# Patient Record
Sex: FEMALE | Race: OTHER | Employment: OTHER | URBAN - METROPOLITAN AREA
[De-identification: names, ages, dates, MRNs, and addresses within clinical notes are randomized per-mention and may not be internally consistent; named-entity substitution may affect disease eponyms.]

---

## 2017-01-11 ENCOUNTER — TELEPHONE (OUTPATIENT)
Dept: INTERNAL MEDICINE CLINIC | Facility: CLINIC | Age: 67
End: 2017-01-11

## 2017-01-23 ENCOUNTER — APPOINTMENT (OUTPATIENT)
Dept: LAB | Age: 67
End: 2017-01-23
Attending: INTERNAL MEDICINE
Payer: COMMERCIAL

## 2017-01-23 DIAGNOSIS — Z00.00 ROUTINE GENERAL MEDICAL EXAMINATION AT A HEALTH CARE FACILITY: ICD-10-CM

## 2017-01-23 LAB
EST. AVERAGE GLUCOSE BLD GHB EST-MCNC: 166 MG/DL (ref 68–126)
HBA1C MFR BLD HPLC: 7.4 % (ref ?–5.7)

## 2017-01-23 PROCEDURE — 83036 HEMOGLOBIN GLYCOSYLATED A1C: CPT | Performed by: INTERNAL MEDICINE

## 2017-01-23 PROCEDURE — 36415 COLL VENOUS BLD VENIPUNCTURE: CPT | Performed by: INTERNAL MEDICINE

## 2017-01-30 RX ORDER — LOSARTAN POTASSIUM 25 MG/1
TABLET ORAL
Qty: 90 TABLET | Refills: 0 | Status: SHIPPED | OUTPATIENT
Start: 2017-01-30 | End: 2017-04-28

## 2017-01-30 RX ORDER — ATORVASTATIN CALCIUM 40 MG/1
TABLET, FILM COATED ORAL
Qty: 90 TABLET | Refills: 0 | Status: SHIPPED | OUTPATIENT
Start: 2017-01-30 | End: 2017-04-28

## 2017-02-10 ENCOUNTER — TELEPHONE (OUTPATIENT)
Dept: INTERNAL MEDICINE CLINIC | Facility: CLINIC | Age: 67
End: 2017-02-10

## 2017-02-10 RX ORDER — GLYBURIDE 5 MG/1
5 TABLET ORAL
Qty: 30 TABLET | Refills: 0 | Status: SHIPPED | OUTPATIENT
Start: 2017-02-10 | End: 2017-02-13

## 2017-02-14 ENCOUNTER — TELEPHONE (OUTPATIENT)
Dept: INTERNAL MEDICINE CLINIC | Facility: CLINIC | Age: 67
End: 2017-02-14

## 2017-02-17 RX ORDER — PEN NEEDLE, DIABETIC 32GX 5/32"
NEEDLE, DISPOSABLE MISCELLANEOUS
Qty: 270 EACH | Refills: 0 | Status: SHIPPED | OUTPATIENT
Start: 2017-02-17 | End: 2017-05-23

## 2017-03-09 ENCOUNTER — OFFICE VISIT (OUTPATIENT)
Dept: INTERNAL MEDICINE CLINIC | Facility: CLINIC | Age: 67
End: 2017-03-09

## 2017-03-09 VITALS
SYSTOLIC BLOOD PRESSURE: 130 MMHG | OXYGEN SATURATION: 98 % | BODY MASS INDEX: 32.38 KG/M2 | TEMPERATURE: 98 F | HEART RATE: 103 BPM | DIASTOLIC BLOOD PRESSURE: 60 MMHG | HEIGHT: 61 IN | RESPIRATION RATE: 20 BRPM | WEIGHT: 171.5 LBS

## 2017-03-09 DIAGNOSIS — I10 ESSENTIAL HYPERTENSION: ICD-10-CM

## 2017-03-09 DIAGNOSIS — H10.9 CONJUNCTIVITIS OF LEFT EYE, UNSPECIFIED CONJUNCTIVITIS TYPE: Primary | ICD-10-CM

## 2017-03-09 DIAGNOSIS — E11.9 TYPE 2 DIABETES MELLITUS WITHOUT COMPLICATION, WITHOUT LONG-TERM CURRENT USE OF INSULIN (HCC): ICD-10-CM

## 2017-03-09 DIAGNOSIS — E78.2 MIXED HYPERLIPIDEMIA: ICD-10-CM

## 2017-03-09 DIAGNOSIS — J06.9 ACUTE UPPER RESPIRATORY INFECTION: ICD-10-CM

## 2017-03-09 PROCEDURE — 99214 OFFICE O/P EST MOD 30 MIN: CPT | Performed by: INTERNAL MEDICINE

## 2017-03-09 RX ORDER — AMOXICILLIN AND CLAVULANATE POTASSIUM 875; 125 MG/1; MG/1
1 TABLET, FILM COATED ORAL 2 TIMES DAILY
Qty: 14 TABLET | Refills: 0 | Status: SHIPPED | OUTPATIENT
Start: 2017-03-09 | End: 2017-03-16

## 2017-03-09 RX ORDER — POLYMYXIN B SULFATE AND TRIMETHOPRIM 1; 10000 MG/ML; [USP'U]/ML
1 SOLUTION OPHTHALMIC EVERY 6 HOURS
Qty: 10 ML | Refills: 0 | Status: SHIPPED | OUTPATIENT
Start: 2017-03-09 | End: 2017-04-05 | Stop reason: ALTCHOICE

## 2017-03-09 NOTE — PATIENT INSTRUCTIONS
For your infection, we will do eyedrops and oral antibiotics. Use the eye drops every six hours.   If you are not better in 1-2 days, follow up with your ophthalmologist.  I have also prescribed an antibiotic (Augmentin) for your upper respiratory infectio

## 2017-03-09 NOTE — PROGRESS NOTES
Leo Hoffmann is a 61year old female. HPI:   Patient presents with:  URI: right side of throat and ear pain  Eye Problem: x1 day left eye red, watery, and pus  Patient presents with acute URI symptoms for two days.   Dealing with sore throat, cough, ATORVASTATIN CALCIUM 40 MG Oral Tab, TAKE 1 TABLET NIGHTLY. (MUST KEEP APPOINTMENT, NO FURTHER REFILLS UNTIL LABS ARE COMPLETED), Disp: 90 tablet, Rfl: 0  •  LOSARTAN POTASSIUM 25 MG Oral Tab, TAKE 1 TABLET DAILY. (KEEP APPOINTMENT, NO FURTHER REFILLS UNTI lb  10/21/16 : 171 lb  10/16/16 : 172 lb  04/11/16 : 166 lb 8 oz  12/30/15 : 167 lb 8 oz    EXAM:   /60 mmHg  Pulse 103  Temp(Src) 98.1 °F (36.7 °C) (Oral)  Resp 20  Ht 61\"  Wt 171 lb 8 oz  BMI 32.42 kg/m2  SpO2 98%  GENERAL: Alert and oriented, wel

## 2017-03-15 ENCOUNTER — OFFICE VISIT (OUTPATIENT)
Dept: INTERNAL MEDICINE CLINIC | Facility: CLINIC | Age: 67
End: 2017-03-15

## 2017-03-15 VITALS
RESPIRATION RATE: 25 BRPM | BODY MASS INDEX: 31.91 KG/M2 | TEMPERATURE: 98 F | SYSTOLIC BLOOD PRESSURE: 110 MMHG | HEART RATE: 100 BPM | DIASTOLIC BLOOD PRESSURE: 68 MMHG | WEIGHT: 169 LBS | OXYGEN SATURATION: 98 % | HEIGHT: 61 IN

## 2017-03-15 DIAGNOSIS — J01.40 ACUTE NON-RECURRENT PANSINUSITIS: Primary | ICD-10-CM

## 2017-03-15 DIAGNOSIS — I10 ESSENTIAL HYPERTENSION: ICD-10-CM

## 2017-03-15 DIAGNOSIS — E11.65 UNCONTROLLED TYPE 2 DIABETES MELLITUS WITH HYPERGLYCEMIA, WITHOUT LONG-TERM CURRENT USE OF INSULIN (HCC): ICD-10-CM

## 2017-03-15 DIAGNOSIS — E78.2 MIXED HYPERLIPIDEMIA: ICD-10-CM

## 2017-03-15 DIAGNOSIS — H69.81 EUSTACHIAN TUBE DYSFUNCTION, RIGHT: ICD-10-CM

## 2017-03-15 PROBLEM — IMO0002 UNCONTROLLED TYPE 2 DIABETES MELLITUS: Status: ACTIVE | Noted: 2017-03-15

## 2017-03-15 PROCEDURE — 99214 OFFICE O/P EST MOD 30 MIN: CPT | Performed by: INTERNAL MEDICINE

## 2017-03-15 RX ORDER — FLUTICASONE PROPIONATE 50 MCG
2 SPRAY, SUSPENSION (ML) NASAL DAILY
Qty: 1 BOTTLE | Refills: 3 | Status: SHIPPED | OUTPATIENT
Start: 2017-03-15 | End: 2018-03-10

## 2017-03-15 NOTE — PROGRESS NOTES
Azael Oden is a 61year old female. HPI:   Patient presents for the following. 1. Has been feelign sick for 1 week. Seen by Dr. Maged Mata on 3/9/2017 and started on polymyxin eye gtt and augmentin. Currently still using both.  Her left eye is no misael Heart Disorder Maternal Grandmother      CVA   • Heart Disorder Maternal Grandfather      MI   • Cancer Brother      esophageal cancer      Past Medical History   Diagnosis Date   • Type II or unspecified type diabetes mellitus without mention of complicat exam of both lower legs/feet is normal as well. ASSESSMENT AND PLAN:   # R eustachian tube d/f 2/2 Acute Bacterial Sinusitis: complete course of augmentin and start flonase. Slowly improving, course and expectations of when 100% recovery is discussed. probiotics for her constipation   - s/p cscope in 4/2013 with polyp and bx with tubular adenoma. Will need repeat cscope in 2018.   - s/p EGD in 4/2013 for long standing gastritis. No Conley's noted but had Hpylori positive.  She completed therapy for this

## 2017-03-15 NOTE — PATIENT INSTRUCTIONS
Please have fasting bloodwork completed at the end of April, 2017.      For your diabetes, please start invokana and take as follows:  Week 1: take invokana 100 mg daily  Week 2: take invokana 200 mg daily  Week 3: take invokana 300 mg daily    Please measu

## 2017-03-24 RX ORDER — OMEPRAZOLE 20 MG/1
CAPSULE, DELAYED RELEASE ORAL
Qty: 90 CAPSULE | Refills: 0 | Status: SHIPPED | OUTPATIENT
Start: 2017-03-24 | End: 2017-06-26

## 2017-03-30 ENCOUNTER — TELEPHONE (OUTPATIENT)
Dept: INTERNAL MEDICINE CLINIC | Facility: CLINIC | Age: 67
End: 2017-03-30

## 2017-03-30 NOTE — TELEPHONE ENCOUNTER
Pharmacy called stating that insurance will not cover invokana with dosing instructions of 3 tabs QD. Pharmacist is asking for RX to be sent for invokana 300 mg 1 tab QD-starting on week #3. Ok to dispense in such a manner?  Pt is currently due to s

## 2017-03-30 NOTE — TELEPHONE ENCOUNTER
Pt notified that alternative RX was sent to pharmacy and to start with 300 mg tabs on week 3. Pt verbalized understanding.

## 2017-04-03 ENCOUNTER — PRIOR ORIGINAL RECORDS (OUTPATIENT)
Dept: OTHER | Age: 67
End: 2017-04-03

## 2017-04-05 ENCOUNTER — OFFICE VISIT (OUTPATIENT)
Dept: INTERNAL MEDICINE CLINIC | Facility: CLINIC | Age: 67
End: 2017-04-05

## 2017-04-05 VITALS
WEIGHT: 167.5 LBS | TEMPERATURE: 98 F | DIASTOLIC BLOOD PRESSURE: 54 MMHG | OXYGEN SATURATION: 96 % | HEART RATE: 98 BPM | HEIGHT: 61 IN | RESPIRATION RATE: 22 BRPM | SYSTOLIC BLOOD PRESSURE: 104 MMHG | BODY MASS INDEX: 31.63 KG/M2

## 2017-04-05 DIAGNOSIS — E11.65 UNCONTROLLED TYPE 2 DIABETES MELLITUS WITH HYPERGLYCEMIA, WITHOUT LONG-TERM CURRENT USE OF INSULIN (HCC): ICD-10-CM

## 2017-04-05 DIAGNOSIS — L30.9 VULVAR DERMATITIS: ICD-10-CM

## 2017-04-05 DIAGNOSIS — N89.8 VAGINAL ITCHING: ICD-10-CM

## 2017-04-05 DIAGNOSIS — R30.0 DYSURIA: Primary | ICD-10-CM

## 2017-04-05 DIAGNOSIS — E11.649 HYPOGLYCEMIA ASSOCIATED WITH TYPE 2 DIABETES MELLITUS (HCC): ICD-10-CM

## 2017-04-05 DIAGNOSIS — I10 ESSENTIAL HYPERTENSION: ICD-10-CM

## 2017-04-05 DIAGNOSIS — K21.00 GASTROESOPHAGEAL REFLUX DISEASE WITH ESOPHAGITIS: ICD-10-CM

## 2017-04-05 PROCEDURE — 87510 GARDNER VAG DNA DIR PROBE: CPT | Performed by: INTERNAL MEDICINE

## 2017-04-05 PROCEDURE — 87480 CANDIDA DNA DIR PROBE: CPT | Performed by: INTERNAL MEDICINE

## 2017-04-05 PROCEDURE — 87660 TRICHOMONAS VAGIN DIR PROBE: CPT | Performed by: INTERNAL MEDICINE

## 2017-04-05 PROCEDURE — 99214 OFFICE O/P EST MOD 30 MIN: CPT | Performed by: INTERNAL MEDICINE

## 2017-04-05 PROCEDURE — 81003 URINALYSIS AUTO W/O SCOPE: CPT | Performed by: INTERNAL MEDICINE

## 2017-04-05 RX ORDER — CLOTRIMAZOLE AND BETAMETHASONE DIPROPIONATE 10; .64 MG/G; MG/G
1 CREAM TOPICAL 2 TIMES DAILY PRN
Qty: 60 G | Refills: 0 | Status: SHIPPED | OUTPATIENT
Start: 2017-04-05 | End: 2019-06-07

## 2017-04-05 NOTE — PATIENT INSTRUCTIONS
Please measure your blood sugar fasting (in the morning) and before lunch or dinner. Please record these values and drop them off to the office or e-mail them every 2 weeks so I can adjust your medications.    Please call us if your blood sugar is <75 or > itching. For severe itching in a small area, apply an ice pack wrapped in a thin towel. Do this for 20 minutes 3 to 4 times a day. · You can also try wet dressings. One way to do this is to wear a wet piece of clothing under a dry one.  Wear a damp shirt u open blisters  Date Last Reviewed: 9/1/2016  © 7082-3631 [facility] [address] [place] [address], [address] [place], [place] [address]. All rights reserved. This information is not intended as a substitute for professional medical care.  Always follow your healthcar

## 2017-04-05 NOTE — PROGRESS NOTES
Alfonso Hatfield is a 61year old female. HPI:   Patient presents for the followin. Diabetes: brings her her BS log which shows average FBS is 175 over past 17 days. Her post prandials are <140s.  She has had 2 episodes of evening symptomatic hypogl complication, not stated as uncontrolled    • Dyslipidemia    • Diverticulitis    • Vitamin D deficiency    • Gastrointestinal bleeding      PUDz in setting of ASA/boniva use   • Osteopenia    • Other and unspecified hyperlipidemia    • Type 2 diabetes eddy moisturizers, will refer to derm. # Urinary Incontinence/OAB: chronic, stable. Has already had bladder surgery and PT. Too busy with grandchildren to go to another round of PT.  If no improvement will see Dr. Dorothy Lala (urology) and we will consider medicatio in 4/2013 for long standing gastritis. No Conley's noted but had Hpylori positive. She completed therapy for this and confirmed resolution. She takes omeprazole 20 mg on a prn basis.    # Health Maintenance: CPX on 10/21/2016  Colon Cancer Screening: s/p c

## 2017-04-13 ENCOUNTER — APPOINTMENT (OUTPATIENT)
Dept: LAB | Age: 67
End: 2017-04-13
Attending: INTERNAL MEDICINE
Payer: COMMERCIAL

## 2017-04-13 DIAGNOSIS — E78.2 MIXED HYPERLIPIDEMIA: ICD-10-CM

## 2017-04-13 DIAGNOSIS — I10 ESSENTIAL HYPERTENSION: ICD-10-CM

## 2017-04-13 PROCEDURE — 82043 UR ALBUMIN QUANTITATIVE: CPT | Performed by: INTERNAL MEDICINE

## 2017-04-13 PROCEDURE — 36415 COLL VENOUS BLD VENIPUNCTURE: CPT | Performed by: INTERNAL MEDICINE

## 2017-04-13 PROCEDURE — 83036 HEMOGLOBIN GLYCOSYLATED A1C: CPT | Performed by: INTERNAL MEDICINE

## 2017-04-13 PROCEDURE — 80048 BASIC METABOLIC PNL TOTAL CA: CPT | Performed by: INTERNAL MEDICINE

## 2017-04-13 PROCEDURE — 82570 ASSAY OF URINE CREATININE: CPT | Performed by: INTERNAL MEDICINE

## 2017-04-13 PROCEDURE — 84450 TRANSFERASE (AST) (SGOT): CPT | Performed by: INTERNAL MEDICINE

## 2017-04-13 PROCEDURE — 80061 LIPID PANEL: CPT | Performed by: INTERNAL MEDICINE

## 2017-04-13 PROCEDURE — 84460 ALANINE AMINO (ALT) (SGPT): CPT | Performed by: INTERNAL MEDICINE

## 2017-04-28 RX ORDER — LOSARTAN POTASSIUM 25 MG/1
TABLET ORAL
Qty: 90 TABLET | Refills: 1 | Status: SHIPPED | OUTPATIENT
Start: 2017-04-28 | End: 2017-10-30

## 2017-04-28 RX ORDER — ATORVASTATIN CALCIUM 40 MG/1
TABLET, FILM COATED ORAL
Qty: 90 TABLET | Refills: 1 | Status: SHIPPED | OUTPATIENT
Start: 2017-04-28 | End: 2017-10-30

## 2017-05-23 ENCOUNTER — TELEPHONE (OUTPATIENT)
Dept: INTERNAL MEDICINE CLINIC | Facility: CLINIC | Age: 67
End: 2017-05-23

## 2017-05-23 NOTE — TELEPHONE ENCOUNTER
I just want to double check that this patient is really using two different pen. I tried to call the patient there was no answer.

## 2017-05-25 DIAGNOSIS — E11.649 HYPOGLYCEMIA ASSOCIATED WITH DIABETES (HCC): Primary | ICD-10-CM

## 2017-05-25 NOTE — TELEPHONE ENCOUNTER
Patient called in stating her refills were sent to the incorrect pharmacy. They should be sent to her [facility]. Please call patient with questions/status on the re-send.

## 2017-05-31 RX ORDER — GLYBURIDE 5 MG/1
TABLET ORAL
Qty: 135 TABLET | Refills: 0 | Status: SHIPPED | OUTPATIENT
Start: 2017-05-31 | End: 2017-08-24

## 2017-05-31 NOTE — PROGRESS NOTES
Her home BS log from 4/30/2017-5/20/2017 shows fasting sugars 160-200 still. Her pre-lunch sugars are <150s. But she had two episodes of hypoglycemia with BS 66 prior to lunch. We need to confirm if she is still taking invokana 300 mg and glyburide.

## 2017-06-15 ENCOUNTER — DIABETIC EDUCATION (OUTPATIENT)
Dept: ENDOCRINOLOGY CLINIC | Facility: CLINIC | Age: 67
End: 2017-06-15

## 2017-06-15 VITALS — HEIGHT: 61 IN | BODY MASS INDEX: 30.96 KG/M2 | WEIGHT: 164 LBS

## 2017-06-15 DIAGNOSIS — E11.65 UNCONTROLLED TYPE 2 DIABETES MELLITUS WITH HYPERGLYCEMIA, WITHOUT LONG-TERM CURRENT USE OF INSULIN (HCC): Primary | ICD-10-CM

## 2017-06-15 PROCEDURE — G0108 DIAB MANAGE TRN  PER INDIV: HCPCS | Performed by: DIETITIAN, REGISTERED

## 2017-06-15 NOTE — PROGRESS NOTES
Justin Streeter  [id number] was seen for Diabetic Medical Nutrition Therapy:    Date: 6/15/2017  Start time: 2 pm  End time: 3 pm    Assessment: Ht 61\"  Wt 164 lb  BMI 31.00 kg/m2    HGBA1C (%)   Date Value   04/13/2017 7.3*   03/21/2014 6.6*   --------

## 2017-06-26 RX ORDER — OMEPRAZOLE 20 MG/1
CAPSULE, DELAYED RELEASE ORAL
Qty: 90 CAPSULE | Refills: 3 | Status: SHIPPED | OUTPATIENT
Start: 2017-06-26 | End: 2017-07-28

## 2017-06-29 RX ORDER — CANAGLIFLOZIN 300 MG/1
TABLET, FILM COATED ORAL
Qty: 90 TABLET | Refills: 0 | Status: SHIPPED | OUTPATIENT
Start: 2017-06-29 | End: 2017-09-29

## 2017-07-13 ENCOUNTER — APPOINTMENT (OUTPATIENT)
Dept: LAB | Age: 67
End: 2017-07-13
Attending: INTERNAL MEDICINE
Payer: MEDICARE

## 2017-07-13 DIAGNOSIS — E11.65 UNCONTROLLED TYPE 2 DIABETES MELLITUS WITH HYPERGLYCEMIA, WITHOUT LONG-TERM CURRENT USE OF INSULIN (HCC): ICD-10-CM

## 2017-07-13 LAB
EST. AVERAGE GLUCOSE BLD GHB EST-MCNC: 154 MG/DL (ref 68–126)
HBA1C MFR BLD HPLC: 7 % (ref ?–5.7)

## 2017-07-13 PROCEDURE — 36415 COLL VENOUS BLD VENIPUNCTURE: CPT

## 2017-07-13 PROCEDURE — 83036 HEMOGLOBIN GLYCOSYLATED A1C: CPT

## 2017-07-28 RX ORDER — OMEPRAZOLE 20 MG/1
CAPSULE, DELAYED RELEASE ORAL
Qty: 90 CAPSULE | Refills: 3 | Status: SHIPPED | OUTPATIENT
Start: 2017-07-28 | End: 2019-04-22

## 2017-08-24 DIAGNOSIS — E11.649 HYPOGLYCEMIA ASSOCIATED WITH DIABETES (HCC): ICD-10-CM

## 2017-08-25 RX ORDER — PEN NEEDLE, DIABETIC 32GX 5/32"
NEEDLE, DISPOSABLE MISCELLANEOUS
Qty: 270 EACH | Refills: 0 | Status: SHIPPED | OUTPATIENT
Start: 2017-08-25 | End: 2017-11-29

## 2017-08-25 RX ORDER — GLYBURIDE 5 MG/1
TABLET ORAL
Qty: 135 TABLET | Refills: 0 | Status: SHIPPED | OUTPATIENT
Start: 2017-08-25 | End: 2017-11-21

## 2017-09-06 ENCOUNTER — TELEPHONE (OUTPATIENT)
Dept: INTERNAL MEDICINE CLINIC | Facility: CLINIC | Age: 67
End: 2017-09-06

## 2017-09-06 DIAGNOSIS — Z12.39 SCREENING FOR BREAST CANCER: Primary | ICD-10-CM

## 2017-09-06 NOTE — TELEPHONE ENCOUNTER
I have ordered agusto fabian 2d/3d but please ask patient to call her insurance to make sure it is covered.  TY

## 2017-09-28 ENCOUNTER — HOSPITAL ENCOUNTER (OUTPATIENT)
Dept: MAMMOGRAPHY | Age: 67
Discharge: HOME | End: 2017-09-28
Attending: INTERNAL MEDICINE
Payer: MEDICARE

## 2017-09-28 DIAGNOSIS — Z12.39 SCREENING FOR BREAST CANCER: ICD-10-CM

## 2017-09-28 PROCEDURE — 77063 BREAST TOMOSYNTHESIS BI: CPT | Performed by: INTERNAL MEDICINE

## 2017-09-28 PROCEDURE — 77067 SCR MAMMO BI INCL CAD: CPT | Performed by: INTERNAL MEDICINE

## 2017-09-29 ENCOUNTER — TELEPHONE (OUTPATIENT)
Dept: INTERNAL MEDICINE CLINIC | Facility: CLINIC | Age: 67
End: 2017-09-29

## 2017-09-29 NOTE — TELEPHONE ENCOUNTER
Patient called requesting refill refill for:  INVOKANA 300 MG Oral Tab    To be sent to:  [facility], [address] [facility], [phone], [phone]    Also would like to have blood work done prior to her u

## 2017-09-30 RX ORDER — CANAGLIFLOZIN 300 MG/1
TABLET, FILM COATED ORAL
Qty: 90 TABLET | Refills: 0 | Status: SHIPPED | OUTPATIENT
Start: 2017-09-30 | End: 2017-09-30

## 2017-10-11 ENCOUNTER — TELEPHONE (OUTPATIENT)
Dept: INTERNAL MEDICINE CLINIC | Facility: CLINIC | Age: 67
End: 2017-10-11

## 2017-10-11 DIAGNOSIS — I10 ESSENTIAL HYPERTENSION: ICD-10-CM

## 2017-10-11 DIAGNOSIS — E11.65 UNCONTROLLED TYPE 2 DIABETES MELLITUS WITH HYPERGLYCEMIA, WITHOUT LONG-TERM CURRENT USE OF INSULIN (HCC): ICD-10-CM

## 2017-10-11 DIAGNOSIS — E78.2 MIXED HYPERLIPIDEMIA: Primary | ICD-10-CM

## 2017-10-25 ENCOUNTER — APPOINTMENT (OUTPATIENT)
Dept: LAB | Age: 67
End: 2017-10-25
Attending: INTERNAL MEDICINE
Payer: MEDICARE

## 2017-10-25 DIAGNOSIS — E11.65 UNCONTROLLED TYPE 2 DIABETES MELLITUS WITH HYPERGLYCEMIA, WITHOUT LONG-TERM CURRENT USE OF INSULIN (HCC): ICD-10-CM

## 2017-10-25 DIAGNOSIS — E78.2 MIXED HYPERLIPIDEMIA: ICD-10-CM

## 2017-10-25 DIAGNOSIS — I10 ESSENTIAL HYPERTENSION: ICD-10-CM

## 2017-10-25 PROCEDURE — 80048 BASIC METABOLIC PNL TOTAL CA: CPT

## 2017-10-25 PROCEDURE — 84450 TRANSFERASE (AST) (SGOT): CPT

## 2017-10-25 PROCEDURE — 84460 ALANINE AMINO (ALT) (SGPT): CPT

## 2017-10-25 PROCEDURE — 36415 COLL VENOUS BLD VENIPUNCTURE: CPT

## 2017-10-25 PROCEDURE — 83036 HEMOGLOBIN GLYCOSYLATED A1C: CPT

## 2017-10-27 ENCOUNTER — APPOINTMENT (OUTPATIENT)
Dept: LAB | Age: 67
End: 2017-10-27
Attending: PHYSICIAN ASSISTANT
Payer: MEDICARE

## 2017-10-27 ENCOUNTER — OFFICE VISIT (OUTPATIENT)
Dept: INTERNAL MEDICINE CLINIC | Facility: CLINIC | Age: 67
End: 2017-10-27

## 2017-10-27 VITALS
BODY MASS INDEX: 30.49 KG/M2 | WEIGHT: 161.5 LBS | DIASTOLIC BLOOD PRESSURE: 78 MMHG | TEMPERATURE: 99 F | OXYGEN SATURATION: 97 % | HEART RATE: 82 BPM | RESPIRATION RATE: 16 BRPM | HEIGHT: 61 IN | SYSTOLIC BLOOD PRESSURE: 120 MMHG

## 2017-10-27 DIAGNOSIS — E11.9 CONTROLLED TYPE 2 DIABETES MELLITUS WITHOUT COMPLICATION, WITHOUT LONG-TERM CURRENT USE OF INSULIN (HCC): ICD-10-CM

## 2017-10-27 DIAGNOSIS — E66.9 OBESITY (BMI 30-39.9): ICD-10-CM

## 2017-10-27 DIAGNOSIS — I10 ESSENTIAL HYPERTENSION: ICD-10-CM

## 2017-10-27 DIAGNOSIS — M85.80 OSTEOPENIA, UNSPECIFIED LOCATION: ICD-10-CM

## 2017-10-27 DIAGNOSIS — K21.00 GASTROESOPHAGEAL REFLUX DISEASE WITH ESOPHAGITIS: ICD-10-CM

## 2017-10-27 DIAGNOSIS — E78.2 MIXED HYPERLIPIDEMIA: ICD-10-CM

## 2017-10-27 DIAGNOSIS — Z11.59 NEED FOR HEPATITIS C SCREENING TEST: ICD-10-CM

## 2017-10-27 DIAGNOSIS — Z00.00 ENCOUNTER FOR ANNUAL HEALTH EXAMINATION: Primary | ICD-10-CM

## 2017-10-27 PROCEDURE — 90670 PCV13 VACCINE IM: CPT | Performed by: PHYSICIAN ASSISTANT

## 2017-10-27 PROCEDURE — 86803 HEPATITIS C AB TEST: CPT

## 2017-10-27 PROCEDURE — G0009 ADMIN PNEUMOCOCCAL VACCINE: HCPCS | Performed by: PHYSICIAN ASSISTANT

## 2017-10-27 PROCEDURE — 99214 OFFICE O/P EST MOD 30 MIN: CPT | Performed by: PHYSICIAN ASSISTANT

## 2017-10-27 PROCEDURE — G0402 INITIAL PREVENTIVE EXAM: HCPCS | Performed by: PHYSICIAN ASSISTANT

## 2017-10-27 PROCEDURE — 36415 COLL VENOUS BLD VENIPUNCTURE: CPT

## 2017-10-27 NOTE — TELEPHONE ENCOUNTER
Patient had an appointment today and forgot to request her refills for:     atorvastatin 40 MG   Losartan Potassium 25 MG     To be sent to: [facility]

## 2017-10-27 NOTE — PATIENT INSTRUCTIONS
You received Prevnar 12 today (pneumonia vaccine). Hepatitis C screening test today. Please do your HIGH DOSE flu shot at the pharmacy ASAP. Please do your lab work April 2018. Remember to fast for 10-12 hours but drink plenty of water.     Mamta Bashir

## 2017-10-27 NOTE — PROGRESS NOTES
HPI:   Silvano Hooker is a 74year old female who presents for a Medicare Initial Preventative Physical Exam (Welcome to Medicare- < 12 months on Medicare) AND f/u of chronic health issues. DM - compliant with meds, no SEs. AM FBS in the 140-150s. or albuminuria; Type II or unspecified type diabetes mellitus without mention of complication, not stated as uncontrolled; and Vitamin D deficiency.     She  has a past surgical history that includes colonoscopy (01/01/2008); upper gi endoscopy,exam (01/01/ Eyes Chart Acuity: 20/25   Able To Tolerate Visual Acuity: No      GENERAL: A&O, well developed, well nourished, in no apparent distress  SKIN: no rashes, no suspicious lesions  HEENT: PERRLA, EOMI, conjunctiva clear, TMs clear and flat, MMM, OP clear, tur BASIC METABOLIC PANEL (8); Future  -     LIPID PANEL; Future  -     AST (SGOT); Future  -     ALT (SGPT); Future  -     HEMOGLOBIN A1C; Future  -     MICROALB/CREAT RATIO, RANDOM URINE; Future    Mixed hyperlipidemia  -     BASIC METABOLIC PANEL (8);  Futur Foye Riedel does not have a Power of  for [employer] on file in [place]. Discussed with patient and provided information           PLAN:  The patient indicates understanding of these issues and agrees to the plan.   Return in 6 months (on 4/27/2 Advance Directives     Do you have a healthcare power of ?: No    Do you have a living will?: No     Please go to \"Cognitive Assessment\" under Medicare Assessment section in Charting, test patient and document.     Then, refresh your progress 21-65 or Pap+HPV every 5 yrs age 33-67, age 72 and older at high risk There are no preventive care reminders to display for this patient.  Update Health Maintenance if applicable    Chlamydia  Annually if high risk No results found for: CHLAMYDIA No flowshe 10/25/2017 0.68    No flowsheet data found. BUN  Annually BUN (mg/dL)   Date Value   10/25/2017 14   03/21/2014 15    No flowsheet data found. Drug Serum Conc  Annually No results found for: DIGOXIN, DIG, VALP No flowsheet data found.     Diabetes are controlled at this time. - takes miralax and probiotics more on a prn basis for her constipation   - s/p cscope in 4/2013 with polyp and bx with tubular adenoma. Will need repeat cscope in 2018.  - s/p EGD in 4/2013 for long standing gastritis.  No Ba

## 2017-10-30 ENCOUNTER — TELEPHONE (OUTPATIENT)
Dept: INTERNAL MEDICINE CLINIC | Facility: CLINIC | Age: 67
End: 2017-10-30

## 2017-10-30 RX ORDER — LOSARTAN POTASSIUM 25 MG/1
TABLET ORAL
Qty: 90 TABLET | Refills: 1 | Status: SHIPPED | OUTPATIENT
Start: 2017-10-30 | End: 2018-04-29

## 2017-10-30 RX ORDER — ATORVASTATIN CALCIUM 40 MG/1
TABLET, FILM COATED ORAL
Qty: 90 TABLET | Refills: 1 | Status: SHIPPED | OUTPATIENT
Start: 2017-10-30 | End: 2018-04-29

## 2017-10-30 NOTE — TELEPHONE ENCOUNTER
Patient had a flu shot at [facility] Sunday and there was some serum that ran down her arm.   She is concerned the flu shot will not be effective and wants to know if there is a test to find out  Please call her at [phone]

## 2017-10-31 RX ORDER — LOSARTAN POTASSIUM 25 MG/1
TABLET ORAL
Qty: 90 TABLET | Refills: 1 | OUTPATIENT
Start: 2017-10-31

## 2017-10-31 RX ORDER — ATORVASTATIN CALCIUM 40 MG/1
TABLET, FILM COATED ORAL
Qty: 90 TABLET | Refills: 1 | OUTPATIENT
Start: 2017-10-31

## 2017-11-02 NOTE — TELEPHONE ENCOUNTER
Per patient, \" quite a lot of fluid ran down my arm, also no tenderness  @ site\" \"I don't believe I received enough flu vaccine, [facility] offered to repeat vaccine but patient hesitant, is there a test she can have done? \" Please advise and issue flu

## 2017-11-02 NOTE — TELEPHONE ENCOUNTER
Patient informed of orders, patient verbalized understanding. Patient will contact [facility] to discuss this issue and if needed will schedule appointment in our office for High dose flu vaccine.

## 2017-11-21 ENCOUNTER — TELEPHONE (OUTPATIENT)
Dept: INTERNAL MEDICINE CLINIC | Facility: CLINIC | Age: 67
End: 2017-11-21

## 2017-11-21 RX ORDER — GLYBURIDE 5 MG/1
TABLET ORAL
Qty: 135 TABLET | Refills: 1 | Status: SHIPPED | OUTPATIENT
Start: 2017-11-21 | End: 2018-05-30

## 2017-11-21 NOTE — TELEPHONE ENCOUNTER
Patient called to request refills for Canagliflozin (INVOKANA) 300 MG and GLYBURIDE 5 MG be sent to [facility].  Please call patient back

## 2017-11-29 DIAGNOSIS — E11.649 HYPOGLYCEMIA ASSOCIATED WITH DIABETES (HCC): ICD-10-CM

## 2017-11-29 RX ORDER — EXENATIDE 250 UG/ML
INJECTION SUBCUTANEOUS
Qty: 7.2 ML | Refills: 1 | Status: SHIPPED | OUTPATIENT
Start: 2017-11-29 | End: 2018-06-20

## 2017-11-29 RX ORDER — PEN NEEDLE, DIABETIC 32GX 5/32"
NEEDLE, DISPOSABLE MISCELLANEOUS
Qty: 270 EACH | Refills: 0 | Status: SHIPPED | OUTPATIENT
Start: 2017-11-29 | End: 2018-02-22

## 2017-11-29 NOTE — TELEPHONE ENCOUNTER
Medication(s) to Refill:   Pending Prescriptions Disp Refills    BD PEN NEEDLE KEVIN U/F 32G X 4 MM Does not apply Misc [Pharmacy Med Name: BD PEN REMA UF KEVIN 4MM 90'S 32G5/32] 270 each 0     Sig: USE 1 PEN TWICE A DAY   (DUE FOR OFFICE VISIT IN APRIL)

## 2018-02-14 ENCOUNTER — OFFICE VISIT (OUTPATIENT)
Dept: INTERNAL MEDICINE CLINIC | Facility: CLINIC | Age: 68
End: 2018-02-14

## 2018-02-14 VITALS
TEMPERATURE: 98 F | HEART RATE: 96 BPM | BODY MASS INDEX: 31 KG/M2 | WEIGHT: 166 LBS | OXYGEN SATURATION: 98 % | DIASTOLIC BLOOD PRESSURE: 58 MMHG | SYSTOLIC BLOOD PRESSURE: 120 MMHG | RESPIRATION RATE: 16 BRPM

## 2018-02-14 DIAGNOSIS — R10.13 EPIGASTRIC PAIN: Primary | ICD-10-CM

## 2018-02-14 PROCEDURE — 99213 OFFICE O/P EST LOW 20 MIN: CPT | Performed by: NURSE PRACTITIONER

## 2018-02-14 RX ORDER — AMOXICILLIN 500 MG/1
CAPSULE ORAL
COMMUNITY
Start: 2018-02-07 | End: 2018-03-30 | Stop reason: ALTCHOICE

## 2018-02-14 NOTE — PATIENT INSTRUCTIONS
Epigastric Pain (Uncertain Cause)     Epigastric pain can be a sign of disease in the upper abdomen.  Common causes include:  · Acid reflux (stomach acid flowing up into the esophagus)  · Gastritis (irritation of the stomach lining)  · Peptic Ulcer Diseas · Eat slowly and chew food well before swallowing. Symptoms of gastritis can be worsened by certain foods.  Limit or avoid fatty, fried, and spicy foods, as well as coffee, chocolate, mint, and foods with high acid content such as tomatoes and citrus fruit While they live in your stomach lining, H. pylori make urea, a natural compound in the body. As the bacteria make urea, they create ammonia and bicarbonate. Traces of these can be found in your breath.  This is proof that H. pylori live in your body.   Why If this test is done too soon after treatment, you could get a false-positive result. This means that the test could show that H. pylori are still present even though they are not.  To get the best results, you should be retested at least 4 weeks after cathryn

## 2018-02-14 NOTE — PROGRESS NOTES
Hetal Clayton is a 74year old female who presents for abdominal pain. The patient complaints of abdominal pain. Pain is located at Epigastric. Pain is described as sharp. Severity is mild, off and on. Associated symptoms: loose stools.  The pain radi Outpatient Prescriptions:  amoxicillin 500 MG Oral Cap  Disp:  Rfl:    BD PEN NEEDLE KEVIN U/F 32G X 4 MM Does not apply Misc USE 1 PEN TWICE A DAY   (DUE FOR OFFICE VISIT IN APRIL) Disp: 270 each Rfl: 0   BYETTA 10 MCG PEN 10 MCG/0.04ML Subcutaneous Soluti Type II or unspecified type diabetes mellitus without mention of complication, not stated as uncontrolled    • Vitamin D deficiency       Past Surgical History:  01/01/2008: COLONOSCOPY      Comment: Hemorrhoid, Recheck 7-10 years  4.2013: COLONOSCOPY tenderness, Wheeler negative, no guarding, no Psoas sign. No hernias. EXTREMITIES: no edema      ASSESSMENT AND PLAN:   Hernandez Peterson is a 74year old female who presents for abdominal pain.     1. Epigastric pain  - HELICOBACTER PYLORI BREATH TEST, ADULT

## 2018-02-22 DIAGNOSIS — E11.649 HYPOGLYCEMIA ASSOCIATED WITH DIABETES (HCC): ICD-10-CM

## 2018-02-22 RX ORDER — PEN NEEDLE, DIABETIC 32GX 5/32"
NEEDLE, DISPOSABLE MISCELLANEOUS
Qty: 270 EACH | Refills: 0 | Status: SHIPPED | OUTPATIENT
Start: 2018-02-22 | End: 2018-05-30

## 2018-03-07 ENCOUNTER — APPOINTMENT (OUTPATIENT)
Dept: LAB | Age: 68
End: 2018-03-07
Attending: NURSE PRACTITIONER
Payer: MEDICARE

## 2018-03-07 DIAGNOSIS — R10.13 EPIGASTRIC PAIN: ICD-10-CM

## 2018-03-07 PROCEDURE — 83013 H PYLORI (C-13) BREATH: CPT

## 2018-03-08 LAB — H. PYLORI BREATH TEST: NEGATIVE

## 2018-03-14 ENCOUNTER — TELEPHONE (OUTPATIENT)
Dept: INTERNAL MEDICINE CLINIC | Facility: CLINIC | Age: 68
End: 2018-03-14

## 2018-03-14 NOTE — TELEPHONE ENCOUNTER
Patient informed of orders, patient verbalized understanding.  Patient will call in 2 wks with update

## 2018-03-14 NOTE — TELEPHONE ENCOUNTER
Increase omeprazole to 40 mg daily for the next 4 weeks. If no improvement noted in 2 weeks follow up with Dr Yan Chol.

## 2018-03-30 ENCOUNTER — OFFICE VISIT (OUTPATIENT)
Dept: INTERNAL MEDICINE CLINIC | Facility: CLINIC | Age: 68
End: 2018-03-30

## 2018-03-30 ENCOUNTER — TELEPHONE (OUTPATIENT)
Dept: INTERNAL MEDICINE CLINIC | Facility: CLINIC | Age: 68
End: 2018-03-30

## 2018-03-30 ENCOUNTER — LAB ENCOUNTER (OUTPATIENT)
Dept: LAB | Age: 68
End: 2018-03-30
Attending: INTERNAL MEDICINE
Payer: MEDICARE

## 2018-03-30 VITALS
SYSTOLIC BLOOD PRESSURE: 110 MMHG | RESPIRATION RATE: 16 BRPM | DIASTOLIC BLOOD PRESSURE: 58 MMHG | OXYGEN SATURATION: 95 % | BODY MASS INDEX: 31.75 KG/M2 | TEMPERATURE: 98 F | HEART RATE: 80 BPM | HEIGHT: 60.75 IN | WEIGHT: 166 LBS

## 2018-03-30 DIAGNOSIS — E66.9 OBESITY (BMI 30-39.9): ICD-10-CM

## 2018-03-30 DIAGNOSIS — I10 ESSENTIAL HYPERTENSION: ICD-10-CM

## 2018-03-30 DIAGNOSIS — D36.9 TUBULAR ADENOMA: ICD-10-CM

## 2018-03-30 DIAGNOSIS — E78.2 MIXED HYPERLIPIDEMIA: ICD-10-CM

## 2018-03-30 DIAGNOSIS — M85.80 OSTEOPENIA, UNSPECIFIED LOCATION: ICD-10-CM

## 2018-03-30 DIAGNOSIS — E11.9 CONTROLLED TYPE 2 DIABETES MELLITUS WITHOUT COMPLICATION, WITHOUT LONG-TERM CURRENT USE OF INSULIN (HCC): ICD-10-CM

## 2018-03-30 DIAGNOSIS — R10.12 LEFT UPPER QUADRANT ABDOMINAL PAIN OF UNKNOWN ETIOLOGY: ICD-10-CM

## 2018-03-30 DIAGNOSIS — K62.5 RECTAL BLEEDING: ICD-10-CM

## 2018-03-30 DIAGNOSIS — Z00.00 ENCOUNTER FOR ANNUAL HEALTH EXAMINATION: ICD-10-CM

## 2018-03-30 DIAGNOSIS — R10.12 LEFT UPPER QUADRANT ABDOMINAL PAIN OF UNKNOWN ETIOLOGY: Primary | ICD-10-CM

## 2018-03-30 DIAGNOSIS — K21.00 GASTROESOPHAGEAL REFLUX DISEASE WITH ESOPHAGITIS: ICD-10-CM

## 2018-03-30 PROCEDURE — 83036 HEMOGLOBIN GLYCOSYLATED A1C: CPT

## 2018-03-30 PROCEDURE — 85025 COMPLETE CBC W/AUTO DIFF WBC: CPT

## 2018-03-30 PROCEDURE — 84460 ALANINE AMINO (ALT) (SGPT): CPT

## 2018-03-30 PROCEDURE — 80048 BASIC METABOLIC PNL TOTAL CA: CPT

## 2018-03-30 PROCEDURE — 36415 COLL VENOUS BLD VENIPUNCTURE: CPT

## 2018-03-30 PROCEDURE — 99214 OFFICE O/P EST MOD 30 MIN: CPT | Performed by: INTERNAL MEDICINE

## 2018-03-30 PROCEDURE — 80061 LIPID PANEL: CPT

## 2018-03-30 PROCEDURE — 82043 UR ALBUMIN QUANTITATIVE: CPT

## 2018-03-30 PROCEDURE — 84450 TRANSFERASE (AST) (SGOT): CPT

## 2018-03-30 PROCEDURE — 82570 ASSAY OF URINE CREATININE: CPT

## 2018-03-30 RX ORDER — LANCETS 33 GAUGE
1 EACH MISCELLANEOUS
Qty: 1 BOX | Refills: 0 | Status: SHIPPED | OUTPATIENT
Start: 2018-03-30 | End: 2019-03-30

## 2018-03-30 RX ORDER — BLOOD SUGAR DIAGNOSTIC
STRIP MISCELLANEOUS
Qty: 100 STRIP | Refills: 11 | Status: SHIPPED | OUTPATIENT
Start: 2018-03-30 | End: 2018-04-02

## 2018-03-30 NOTE — TELEPHONE ENCOUNTER
Glucose Blood (ONETOUCH VERIO) In Vitro Strip 100 strip 11 3/30/2018 3/30/2019   Sig :  Use as directed.  DX E11.9       Pharmacist called and needs clarification for strips and or lancets please callback

## 2018-03-30 NOTE — PROGRESS NOTES
Luisa Ellington is a 74year old female. HPI:   Patient presents for abdominal discomfort x 5 weeks. 1. LUQ abdominal pain: started about 5 weeks ago. It is a dull ache that is sometimes severe. It does not last all day and is not present daily.  Somet from Last 6 Encounters:  03/30/18 : 166 lb  02/14/18 : 166 lb  10/27/17 : 161 lb 8 oz  06/15/17 : 164 lb  04/05/17 : 167 lb 8 oz  03/15/17 : 169 lb        Mayi [Ibandronate*    Unknown    Comment:Bleeding ulcers  Lisinopril              Coughing    Comme no thyromegaly  LUNGS: clear to auscultation bilateraly, no c/w/r  CARDIO: RRR without g/m/r  GI: obese, softly distended, non tender, umbilical hernia that is easily reducible and non tender, has very mild pressure-like sensation in LUQ.  No rebound/guardi calcium/vit D supplementation as well. Encourage weight bearing excercises as this is not an active part of her lifestyle.   # Constipation, h/o Diverticulitis (x2 episodes), colonic polyps, antral gastritis. Symptoms are controlled at this time.  No consti

## 2018-03-31 DIAGNOSIS — E11.65 UNCONTROLLED TYPE 2 DIABETES MELLITUS WITH HYPERGLYCEMIA, WITHOUT LONG-TERM CURRENT USE OF INSULIN (HCC): Primary | ICD-10-CM

## 2018-04-02 ENCOUNTER — TELEPHONE (OUTPATIENT)
Dept: INTERNAL MEDICINE CLINIC | Facility: CLINIC | Age: 68
End: 2018-04-02

## 2018-04-02 DIAGNOSIS — E78.2 MIXED HYPERLIPIDEMIA: ICD-10-CM

## 2018-04-02 DIAGNOSIS — R10.12 LEFT UPPER QUADRANT ABDOMINAL PAIN OF UNKNOWN ETIOLOGY: ICD-10-CM

## 2018-04-02 DIAGNOSIS — D36.9 TUBULAR ADENOMA: ICD-10-CM

## 2018-04-02 DIAGNOSIS — I10 ESSENTIAL HYPERTENSION: ICD-10-CM

## 2018-04-02 DIAGNOSIS — E11.9 CONTROLLED TYPE 2 DIABETES MELLITUS WITHOUT COMPLICATION, WITHOUT LONG-TERM CURRENT USE OF INSULIN (HCC): ICD-10-CM

## 2018-04-02 DIAGNOSIS — K62.5 RECTAL BLEEDING: ICD-10-CM

## 2018-04-02 RX ORDER — BLOOD SUGAR DIAGNOSTIC
STRIP MISCELLANEOUS
Qty: 200 STRIP | Refills: 11 | Status: SHIPPED | OUTPATIENT
Start: 2018-04-02 | End: 2019-06-17

## 2018-04-04 ENCOUNTER — HOSPITAL ENCOUNTER (OUTPATIENT)
Dept: ULTRASOUND IMAGING | Age: 68
Discharge: HOME | End: 2018-04-04
Attending: INTERNAL MEDICINE
Payer: MEDICARE

## 2018-04-04 ENCOUNTER — TELEPHONE (OUTPATIENT)
Dept: INTERNAL MEDICINE CLINIC | Facility: CLINIC | Age: 68
End: 2018-04-04

## 2018-04-04 DIAGNOSIS — E78.2 MIXED HYPERLIPIDEMIA: ICD-10-CM

## 2018-04-04 DIAGNOSIS — E11.9 CONTROLLED TYPE 2 DIABETES MELLITUS WITHOUT COMPLICATION, WITHOUT LONG-TERM CURRENT USE OF INSULIN (HCC): ICD-10-CM

## 2018-04-04 DIAGNOSIS — D36.9 TUBULAR ADENOMA: ICD-10-CM

## 2018-04-04 DIAGNOSIS — D50.9 MICROCYTIC ANEMIA: Primary | ICD-10-CM

## 2018-04-04 DIAGNOSIS — R10.12 LEFT UPPER QUADRANT PAIN: ICD-10-CM

## 2018-04-04 DIAGNOSIS — K62.5 RECTAL BLEEDING: ICD-10-CM

## 2018-04-04 DIAGNOSIS — I10 ESSENTIAL HYPERTENSION: ICD-10-CM

## 2018-04-04 DIAGNOSIS — R10.12 LEFT UPPER QUADRANT ABDOMINAL PAIN OF UNKNOWN ETIOLOGY: ICD-10-CM

## 2018-04-04 PROCEDURE — 76700 US EXAM ABDOM COMPLETE: CPT | Performed by: INTERNAL MEDICINE

## 2018-04-04 NOTE — TELEPHONE ENCOUNTER
Patient informed of US Abd result , patient states pain off/on, has not had any pain for last few days but also forgot to inform Dr. Angeles Held she has a \" Bitter taste in her mouth\" Please advise

## 2018-04-04 NOTE — TELEPHONE ENCOUNTER
1. I have ordered a CT abdomen for her abdominal pain. 2. She has NEW anemia - she needs to get a colonoscopy ASAP. I provided her with a refill at the last office visit.

## 2018-04-06 ENCOUNTER — HOSPITAL ENCOUNTER (OUTPATIENT)
Dept: CT IMAGING | Age: 68
Discharge: HOME | End: 2018-04-06
Attending: INTERNAL MEDICINE
Payer: MEDICARE

## 2018-04-06 DIAGNOSIS — D50.9 MICROCYTIC ANEMIA: ICD-10-CM

## 2018-04-06 DIAGNOSIS — R10.12 LEFT UPPER QUADRANT PAIN: ICD-10-CM

## 2018-04-06 PROCEDURE — 74177 CT ABD & PELVIS W/CONTRAST: CPT | Performed by: INTERNAL MEDICINE

## 2018-04-13 ENCOUNTER — APPOINTMENT (OUTPATIENT)
Dept: LAB | Age: 68
End: 2018-04-13
Attending: PHYSICIAN ASSISTANT
Payer: MEDICARE

## 2018-04-13 DIAGNOSIS — D50.9 MICROCYTIC ANEMIA: ICD-10-CM

## 2018-04-13 PROCEDURE — 83550 IRON BINDING TEST: CPT

## 2018-04-13 PROCEDURE — 83540 ASSAY OF IRON: CPT

## 2018-04-13 PROCEDURE — 36415 COLL VENOUS BLD VENIPUNCTURE: CPT

## 2018-04-23 NOTE — TELEPHONE ENCOUNTER
----- Message from Dwayne Almonte MD sent at 4/18/2018 11:30 AM CDT -----  Regarding: egd and colonoscopy  Is 5/22 the earliest you can get EGD and colonoscopy?

## 2018-04-30 RX ORDER — LOSARTAN POTASSIUM 25 MG/1
TABLET ORAL
Qty: 90 TABLET | Refills: 1 | Status: SHIPPED | OUTPATIENT
Start: 2018-04-30 | End: 2018-11-19

## 2018-04-30 RX ORDER — ATORVASTATIN CALCIUM 40 MG/1
TABLET, FILM COATED ORAL
Qty: 90 TABLET | Refills: 1 | Status: SHIPPED | OUTPATIENT
Start: 2018-04-30 | End: 2018-11-16

## 2018-05-02 ENCOUNTER — PRIOR ORIGINAL RECORDS (OUTPATIENT)
Dept: OTHER | Age: 68
End: 2018-05-02

## 2018-05-18 ENCOUNTER — TELEPHONE (OUTPATIENT)
Dept: INTERNAL MEDICINE CLINIC | Facility: CLINIC | Age: 68
End: 2018-05-18

## 2018-05-29 RX ORDER — CANAGLIFLOZIN 300 MG/1
TABLET, FILM COATED ORAL
Qty: 90 TABLET | Refills: 0 | Status: SHIPPED | OUTPATIENT
Start: 2018-05-29 | End: 2018-08-16

## 2018-05-30 DIAGNOSIS — E11.649 HYPOGLYCEMIA ASSOCIATED WITH DIABETES (HCC): ICD-10-CM

## 2018-05-30 RX ORDER — GLYBURIDE 5 MG/1
TABLET ORAL
Qty: 135 TABLET | Refills: 1 | Status: SHIPPED | OUTPATIENT
Start: 2018-05-30 | End: 2018-11-05

## 2018-05-30 NOTE — TELEPHONE ENCOUNTER
Patient requesting refill for glyBURIDE 5 MG and BD PEN NEEDLE KEVIN U/F 32G X 4 MM. Patient is completely out of needles.  [facility]

## 2018-05-30 NOTE — TELEPHONE ENCOUNTER
glyBURIDE 5 MG Oral Tab 135 tablet 1 11/21/2017    Sig :  TAKE 1 AND 1/2 TABLETS(7.5 MG) BY MOUTH DAILY WITH BREAKFAST

## 2018-05-31 ENCOUNTER — PRIOR ORIGINAL RECORDS (OUTPATIENT)
Dept: OTHER | Age: 68
End: 2018-05-31

## 2018-06-08 ENCOUNTER — MED REC SCAN ONLY (OUTPATIENT)
Dept: INTERNAL MEDICINE CLINIC | Facility: CLINIC | Age: 68
End: 2018-06-08

## 2018-06-15 ENCOUNTER — PRIOR ORIGINAL RECORDS (OUTPATIENT)
Dept: OTHER | Age: 68
End: 2018-06-15

## 2018-06-20 DIAGNOSIS — E11.649 HYPOGLYCEMIA ASSOCIATED WITH DIABETES (HCC): ICD-10-CM

## 2018-06-21 ENCOUNTER — PRIOR ORIGINAL RECORDS (OUTPATIENT)
Dept: OTHER | Age: 68
End: 2018-06-21

## 2018-06-21 RX ORDER — EXENATIDE 250 UG/ML
INJECTION SUBCUTANEOUS
Qty: 7.2 ML | Refills: 1 | Status: SHIPPED | OUTPATIENT
Start: 2018-06-21 | End: 2018-09-07 | Stop reason: ALTCHOICE

## 2018-06-21 NOTE — TELEPHONE ENCOUNTER
Byetta 10 mcg inject 10 units bid filled 11-29-17 7.2 ml with 1 refill     LOV 3-30-18      return in 6 months for medicare wellness exam    No upcoming apt on file     Labs 3-30-18     HgbA1C <5.7 % 7.3

## 2018-07-11 ENCOUNTER — PRIOR ORIGINAL RECORDS (OUTPATIENT)
Dept: OTHER | Age: 68
End: 2018-07-11

## 2018-07-26 ENCOUNTER — MYAURORA ACCOUNT LINK (OUTPATIENT)
Dept: OTHER | Age: 68
End: 2018-07-26

## 2018-08-22 PROBLEM — K92.89 GAS BLOAT SYNDROME: Status: ACTIVE | Noted: 2018-08-22

## 2018-08-22 PROBLEM — K59.00 CONSTIPATION: Status: ACTIVE | Noted: 2018-08-22

## 2018-08-22 PROBLEM — D50.9 IRON DEFICIENCY ANEMIA: Status: ACTIVE | Noted: 2018-08-22

## 2018-08-23 ENCOUNTER — LAB ENCOUNTER (OUTPATIENT)
Dept: LAB | Age: 68
End: 2018-08-23
Attending: NURSE PRACTITIONER
Payer: MEDICARE

## 2018-08-23 DIAGNOSIS — D50.9 IRON DEFICIENCY ANEMIA, UNSPECIFIED IRON DEFICIENCY ANEMIA TYPE: ICD-10-CM

## 2018-08-23 DIAGNOSIS — E11.65 UNCONTROLLED TYPE 2 DIABETES MELLITUS WITH HYPERGLYCEMIA, WITHOUT LONG-TERM CURRENT USE OF INSULIN (HCC): ICD-10-CM

## 2018-08-23 DIAGNOSIS — R10.12 LEFT UPPER QUADRANT PAIN: ICD-10-CM

## 2018-08-23 DIAGNOSIS — D50.9 MICROCYTIC ANEMIA: ICD-10-CM

## 2018-08-23 LAB
BASOPHILS # BLD AUTO: 0.06 X10(3) UL (ref 0–0.1)
BASOPHILS NFR BLD AUTO: 0.7 %
DEPRECATED HBV CORE AB SER IA-ACNC: 5.7 NG/ML (ref 18–340)
EOSINOPHIL # BLD AUTO: 0.63 X10(3) UL (ref 0–0.3)
EOSINOPHIL NFR BLD AUTO: 7.8 %
ERYTHROCYTE [DISTWIDTH] IN BLOOD BY AUTOMATED COUNT: 18.8 % (ref 11.5–16)
EST. AVERAGE GLUCOSE BLD GHB EST-MCNC: 169 MG/DL (ref 68–126)
HBA1C MFR BLD HPLC: 7.5 % (ref ?–5.7)
HCT VFR BLD AUTO: 32.4 % (ref 34–50)
HGB BLD-MCNC: 9.3 G/DL (ref 12–16)
IMMATURE GRANULOCYTE COUNT: 0.02 X10(3) UL (ref 0–1)
IMMATURE GRANULOCYTE RATIO %: 0.2 %
IRON SATURATION: 3 % (ref 15–50)
IRON: 15 UG/DL (ref 28–170)
LYMPHOCYTES # BLD AUTO: 2.45 X10(3) UL (ref 0.9–4)
LYMPHOCYTES NFR BLD AUTO: 30.2 %
MCH RBC QN AUTO: 22 PG (ref 27–33.2)
MCHC RBC AUTO-ENTMCNC: 28.7 G/DL (ref 31–37)
MCV RBC AUTO: 76.8 FL (ref 81–100)
MONOCYTES # BLD AUTO: 0.71 X10(3) UL (ref 0.1–1)
MONOCYTES NFR BLD AUTO: 8.8 %
NEUTROPHIL ABS PRELIM: 4.23 X10 (3) UL (ref 1.3–6.7)
NEUTROPHILS # BLD AUTO: 4.23 X10(3) UL (ref 1.3–6.7)
NEUTROPHILS NFR BLD AUTO: 52.3 %
PLATELET # BLD AUTO: 204 10(3)UL (ref 150–450)
RBC # BLD AUTO: 4.22 X10(6)UL (ref 3.8–5.1)
RED CELL DISTRIBUTION WIDTH-SD: 51.5 FL (ref 35.1–46.3)
TOTAL IRON BINDING CAPACITY: 513 UG/DL (ref 240–450)
TRANSFERRIN SERPL-MCNC: 344 MG/DL (ref 200–360)
WBC # BLD AUTO: 8.1 X10(3) UL (ref 4–13)

## 2018-08-23 PROCEDURE — 83550 IRON BINDING TEST: CPT

## 2018-08-23 PROCEDURE — 83540 ASSAY OF IRON: CPT

## 2018-08-23 PROCEDURE — 36415 COLL VENOUS BLD VENIPUNCTURE: CPT

## 2018-08-23 PROCEDURE — 85025 COMPLETE CBC W/AUTO DIFF WBC: CPT

## 2018-08-23 PROCEDURE — 82728 ASSAY OF FERRITIN: CPT

## 2018-08-23 PROCEDURE — 83036 HEMOGLOBIN GLYCOSYLATED A1C: CPT

## 2018-09-07 ENCOUNTER — OFFICE VISIT (OUTPATIENT)
Dept: INTERNAL MEDICINE CLINIC | Facility: CLINIC | Age: 68
End: 2018-09-07
Payer: MEDICARE

## 2018-09-07 ENCOUNTER — TELEPHONE (OUTPATIENT)
Dept: INTERNAL MEDICINE CLINIC | Facility: CLINIC | Age: 68
End: 2018-09-07

## 2018-09-07 VITALS
HEIGHT: 62 IN | WEIGHT: 163.75 LBS | HEART RATE: 84 BPM | OXYGEN SATURATION: 95 % | TEMPERATURE: 98 F | DIASTOLIC BLOOD PRESSURE: 60 MMHG | RESPIRATION RATE: 16 BRPM | BODY MASS INDEX: 30.14 KG/M2 | SYSTOLIC BLOOD PRESSURE: 110 MMHG

## 2018-09-07 DIAGNOSIS — I10 ESSENTIAL HYPERTENSION: ICD-10-CM

## 2018-09-07 DIAGNOSIS — E78.2 MIXED HYPERLIPIDEMIA: ICD-10-CM

## 2018-09-07 DIAGNOSIS — E11.9 CONTROLLED TYPE 2 DIABETES MELLITUS WITHOUT COMPLICATION, WITHOUT LONG-TERM CURRENT USE OF INSULIN (HCC): ICD-10-CM

## 2018-09-07 DIAGNOSIS — D36.9 TUBULAR ADENOMA: ICD-10-CM

## 2018-09-07 DIAGNOSIS — Z12.39 SCREENING BREAST EXAMINATION: ICD-10-CM

## 2018-09-07 DIAGNOSIS — D50.9 IRON DEFICIENCY ANEMIA, UNSPECIFIED IRON DEFICIENCY ANEMIA TYPE: Primary | ICD-10-CM

## 2018-09-07 PROCEDURE — 99214 OFFICE O/P EST MOD 30 MIN: CPT | Performed by: INTERNAL MEDICINE

## 2018-09-07 NOTE — PROGRESS NOTES
Vin Gutierrez is a 79year old female. HPI:   Patient presents for the following issues. 1. Iron Deficiency Anemia: still no cause found. Underwent EGD and colonoscopy. She started iron supplement last week and is tolerating it well.  She has f/u wit Onset   • Lung ca [OTHER] Mother    • Dementia [OTHER] Father    • Thrombocytopenia [OTHER] Other      Sibling   • Heart Disorder Maternal Grandmother      CVA   • Heart Disorder Maternal Grandfather      MI   • Cancer Brother      esophageal cancer      P or edema    ASSESSMENT AND PLAN:   # Iron Deficiency Anemia: cause unknown. Will be getting MR enterography and has f/u with heme. She has started oral iron repletion.    Recent evaluation includes 8/2/2018 - capsule endoscopy - Scattered red spots are appr colonic polyps, antral gastritis. Symptoms are controlled at this time. No constipation currently. - takes miralax and probiotics more on a prn basis for her constipation   - s/p cscope in 4/2013 with polyp and bx with tubular adenoma.  Will need repeat c

## 2018-09-07 NOTE — PATIENT INSTRUCTIONS
Please have labs completed at the end of November. You do not need to fast.     Please eat a healthy snack around 7-730 pm which includes fruit, nuts, or cheese. For your diabetes, please change from byetta to trulicity.  Please take trulicity 5.33 mg t

## 2018-09-07 NOTE — TELEPHONE ENCOUNTER
1. Please just have patient fill the trulicity 8.14 mg at [facility]. If her sugars are well controlled on it, we plan to change to a 90 day prescription of 1.5 mg weekly through her mail order pharmacy.  TY

## 2018-09-07 NOTE — TELEPHONE ENCOUNTER
Pharmacy calling state that how Dulaglutide (TRULICITY) 3.88 FH/7.4TW Subcutaneous Solution Pen-injector sig is written it can not be filled. Pharmacist states that 2 separate scripts need to be written.     1 for the .75mg and 1 for the 1.5 mg    States

## 2018-09-10 ENCOUNTER — TELEPHONE (OUTPATIENT)
Dept: INTERNAL MEDICINE CLINIC | Facility: CLINIC | Age: 68
End: 2018-09-10

## 2018-09-12 NOTE — TELEPHONE ENCOUNTER
Patient returning phone call. Informed and relayed message below. Pt understood and will  samples today.

## 2018-09-19 ENCOUNTER — HOSPITAL ENCOUNTER (OUTPATIENT)
Dept: MRI IMAGING | Facility: HOSPITAL | Age: 68
Discharge: HOME | End: 2018-09-19
Attending: INTERNAL MEDICINE
Payer: MEDICARE

## 2018-09-19 DIAGNOSIS — D50.9 IRON DEFICIENCY ANEMIA, UNSPECIFIED IRON DEFICIENCY ANEMIA TYPE: ICD-10-CM

## 2018-09-19 LAB — CREAT SERPL-MCNC: 0.7 MG/DL (ref 0.55–1.02)

## 2018-09-19 PROCEDURE — A9575 INJ GADOTERATE MEGLUMI 0.1ML: HCPCS | Performed by: INTERNAL MEDICINE

## 2018-09-19 PROCEDURE — 72197 MRI PELVIS W/O & W/DYE: CPT | Performed by: INTERNAL MEDICINE

## 2018-09-19 PROCEDURE — 74183 MRI ABD W/O CNTR FLWD CNTR: CPT | Performed by: INTERNAL MEDICINE

## 2018-09-19 PROCEDURE — 82565 ASSAY OF CREATININE: CPT

## 2018-09-19 NOTE — IMAGING NOTE
Pt known diabetic on several diabetic medications, trulicity, glyberide, glucophage and invokana and is here for MRI enterography. I consulted with Dr Valdes Couroman and he gave order to omit glucagon due to these factors.  Darell Dowling notified of this to alert MRI t

## 2018-09-28 ENCOUNTER — OFFICE VISIT (OUTPATIENT)
Dept: HEMATOLOGY/ONCOLOGY | Facility: HOSPITAL | Age: 68
End: 2018-09-28
Attending: INTERNAL MEDICINE
Payer: MEDICARE

## 2018-09-28 VITALS
OXYGEN SATURATION: 97 % | HEART RATE: 97 BPM | BODY MASS INDEX: 30.29 KG/M2 | RESPIRATION RATE: 18 BRPM | SYSTOLIC BLOOD PRESSURE: 123 MMHG | DIASTOLIC BLOOD PRESSURE: 70 MMHG | WEIGHT: 164.63 LBS | TEMPERATURE: 97 F | HEIGHT: 62 IN

## 2018-09-28 DIAGNOSIS — E61.1 IRON DEFICIENCY: Primary | ICD-10-CM

## 2018-09-28 DIAGNOSIS — D50.0 IRON DEFICIENCY ANEMIA DUE TO CHRONIC BLOOD LOSS: ICD-10-CM

## 2018-09-28 LAB
BASOPHILS # BLD AUTO: 0.08 X10(3) UL (ref 0–0.1)
BASOPHILS NFR BLD AUTO: 1 %
EOSINOPHIL # BLD AUTO: 0.31 X10(3) UL (ref 0–0.3)
EOSINOPHIL NFR BLD AUTO: 4 %
ERYTHROCYTE [DISTWIDTH] IN BLOOD BY AUTOMATED COUNT: 25 % (ref 11.5–16)
HCT VFR BLD AUTO: 38.4 % (ref 34–50)
HGB BLD-MCNC: 11.2 G/DL (ref 12–16)
IMMATURE GRANULOCYTE COUNT: 0.02 X10(3) UL (ref 0–1)
IMMATURE GRANULOCYTE RATIO %: 0.3 %
LYMPHOCYTES # BLD AUTO: 2.48 X10(3) UL (ref 0.9–4)
LYMPHOCYTES NFR BLD AUTO: 32.2 %
MCH RBC QN AUTO: 24 PG (ref 27–33.2)
MCHC RBC AUTO-ENTMCNC: 29.2 G/DL (ref 31–37)
MCV RBC AUTO: 82.2 FL (ref 81–100)
MONOCYTES # BLD AUTO: 0.71 X10(3) UL (ref 0.1–1)
MONOCYTES NFR BLD AUTO: 9.2 %
NEUTROPHIL ABS PRELIM: 4.1 X10 (3) UL (ref 1.3–6.7)
NEUTROPHILS # BLD AUTO: 4.1 X10(3) UL (ref 1.3–6.7)
NEUTROPHILS NFR BLD AUTO: 53.3 %
PLATELET # BLD AUTO: 183 10(3)UL (ref 150–450)
PLATELET MORPHOLOGY: NORMAL
RBC # BLD AUTO: 4.67 X10(6)UL (ref 3.8–5.1)
RED CELL DISTRIBUTION WIDTH-SD: 72.3 FL (ref 35.1–46.3)
WBC # BLD AUTO: 7.7 X10(3) UL (ref 4–13)

## 2018-09-28 PROCEDURE — 99204 OFFICE O/P NEW MOD 45 MIN: CPT | Performed by: INTERNAL MEDICINE

## 2018-09-28 NOTE — PROGRESS NOTES
Patient is here for MD consult for anemia. Labs drawn on 8/23. Patient started on oral iron at home x 2 weeks. Following up with GI also. Patient had a colonoscopy, EGD and capsule endoscopy to check the source of anemia. No bleeding problems.        Peggyann Goldberg

## 2018-09-29 NOTE — CONSULTS
[facility]    PATIENT'S NAME: Tiffanie Cohen   CONSULTING PHYSICIAN: Blanca Peoples M.D.    PATIENT ACCOUNT #: [de-identified] LOCATION: [address] [place] [address] RECORD #: [id number] YOB: 1950   CONSULTATION DATE: 09/28/2018       CANCER C active. She states she has tolerated the iron orally reasonably well. She has dark stools. She has had a little bit of constipation, and I recommended that she take a stool softener.   She had a little bit of nausea when she first started, but this has r glaucoma. Hearing is good teeth and gums are in good repair. She has had mild GERD, and she takes omeprazole regularly. She denies any gallbladder disease, hepatitis. She denies any asthma, cough, shortness of breath, pneumonia.   She has no history of absorption of iron. She denies any symptoms that would suggest this is celiac disease. I have suggested to her that, given the fact that her hemoglobin is climbing, that she stay on the oral iron. She is tolerating it fairly well.   I explained to her th

## 2018-10-24 ENCOUNTER — HOSPITAL ENCOUNTER (OUTPATIENT)
Dept: MAMMOGRAPHY | Age: 68
Discharge: HOME | End: 2018-10-24
Attending: INTERNAL MEDICINE
Payer: MEDICARE

## 2018-10-24 DIAGNOSIS — Z12.39 SCREENING BREAST EXAMINATION: ICD-10-CM

## 2018-10-24 PROCEDURE — 77063 BREAST TOMOSYNTHESIS BI: CPT | Performed by: INTERNAL MEDICINE

## 2018-10-24 PROCEDURE — 77067 SCR MAMMO BI INCL CAD: CPT | Performed by: INTERNAL MEDICINE

## 2018-10-25 ENCOUNTER — OFFICE VISIT (OUTPATIENT)
Dept: INTERNAL MEDICINE CLINIC | Facility: CLINIC | Age: 68
End: 2018-10-25
Payer: MEDICARE

## 2018-10-25 VITALS
HEIGHT: 62 IN | SYSTOLIC BLOOD PRESSURE: 118 MMHG | WEIGHT: 163.5 LBS | TEMPERATURE: 98 F | RESPIRATION RATE: 16 BRPM | DIASTOLIC BLOOD PRESSURE: 70 MMHG | BODY MASS INDEX: 30.09 KG/M2 | HEART RATE: 84 BPM

## 2018-10-25 DIAGNOSIS — Z23 NEED FOR PROPHYLACTIC VACCINATION AND INOCULATION AGAINST INFLUENZA: ICD-10-CM

## 2018-10-25 DIAGNOSIS — R22.1 NECK MASS: ICD-10-CM

## 2018-10-25 DIAGNOSIS — R22.1 NECK FULLNESS: Primary | ICD-10-CM

## 2018-10-25 PROCEDURE — G0008 ADMIN INFLUENZA VIRUS VAC: HCPCS | Performed by: INTERNAL MEDICINE

## 2018-10-25 PROCEDURE — 90653 IIV ADJUVANT VACCINE IM: CPT | Performed by: INTERNAL MEDICINE

## 2018-10-25 PROCEDURE — 99213 OFFICE O/P EST LOW 20 MIN: CPT | Performed by: INTERNAL MEDICINE

## 2018-10-25 NOTE — PROGRESS NOTES
[facility] Internal Medicine Office Note  Chief Complaint:   Patient presents with:  Lump: Pt c/o lump to left side of neck near her jaw x 3 weeks.       HPI:   This is a 79year old female coming in for lump at neck  HPI  L neck - noticed about 3 [Ibandronate*    UNKNOWN    Comment:Bleeding ulcers  Lisinopril              Coughing    Comment:TABS    Current Outpatient Medications:  ferrous sulfate 325 (65 FE) MG Oral Tab EC Take 325 mg by mouth 2 (two) times daily.  Disp:  Rfl:    Dulaglutide (WANDAULI for cough and shortness of breath. Cardiovascular: Negative for chest pain.         EXAM:   /70 (BP Location: Left arm, Patient Position: Sitting, Cuff Size: adult)   Pulse 84   Temp 98.3 °F (36.8 °C) (Oral)   Resp 16   Ht 62\"   Wt 163 lb 8 oz   B VACCINE ADJUVANT IM  US HEAD/NECK ([id number])    DEXA Scan due on 07/11/2017  Influenza Vaccine(1) due on 09/01/2018  Fall Risk Screening due on 10/27/2018  Pneumococcal PPSV23/PCV13 65+ Years / Low and Medium Risk(2 of 2 - PPSV23) due on 10/27/2018  Patie

## 2018-11-02 ENCOUNTER — LAB ENCOUNTER (OUTPATIENT)
Dept: LAB | Age: 68
End: 2018-11-02
Attending: INTERNAL MEDICINE
Payer: MEDICARE

## 2018-11-02 DIAGNOSIS — E61.1 IRON DEFICIENCY: ICD-10-CM

## 2018-11-02 PROCEDURE — 36415 COLL VENOUS BLD VENIPUNCTURE: CPT

## 2018-11-02 PROCEDURE — 85025 COMPLETE CBC W/AUTO DIFF WBC: CPT

## 2018-11-05 ENCOUNTER — TELEPHONE (OUTPATIENT)
Dept: HEMATOLOGY/ONCOLOGY | Facility: HOSPITAL | Age: 68
End: 2018-11-05

## 2018-11-05 DIAGNOSIS — E11.649 HYPOGLYCEMIA ASSOCIATED WITH DIABETES (HCC): ICD-10-CM

## 2018-11-05 NOTE — TELEPHONE ENCOUNTER
Refill requested:   Requested Prescriptions     Pending Prescriptions Disp Refills   • glyBURIDE 5 MG Oral Tab [Pharmacy Med Name: GLYBURIDE TABS 5MG] 135 tablet 1     Sig: TAKE ONE AND ONE-HALF TABLETS DAILY WITH BREAKFAST       Failed protocol      Last

## 2018-11-05 NOTE — TELEPHONE ENCOUNTER
Hadley Mata MD  P Edw Santos Arriaza Rns             Let her know it is better.  Still needs to take iron for at least three more months. Left VM with results.

## 2018-11-06 RX ORDER — GLYBURIDE 5 MG/1
TABLET ORAL
Qty: 135 TABLET | Refills: 1 | Status: SHIPPED | OUTPATIENT
Start: 2018-11-06 | End: 2019-06-07

## 2018-11-06 NOTE — TELEPHONE ENCOUNTER
I have approved her glyburide butmarjoriee is due for non-fasting labs at the end of November. Please remind the patient. Labs are ordered.  TY

## 2018-11-15 NOTE — TELEPHONE ENCOUNTER
ATORVASTATIN 40 MG Oral Tab  And Losartan refilled to [facility] please; she is out of Losartan can doctor send short supply to [facility] in [place] please an still request 90 day to mail order

## 2018-11-16 RX ORDER — ATORVASTATIN CALCIUM 40 MG/1
TABLET, FILM COATED ORAL
Qty: 30 TABLET | Refills: 0 | Status: SHIPPED | OUTPATIENT
Start: 2018-11-16 | End: 2018-11-20

## 2018-11-16 NOTE — TELEPHONE ENCOUNTER
Pt is F/U on response for the shot supply to be sent to local pharmacy states she has been waiting for 11 days now

## 2018-11-16 NOTE — TELEPHONE ENCOUNTER
Medication(s) to Refill:   Requested Prescriptions     Pending Prescriptions Disp Refills   • atorvastatin 40 MG Oral Tab 30 tablet 0     Sig: TAKE 1 TABLET NIGHTLY       Last Time Medication was Filled:  4/30/18      Last Office Visit with PCP: 9/7/2018

## 2018-11-19 NOTE — TELEPHONE ENCOUNTER
Patient called requesting short supply to be sent to:  LOSARTAN POTASSIUM 25 MG Oral Tab    To be sent to:  [facility], [address] [facility], [phone], [phone]    Patient stated that the incorrect pr

## 2018-11-20 ENCOUNTER — TELEPHONE (OUTPATIENT)
Dept: INTERNAL MEDICINE CLINIC | Facility: CLINIC | Age: 68
End: 2018-11-20

## 2018-11-20 RX ORDER — LOSARTAN POTASSIUM 25 MG/1
25 TABLET ORAL
Qty: 90 TABLET | Refills: 1 | Status: SHIPPED | OUTPATIENT
Start: 2018-11-20 | End: 2018-11-20

## 2018-11-20 RX ORDER — ATORVASTATIN CALCIUM 40 MG/1
TABLET, FILM COATED ORAL
Qty: 90 TABLET | Refills: 1 | Status: SHIPPED | OUTPATIENT
Start: 2018-11-20 | End: 2019-06-07

## 2018-11-20 RX ORDER — LOSARTAN POTASSIUM 25 MG/1
25 TABLET ORAL
Qty: 90 TABLET | Refills: 1 | Status: SHIPPED | OUTPATIENT
Start: 2018-11-20 | End: 2019-03-14

## 2018-11-20 NOTE — TELEPHONE ENCOUNTER
Medication(s) to Refill:   Requested Prescriptions     Pending Prescriptions Disp Refills   • Losartan Potassium 25 MG Oral Tab 90 tablet 1     Sig: Take 1 tablet (25 mg total) by mouth once daily.        Last Time Medication was Filled:  4/30/18      Last

## 2018-11-20 NOTE — TELEPHONE ENCOUNTER
Pt called back to inform need prescriptions for statin and Losartan sent to the mail service pharmacy and needed only a short supply of 12 tabs for Losartan only sent to the local pharmacy, as she ran out of meds.      Statin and losartan re-sent to the [facility]

## 2018-11-20 NOTE — TELEPHONE ENCOUNTER
[facility] would like a call back needs clarification on Losartan Potassium 25 MG   PH# [phone]  Ref# [id number]

## 2018-11-30 ENCOUNTER — LAB ENCOUNTER (OUTPATIENT)
Dept: LAB | Age: 68
End: 2018-11-30
Attending: INTERNAL MEDICINE
Payer: MEDICARE

## 2018-11-30 DIAGNOSIS — I10 ESSENTIAL HYPERTENSION: ICD-10-CM

## 2018-11-30 DIAGNOSIS — E11.9 CONTROLLED TYPE 2 DIABETES MELLITUS WITHOUT COMPLICATION, WITHOUT LONG-TERM CURRENT USE OF INSULIN (HCC): ICD-10-CM

## 2018-11-30 DIAGNOSIS — E61.1 IRON DEFICIENCY: ICD-10-CM

## 2018-11-30 PROCEDURE — 85025 COMPLETE CBC W/AUTO DIFF WBC: CPT

## 2018-11-30 PROCEDURE — 84460 ALANINE AMINO (ALT) (SGPT): CPT

## 2018-11-30 PROCEDURE — 36415 COLL VENOUS BLD VENIPUNCTURE: CPT

## 2018-11-30 PROCEDURE — 80048 BASIC METABOLIC PNL TOTAL CA: CPT

## 2018-11-30 PROCEDURE — 84450 TRANSFERASE (AST) (SGOT): CPT

## 2018-11-30 PROCEDURE — 83036 HEMOGLOBIN GLYCOSYLATED A1C: CPT

## 2018-12-06 ENCOUNTER — OFFICE VISIT (OUTPATIENT)
Dept: INTERNAL MEDICINE CLINIC | Facility: CLINIC | Age: 68
End: 2018-12-06
Payer: MEDICARE

## 2018-12-06 VITALS
HEART RATE: 86 BPM | BODY MASS INDEX: 29.81 KG/M2 | DIASTOLIC BLOOD PRESSURE: 60 MMHG | WEIGHT: 162 LBS | TEMPERATURE: 99 F | RESPIRATION RATE: 18 BRPM | SYSTOLIC BLOOD PRESSURE: 110 MMHG | HEIGHT: 62 IN | OXYGEN SATURATION: 99 %

## 2018-12-06 DIAGNOSIS — Z00.00 ROUTINE GENERAL MEDICAL EXAMINATION AT A HEALTH CARE FACILITY: Primary | ICD-10-CM

## 2018-12-06 DIAGNOSIS — K21.00 GASTROESOPHAGEAL REFLUX DISEASE WITH ESOPHAGITIS: ICD-10-CM

## 2018-12-06 DIAGNOSIS — Z13.31 SCREENING FOR DEPRESSION: ICD-10-CM

## 2018-12-06 DIAGNOSIS — M85.80 OSTEOPENIA, UNSPECIFIED LOCATION: ICD-10-CM

## 2018-12-06 DIAGNOSIS — K59.00 CONSTIPATION, UNSPECIFIED CONSTIPATION TYPE: ICD-10-CM

## 2018-12-06 DIAGNOSIS — I10 ESSENTIAL HYPERTENSION: ICD-10-CM

## 2018-12-06 DIAGNOSIS — D50.0 IRON DEFICIENCY ANEMIA DUE TO CHRONIC BLOOD LOSS: ICD-10-CM

## 2018-12-06 DIAGNOSIS — K21.9 GASTROESOPHAGEAL REFLUX DISEASE WITHOUT ESOPHAGITIS: ICD-10-CM

## 2018-12-06 DIAGNOSIS — E78.2 MIXED HYPERLIPIDEMIA: ICD-10-CM

## 2018-12-06 DIAGNOSIS — M94.9 DISORDER OF BONE AND CARTILAGE: ICD-10-CM

## 2018-12-06 DIAGNOSIS — E11.9 CONTROLLED TYPE 2 DIABETES MELLITUS WITHOUT COMPLICATION, WITHOUT LONG-TERM CURRENT USE OF INSULIN (HCC): ICD-10-CM

## 2018-12-06 DIAGNOSIS — D36.9 TUBULAR ADENOMA: ICD-10-CM

## 2018-12-06 DIAGNOSIS — M89.9 DISORDER OF BONE AND CARTILAGE: ICD-10-CM

## 2018-12-06 PROBLEM — E66.3 OVERWEIGHT (BMI 25.0-29.9): Status: ACTIVE | Noted: 2018-12-06

## 2018-12-06 PROCEDURE — G0444 DEPRESSION SCREEN ANNUAL: HCPCS | Performed by: INTERNAL MEDICINE

## 2018-12-06 PROCEDURE — G0439 PPPS, SUBSEQ VISIT: HCPCS | Performed by: INTERNAL MEDICINE

## 2018-12-06 PROCEDURE — 99214 OFFICE O/P EST MOD 30 MIN: CPT | Performed by: INTERNAL MEDICINE

## 2018-12-06 NOTE — PATIENT INSTRUCTIONS
Please repeat your A1C in 3-4 months. You need a full set of labs in 6 months. You need 3 eight ounce servings of calcium/vitamin D daily. This includes spinach, kale, broccoli, almonds, milk, yogurt, or cottage cheese.      I also advise you get the sh

## 2018-12-06 NOTE — PROGRESS NOTES
Dc Oliver is a 79year old female. HPI:   Patient presents for medicare wellness exam and additional issues noted below. 1. Iron Deficiency Anemia: her CBC is now normal on iron replacement.  She is tolerating the iron replacement despite mild c MI   • Cancer Brother         esophageal cancer      Past Medical History:   Diagnosis Date   • Diverticulitis    • Dyslipidemia    • Gastrointestinal bleeding     PUDz in setting of ASA/boniva use   • Osteopenia    • Other and unspecified hyperlipi bilateral sciatica: start with HEP, she declines PT. Will discuss further if not improving in 4-6 weeks. # Iron Deficiency Anemia: her CBC is now normal on iron replacement. She is tolerating the iron replacement despite mild constipation.  Likely a small bearing excercises as this is not an active part of her lifestyle.   # Constipation, h/o Diverticulitis (x2 episodes), colonic polyps, antral gastritis. Chronic, controlled with high fiber diet. # GERD: well controlled on omeprazole.  Unable to take it WA

## 2018-12-13 RX ORDER — DULAGLUTIDE 1.5 MG/.5ML
INJECTION, SOLUTION SUBCUTANEOUS
Qty: 6 ML | Refills: 0 | Status: SHIPPED | OUTPATIENT
Start: 2018-12-13 | End: 2019-03-25

## 2018-12-13 NOTE — TELEPHONE ENCOUNTER
Approved per protocol  Marisa  Last OV relevant to medication: 12/6/18  Last refill date: 9/11/18  #/refills: 0  When pt was asked to return for OV: 6 months  Upcoming appt/reason: none

## 2018-12-19 ENCOUNTER — HOSPITAL ENCOUNTER (OUTPATIENT)
Dept: BONE DENSITY | Age: 68
Discharge: HOME | End: 2018-12-19
Attending: INTERNAL MEDICINE
Payer: MEDICARE

## 2018-12-19 DIAGNOSIS — M94.9 DISORDER OF BONE AND CARTILAGE: ICD-10-CM

## 2018-12-19 DIAGNOSIS — M89.9 DISORDER OF BONE AND CARTILAGE: ICD-10-CM

## 2018-12-19 PROCEDURE — 77080 DXA BONE DENSITY AXIAL: CPT | Performed by: INTERNAL MEDICINE

## 2018-12-26 ENCOUNTER — OFFICE VISIT (OUTPATIENT)
Dept: FAMILY MEDICINE CLINIC | Facility: CLINIC | Age: 68
End: 2018-12-26
Payer: MEDICARE

## 2018-12-26 VITALS
WEIGHT: 162 LBS | HEIGHT: 61 IN | OXYGEN SATURATION: 97 % | HEART RATE: 90 BPM | RESPIRATION RATE: 20 BRPM | TEMPERATURE: 98 F | SYSTOLIC BLOOD PRESSURE: 118 MMHG | BODY MASS INDEX: 30.58 KG/M2 | DIASTOLIC BLOOD PRESSURE: 62 MMHG

## 2018-12-26 DIAGNOSIS — H68.103 EUSTACHIAN TUBE OBSTRUCTION, BILATERAL: Primary | ICD-10-CM

## 2018-12-26 DIAGNOSIS — H69.83 EUSTACHIAN TUBE DYSFUNCTION, BILATERAL: ICD-10-CM

## 2018-12-26 DIAGNOSIS — J00 NASOPHARYNGITIS ACUTE: ICD-10-CM

## 2018-12-26 PROBLEM — H69.93 EUSTACHIAN TUBE DYSFUNCTION, BILATERAL: Status: ACTIVE | Noted: 2018-12-26

## 2018-12-26 PROCEDURE — 99213 OFFICE O/P EST LOW 20 MIN: CPT | Performed by: NURSE PRACTITIONER

## 2018-12-26 RX ORDER — FLUTICASONE PROPIONATE 50 MCG
2 SPRAY, SUSPENSION (ML) NASAL DAILY
Qty: 1 INHALER | Refills: 0 | Status: SHIPPED | OUTPATIENT
Start: 2018-12-26 | End: 2019-06-07

## 2018-12-26 NOTE — PROGRESS NOTES
CHIEF COMPLAINT:     Patient presents with:  Sinus Problem  Sore Throat      HPI:   Radha Krishnamurthy is a 79year old female who presents with complaints of sinus congestion and bilateral ear pressure/pain for 2 days. Denies constitutional complaints. 0   Calcium Carbonate (CALTRATE 600 OR) Take  by mouth 2 (two) times daily. Disp:  Rfl:    Cholecalciferol (VITAMIN D) 1000 UNITS Oral Cap Take 1,000 mg by mouth daily.  Disp:  Rfl:       Past Medical History:   Diagnosis Date   • Diverticulitis    • Dyslip masses. BS's present x4. No palpable masses or hepatosplenomegaly. EXTREMITIES: no cyanosis, clubbing or edema  LYMPH:  Negative lymphadenopathy.       ASSESSMENT AND PLAN:     ASSESSMENT:  Eustachian tube obstruction, bilateral  (primary encounter diagn

## 2018-12-31 ENCOUNTER — LAB ENCOUNTER (OUTPATIENT)
Dept: LAB | Age: 68
End: 2018-12-31
Attending: INTERNAL MEDICINE
Payer: MEDICARE

## 2018-12-31 DIAGNOSIS — D50.9 IRON DEFICIENCY ANEMIA, UNSPECIFIED IRON DEFICIENCY ANEMIA TYPE: ICD-10-CM

## 2018-12-31 DIAGNOSIS — E61.1 IRON DEFICIENCY: ICD-10-CM

## 2018-12-31 LAB
BASOPHILS # BLD AUTO: 0.07 X10(3) UL (ref 0–0.1)
BASOPHILS NFR BLD AUTO: 0.9 %
DEPRECATED HBV CORE AB SER IA-ACNC: 28.1 NG/ML (ref 18–340)
EOSINOPHIL # BLD AUTO: 0.27 X10(3) UL (ref 0–0.3)
EOSINOPHIL NFR BLD AUTO: 3.5 %
ERYTHROCYTE [DISTWIDTH] IN BLOOD BY AUTOMATED COUNT: 13.9 % (ref 11.5–16)
HCT VFR BLD AUTO: 41.4 % (ref 34–50)
HGB BLD-MCNC: 13.2 G/DL (ref 12–16)
IMMATURE GRANULOCYTE COUNT: 0.03 X10(3) UL (ref 0–1)
IMMATURE GRANULOCYTE RATIO %: 0.4 %
IRON SATURATION: 19 % (ref 15–50)
IRON: 65 UG/DL (ref 28–170)
LYMPHOCYTES # BLD AUTO: 2.23 X10(3) UL (ref 0.9–4)
LYMPHOCYTES NFR BLD AUTO: 29.3 %
MCH RBC QN AUTO: 30.5 PG (ref 27–33.2)
MCHC RBC AUTO-ENTMCNC: 31.9 G/DL (ref 31–37)
MCV RBC AUTO: 95.6 FL (ref 81–100)
MONOCYTES # BLD AUTO: 0.52 X10(3) UL (ref 0.1–1)
MONOCYTES NFR BLD AUTO: 6.8 %
NEUTROPHIL ABS PRELIM: 4.49 X10 (3) UL (ref 1.3–6.7)
NEUTROPHILS # BLD AUTO: 4.49 X10(3) UL (ref 1.3–6.7)
NEUTROPHILS NFR BLD AUTO: 59.1 %
PLATELET # BLD AUTO: 178 10(3)UL (ref 150–450)
RBC # BLD AUTO: 4.33 X10(6)UL (ref 3.8–5.1)
RED CELL DISTRIBUTION WIDTH-SD: 47.2 FL (ref 35.1–46.3)
TOTAL IRON BINDING CAPACITY: 350 UG/DL (ref 240–450)
TRANSFERRIN SERPL-MCNC: 235 MG/DL (ref 200–360)
WBC # BLD AUTO: 7.6 X10(3) UL (ref 4–13)

## 2018-12-31 PROCEDURE — 82728 ASSAY OF FERRITIN: CPT

## 2018-12-31 PROCEDURE — 36415 COLL VENOUS BLD VENIPUNCTURE: CPT

## 2018-12-31 PROCEDURE — 85025 COMPLETE CBC W/AUTO DIFF WBC: CPT

## 2018-12-31 PROCEDURE — 83540 ASSAY OF IRON: CPT

## 2018-12-31 PROCEDURE — 83550 IRON BINDING TEST: CPT

## 2019-01-03 NOTE — TELEPHONE ENCOUNTER
Last office visit 12/6/18 with Dr. Dianne Brown. Last refill 12/22/17 (#180, 3 refills). Last A1C 11/30/18.

## 2019-02-13 RX ORDER — CANAGLIFLOZIN 300 MG/1
TABLET, FILM COATED ORAL
Qty: 90 TABLET | Refills: 1 | Status: SHIPPED | OUTPATIENT
Start: 2019-02-13 | End: 2019-08-22

## 2019-02-13 NOTE — TELEPHONE ENCOUNTER
Refill requested:   Requested Prescriptions     Pending Prescriptions Disp Refills   • INVOKANA 300 MG Oral Tab [Pharmacy Med Name: INVOKANA TABS 300MG] 90 tablet 1     Sig: TAKE 1 TABLET DAILY         Passed protocol    Last refill: 8/16/2018    Labs:   L

## 2019-02-28 VITALS
HEART RATE: 68 BPM | SYSTOLIC BLOOD PRESSURE: 120 MMHG | RESPIRATION RATE: 16 BRPM | HEIGHT: 62 IN | BODY MASS INDEX: 30.55 KG/M2 | WEIGHT: 166 LBS | DIASTOLIC BLOOD PRESSURE: 64 MMHG

## 2019-02-28 VITALS
DIASTOLIC BLOOD PRESSURE: 64 MMHG | BODY MASS INDEX: 31.34 KG/M2 | HEART RATE: 68 BPM | HEIGHT: 61 IN | WEIGHT: 166 LBS | RESPIRATION RATE: 16 BRPM | SYSTOLIC BLOOD PRESSURE: 118 MMHG

## 2019-03-01 VITALS
DIASTOLIC BLOOD PRESSURE: 54 MMHG | SYSTOLIC BLOOD PRESSURE: 118 MMHG | RESPIRATION RATE: 16 BRPM | HEART RATE: 86 BPM | BODY MASS INDEX: 31.72 KG/M2 | HEIGHT: 61 IN | WEIGHT: 168 LBS

## 2019-03-14 ENCOUNTER — TELEPHONE (OUTPATIENT)
Dept: INTERNAL MEDICINE CLINIC | Facility: CLINIC | Age: 69
End: 2019-03-14

## 2019-03-14 DIAGNOSIS — D50.0 IRON DEFICIENCY ANEMIA DUE TO CHRONIC BLOOD LOSS: ICD-10-CM

## 2019-03-14 DIAGNOSIS — I10 ESSENTIAL HYPERTENSION: ICD-10-CM

## 2019-03-14 DIAGNOSIS — E11.9 CONTROLLED TYPE 2 DIABETES MELLITUS WITHOUT COMPLICATION, WITHOUT LONG-TERM CURRENT USE OF INSULIN (HCC): Primary | ICD-10-CM

## 2019-03-14 RX ORDER — VALSARTAN 40 MG/1
40 TABLET ORAL DAILY
Qty: 90 TABLET | Refills: 1 | Status: SHIPPED | OUTPATIENT
Start: 2019-03-14 | End: 2019-06-07

## 2019-03-14 NOTE — TELEPHONE ENCOUNTER
Patient contacted her pharmacy in regards to the recall on Losartan. Patient stated that the pharmacy told her that it doesn't affect her. Patient doesn't feel comfortable taking this medication and would like to switch to another medication.  Please advise

## 2019-03-18 ENCOUNTER — TELEPHONE (OUTPATIENT)
Dept: INTERNAL MEDICINE CLINIC | Facility: CLINIC | Age: 69
End: 2019-03-18

## 2019-03-18 NOTE — TELEPHONE ENCOUNTER
Called Pt back. Left message that Valsartan is the correct dose. Different medications come in different doses. This is the medication and dose the doctor wants her on.

## 2019-03-18 NOTE — TELEPHONE ENCOUNTER
Patient called back requesting to speak with the nurse to clarify her medication. She was recently changed to Valsartan 40 MG and was previously taking losartan 25 MG. Patient would like to confirm that the dosage is correct.  Please advise

## 2019-03-25 RX ORDER — DULAGLUTIDE 1.5 MG/.5ML
INJECTION, SOLUTION SUBCUTANEOUS
Qty: 6 ML | Refills: 0 | Status: SHIPPED | OUTPATIENT
Start: 2019-03-25 | End: 2019-03-27

## 2019-03-25 NOTE — TELEPHONE ENCOUNTER
Refill requested:   Requested Prescriptions     Pending Prescriptions Disp Refills   • TRULICITY 1.5 RY/2.7UU Subcutaneous Solution Pen-injector [Pharmacy Med Name: TRULICITY 7.7FC/7.9DY SDP 4X0.5ML] 6 mL 0     Sig: INJECT 1.5ML INTO THE SKIN ONCE WEEKLY.

## 2019-03-27 NOTE — TELEPHONE ENCOUNTER
Pt very upset would like a call back from nurse medication was sent to wrong pharmacy and she needs to take it this weekend so would like refill today  TRULICITY 1.5 TZ/3.2WN Subcutaneous Solution Pen-injector  [address], [address] [facility] -

## 2019-06-05 ENCOUNTER — LAB ENCOUNTER (OUTPATIENT)
Dept: LAB | Age: 69
End: 2019-06-05
Attending: INTERNAL MEDICINE
Payer: MEDICARE

## 2019-06-05 DIAGNOSIS — D50.0 IRON DEFICIENCY ANEMIA DUE TO CHRONIC BLOOD LOSS: ICD-10-CM

## 2019-06-05 DIAGNOSIS — D50.9 IRON DEFICIENCY ANEMIA, UNSPECIFIED IRON DEFICIENCY ANEMIA TYPE: ICD-10-CM

## 2019-06-05 DIAGNOSIS — E11.9 CONTROLLED TYPE 2 DIABETES MELLITUS WITHOUT COMPLICATION, WITHOUT LONG-TERM CURRENT USE OF INSULIN (HCC): ICD-10-CM

## 2019-06-05 DIAGNOSIS — I10 ESSENTIAL HYPERTENSION: ICD-10-CM

## 2019-06-05 PROCEDURE — 36415 COLL VENOUS BLD VENIPUNCTURE: CPT

## 2019-06-05 PROCEDURE — 82728 ASSAY OF FERRITIN: CPT

## 2019-06-05 PROCEDURE — 83540 ASSAY OF IRON: CPT

## 2019-06-05 PROCEDURE — 85025 COMPLETE CBC W/AUTO DIFF WBC: CPT

## 2019-06-05 PROCEDURE — 80061 LIPID PANEL: CPT

## 2019-06-05 PROCEDURE — 83550 IRON BINDING TEST: CPT

## 2019-06-05 PROCEDURE — 83036 HEMOGLOBIN GLYCOSYLATED A1C: CPT

## 2019-06-05 PROCEDURE — 82043 UR ALBUMIN QUANTITATIVE: CPT

## 2019-06-05 PROCEDURE — 82570 ASSAY OF URINE CREATININE: CPT

## 2019-06-05 PROCEDURE — 80053 COMPREHEN METABOLIC PANEL: CPT

## 2019-06-07 ENCOUNTER — OFFICE VISIT (OUTPATIENT)
Dept: INTERNAL MEDICINE CLINIC | Facility: CLINIC | Age: 69
End: 2019-06-07
Payer: MEDICARE

## 2019-06-07 VITALS
TEMPERATURE: 98 F | RESPIRATION RATE: 16 BRPM | DIASTOLIC BLOOD PRESSURE: 52 MMHG | HEIGHT: 60.5 IN | OXYGEN SATURATION: 96 % | SYSTOLIC BLOOD PRESSURE: 126 MMHG | BODY MASS INDEX: 31.18 KG/M2 | WEIGHT: 163 LBS | HEART RATE: 87 BPM

## 2019-06-07 DIAGNOSIS — E11.9 CONTROLLED TYPE 2 DIABETES MELLITUS WITHOUT COMPLICATION, WITHOUT LONG-TERM CURRENT USE OF INSULIN (HCC): ICD-10-CM

## 2019-06-07 DIAGNOSIS — E78.5 HYPERLIPIDEMIA ASSOCIATED WITH TYPE 2 DIABETES MELLITUS (HCC): ICD-10-CM

## 2019-06-07 DIAGNOSIS — Z12.39 SCREENING FOR BREAST CANCER: Primary | ICD-10-CM

## 2019-06-07 DIAGNOSIS — I15.2 HYPERTENSION ASSOCIATED WITH DIABETES (HCC): ICD-10-CM

## 2019-06-07 DIAGNOSIS — E11.649 HYPOGLYCEMIA ASSOCIATED WITH DIABETES (HCC): ICD-10-CM

## 2019-06-07 DIAGNOSIS — E11.69 HYPERLIPIDEMIA ASSOCIATED WITH TYPE 2 DIABETES MELLITUS (HCC): ICD-10-CM

## 2019-06-07 DIAGNOSIS — Z12.31 ENCOUNTER FOR SCREENING MAMMOGRAM FOR MALIGNANT NEOPLASM OF BREAST: ICD-10-CM

## 2019-06-07 DIAGNOSIS — E11.59 HYPERTENSION ASSOCIATED WITH DIABETES (HCC): ICD-10-CM

## 2019-06-07 PROBLEM — R80.9 TYPE 2 DIABETES MELLITUS WITH ALBUMINURIA (HCC): Status: ACTIVE | Noted: 2017-03-15

## 2019-06-07 PROBLEM — R80.9 TYPE 2 DIABETES MELLITUS WITH ALBUMINURIA: Status: ACTIVE | Noted: 2017-03-15

## 2019-06-07 PROBLEM — E11.29 TYPE 2 DIABETES MELLITUS WITH ALBUMINURIA: Status: ACTIVE | Noted: 2017-03-15

## 2019-06-07 PROBLEM — E11.29 TYPE 2 DIABETES MELLITUS WITH ALBUMINURIA  (HCC): Status: ACTIVE | Noted: 2017-03-15

## 2019-06-07 PROBLEM — E11.29 TYPE 2 DIABETES MELLITUS WITH ALBUMINURIA (HCC): Status: ACTIVE | Noted: 2017-03-15

## 2019-06-07 PROBLEM — R80.9 TYPE 2 DIABETES MELLITUS WITH ALBUMINURIA  (HCC): Status: ACTIVE | Noted: 2017-03-15

## 2019-06-07 PROCEDURE — 99214 OFFICE O/P EST MOD 30 MIN: CPT | Performed by: INTERNAL MEDICINE

## 2019-06-07 RX ORDER — ATORVASTATIN CALCIUM 40 MG/1
TABLET, FILM COATED ORAL
Qty: 90 TABLET | Refills: 3 | Status: SHIPPED | OUTPATIENT
Start: 2019-06-07 | End: 2020-06-24

## 2019-06-07 RX ORDER — GLYBURIDE 5 MG/1
TABLET ORAL
Qty: 135 TABLET | Refills: 3 | Status: SHIPPED | OUTPATIENT
Start: 2019-06-07 | End: 2019-12-13

## 2019-06-07 RX ORDER — CLOTRIMAZOLE AND BETAMETHASONE DIPROPIONATE 10; .64 MG/G; MG/G
1 CREAM TOPICAL 2 TIMES DAILY PRN
Qty: 60 G | Refills: 0 | Status: SHIPPED | OUTPATIENT
Start: 2019-06-07

## 2019-06-07 RX ORDER — LOSARTAN POTASSIUM 25 MG/1
25 TABLET ORAL DAILY
Qty: 90 TABLET | Refills: 3 | Status: SHIPPED | OUTPATIENT
Start: 2019-06-07 | End: 2020-01-15

## 2019-06-07 RX ORDER — LOSARTAN POTASSIUM 25 MG/1
25 TABLET ORAL DAILY
COMMUNITY
End: 2019-06-07

## 2019-06-07 NOTE — PROGRESS NOTES
Robinson Read is a 79year old female. HPI:   Patient presents for the following issues. 1. HTN: her son in law works for [employer] so she never swtiched to valsartan and just continued on the losartan. Does not check home pressures. Heart Disorder Maternal Grandfather         MI   • Cancer Brother         esophageal cancer      Past Medical History:   Diagnosis Date   • Diverticulitis    • Dyslipidemia    • Gastrointestinal bleeding     PUDz in setting of ASA/boniva use   • Osteopenia legs/feet is normal as well. ASSESSMENT AND PLAN:   # Iron Deficiency Anemia: her CBC is now normal on iron replacement. She wants to reduce her iron to daily rather than BID and repeat CBC in 6 weeks.     - Likely a small bowel source but no further e loss. Cont calcium/vit D supplementation as well. Encourage weight bearing excercises as this is not an active part of her lifestyle.   # Constipation, h/o Diverticulitis (x2 episodes), colonic polyps, antral gastritis.  Chronic, controlled with high fiber

## 2019-06-07 NOTE — PATIENT INSTRUCTIONS
You can reduce your iron supplement to once daily and repeat your CBC (blood count) in 6 weeks to make sure your hemoglobin is not dropping. Please do not exceed 3000 mg (3 grams) of tylenol in 24 hours.    It is very important you look at the amount of

## 2019-06-17 DIAGNOSIS — K62.5 RECTAL BLEEDING: ICD-10-CM

## 2019-06-17 DIAGNOSIS — R10.12 LEFT UPPER QUADRANT ABDOMINAL PAIN OF UNKNOWN ETIOLOGY: ICD-10-CM

## 2019-06-17 DIAGNOSIS — E11.9 CONTROLLED TYPE 2 DIABETES MELLITUS WITHOUT COMPLICATION, WITHOUT LONG-TERM CURRENT USE OF INSULIN (HCC): ICD-10-CM

## 2019-06-17 DIAGNOSIS — E78.2 MIXED HYPERLIPIDEMIA: ICD-10-CM

## 2019-06-17 DIAGNOSIS — D36.9 TUBULAR ADENOMA: ICD-10-CM

## 2019-06-17 DIAGNOSIS — I10 ESSENTIAL HYPERTENSION: ICD-10-CM

## 2019-06-17 RX ORDER — BLOOD SUGAR DIAGNOSTIC
STRIP MISCELLANEOUS
Qty: 200 STRIP | Refills: 11 | Status: SHIPPED | OUTPATIENT
Start: 2019-06-17 | End: 2020-06-16

## 2019-06-17 NOTE — TELEPHONE ENCOUNTER
Last OV: 6/7/19 with Dr. Jas Hannah  Last refill date: 4/2/18     #/refills: #200 strips, 11 refills  When pt was asked to return for OV: 6 months  Upcoming appt/reason: no upcoming appt  LAST LABS 6/5/19

## 2019-07-26 ENCOUNTER — LAB ENCOUNTER (OUTPATIENT)
Dept: LAB | Age: 69
End: 2019-07-26
Attending: INTERNAL MEDICINE
Payer: MEDICARE

## 2019-07-26 DIAGNOSIS — R80.9 TYPE 2 DIABETES MELLITUS WITH ALBUMINURIA (HCC): ICD-10-CM

## 2019-07-26 DIAGNOSIS — E11.29 TYPE 2 DIABETES MELLITUS WITH ALBUMINURIA (HCC): ICD-10-CM

## 2019-07-26 DIAGNOSIS — E11.29 TYPE 2 DIABETES MELLITUS WITH ALBUMINURIA (HCC): Primary | ICD-10-CM

## 2019-07-26 DIAGNOSIS — R80.9 TYPE 2 DIABETES MELLITUS WITH ALBUMINURIA (HCC): Primary | ICD-10-CM

## 2019-07-26 LAB
BASOPHILS # BLD AUTO: 0.07 X10(3) UL (ref 0–0.2)
BASOPHILS NFR BLD AUTO: 0.9 %
DEPRECATED RDW RBC AUTO: 45.9 FL (ref 35.1–46.3)
EOSINOPHIL # BLD AUTO: 0.31 X10(3) UL (ref 0–0.7)
EOSINOPHIL NFR BLD AUTO: 4 %
ERYTHROCYTE [DISTWIDTH] IN BLOOD BY AUTOMATED COUNT: 12.5 % (ref 11–15)
HCT VFR BLD AUTO: 43.2 % (ref 35–48)
HGB BLD-MCNC: 13.9 G/DL (ref 12–16)
IMM GRANULOCYTES # BLD AUTO: 0.03 X10(3) UL (ref 0–1)
IMM GRANULOCYTES NFR BLD: 0.4 %
LYMPHOCYTES # BLD AUTO: 2.14 X10(3) UL (ref 1–4)
LYMPHOCYTES NFR BLD AUTO: 27.8 %
MCH RBC QN AUTO: 32 PG (ref 26–34)
MCHC RBC AUTO-ENTMCNC: 32.2 G/DL (ref 31–37)
MCV RBC AUTO: 99.3 FL (ref 80–100)
MONOCYTES # BLD AUTO: 0.7 X10(3) UL (ref 0.1–1)
MONOCYTES NFR BLD AUTO: 9.1 %
NEUTROPHILS # BLD AUTO: 4.46 X10 (3) UL (ref 1.5–7.7)
NEUTROPHILS # BLD AUTO: 4.46 X10(3) UL (ref 1.5–7.7)
NEUTROPHILS NFR BLD AUTO: 57.8 %
PLATELET # BLD AUTO: 156 10(3)UL (ref 150–450)
RBC # BLD AUTO: 4.35 X10(6)UL (ref 3.8–5.3)
WBC # BLD AUTO: 7.7 X10(3) UL (ref 4–11)

## 2019-07-26 PROCEDURE — 36415 COLL VENOUS BLD VENIPUNCTURE: CPT

## 2019-07-26 PROCEDURE — 85025 COMPLETE CBC W/AUTO DIFF WBC: CPT

## 2019-08-26 NOTE — TELEPHONE ENCOUNTER
INVOKANA 300 MG     Last OV relevant to medication: 6-7-2019     Last refill date: 2- # 90 tabs with 1 refills    When pt was asked to return for OV: 6 months     Upcoming appt/reason: none     Labs: 6-5-2019 ( lipid, hemoglobin a1c, cbc, and cmp )

## 2019-08-27 RX ORDER — CANAGLIFLOZIN 300 MG/1
TABLET, FILM COATED ORAL
Qty: 90 TABLET | Refills: 3 | Status: SHIPPED | OUTPATIENT
Start: 2019-08-27 | End: 2020-08-19

## 2019-09-27 ENCOUNTER — OFFICE VISIT (OUTPATIENT)
Dept: HEMATOLOGY/ONCOLOGY | Facility: HOSPITAL | Age: 69
End: 2019-09-27
Attending: INTERNAL MEDICINE
Payer: MEDICARE

## 2019-09-27 VITALS
BODY MASS INDEX: 31.3 KG/M2 | HEART RATE: 84 BPM | TEMPERATURE: 97 F | OXYGEN SATURATION: 95 % | HEIGHT: 60.51 IN | RESPIRATION RATE: 16 BRPM | WEIGHT: 163.63 LBS | SYSTOLIC BLOOD PRESSURE: 112 MMHG | DIASTOLIC BLOOD PRESSURE: 68 MMHG

## 2019-09-27 DIAGNOSIS — D50.0 IRON DEFICIENCY ANEMIA DUE TO CHRONIC BLOOD LOSS: Primary | ICD-10-CM

## 2019-09-27 LAB
BASOPHILS # BLD AUTO: 0.04 X10(3) UL (ref 0–0.2)
BASOPHILS NFR BLD AUTO: 0.6 %
DEPRECATED HBV CORE AB SER IA-ACNC: 32.8 NG/ML (ref 18–340)
DEPRECATED RDW RBC AUTO: 45 FL (ref 35.1–46.3)
EOSINOPHIL # BLD AUTO: 0.3 X10(3) UL (ref 0–0.7)
EOSINOPHIL NFR BLD AUTO: 4.2 %
ERYTHROCYTE [DISTWIDTH] IN BLOOD BY AUTOMATED COUNT: 12.7 % (ref 11–15)
HCT VFR BLD AUTO: 41.6 % (ref 35–48)
HGB BLD-MCNC: 13.5 G/DL (ref 12–16)
IMM GRANULOCYTES # BLD AUTO: 0.02 X10(3) UL (ref 0–1)
IMM GRANULOCYTES NFR BLD: 0.3 %
IRON SATURATION: 24 % (ref 15–50)
IRON SERPL-MCNC: 90 UG/DL (ref 50–170)
LYMPHOCYTES # BLD AUTO: 2.49 X10(3) UL (ref 1–4)
LYMPHOCYTES NFR BLD AUTO: 34.6 %
MCH RBC QN AUTO: 31.5 PG (ref 26–34)
MCHC RBC AUTO-ENTMCNC: 32.5 G/DL (ref 31–37)
MCV RBC AUTO: 97 FL (ref 80–100)
MONOCYTES # BLD AUTO: 0.49 X10(3) UL (ref 0.1–1)
MONOCYTES NFR BLD AUTO: 6.8 %
NEUTROPHILS # BLD AUTO: 3.86 X10 (3) UL (ref 1.5–7.7)
NEUTROPHILS # BLD AUTO: 3.86 X10(3) UL (ref 1.5–7.7)
NEUTROPHILS NFR BLD AUTO: 53.5 %
PLATELET # BLD AUTO: 163 10(3)UL (ref 150–450)
RBC # BLD AUTO: 4.29 X10(6)UL (ref 3.8–5.3)
TOTAL IRON BINDING CAPACITY: 373 UG/DL (ref 240–450)
TRANSFERRIN SERPL-MCNC: 250 MG/DL (ref 200–360)
WBC # BLD AUTO: 7.2 X10(3) UL (ref 4–11)

## 2019-09-27 PROCEDURE — 99213 OFFICE O/P EST LOW 20 MIN: CPT | Performed by: INTERNAL MEDICINE

## 2019-09-27 NOTE — PROGRESS NOTES
Patient is here for MD f/u for anemia. Labs drawn today. Patient is feeling well. Denies any bleeding issues. Appetite and energy level is good. No complaints.        Education Record    Learner:  Patient and Family Member    Disease / Diagnosis:  Anemia

## 2019-09-30 NOTE — PROGRESS NOTES
[facility]    PATIENT'S NAME: Annie Fifi   ATTENDING PHYSICIAN: Cheyanne Ruano M.D.    PATIENT ACCOUNT #: [de-identified] LOCATION: [address] [place] [address] RECORD #: [id number] YOB: 1950   DATE OF SERVICE: 09/27/2019       CANCER CENT 112/68, pulse 84, respiratory rate is 20, temperature is 96.9. HEENT:  Unremarkable. She has pink conjunctivae, anicteric sclerae. Pharynx without lesions. LYMPHATICS:  She has no cervical, supraclavicular, or axillary adenopathy.    LUNGS:  Resonant t

## 2019-10-23 ENCOUNTER — TELEPHONE (OUTPATIENT)
Dept: INTERNAL MEDICINE CLINIC | Facility: CLINIC | Age: 69
End: 2019-10-23

## 2019-11-06 ENCOUNTER — TELEPHONE (OUTPATIENT)
Dept: INTERNAL MEDICINE CLINIC | Facility: CLINIC | Age: 69
End: 2019-11-06

## 2019-11-06 ENCOUNTER — HOSPITAL ENCOUNTER (OUTPATIENT)
Dept: MAMMOGRAPHY | Age: 69
Discharge: HOME | End: 2019-11-06
Attending: INTERNAL MEDICINE
Payer: MEDICARE

## 2019-11-06 DIAGNOSIS — E78.5 HYPERLIPIDEMIA ASSOCIATED WITH TYPE 2 DIABETES MELLITUS (HCC): ICD-10-CM

## 2019-11-06 DIAGNOSIS — D50.0 IRON DEFICIENCY ANEMIA DUE TO CHRONIC BLOOD LOSS: ICD-10-CM

## 2019-11-06 DIAGNOSIS — I15.2 HYPERTENSION ASSOCIATED WITH DIABETES (HCC): Primary | ICD-10-CM

## 2019-11-06 DIAGNOSIS — E11.59 HYPERTENSION ASSOCIATED WITH DIABETES (HCC): Primary | ICD-10-CM

## 2019-11-06 DIAGNOSIS — E11.29 TYPE 2 DIABETES MELLITUS WITH ALBUMINURIA (HCC): ICD-10-CM

## 2019-11-06 DIAGNOSIS — E11.69 HYPERLIPIDEMIA ASSOCIATED WITH TYPE 2 DIABETES MELLITUS (HCC): ICD-10-CM

## 2019-11-06 DIAGNOSIS — R80.9 TYPE 2 DIABETES MELLITUS WITH ALBUMINURIA (HCC): ICD-10-CM

## 2019-11-06 DIAGNOSIS — Z12.31 ENCOUNTER FOR SCREENING MAMMOGRAM FOR MALIGNANT NEOPLASM OF BREAST: ICD-10-CM

## 2019-11-06 PROCEDURE — 77063 BREAST TOMOSYNTHESIS BI: CPT | Performed by: INTERNAL MEDICINE

## 2019-11-06 PROCEDURE — 77067 SCR MAMMO BI INCL CAD: CPT | Performed by: INTERNAL MEDICINE

## 2019-11-06 NOTE — TELEPHONE ENCOUNTER
Patient has an appointment scheduled her AWV on 12/13/19, and was wondering if Dr Marsha Ford can place her lab orders in prior to her visit. Please lizbeth pt with order status.

## 2019-12-06 ENCOUNTER — LAB ENCOUNTER (OUTPATIENT)
Dept: LAB | Age: 69
End: 2019-12-06
Attending: INTERNAL MEDICINE
Payer: MEDICARE

## 2019-12-06 DIAGNOSIS — D50.0 IRON DEFICIENCY ANEMIA DUE TO CHRONIC BLOOD LOSS: ICD-10-CM

## 2019-12-06 DIAGNOSIS — E11.69 HYPERLIPIDEMIA ASSOCIATED WITH TYPE 2 DIABETES MELLITUS (HCC): ICD-10-CM

## 2019-12-06 DIAGNOSIS — I15.2 HYPERTENSION ASSOCIATED WITH DIABETES (HCC): ICD-10-CM

## 2019-12-06 DIAGNOSIS — E78.5 HYPERLIPIDEMIA ASSOCIATED WITH TYPE 2 DIABETES MELLITUS (HCC): ICD-10-CM

## 2019-12-06 DIAGNOSIS — R80.9 TYPE 2 DIABETES MELLITUS WITH ALBUMINURIA (HCC): ICD-10-CM

## 2019-12-06 DIAGNOSIS — E11.59 HYPERTENSION ASSOCIATED WITH DIABETES (HCC): ICD-10-CM

## 2019-12-06 DIAGNOSIS — E11.29 TYPE 2 DIABETES MELLITUS WITH ALBUMINURIA (HCC): ICD-10-CM

## 2019-12-06 PROCEDURE — 84450 TRANSFERASE (AST) (SGOT): CPT

## 2019-12-06 PROCEDURE — 80048 BASIC METABOLIC PNL TOTAL CA: CPT

## 2019-12-06 PROCEDURE — 36415 COLL VENOUS BLD VENIPUNCTURE: CPT

## 2019-12-06 PROCEDURE — 84460 ALANINE AMINO (ALT) (SGPT): CPT

## 2019-12-06 PROCEDURE — 85025 COMPLETE CBC W/AUTO DIFF WBC: CPT

## 2019-12-06 PROCEDURE — 83036 HEMOGLOBIN GLYCOSYLATED A1C: CPT

## 2019-12-13 ENCOUNTER — OFFICE VISIT (OUTPATIENT)
Dept: INTERNAL MEDICINE CLINIC | Facility: CLINIC | Age: 69
End: 2019-12-13
Payer: MEDICARE

## 2019-12-13 VITALS
BODY MASS INDEX: 31.27 KG/M2 | SYSTOLIC BLOOD PRESSURE: 94 MMHG | HEART RATE: 90 BPM | OXYGEN SATURATION: 97 % | TEMPERATURE: 98 F | RESPIRATION RATE: 16 BRPM | HEIGHT: 60.5 IN | WEIGHT: 163.5 LBS | DIASTOLIC BLOOD PRESSURE: 70 MMHG

## 2019-12-13 DIAGNOSIS — R80.9 TYPE 2 DIABETES MELLITUS WITH ALBUMINURIA (HCC): ICD-10-CM

## 2019-12-13 DIAGNOSIS — E11.59 HYPERTENSION ASSOCIATED WITH DIABETES (HCC): ICD-10-CM

## 2019-12-13 DIAGNOSIS — E11.29 TYPE 2 DIABETES MELLITUS WITH ALBUMINURIA (HCC): ICD-10-CM

## 2019-12-13 DIAGNOSIS — Z13.31 SCREENING FOR DEPRESSION: ICD-10-CM

## 2019-12-13 DIAGNOSIS — K21.9 GASTROESOPHAGEAL REFLUX DISEASE WITHOUT ESOPHAGITIS: ICD-10-CM

## 2019-12-13 DIAGNOSIS — Z00.00 ROUTINE GENERAL MEDICAL EXAMINATION AT A HEALTH CARE FACILITY: Primary | ICD-10-CM

## 2019-12-13 DIAGNOSIS — E78.5 HYPERLIPIDEMIA ASSOCIATED WITH TYPE 2 DIABETES MELLITUS (HCC): ICD-10-CM

## 2019-12-13 DIAGNOSIS — I15.2 HYPERTENSION ASSOCIATED WITH DIABETES (HCC): ICD-10-CM

## 2019-12-13 DIAGNOSIS — E11.69 HYPERLIPIDEMIA ASSOCIATED WITH TYPE 2 DIABETES MELLITUS (HCC): ICD-10-CM

## 2019-12-13 DIAGNOSIS — M85.88 OSTEOPENIA OF LUMBAR SPINE: ICD-10-CM

## 2019-12-13 DIAGNOSIS — E11.649 HYPOGLYCEMIA ASSOCIATED WITH DIABETES (HCC): ICD-10-CM

## 2019-12-13 PROCEDURE — 90662 IIV NO PRSV INCREASED AG IM: CPT | Performed by: INTERNAL MEDICINE

## 2019-12-13 PROCEDURE — G0444 DEPRESSION SCREEN ANNUAL: HCPCS | Performed by: INTERNAL MEDICINE

## 2019-12-13 PROCEDURE — G0439 PPPS, SUBSEQ VISIT: HCPCS | Performed by: INTERNAL MEDICINE

## 2019-12-13 PROCEDURE — 99214 OFFICE O/P EST MOD 30 MIN: CPT | Performed by: INTERNAL MEDICINE

## 2019-12-13 PROCEDURE — G0008 ADMIN INFLUENZA VIRUS VAC: HCPCS | Performed by: INTERNAL MEDICINE

## 2019-12-13 RX ORDER — GLYBURIDE 2.5 MG/1
2.5 TABLET ORAL
Qty: 90 TABLET | Refills: 0 | COMMUNITY
Start: 2019-12-13 | End: 2020-05-29

## 2019-12-13 NOTE — PATIENT INSTRUCTIONS
Please decrease your glyburide to 2.5 mg once daily. You can take half of your 5 mg tablets. If your morning, fasting sugars are rising above 140, then please let me know and go back to 5 mg of glyburide daily.     For your high blood pressure, please stop

## 2019-12-13 NOTE — PROGRESS NOTES
Silvano Hooker is a 81year old female. HPI:   Patient presents for medicare wellness exam and additional issues noted below. 1. DM: her home FBS are < 140s. Does not usually measure any other time of day. She is only on glyburide 5 mg daily now.  Luisito Louie • Heart Disorder Maternal Grandmother         CVA   • Heart Disorder Maternal Grandfather         MI   • Cancer Brother         esophageal cancer      Past Medical History:   Diagnosis Date   • Diverticulitis    • Dyslipidemia    • Gastrointestinal bleed further evaluation is needed per heme. Cont monitoring of CBC and iron stores regularly. 8/2/2018 - capsule endoscopy - Scattered red spots are appreciated throughout the small bowel.  Subtle erythema is occasionally appreciated. Does not explain JENN. DEXA in 12/2020 and will need prolia injections if she develops osteoporosis. # Constipation, h/o Diverticulitis (x2 episodes), colonic polyps, antral gastritis. Chronic, controlled with high fiber diet. # GERD: well controlled on omeprazole.  Unable to

## 2020-01-02 NOTE — TELEPHONE ENCOUNTER
Metformin HCL 1000 Mg  Last OV relevant to medication: 12-13-19  Last refill date: 1-3-19 #/refills: 3  When pt was asked to return for OV: 6 mo. Upcoming appt/reason: none  Recent labs: 12-6-19: HgBA1c  6-5-19: Microalb./Creat.

## 2020-01-15 ENCOUNTER — OFFICE VISIT (OUTPATIENT)
Dept: INTERNAL MEDICINE CLINIC | Facility: CLINIC | Age: 70
End: 2020-01-15
Payer: MEDICARE

## 2020-01-15 ENCOUNTER — TELEPHONE (OUTPATIENT)
Dept: INTERNAL MEDICINE CLINIC | Facility: CLINIC | Age: 70
End: 2020-01-15

## 2020-01-15 VITALS
SYSTOLIC BLOOD PRESSURE: 120 MMHG | OXYGEN SATURATION: 98 % | BODY MASS INDEX: 30.98 KG/M2 | TEMPERATURE: 98 F | HEART RATE: 84 BPM | DIASTOLIC BLOOD PRESSURE: 64 MMHG | HEIGHT: 60.5 IN | RESPIRATION RATE: 16 BRPM | WEIGHT: 162 LBS

## 2020-01-15 DIAGNOSIS — E11.69 HYPERLIPIDEMIA ASSOCIATED WITH TYPE 2 DIABETES MELLITUS (HCC): ICD-10-CM

## 2020-01-15 DIAGNOSIS — R80.9 TYPE 2 DIABETES MELLITUS WITH ALBUMINURIA (HCC): Primary | ICD-10-CM

## 2020-01-15 DIAGNOSIS — R01.1 SYSTOLIC MURMUR: ICD-10-CM

## 2020-01-15 DIAGNOSIS — E11.29 TYPE 2 DIABETES MELLITUS WITH ALBUMINURIA (HCC): Primary | ICD-10-CM

## 2020-01-15 DIAGNOSIS — E78.5 HYPERLIPIDEMIA ASSOCIATED WITH TYPE 2 DIABETES MELLITUS (HCC): ICD-10-CM

## 2020-01-15 DIAGNOSIS — R60.0 EDEMA OF RIGHT FOOT: ICD-10-CM

## 2020-01-15 PROCEDURE — 99214 OFFICE O/P EST MOD 30 MIN: CPT | Performed by: INTERNAL MEDICINE

## 2020-01-15 NOTE — PROGRESS NOTES
Keyona Jessica is a 81year old female. HPI:   Patient presents for the following issues. 1. HTN: we stopped losartan one month ago. Home pressrues are < 130/80s off losartan. 2. Swelling of R foot: she started to notice it 2 weeks ago.  No injury o MI   • Cancer Brother         esophageal cancer      Past Medical History:   Diagnosis Date   • Diverticulitis    • Dyslipidemia    • Gastrointestinal bleeding     PUDz in setting of ASA/boniva use   • Osteopenia    • Other and unspecified hyperlipi normal.  Pulsation pedal pulse exam of both lower legs/feet is normal as well      ASSESSMENT AND PLAN:   # Systolic heart murmur: check TTE  # R Foot edema: very very mild. Likely venous insufficiency.  Has had treatments for varicose veins in past. At [facility] 3/2015). She had questionable GIB on boniva so is not a candidate for bisphosphonates. Per my literature review, clinical trials for evista do not go beyond 8 years.  Evista does not carry prolonged bone health benefits so once it is stopped she may have re

## 2020-01-24 ENCOUNTER — HOSPITAL ENCOUNTER (OUTPATIENT)
Dept: CV DIAGNOSTICS | Facility: HOSPITAL | Age: 70
Discharge: HOME | End: 2020-01-24
Attending: INTERNAL MEDICINE
Payer: MEDICARE

## 2020-01-24 DIAGNOSIS — R01.1 SYSTOLIC MURMUR: ICD-10-CM

## 2020-01-24 PROCEDURE — 93306 TTE W/DOPPLER COMPLETE: CPT | Performed by: INTERNAL MEDICINE

## 2020-02-19 ENCOUNTER — TELEPHONE (OUTPATIENT)
Dept: INTERNAL MEDICINE CLINIC | Facility: CLINIC | Age: 70
End: 2020-02-19

## 2020-02-19 NOTE — TELEPHONE ENCOUNTER
Pt stated she had the same symptoms before but were very mild. Pt reports she noticed the bleeding when washing up last night and was experiencing \"very heavy bleeding\".  Pt stated the bleeding stopped and she had gotten up during the night to go to the b

## 2020-02-19 NOTE — TELEPHONE ENCOUNTER
Patient called stating that she has a scab near her anus and is experiencing some bleeding. Requesting appointment with Dr. Daniel Holloway but none available. Advised next appointment tomorrow with Karis Escobar, but patient requested to speak with the nurse.

## 2020-02-21 ENCOUNTER — LAB ENCOUNTER (OUTPATIENT)
Dept: LAB | Age: 70
End: 2020-02-21
Attending: INTERNAL MEDICINE
Payer: MEDICARE

## 2020-02-21 ENCOUNTER — OFFICE VISIT (OUTPATIENT)
Dept: INTERNAL MEDICINE CLINIC | Facility: CLINIC | Age: 70
End: 2020-02-21
Payer: MEDICARE

## 2020-02-21 VITALS
DIASTOLIC BLOOD PRESSURE: 64 MMHG | OXYGEN SATURATION: 98 % | RESPIRATION RATE: 16 BRPM | HEART RATE: 84 BPM | HEIGHT: 60.5 IN | SYSTOLIC BLOOD PRESSURE: 124 MMHG | BODY MASS INDEX: 31.18 KG/M2 | WEIGHT: 163 LBS | TEMPERATURE: 98 F

## 2020-02-21 DIAGNOSIS — K62.5 RECTAL BLEEDING: ICD-10-CM

## 2020-02-21 DIAGNOSIS — K59.00 CONSTIPATION, UNSPECIFIED CONSTIPATION TYPE: ICD-10-CM

## 2020-02-21 DIAGNOSIS — K62.5 RECTAL BLEEDING: Primary | ICD-10-CM

## 2020-02-21 LAB
BASOPHILS # BLD AUTO: 0.09 X10(3) UL (ref 0–0.2)
BASOPHILS NFR BLD AUTO: 1.2 %
DEPRECATED RDW RBC AUTO: 45.5 FL (ref 35.1–46.3)
EOSINOPHIL # BLD AUTO: 0.24 X10(3) UL (ref 0–0.7)
EOSINOPHIL NFR BLD AUTO: 3.2 %
ERYTHROCYTE [DISTWIDTH] IN BLOOD BY AUTOMATED COUNT: 12.7 % (ref 11–15)
HCT VFR BLD AUTO: 43.8 % (ref 35–48)
HGB BLD-MCNC: 14 G/DL (ref 12–16)
IMM GRANULOCYTES # BLD AUTO: 0.03 X10(3) UL (ref 0–1)
IMM GRANULOCYTES NFR BLD: 0.4 %
LYMPHOCYTES # BLD AUTO: 2.21 X10(3) UL (ref 1–4)
LYMPHOCYTES NFR BLD AUTO: 29.3 %
MCH RBC QN AUTO: 31.7 PG (ref 26–34)
MCHC RBC AUTO-ENTMCNC: 32 G/DL (ref 31–37)
MCV RBC AUTO: 99.3 FL (ref 80–100)
MONOCYTES # BLD AUTO: 0.66 X10(3) UL (ref 0.1–1)
MONOCYTES NFR BLD AUTO: 8.7 %
NEUTROPHILS # BLD AUTO: 4.32 X10 (3) UL (ref 1.5–7.7)
NEUTROPHILS # BLD AUTO: 4.32 X10(3) UL (ref 1.5–7.7)
NEUTROPHILS NFR BLD AUTO: 57.2 %
PLATELET # BLD AUTO: 176 10(3)UL (ref 150–450)
RBC # BLD AUTO: 4.41 X10(6)UL (ref 3.8–5.3)
WBC # BLD AUTO: 7.6 X10(3) UL (ref 4–11)

## 2020-02-21 PROCEDURE — 99213 OFFICE O/P EST LOW 20 MIN: CPT | Performed by: INTERNAL MEDICINE

## 2020-02-21 PROCEDURE — 36415 COLL VENOUS BLD VENIPUNCTURE: CPT

## 2020-02-21 PROCEDURE — 85025 COMPLETE CBC W/AUTO DIFF WBC: CPT

## 2020-02-21 NOTE — PATIENT INSTRUCTIONS
Please repeat labs in June, 2020.  If they are normal, you can wait until December, 2020 for your next office visit when you are due for your medicare wellness exam.

## 2020-02-21 NOTE — PROGRESS NOTES
Kelsie Grider is a 81year old female. HPI:   Patient presents for the following issues. 1. DM: doing well on glyburide which was decreaed in 12/2019. FBS are still < 140s. No hypoglycemia. 2. She noticed rectal bleeding 3 nights ago.  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 # Systolic heart murmur: TTE in Jan, 2020 w/o valve abnormalities. # R Foot edema: very very mild. Likely venous insufficiency.  Has had treatments for varicose veins in past. At this time, I think comp literature review, clinical trials for evista do not go beyond 8 years. Evista does not carry prolonged bone health benefits so once it is stopped she may have resumption of bone loss. Cont calcium/vit D supplementation as well.  Encourage weight bearing ex

## 2020-03-04 ENCOUNTER — TELEPHONE (OUTPATIENT)
Dept: INTERNAL MEDICINE CLINIC | Facility: CLINIC | Age: 70
End: 2020-03-04

## 2020-05-15 RX ORDER — DULAGLUTIDE 1.5 MG/.5ML
INJECTION, SOLUTION SUBCUTANEOUS
Qty: 6 ML | Refills: 3 | Status: SHIPPED | OUTPATIENT
Start: 2020-05-15 | End: 2021-05-03

## 2020-05-15 NOTE — TELEPHONE ENCOUNTER
TRULICITY 7.6VT/8.7XD SDP 4X0. 5ML  Protocol Passed     LOV: 2/21/2020  RTC: 12/2020 for medicare wellness   FOV: none scheduled   Filled: 6/7/19 #6mL 3 refills   Recent labs: HgBA1C 12/6/19  HgbA1C  <5.7 % 6.4High

## 2020-05-29 RX ORDER — GLYBURIDE 2.5 MG/1
2.5 TABLET ORAL
Qty: 90 TABLET | Refills: 2 | Status: SHIPPED | OUTPATIENT
Start: 2020-05-29 | End: 2020-06-24

## 2020-05-29 NOTE — TELEPHONE ENCOUNTER
Patient called and stated that she is out of her prescription for glyBURIDE 2.5 MG Oral Tab. Patient stated that Lashae Quinteros is weaning her off of the medication and she is currently only taking half of the dose. Patient stated that she only needs a partial refill. Needs to be sent to [facility], [address] [facility], [phone], [phone].

## 2020-06-22 ENCOUNTER — LAB ENCOUNTER (OUTPATIENT)
Dept: LAB | Age: 70
End: 2020-06-22
Attending: NURSE PRACTITIONER
Payer: MEDICARE

## 2020-06-22 DIAGNOSIS — E78.5 HYPERLIPIDEMIA ASSOCIATED WITH TYPE 2 DIABETES MELLITUS (HCC): ICD-10-CM

## 2020-06-22 DIAGNOSIS — E11.59 HYPERTENSION ASSOCIATED WITH DIABETES (HCC): ICD-10-CM

## 2020-06-22 DIAGNOSIS — Z00.00 ROUTINE GENERAL MEDICAL EXAMINATION AT A HEALTH CARE FACILITY: ICD-10-CM

## 2020-06-22 DIAGNOSIS — Z13.31 SCREENING FOR DEPRESSION: ICD-10-CM

## 2020-06-22 DIAGNOSIS — K21.9 GASTROESOPHAGEAL REFLUX DISEASE WITHOUT ESOPHAGITIS: ICD-10-CM

## 2020-06-22 DIAGNOSIS — I15.2 HYPERTENSION ASSOCIATED WITH DIABETES (HCC): ICD-10-CM

## 2020-06-22 DIAGNOSIS — E11.649 HYPOGLYCEMIA ASSOCIATED WITH DIABETES (HCC): ICD-10-CM

## 2020-06-22 DIAGNOSIS — M85.88 OSTEOPENIA OF LUMBAR SPINE: ICD-10-CM

## 2020-06-22 DIAGNOSIS — E11.69 HYPERLIPIDEMIA ASSOCIATED WITH TYPE 2 DIABETES MELLITUS (HCC): ICD-10-CM

## 2020-06-22 DIAGNOSIS — E11.29 TYPE 2 DIABETES MELLITUS WITH ALBUMINURIA (HCC): ICD-10-CM

## 2020-06-22 DIAGNOSIS — K62.5 RECTAL BLEEDING: ICD-10-CM

## 2020-06-22 DIAGNOSIS — R80.9 TYPE 2 DIABETES MELLITUS WITH ALBUMINURIA (HCC): ICD-10-CM

## 2020-06-22 DIAGNOSIS — Z79.899 CURRENT USE OF PROTON PUMP INHIBITOR: ICD-10-CM

## 2020-06-22 PROCEDURE — 84460 ALANINE AMINO (ALT) (SGPT): CPT

## 2020-06-22 PROCEDURE — 83735 ASSAY OF MAGNESIUM: CPT

## 2020-06-22 PROCEDURE — 82570 ASSAY OF URINE CREATININE: CPT

## 2020-06-22 PROCEDURE — 85025 COMPLETE CBC W/AUTO DIFF WBC: CPT

## 2020-06-22 PROCEDURE — 82043 UR ALBUMIN QUANTITATIVE: CPT

## 2020-06-22 PROCEDURE — 80061 LIPID PANEL: CPT

## 2020-06-22 PROCEDURE — 36415 COLL VENOUS BLD VENIPUNCTURE: CPT

## 2020-06-22 PROCEDURE — 83036 HEMOGLOBIN GLYCOSYLATED A1C: CPT

## 2020-06-22 PROCEDURE — 80048 BASIC METABOLIC PNL TOTAL CA: CPT

## 2020-06-22 PROCEDURE — 84450 TRANSFERASE (AST) (SGOT): CPT

## 2020-06-22 PROCEDURE — 82728 ASSAY OF FERRITIN: CPT

## 2020-06-24 ENCOUNTER — OFFICE VISIT (OUTPATIENT)
Dept: INTERNAL MEDICINE CLINIC | Facility: CLINIC | Age: 70
End: 2020-06-24
Payer: MEDICARE

## 2020-06-24 VITALS
SYSTOLIC BLOOD PRESSURE: 128 MMHG | OXYGEN SATURATION: 98 % | TEMPERATURE: 98 F | DIASTOLIC BLOOD PRESSURE: 64 MMHG | BODY MASS INDEX: 31.56 KG/M2 | RESPIRATION RATE: 12 BRPM | WEIGHT: 165 LBS | HEART RATE: 66 BPM | HEIGHT: 60.5 IN

## 2020-06-24 DIAGNOSIS — R80.9 TYPE 2 DIABETES MELLITUS WITH ALBUMINURIA (HCC): Primary | ICD-10-CM

## 2020-06-24 DIAGNOSIS — M85.88 OSTEOPENIA OF LUMBAR SPINE: ICD-10-CM

## 2020-06-24 DIAGNOSIS — E11.649 HYPOGLYCEMIA ASSOCIATED WITH DIABETES (HCC): ICD-10-CM

## 2020-06-24 DIAGNOSIS — E11.29 TYPE 2 DIABETES MELLITUS WITH ALBUMINURIA (HCC): Primary | ICD-10-CM

## 2020-06-24 DIAGNOSIS — E11.69 HYPERLIPIDEMIA ASSOCIATED WITH TYPE 2 DIABETES MELLITUS (HCC): ICD-10-CM

## 2020-06-24 DIAGNOSIS — Z12.31 VISIT FOR SCREENING MAMMOGRAM: ICD-10-CM

## 2020-06-24 DIAGNOSIS — E78.5 HYPERLIPIDEMIA ASSOCIATED WITH TYPE 2 DIABETES MELLITUS (HCC): ICD-10-CM

## 2020-06-24 PROCEDURE — 99214 OFFICE O/P EST MOD 30 MIN: CPT | Performed by: INTERNAL MEDICINE

## 2020-06-24 PROCEDURE — G0009 ADMIN PNEUMOCOCCAL VACCINE: HCPCS | Performed by: INTERNAL MEDICINE

## 2020-06-24 PROCEDURE — 90732 PPSV23 VACC 2 YRS+ SUBQ/IM: CPT | Performed by: INTERNAL MEDICINE

## 2020-06-24 RX ORDER — GLYBURIDE 2.5 MG/1
2.5 TABLET ORAL
Qty: 90 TABLET | Refills: 2 | Status: SHIPPED | OUTPATIENT
Start: 2020-06-24 | End: 2021-03-26

## 2020-06-24 RX ORDER — ATORVASTATIN CALCIUM 40 MG/1
TABLET, FILM COATED ORAL
Qty: 90 TABLET | Refills: 3 | Status: SHIPPED | OUTPATIENT
Start: 2020-06-24 | End: 2021-06-28

## 2020-06-24 NOTE — PROGRESS NOTES
Foye Riedel is a 81year old female. HPI:   Patient presents for the following issues. 1. DM: doing very well on current regimen. Denies hypoglycemia. 2. HLP: tolerating statin therapy well  3. Obese: weight is stable.  strugglign to eat well whi diabetes mellitus without mention of complication, not stated as uncontrolled    • Vitamin D deficiency       Past Surgical History:   Procedure Laterality Date   • COLONOSCOPY  01/01/2008    Hemorrhoid, Recheck 7-10 years   • COLONOSCOPY  4.2013    adenom repeat colonoscopy in 5 years. # Controlled type 2 DM: A1C at goal in 6/2020. Doing well with lifestyle interventions. - Will cont metformin, invokana, trulicity for now. Continue glyburide 2.5 mg daily.    - DM Eye Exam on 12- by Dr. Jerry Bennett Rec high dose flu shot at pharmacy.     Medical POA: Karla Home (daughter)     Care Team:  GI- Dr. Elliot Anderson- Dr. Hendricks Mt- Dr. Sidney Rivas on 7/3/2020: DM eye exam by Pernell Russell on 6/23/2020: no diabetic retinopathy in either eye

## 2020-06-24 NOTE — PATIENT INSTRUCTIONS
Please complete your DEXA (can be done after 12/19/2020) and mammogram prior to your next appointment with me. Please repeat your labs in December, 2020. I advise you get the annual flu shot every September, October.

## 2020-08-19 RX ORDER — CANAGLIFLOZIN 300 MG/1
TABLET, FILM COATED ORAL
Qty: 90 TABLET | Refills: 3 | Status: SHIPPED | OUTPATIENT
Start: 2020-08-19 | End: 2021-07-14

## 2020-08-19 NOTE — TELEPHONE ENCOUNTER
Refill requested:   Requested Prescriptions     Pending Prescriptions Disp Refills   • INVOKANA 300 MG Oral Tab [Pharmacy Med Name: INVOKANA TABS 300MG] 90 tablet 3     Sig: TAKE 1 TABLET DAILY     Last refill: 8--90 tabs with 3 refill    Labs: 6-22

## 2020-10-22 ENCOUNTER — IMMUNIZATION (OUTPATIENT)
Dept: INTERNAL MEDICINE CLINIC | Facility: CLINIC | Age: 70
End: 2020-10-22
Payer: MEDICARE

## 2020-10-22 DIAGNOSIS — Z23 NEED FOR VACCINATION: ICD-10-CM

## 2020-10-22 PROCEDURE — G0008 ADMIN INFLUENZA VIRUS VAC: HCPCS | Performed by: INTERNAL MEDICINE

## 2020-10-22 PROCEDURE — 90662 IIV NO PRSV INCREASED AG IM: CPT | Performed by: INTERNAL MEDICINE

## 2020-12-09 ENCOUNTER — LAB ENCOUNTER (OUTPATIENT)
Dept: LAB | Age: 70
End: 2020-12-09
Attending: INTERNAL MEDICINE
Payer: MEDICARE

## 2020-12-09 DIAGNOSIS — E11.69 HYPERLIPIDEMIA ASSOCIATED WITH TYPE 2 DIABETES MELLITUS (HCC): ICD-10-CM

## 2020-12-09 DIAGNOSIS — E78.5 HYPERLIPIDEMIA ASSOCIATED WITH TYPE 2 DIABETES MELLITUS (HCC): ICD-10-CM

## 2020-12-09 DIAGNOSIS — M85.88 OSTEOPENIA OF LUMBAR SPINE: ICD-10-CM

## 2020-12-09 DIAGNOSIS — R80.9 TYPE 2 DIABETES MELLITUS WITH ALBUMINURIA (HCC): ICD-10-CM

## 2020-12-09 DIAGNOSIS — Z12.31 VISIT FOR SCREENING MAMMOGRAM: ICD-10-CM

## 2020-12-09 DIAGNOSIS — E11.29 TYPE 2 DIABETES MELLITUS WITH ALBUMINURIA (HCC): ICD-10-CM

## 2020-12-09 PROCEDURE — 84450 TRANSFERASE (AST) (SGOT): CPT

## 2020-12-09 PROCEDURE — 82306 VITAMIN D 25 HYDROXY: CPT

## 2020-12-09 PROCEDURE — 83036 HEMOGLOBIN GLYCOSYLATED A1C: CPT

## 2020-12-09 PROCEDURE — 84460 ALANINE AMINO (ALT) (SGPT): CPT

## 2020-12-09 PROCEDURE — 80048 BASIC METABOLIC PNL TOTAL CA: CPT

## 2020-12-09 PROCEDURE — 36415 COLL VENOUS BLD VENIPUNCTURE: CPT

## 2020-12-10 ENCOUNTER — TELEPHONE (OUTPATIENT)
Dept: INTERNAL MEDICINE CLINIC | Facility: CLINIC | Age: 70
End: 2020-12-10

## 2020-12-10 NOTE — TELEPHONE ENCOUNTER
Pt is requesting to please ask her pcp if is ok to continue to take her multivitamins and vitamin D prior to doing the Bone Density. ? Please call pt and advise.      XR DEXA BONE DENSITOMETRY

## 2020-12-14 ENCOUNTER — OFFICE VISIT (OUTPATIENT)
Dept: INTERNAL MEDICINE CLINIC | Facility: CLINIC | Age: 70
End: 2020-12-14
Payer: MEDICARE

## 2020-12-14 VITALS
OXYGEN SATURATION: 98 % | HEIGHT: 60.5 IN | BODY MASS INDEX: 31.18 KG/M2 | RESPIRATION RATE: 16 BRPM | DIASTOLIC BLOOD PRESSURE: 68 MMHG | HEART RATE: 88 BPM | WEIGHT: 163 LBS | TEMPERATURE: 98 F | SYSTOLIC BLOOD PRESSURE: 132 MMHG

## 2020-12-14 DIAGNOSIS — Z13.31 SCREENING FOR DEPRESSION: ICD-10-CM

## 2020-12-14 DIAGNOSIS — R80.9 TYPE 2 DIABETES MELLITUS WITH ALBUMINURIA (HCC): ICD-10-CM

## 2020-12-14 DIAGNOSIS — E66.3 OVERWEIGHT (BMI 25.0-29.9): ICD-10-CM

## 2020-12-14 DIAGNOSIS — M85.88 OSTEOPENIA OF LUMBAR SPINE: ICD-10-CM

## 2020-12-14 DIAGNOSIS — D50.0 IRON DEFICIENCY ANEMIA DUE TO CHRONIC BLOOD LOSS: ICD-10-CM

## 2020-12-14 DIAGNOSIS — Z00.00 ROUTINE GENERAL MEDICAL EXAMINATION AT A HEALTH CARE FACILITY: Primary | ICD-10-CM

## 2020-12-14 DIAGNOSIS — K21.9 GASTROESOPHAGEAL REFLUX DISEASE WITHOUT ESOPHAGITIS: ICD-10-CM

## 2020-12-14 DIAGNOSIS — E78.5 HYPERLIPIDEMIA ASSOCIATED WITH TYPE 2 DIABETES MELLITUS (HCC): ICD-10-CM

## 2020-12-14 DIAGNOSIS — E11.29 TYPE 2 DIABETES MELLITUS WITH ALBUMINURIA (HCC): ICD-10-CM

## 2020-12-14 DIAGNOSIS — E11.69 HYPERLIPIDEMIA ASSOCIATED WITH TYPE 2 DIABETES MELLITUS (HCC): ICD-10-CM

## 2020-12-14 PROCEDURE — G0444 DEPRESSION SCREEN ANNUAL: HCPCS | Performed by: INTERNAL MEDICINE

## 2020-12-14 PROCEDURE — G0439 PPPS, SUBSEQ VISIT: HCPCS | Performed by: INTERNAL MEDICINE

## 2020-12-14 PROCEDURE — 99214 OFFICE O/P EST MOD 30 MIN: CPT | Performed by: INTERNAL MEDICINE

## 2020-12-14 RX ORDER — CANAGLIFLOZIN 300 MG/1
300 TABLET, FILM COATED ORAL
Qty: 10 TABLET | Refills: 0 | COMMUNITY
Start: 2020-12-14 | End: 2021-12-14

## 2020-12-14 NOTE — PROGRESS NOTES
Eneida Samuel is a 78year old female. HPI:   Patient presents for medicare wellness exam and additional issues noted below. 1. DM: A1C is at goal. Checks BS once daily. FBS are usually < 130s. Denies hypoglycemia.   2. HTN: checks BP a few times per stated as uncontrolled    • Vitamin D deficiency       Past Surgical History:   Procedure Laterality Date   • COLONOSCOPY  01/01/2008    Hemorrhoid, Recheck 7-10 years   • COLONOSCOPY  4.2013    adenomatous polyp   • HYSTERECTOMY  2004    total hystero. lifestyle interventions. - Will cont metformin, invokana, trulicity for now. Continue glyburide 2.5 mg daily.    - DM eye exam by Kathrin Nava on 6/23/2020: no diabetic retinopathy in either eye.   - Foot exam: Jan, 2020, referred to podiatry for nail cu Team:  [facility]- Dr. Aparna Avalos- Dr. Leatha Warner- Dr. Lewis Melton      The patient indicates understanding of these issues and agrees to the plan. The patient is asked to return in 6 months for DM.    Emir Olmedo MD

## 2020-12-21 ENCOUNTER — HOSPITAL ENCOUNTER (OUTPATIENT)
Dept: BONE DENSITY | Age: 70
Discharge: HOME | End: 2020-12-21
Attending: INTERNAL MEDICINE
Payer: MEDICARE

## 2020-12-21 ENCOUNTER — HOSPITAL ENCOUNTER (OUTPATIENT)
Dept: MAMMOGRAPHY | Age: 70
Discharge: HOME | End: 2020-12-21
Attending: INTERNAL MEDICINE
Payer: MEDICARE

## 2020-12-21 DIAGNOSIS — M85.88 OSTEOPENIA OF LUMBAR SPINE: ICD-10-CM

## 2020-12-21 DIAGNOSIS — Z12.31 VISIT FOR SCREENING MAMMOGRAM: ICD-10-CM

## 2020-12-21 PROCEDURE — 77080 DXA BONE DENSITY AXIAL: CPT | Performed by: INTERNAL MEDICINE

## 2020-12-21 PROCEDURE — 77067 SCR MAMMO BI INCL CAD: CPT | Performed by: INTERNAL MEDICINE

## 2020-12-21 PROCEDURE — 77063 BREAST TOMOSYNTHESIS BI: CPT | Performed by: INTERNAL MEDICINE

## 2020-12-29 ENCOUNTER — LAB ENCOUNTER (OUTPATIENT)
Dept: LAB | Age: 70
End: 2020-12-29
Attending: INTERNAL MEDICINE
Payer: MEDICARE

## 2020-12-29 DIAGNOSIS — D50.0 IRON DEFICIENCY ANEMIA DUE TO CHRONIC BLOOD LOSS: ICD-10-CM

## 2020-12-29 PROCEDURE — 36415 COLL VENOUS BLD VENIPUNCTURE: CPT

## 2020-12-29 PROCEDURE — 82728 ASSAY OF FERRITIN: CPT

## 2020-12-29 PROCEDURE — 85025 COMPLETE CBC W/AUTO DIFF WBC: CPT

## 2021-01-18 ENCOUNTER — TELEPHONE (OUTPATIENT)
Dept: INTERNAL MEDICINE CLINIC | Facility: CLINIC | Age: 71
End: 2021-01-18

## 2021-01-18 DIAGNOSIS — M25.562 PAIN IN BOTH KNEES, UNSPECIFIED CHRONICITY: ICD-10-CM

## 2021-01-18 DIAGNOSIS — M25.561 PAIN IN BOTH KNEES, UNSPECIFIED CHRONICITY: ICD-10-CM

## 2021-01-18 DIAGNOSIS — M79.605 PAIN IN BOTH LOWER EXTREMITIES: Primary | ICD-10-CM

## 2021-01-18 DIAGNOSIS — M79.604 PAIN IN BOTH LOWER EXTREMITIES: Primary | ICD-10-CM

## 2021-01-18 NOTE — TELEPHONE ENCOUNTER
Pt requests referral to Rheumatology d/t arthritis and pain in bilateral legs/knees.   States she discussed with KS during OV last June

## 2021-02-06 DIAGNOSIS — Z23 NEED FOR VACCINATION: ICD-10-CM

## 2021-02-09 ENCOUNTER — IMMUNIZATION (OUTPATIENT)
Dept: LAB | Age: 71
End: 2021-02-09
Attending: HOSPITALIST
Payer: MEDICARE

## 2021-02-09 DIAGNOSIS — Z23 NEED FOR VACCINATION: Primary | ICD-10-CM

## 2021-02-09 PROCEDURE — 0001A SARSCOV2 VAC 30MCG/0.3ML IM: CPT

## 2021-03-02 ENCOUNTER — IMMUNIZATION (OUTPATIENT)
Dept: LAB | Age: 71
End: 2021-03-02
Attending: HOSPITALIST
Payer: MEDICARE

## 2021-03-02 DIAGNOSIS — Z23 NEED FOR VACCINATION: Primary | ICD-10-CM

## 2021-03-02 PROCEDURE — 0002A SARSCOV2 VAC 30MCG/0.3ML IM: CPT

## 2021-03-25 DIAGNOSIS — E11.649 HYPOGLYCEMIA ASSOCIATED WITH DIABETES (HCC): ICD-10-CM

## 2021-03-26 RX ORDER — GLYBURIDE 2.5 MG/1
TABLET ORAL
Qty: 90 TABLET | Refills: 3 | Status: SHIPPED | OUTPATIENT
Start: 2021-03-26

## 2021-03-26 NOTE — TELEPHONE ENCOUNTER
Name from pharmacy: GLYBURIDE TABS 2.5MG          Will file in chart as: GLYBURIDE 2.5 MG Oral Tab    Sig: TAKE 1 TABLET DAILY WITH BREAKFAST    Disp:  90 tablet    Refills:  3    Start: 3/25/2021    Class: Normal    Non-formulary For: Hypoglycemia associa

## 2021-04-06 ENCOUNTER — OFFICE VISIT (OUTPATIENT)
Dept: RHEUMATOLOGY | Facility: CLINIC | Age: 71
End: 2021-04-06
Payer: MEDICARE

## 2021-04-06 VITALS
HEART RATE: 80 BPM | TEMPERATURE: 97 F | DIASTOLIC BLOOD PRESSURE: 72 MMHG | BODY MASS INDEX: 31.34 KG/M2 | HEIGHT: 61 IN | SYSTOLIC BLOOD PRESSURE: 124 MMHG | RESPIRATION RATE: 16 BRPM | WEIGHT: 166 LBS

## 2021-04-06 DIAGNOSIS — M79.642 BILATERAL HAND PAIN: ICD-10-CM

## 2021-04-06 DIAGNOSIS — M35.00 SICCA SYNDROME (HCC): ICD-10-CM

## 2021-04-06 DIAGNOSIS — M25.562 CHRONIC PAIN OF BOTH KNEES: ICD-10-CM

## 2021-04-06 DIAGNOSIS — G89.29 CHRONIC PAIN OF BOTH KNEES: ICD-10-CM

## 2021-04-06 DIAGNOSIS — M25.561 CHRONIC PAIN OF BOTH KNEES: ICD-10-CM

## 2021-04-06 DIAGNOSIS — M25.50 POLYARTHRALGIA: Primary | ICD-10-CM

## 2021-04-06 DIAGNOSIS — M79.641 BILATERAL HAND PAIN: ICD-10-CM

## 2021-04-06 PROCEDURE — 99204 OFFICE O/P NEW MOD 45 MIN: CPT | Performed by: INTERNAL MEDICINE

## 2021-04-06 NOTE — PROGRESS NOTES
?  RHEUMATOLOGY NEW PATIENT   Date of visit: 4/6/2021  ? Patient presents with:  Establish Care: new pt. Dr. Angeles Held referral. Having joint pain in hands, shoulders, and knees. Joint swelling in hands all day. Dry eyes and dry mouth. No rashes.  No xrays do Future  -     C-REACTIVE PROTEIN; Future  -     RHEUMATOID ARTHRITIS FACTOR; Future  -     CYCLIC CITRULLINATE PEP. IGG; Future  -     XR HAND (MIN 3 VIEWS), RIGHT (CPT=73130); Future  -     XR HAND (MIN 3 VIEWS), LEFT (CPT=73130);  Future  -     XR KNEE BI arthritis without much relief.    Does feel like overall strength in her hands is decrease.     + hair thinning when she was having irregular menstrual cycles, s/p hysterectomy in 2004, hair thinning never got better but did not get worse  + hx of iron defi epistaxis. Denies chronic cough or hemoptysis. Denies obvious blood in the urine.      Family hx:   Mother  of lung cancer in her 52's  Father had leg cramping, unclear details  No formally known autoimmune disease except oldest daughter with multipl 3  metFORMIN HCl 1000 MG Oral Tab, Take 1 tablet (1,000 mg total) by mouth 2 (two) times daily with meals. , Disp: 180 tablet, Rfl: 3  atorvastatin 40 MG Oral Tab, TAKE 1 TABLET NIGHTLY, Disp: 90 tablet, Rfl: 3  TRULICITY 1.5 ELIZA/9.3FS Subcutaneous Solution Positive for dizziness. Negative for tingling, seizures, weakness and headaches. Endo/Heme/Allergies: Does not bruise/bleed easily. Psychiatric/Behavioral: Negative for depression. The patient is not nervous/anxious and does not have insomnia.       ESTUARDO no effusion. Right bony enlargement compared to left. Lymphadenopathy:      Cervical: No cervical adenopathy. Skin:     General: Skin is warm and dry. Findings: No erythema or rash.       Comments: No periungal erythema  No nail pitting/onycholysi efficacy.          Labs:  Lab Results   Component Value Date    WBC 8.7 12/29/2020    RBC 4.47 12/29/2020    HGB 14.1 12/29/2020    HCT 43.9 12/29/2020    .0 12/29/2020    MPV 13.5 (H) 06/22/2012    MCV 98.2 12/29/2020    MCH 31.5 12/29/2020    MCHC

## 2021-04-12 ENCOUNTER — HOSPITAL ENCOUNTER (OUTPATIENT)
Dept: GENERAL RADIOLOGY | Age: 71
Discharge: HOME | End: 2021-04-12
Attending: INTERNAL MEDICINE
Payer: MEDICARE

## 2021-04-12 ENCOUNTER — LAB ENCOUNTER (OUTPATIENT)
Dept: LAB | Age: 71
End: 2021-04-12
Attending: INTERNAL MEDICINE
Payer: MEDICARE

## 2021-04-12 DIAGNOSIS — M79.641 BILATERAL HAND PAIN: ICD-10-CM

## 2021-04-12 DIAGNOSIS — M25.50 POLYARTHRALGIA: ICD-10-CM

## 2021-04-12 DIAGNOSIS — M25.562 CHRONIC PAIN OF BOTH KNEES: ICD-10-CM

## 2021-04-12 DIAGNOSIS — M79.642 BILATERAL HAND PAIN: ICD-10-CM

## 2021-04-12 DIAGNOSIS — G89.29 CHRONIC PAIN OF BOTH KNEES: ICD-10-CM

## 2021-04-12 DIAGNOSIS — M25.561 CHRONIC PAIN OF BOTH KNEES: ICD-10-CM

## 2021-04-12 DIAGNOSIS — M35.00 SICCA SYNDROME (HCC): ICD-10-CM

## 2021-04-12 PROCEDURE — 36415 COLL VENOUS BLD VENIPUNCTURE: CPT

## 2021-04-12 PROCEDURE — 86235 NUCLEAR ANTIGEN ANTIBODY: CPT

## 2021-04-12 PROCEDURE — 86431 RHEUMATOID FACTOR QUANT: CPT

## 2021-04-12 PROCEDURE — 86038 ANTINUCLEAR ANTIBODIES: CPT

## 2021-04-12 PROCEDURE — 86140 C-REACTIVE PROTEIN: CPT

## 2021-04-12 PROCEDURE — 73130 X-RAY EXAM OF HAND: CPT | Performed by: INTERNAL MEDICINE

## 2021-04-12 PROCEDURE — 86200 CCP ANTIBODY: CPT

## 2021-04-12 PROCEDURE — 73565 X-RAY EXAM OF KNEES: CPT | Performed by: INTERNAL MEDICINE

## 2021-04-12 PROCEDURE — 86225 DNA ANTIBODY NATIVE: CPT

## 2021-04-12 PROCEDURE — 84550 ASSAY OF BLOOD/URIC ACID: CPT

## 2021-04-12 PROCEDURE — 85652 RBC SED RATE AUTOMATED: CPT

## 2021-04-13 ENCOUNTER — TELEPHONE (OUTPATIENT)
Dept: INTERNAL MEDICINE CLINIC | Facility: CLINIC | Age: 71
End: 2021-04-13

## 2021-04-13 NOTE — TELEPHONE ENCOUNTER
Pt is requesting a prescription for a new glucose meter, the one she has is not working/broke, pt uses the one touch verio flex.      [facility] #[id number] Manpreet Mclean, [address] [facility], [phone], [phone]   Joni. Markel Lacey 20

## 2021-04-14 RX ORDER — BLOOD-GLUCOSE METER
1 EACH MISCELLANEOUS 2 TIMES DAILY
Qty: 1 KIT | Refills: 0 | Status: SHIPPED | OUTPATIENT
Start: 2021-04-14 | End: 2021-04-15 | Stop reason: ALTCHOICE

## 2021-04-14 NOTE — TELEPHONE ENCOUNTER
Blood Glucose Monitoring Suppl (Bernis Freeze IQ SYSTEM) w/Device Does not apply Kit          Si Device by Other route 2 (two) times daily. Use as directed.     Disp:  1 kit    Refills:  0    Start: 2021 - 2022    Class: Normal         Diabet

## 2021-04-14 NOTE — TELEPHONE ENCOUNTER
Angely Ivey from [facility], [place] called and stated they do not have onetouch verio iq system. waltyrell asked if they can replace with either onetouch flex or one touch reflex.  Please adv     [facility] #[id number] - [address], [address]

## 2021-04-15 RX ORDER — BLOOD-GLUCOSE METER
EACH MISCELLANEOUS
Qty: 1 KIT | Refills: 0 | Status: SHIPPED | OUTPATIENT
Start: 2021-04-15

## 2021-04-16 ENCOUNTER — TELEPHONE (OUTPATIENT)
Dept: RHEUMATOLOGY | Facility: CLINIC | Age: 71
End: 2021-04-16

## 2021-04-16 NOTE — TELEPHONE ENCOUNTER
Mrs. Edgardo Fan called. X-rays of the hands show osteoarthritis present right greater than left hand with Heberden's nodes. X-rays knees also show osteoarthritis mild. HEMAL test positive RNP test positive LUIS FERNANDO rest testing negative.   Patient taking X strengt

## 2021-05-03 RX ORDER — DULAGLUTIDE 1.5 MG/.5ML
INJECTION, SOLUTION SUBCUTANEOUS
Qty: 6 ML | Refills: 1 | Status: SHIPPED | OUTPATIENT
Start: 2021-05-03 | End: 2021-07-14

## 2021-05-03 NOTE — TELEPHONE ENCOUNTER
Medication(s) to Refill:   Requested Prescriptions     Pending Prescriptions Disp Refills   • TRULICITY 1.5 IZ/2.9II Subcutaneous Solution Pen-injector [Pharmacy Med Name: TRULICITY 9.0MP/1.6WO SDP 0.5ML] 6 mL 3     Sig: ADMINISTER 0.5 ML UNDER THE SKIN 1

## 2021-06-28 ENCOUNTER — TELEPHONE (OUTPATIENT)
Dept: INTERNAL MEDICINE CLINIC | Facility: CLINIC | Age: 71
End: 2021-06-28

## 2021-06-28 DIAGNOSIS — E11.69 HYPERLIPIDEMIA ASSOCIATED WITH TYPE 2 DIABETES MELLITUS (HCC): Primary | ICD-10-CM

## 2021-06-28 DIAGNOSIS — E78.5 HYPERLIPIDEMIA ASSOCIATED WITH TYPE 2 DIABETES MELLITUS (HCC): Primary | ICD-10-CM

## 2021-06-28 DIAGNOSIS — R80.9 TYPE 2 DIABETES MELLITUS WITH ALBUMINURIA (HCC): ICD-10-CM

## 2021-06-28 DIAGNOSIS — E11.29 TYPE 2 DIABETES MELLITUS WITH ALBUMINURIA (HCC): ICD-10-CM

## 2021-06-28 RX ORDER — ATORVASTATIN CALCIUM 40 MG/1
TABLET, FILM COATED ORAL
Qty: 90 TABLET | Refills: 0 | Status: SHIPPED | OUTPATIENT
Start: 2021-06-28 | End: 2021-07-14

## 2021-06-28 NOTE — TELEPHONE ENCOUNTER
Name from pharmacy: ATORVASTATIN TABS 40MG          Will file in chart as: ATORVASTATIN 40 MG Oral Tab    Sig: TAKE 1 TABLET NIGHTLY    Disp:  90 tablet    Refills:  3    Start: 6/28/2021    Class: Normal    Non-formulary    Last ordered: 1 year ago by [facility]

## 2021-06-28 NOTE — TELEPHONE ENCOUNTER
Appt scheduled for DM fol up   Is lab draw needed prior to appt ?  [facility]    Future Appointments   Date Time Provider Del Kim   7/14/2021  2:00 PM Olvin Myles MD [facility] [place]   10/12/2021 10:30 AM Abrahan Cope,  [facility] [place]

## 2021-07-02 ENCOUNTER — LAB ENCOUNTER (OUTPATIENT)
Dept: LAB | Age: 71
End: 2021-07-02
Attending: INTERNAL MEDICINE
Payer: MEDICARE

## 2021-07-02 DIAGNOSIS — E11.29 TYPE 2 DIABETES MELLITUS WITH ALBUMINURIA (HCC): ICD-10-CM

## 2021-07-02 DIAGNOSIS — R80.9 TYPE 2 DIABETES MELLITUS WITH ALBUMINURIA (HCC): ICD-10-CM

## 2021-07-02 DIAGNOSIS — E78.5 HYPERLIPIDEMIA ASSOCIATED WITH TYPE 2 DIABETES MELLITUS (HCC): ICD-10-CM

## 2021-07-02 DIAGNOSIS — E11.69 HYPERLIPIDEMIA ASSOCIATED WITH TYPE 2 DIABETES MELLITUS (HCC): ICD-10-CM

## 2021-07-02 LAB
ALT SERPL-CCNC: 42 U/L
ANION GAP SERPL CALC-SCNC: 7 MMOL/L (ref 0–18)
AST SERPL-CCNC: 30 U/L (ref 15–37)
BUN BLD-MCNC: 20 MG/DL (ref 7–18)
BUN/CREAT SERPL: 25.6 (ref 10–20)
CALCIUM BLD-MCNC: 10 MG/DL (ref 8.5–10.1)
CHLORIDE SERPL-SCNC: 102 MMOL/L (ref 98–112)
CHOLEST SMN-MCNC: 169 MG/DL (ref ?–200)
CO2 SERPL-SCNC: 28 MMOL/L (ref 21–32)
CREAT BLD-MCNC: 0.78 MG/DL
EST. AVERAGE GLUCOSE BLD GHB EST-MCNC: 151 MG/DL (ref 68–126)
GLUCOSE BLD-MCNC: 141 MG/DL (ref 70–99)
HBA1C MFR BLD HPLC: 6.9 % (ref ?–5.7)
HDLC SERPL-MCNC: 44 MG/DL (ref 40–59)
LDLC SERPL CALC-MCNC: 85 MG/DL (ref ?–100)
NONHDLC SERPL-MCNC: 125 MG/DL (ref ?–130)
OSMOLALITY SERPL CALC.SUM OF ELEC: 289 MOSM/KG (ref 275–295)
POTASSIUM SERPL-SCNC: 4.1 MMOL/L (ref 3.5–5.1)
SODIUM SERPL-SCNC: 137 MMOL/L (ref 136–145)
TRIGL SERPL-MCNC: 238 MG/DL (ref 30–149)
VLDLC SERPL CALC-MCNC: 38 MG/DL (ref 0–30)

## 2021-07-02 PROCEDURE — 80061 LIPID PANEL: CPT

## 2021-07-02 PROCEDURE — 84460 ALANINE AMINO (ALT) (SGPT): CPT

## 2021-07-02 PROCEDURE — 83036 HEMOGLOBIN GLYCOSYLATED A1C: CPT

## 2021-07-02 PROCEDURE — 36415 COLL VENOUS BLD VENIPUNCTURE: CPT

## 2021-07-02 PROCEDURE — 80048 BASIC METABOLIC PNL TOTAL CA: CPT

## 2021-07-02 PROCEDURE — 84450 TRANSFERASE (AST) (SGOT): CPT

## 2021-07-14 ENCOUNTER — TELEPHONE (OUTPATIENT)
Dept: INTERNAL MEDICINE CLINIC | Facility: CLINIC | Age: 71
End: 2021-07-14

## 2021-07-14 ENCOUNTER — OFFICE VISIT (OUTPATIENT)
Dept: INTERNAL MEDICINE CLINIC | Facility: CLINIC | Age: 71
End: 2021-07-14
Payer: MEDICARE

## 2021-07-14 VITALS
SYSTOLIC BLOOD PRESSURE: 118 MMHG | RESPIRATION RATE: 16 BRPM | DIASTOLIC BLOOD PRESSURE: 60 MMHG | HEART RATE: 68 BPM | WEIGHT: 165 LBS | TEMPERATURE: 98 F | BODY MASS INDEX: 31.15 KG/M2 | HEIGHT: 61 IN | OXYGEN SATURATION: 98 %

## 2021-07-14 DIAGNOSIS — D50.0 IRON DEFICIENCY ANEMIA DUE TO CHRONIC BLOOD LOSS: ICD-10-CM

## 2021-07-14 DIAGNOSIS — E78.5 HYPERLIPIDEMIA ASSOCIATED WITH TYPE 2 DIABETES MELLITUS (HCC): ICD-10-CM

## 2021-07-14 DIAGNOSIS — E11.69 HYPERLIPIDEMIA ASSOCIATED WITH TYPE 2 DIABETES MELLITUS (HCC): ICD-10-CM

## 2021-07-14 DIAGNOSIS — E66.9 DIABETES MELLITUS TYPE 2 IN OBESE (HCC): ICD-10-CM

## 2021-07-14 DIAGNOSIS — R80.9 TYPE 2 DIABETES MELLITUS WITH ALBUMINURIA (HCC): Primary | ICD-10-CM

## 2021-07-14 DIAGNOSIS — M85.88 OSTEOPENIA OF LUMBAR SPINE: ICD-10-CM

## 2021-07-14 DIAGNOSIS — E11.69 DIABETES MELLITUS TYPE 2 IN OBESE (HCC): ICD-10-CM

## 2021-07-14 DIAGNOSIS — E11.29 TYPE 2 DIABETES MELLITUS WITH ALBUMINURIA (HCC): Primary | ICD-10-CM

## 2021-07-14 LAB
CREAT UR-SCNC: 34.1 MG/DL
MICROALBUMIN UR-MCNC: 0.83 MG/DL
MICROALBUMIN/CREAT 24H UR-RTO: 24.3 UG/MG (ref ?–30)

## 2021-07-14 PROCEDURE — 82043 UR ALBUMIN QUANTITATIVE: CPT | Performed by: INTERNAL MEDICINE

## 2021-07-14 PROCEDURE — 82570 ASSAY OF URINE CREATININE: CPT | Performed by: INTERNAL MEDICINE

## 2021-07-14 PROCEDURE — 99214 OFFICE O/P EST MOD 30 MIN: CPT | Performed by: INTERNAL MEDICINE

## 2021-07-14 RX ORDER — ROSUVASTATIN CALCIUM 10 MG/1
10 TABLET, COATED ORAL DAILY
Qty: 90 TABLET | Refills: 3 | Status: SHIPPED | OUTPATIENT
Start: 2021-07-14 | End: 2021-12-15

## 2021-07-14 RX ORDER — CANAGLIFLOZIN 300 MG/1
TABLET, FILM COATED ORAL
Qty: 90 TABLET | Refills: 3 | Status: SHIPPED | OUTPATIENT
Start: 2021-07-14 | End: 2021-12-15

## 2021-07-14 RX ORDER — DULAGLUTIDE 1.5 MG/.5ML
0.5 INJECTION, SOLUTION SUBCUTANEOUS WEEKLY
Qty: 6 ML | Refills: 3 | Status: SHIPPED | OUTPATIENT
Start: 2021-07-14

## 2021-07-14 NOTE — PROGRESS NOTES
Josesito Gregory is a 73year old female. HPI:   Patient presents for diabetes and additional issues noted below. 1. DM: checks FBS every day and sometimes 2 hours after a meal. FBS are less than 140s. No hypoglycemia.    2. 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 Needs repeat colonoscopy in 5 years (2023)  # Controlled type 2 DM: A1C at goal in 7/2021. Doing well with lifestyle interventions. - Will cont metformin, invokana, trulicity, and glyburide 2.5 mg daily.    - DM eye exam by Tristin Lynn shot at pharmacy. Medical POA: Demian Christian (daughter) is primary      Care Team:  GI- Dr. Jimena Young- Dr. Buddy Acuna- Dr. Covarrubias Daughters on 8/2/2021  DM ey exam on 6/29/2021 by Carolina Almodovar - no DM retinopathy in either eye.      The pat

## 2021-07-14 NOTE — TELEPHONE ENCOUNTER
Records Requested from:  [facility] eye exam    Awaiting diabetic eye exam    Fax: [phone].     T: [phone]

## 2021-07-14 NOTE — PATIENT INSTRUCTIONS
For your high cholesterol please complete your atorvastatin and then start rosuvastatin 10 mg every day and check your fasting labs 3 months after starting rosuvastatin.

## 2021-09-03 ENCOUNTER — HOSPITAL ENCOUNTER (OUTPATIENT)
Dept: GENERAL RADIOLOGY | Age: 71
Discharge: HOME | End: 2021-09-03
Attending: INTERNAL MEDICINE
Payer: MEDICARE

## 2021-09-03 ENCOUNTER — OFFICE VISIT (OUTPATIENT)
Dept: INTERNAL MEDICINE CLINIC | Facility: CLINIC | Age: 71
End: 2021-09-03
Payer: MEDICARE

## 2021-09-03 VITALS
SYSTOLIC BLOOD PRESSURE: 134 MMHG | HEIGHT: 60.25 IN | BODY MASS INDEX: 31.2 KG/M2 | HEART RATE: 100 BPM | WEIGHT: 161 LBS | DIASTOLIC BLOOD PRESSURE: 66 MMHG | OXYGEN SATURATION: 98 % | TEMPERATURE: 100 F | RESPIRATION RATE: 16 BRPM

## 2021-09-03 DIAGNOSIS — M25.511 RIGHT SHOULDER PAIN, UNSPECIFIED CHRONICITY: ICD-10-CM

## 2021-09-03 DIAGNOSIS — M25.511 RIGHT SHOULDER PAIN, UNSPECIFIED CHRONICITY: Primary | ICD-10-CM

## 2021-09-03 PROCEDURE — 99214 OFFICE O/P EST MOD 30 MIN: CPT | Performed by: INTERNAL MEDICINE

## 2021-09-03 PROCEDURE — 73030 X-RAY EXAM OF SHOULDER: CPT | Performed by: INTERNAL MEDICINE

## 2021-09-03 RX ORDER — ERGOCALCIFEROL (VITAMIN D2) 10 MCG
1 TABLET ORAL DAILY
COMMUNITY

## 2021-09-03 RX ORDER — TRAMADOL HYDROCHLORIDE 50 MG/1
50 TABLET ORAL 2 TIMES DAILY PRN
Qty: 30 TABLET | Refills: 0 | Status: SHIPPED | OUTPATIENT
Start: 2021-09-03 | End: 2021-11-15 | Stop reason: ALTCHOICE

## 2021-09-03 NOTE — PROGRESS NOTES
HPI/Subjective:   Kelsie Grider is a 73year old femalewho presents for Arm Pain (Right arm pain for a few days. No injury involved.  ) and Headache (the last couple days.)     R arm pain for 3 days. She is not sure how it started.    Starts on her shoul OFFICE VISIT IN APRIL) 270 each 1   • Cholecalciferol (VITAMIN D) 1000 UNITS Oral Cap Take 2,000 mg by mouth daily. • Rosuvastatin Calcium 10 MG Oral Tab Take 1 tablet (10 mg total) by mouth daily.  (Patient not taking: Reported on 9/3/2021 ) 90 table

## 2021-09-07 ENCOUNTER — TELEPHONE (OUTPATIENT)
Dept: PHYSICAL THERAPY | Facility: HOSPITAL | Age: 71
End: 2021-09-07

## 2021-09-17 ENCOUNTER — OFFICE VISIT (OUTPATIENT)
Dept: INTERNAL MEDICINE CLINIC | Facility: CLINIC | Age: 71
End: 2021-09-17
Payer: MEDICARE

## 2021-09-17 VITALS
SYSTOLIC BLOOD PRESSURE: 130 MMHG | WEIGHT: 160 LBS | RESPIRATION RATE: 18 BRPM | OXYGEN SATURATION: 98 % | BODY MASS INDEX: 31 KG/M2 | DIASTOLIC BLOOD PRESSURE: 64 MMHG | TEMPERATURE: 97 F | HEART RATE: 92 BPM

## 2021-09-17 DIAGNOSIS — M19.011 ARTHRITIS OF RIGHT ACROMIOCLAVICULAR JOINT: Primary | ICD-10-CM

## 2021-09-17 PROCEDURE — 99212 OFFICE O/P EST SF 10 MIN: CPT | Performed by: INTERNAL MEDICINE

## 2021-09-17 NOTE — PROGRESS NOTES
Subjective:   Alyssa Anderson is a 73year old femalewho presents for Follow - Up     Follow-up for R arm pain. Xray had showed AC arthritic changes. It was recommended she try PT and see ortho. Today reports that the pain is much better.  Has appt with LILIANA X 4 MM Does not apply Misc USE 1 PEN TWICE A DAY   (DUE FOR OFFICE VISIT IN APRIL) 270 each 1   • Cholecalciferol (VITAMIN D) 1000 UNITS Oral Cap Take 2,000 mg by mouth daily. Review of Systems:  Pertinent items are noted in HPI.   A comprehensive

## 2021-09-21 ENCOUNTER — OFFICE VISIT (OUTPATIENT)
Dept: PHYSICAL THERAPY | Age: 71
End: 2021-09-21
Attending: INTERNAL MEDICINE
Payer: MEDICARE

## 2021-09-21 DIAGNOSIS — M25.511 RIGHT SHOULDER PAIN, UNSPECIFIED CHRONICITY: ICD-10-CM

## 2021-09-21 PROCEDURE — 97110 THERAPEUTIC EXERCISES: CPT

## 2021-09-21 PROCEDURE — 97161 PT EVAL LOW COMPLEX 20 MIN: CPT

## 2021-09-21 PROCEDURE — 97140 MANUAL THERAPY 1/> REGIONS: CPT

## 2021-09-21 NOTE — PROGRESS NOTES
SHOULDER EVALUATION:   Referring Physician: Dr. Hortensia Flannery  Diagnosis: R arm pain   Date of Service: 9/21/2021     PATIENT Pino Peña is a 73year old female who presents to therapy today with complaints of R arm pain for which she saw the do lifting. Signs and symptoms are consistent with diagnosis of RTC tendinitis with impingement with decreased R shoulder strength and ROM. Berta Bergman Pt and PT discussed evaluation findings, pathology, POC and HEP.   Pt voiced understanding and performs HEP correctly traction     Charges: PT Eval Low Complexity, ex, M      Total Timed Treatment: 25 min     Total Treatment Time: 45 min       Goals: (to be met in 8 visits)   *Increase R shoulder IR from 27 deg to at least 55 deg to improve reach behind back  * Decrease R

## 2021-09-23 ENCOUNTER — OFFICE VISIT (OUTPATIENT)
Dept: PHYSICAL THERAPY | Age: 71
End: 2021-09-23
Attending: INTERNAL MEDICINE
Payer: MEDICARE

## 2021-09-23 ENCOUNTER — TELEPHONE (OUTPATIENT)
Dept: PHYSICAL THERAPY | Facility: HOSPITAL | Age: 71
End: 2021-09-23

## 2021-09-23 PROCEDURE — 97140 MANUAL THERAPY 1/> REGIONS: CPT

## 2021-09-23 PROCEDURE — 97112 NEUROMUSCULAR REEDUCATION: CPT

## 2021-09-23 PROCEDURE — 97110 THERAPEUTIC EXERCISES: CPT

## 2021-09-23 NOTE — PROGRESS NOTES
Dx: R RTC tendinitis         Insurance (Authorized # of Visits):  8 Medicare       Authorizing Physician: Dr. Ekta Contreras  Next MD visit: none scheduled  Fall Risk: standard         Precautions: n/a             Subjective: R shoulder pain =2 on pulleys .  Has 3#

## 2021-09-28 ENCOUNTER — OFFICE VISIT (OUTPATIENT)
Dept: PHYSICAL THERAPY | Age: 71
End: 2021-09-28
Attending: INTERNAL MEDICINE
Payer: MEDICARE

## 2021-09-28 PROCEDURE — 97140 MANUAL THERAPY 1/> REGIONS: CPT

## 2021-09-28 PROCEDURE — 97110 THERAPEUTIC EXERCISES: CPT

## 2021-09-28 PROCEDURE — 97112 NEUROMUSCULAR REEDUCATION: CPT

## 2021-09-28 NOTE — PROGRESS NOTES
Dx:  R RTC tendinitis         Insurance (Authorized # of Visits):  8 Medicare       Authorizing Physician: Dr. Cleveland Bowman  Next MD visit: none scheduled  Fall Risk: standard         Precautions: n/a             Subjective: R shoulder is bothering her a little, , STM to scap muscles: 5 mins ,  Inf glide R shoulder w/HBB      HEP: HEP for: scap sets, anterior chest stretch.   Ice 15 mins BID   9/23/2021  Upper trap stretch, side lying shoulder abd and ER with 1# wt       Charges: m, ex, neuroreed       Total Timed

## 2021-09-30 ENCOUNTER — OFFICE VISIT (OUTPATIENT)
Dept: PHYSICAL THERAPY | Age: 71
End: 2021-09-30
Attending: INTERNAL MEDICINE
Payer: MEDICARE

## 2021-09-30 PROCEDURE — 97140 MANUAL THERAPY 1/> REGIONS: CPT

## 2021-09-30 PROCEDURE — 97110 THERAPEUTIC EXERCISES: CPT

## 2021-09-30 PROCEDURE — 97112 NEUROMUSCULAR REEDUCATION: CPT

## 2021-09-30 NOTE — PROGRESS NOTES
Dx:  R RTC tendinitis         Insurance (Authorized # of Visits):  8 Medicare       Authorizing Physician: Dr. Rosa Isela Conn  Next MD visit: none scheduled  Fall Risk: standard         Precautions: n/a             Subjective: Using L more than R for vacuuming   Marsha Givens AAROM abd 210  R ER  1#  x20     PROM R shoulder ER/IR, flexion, 30 sec x3 PROM R shoulder ER/IR, flexion, horiz abd. add 5 mins  PROM R shoulder ER/IR, flexion, horiz abd. add 5 mins      Wand for bilateral shoulder extension and then ER Wand for bilatera

## 2021-10-03 ENCOUNTER — IMMUNIZATION (OUTPATIENT)
Dept: LAB | Facility: HOSPITAL | Age: 71
End: 2021-10-03
Attending: EMERGENCY MEDICINE
Payer: MEDICARE

## 2021-10-03 DIAGNOSIS — Z23 NEED FOR VACCINATION: Primary | ICD-10-CM

## 2021-10-03 PROCEDURE — 0003A SARSCOV2 VAC 30MCG/0.3ML IM: CPT

## 2021-10-05 NOTE — TELEPHONE ENCOUNTER
Protocol  Passed    Requesting:   Name from pharmacy: METFORMIN HCL TABS 1000MG          Will file in chart as: METFORMIN HCL 1000 MG Oral Tab    Sig: TAKE 1 TABLET TWICE A DAY WITH MEALS    Disp:  180 tablet    Refills:  3    Start: 10/5/2021    Class: No

## 2021-10-05 NOTE — TELEPHONE ENCOUNTER
Pt called requesting a refill for :     metFORMIN HCl 1000 MG Oral Tab    [address], [address] [facility] [phone], [phone]   [place] [id number]

## 2021-10-06 NOTE — TELEPHONE ENCOUNTER
Medication was already sent in today.      Passed protocol     Last refill:  METFORMIN HCL 1000 MG Oral Tab 180 tablet 3 10/6/2021    Sig:   TAKE 1 TABLET TWICE A DAY WITH MEALS       LOV:   9/17/2021 Dr Talia Gilliam   No FOV scheduled scheduled

## 2021-10-08 ENCOUNTER — OFFICE VISIT (OUTPATIENT)
Dept: PHYSICAL THERAPY | Age: 71
End: 2021-10-08
Attending: INTERNAL MEDICINE
Payer: MEDICARE

## 2021-10-08 PROCEDURE — 97140 MANUAL THERAPY 1/> REGIONS: CPT

## 2021-10-08 PROCEDURE — 97110 THERAPEUTIC EXERCISES: CPT

## 2021-10-08 PROCEDURE — 97112 NEUROMUSCULAR REEDUCATION: CPT

## 2021-10-08 NOTE — PROGRESS NOTES
Dx:  R RTC tendinitis         Insurance (Authorized # of Visits):  8 Medicare       Authorizing Physician: Dr. Lance Bell  Next MD visit: none scheduled  Fall Risk: standard         Precautions: n/a             Subjective:    R shoulder is bothe  r her, sometim scapular muscles  Side lying abd 2x10 ( to 100 deg),  R ER 2x10 with 1# wt,  SL AAROM abd 210  R ER  1#  x20 Supine horiz abd/add 0# x10  - supine protraction 1# x10 , supine flex 1# x10     PROM R shoulder ER/IR, flexion, 30 sec x3 PROM R shoulder ER/IR,

## 2021-10-12 ENCOUNTER — OFFICE VISIT (OUTPATIENT)
Dept: PHYSICAL THERAPY | Age: 71
End: 2021-10-12
Attending: INTERNAL MEDICINE
Payer: MEDICARE

## 2021-10-12 ENCOUNTER — OFFICE VISIT (OUTPATIENT)
Dept: RHEUMATOLOGY | Facility: CLINIC | Age: 71
End: 2021-10-12
Payer: MEDICARE

## 2021-10-12 VITALS
HEART RATE: 84 BPM | OXYGEN SATURATION: 98 % | TEMPERATURE: 98 F | RESPIRATION RATE: 16 BRPM | WEIGHT: 163 LBS | BODY MASS INDEX: 31.18 KG/M2 | DIASTOLIC BLOOD PRESSURE: 64 MMHG | SYSTOLIC BLOOD PRESSURE: 122 MMHG | HEIGHT: 60.5 IN

## 2021-10-12 DIAGNOSIS — R76.8 ANA POSITIVE: ICD-10-CM

## 2021-10-12 DIAGNOSIS — R76.8 RIBONUCLEOPROTEIN ANTIBODY POSITIVE: ICD-10-CM

## 2021-10-12 DIAGNOSIS — M15.4 EROSIVE OSTEOARTHRITIS OF BOTH HANDS: ICD-10-CM

## 2021-10-12 DIAGNOSIS — M25.50 POLYARTHRALGIA: Primary | ICD-10-CM

## 2021-10-12 DIAGNOSIS — M15.9 PRIMARY OSTEOARTHRITIS INVOLVING MULTIPLE JOINTS: ICD-10-CM

## 2021-10-12 DIAGNOSIS — M35.00 SICCA SYNDROME (HCC): ICD-10-CM

## 2021-10-12 PROCEDURE — 97110 THERAPEUTIC EXERCISES: CPT

## 2021-10-12 PROCEDURE — 97140 MANUAL THERAPY 1/> REGIONS: CPT

## 2021-10-12 PROCEDURE — 99214 OFFICE O/P EST MOD 30 MIN: CPT | Performed by: INTERNAL MEDICINE

## 2021-10-12 NOTE — PROGRESS NOTES
?  RHEUMATOLOGY FOLLOW UP   Date of visit: 10/12/2021  ? Patient presents with: Follow - Up: 6 month f/u. Feeling about the same. Joint pain in hands. Has pain in right shoulder all the way down to her hand. Xray showed mostly arthritis.  Pain is gettin candidate for hyalgan injections.  She will think about it and let us know if she's interested and we will check for insurance approval.     If RNP positivity still present, will consider trial of plaquenil.     -We discussed the risks and benefits of Plaqu COMPLEMENT C3, SERUM; Future  -     IMMUNOGLOBULIN A/G/M, QUANT; Future    Ribonucleoprotein antibody positive  -     RNP ANTIBODIES; Future  -     SED RATE, WESTERGREN (AUTOMATED);  Future  -     C-REACTIVE PROTEIN; Future  -     COMPLEMENT C4, SERUM; Futu avoids NSAIDs. HPI from initial consultation  referred for rheumatologic evaluation due to worsened joint pain. Has been having worsened joint pain over the past year but has been present for several years.  Has noticed worsened nodularities over th swallowing. The patient denies any history of uveitis, nodular painful shin bruises, Achilles heel pain or symptoms of enthesitis, psoriatic lesions, spooning or pitting of the nails, history of dactylitis, or pain awakening the patient from sleep.   Bharath Mckeon Onset   • Other (Lung ca) Mother    • Other (Dementia) Father    • Other (Thrombocytopenia) Other         Sibling   • Heart Disorder Maternal Grandmother         CVA   • Heart Disorder Maternal Grandfather         MI   • Cancer Brother         esophageal c Respiratory: Negative for cough, shortness of breath and wheezing. Cardiovascular: Positive for leg swelling. Negative for chest pain and palpitations. Gastrointestinal: Positive for heartburn. Negative for abdominal pain and nausea.    Genitourinary swelling or tenderness. Cervical back: Normal range of motion and neck supple. Comments: Diffuse heberden/bouchards nodules, large CMC B/L without synovitis.    No swelling, tenderness, redness or restriction of motion of the MCPs, wrists, elbows, on 9/03/2021 at 3:07 PM        PROCEDURE:  XR HAND (MIN 3 VIEWS), RIGHT (CPT=73130)       TECHNIQUE:  Three views were obtained.       COMPARISON:  [facility], XR, XR HAND (MIN 3 VIEWS), LEFT (CPT=73130), 4/12/2021, 9:59 AM.       INDICATIONS:  M25.50 Miguel joint space narrowing predominantly involves the 2nd and 3rd distal interphalangeal joint with marginal osteophytes (Heberden nodes) and surrounding soft tissue swelling consistent with   osteoarthritis.  Degenerative change also involves the carpal 1st me            LUMBAR SPINE ANALYSIS RESULTS:       Bone mineral density (BMD) (g/cm2):  .855     Lumbar T-Score:  -1.7       % young normals:  82       % age matched controls:  80       Change from prior spine examination:  -2.3%                TOTAL HIP HEMAL 07/02/2021    ALKPHO 76 06/05/2019    AST 30 07/02/2021    ALT 42 07/02/2021    BILT 0.5 06/05/2019    TP 7.6 06/05/2019    ALB 4.2 06/05/2019    GLOBULIN 3.4 06/05/2019     07/02/2021    K 4.1 07/02/2021     07/02/2021    CO2 28.0 07/02/2021

## 2021-10-12 NOTE — PROGRESS NOTES
Dx:  R RTC tendinitis         Insurance (Authorized # of Visits):  8 Medicare       Authorizing Physician: Dr. Sandy Schneider  Next MD visit: none scheduled  Fall Risk: standard         Precautions: n/a             Subjective:    R shoulder is bothering her, someti tolerance  Supine gentle AAROM shoulder ER/IR Supine gentle AAROM shoulder ER/IR Supine gentle AAROM shoulder ER/IR  , flex, abd     Manual: posterior and inf glides Gr III, scapular mobs, STM to R scapular muscles  Side lying abd 2x10 ( to 100 deg),  R ER

## 2021-10-14 ENCOUNTER — OFFICE VISIT (OUTPATIENT)
Dept: PHYSICAL THERAPY | Age: 71
End: 2021-10-14
Attending: INTERNAL MEDICINE
Payer: MEDICARE

## 2021-10-14 PROCEDURE — 97110 THERAPEUTIC EXERCISES: CPT

## 2021-10-14 PROCEDURE — 97140 MANUAL THERAPY 1/> REGIONS: CPT

## 2021-10-14 NOTE — PROGRESS NOTES
Dx: R RTC tendinitis         Insurance (Authorized # of Visits):  8 Medicare       Authorizing Physician: Dr. Talia Gilliam  Next MD visit: none scheduled  Fall Risk: standard         Precautions: n/a             Subjective:   .  Notes pain is in R anterior should wall walks with ball into flexion x10    Practice placing 2# wt overhead 3 times   Biceps curls 3# x20     Sleeper stretch , post capsule stretch,supine ER/IR : poor tolerance  Supine gentle AAROM shoulder ER/IR Supine gentle AAROM shoulder ER/IR Supine ge mobs , STM to scap muscles: 6 mins ,  Inf glide R shoulder w/HBB SL R scapular mobs , STM to scap muscles: 6 mins ,  Inf glide R shoulder w/HBB  Post , inf glides GR III    HEP: HEP for: scap sets, anterior chest stretch.   Ice 15 mins BID   9/23/2021  Uppe

## 2021-10-15 ENCOUNTER — LAB ENCOUNTER (OUTPATIENT)
Dept: LAB | Age: 71
End: 2021-10-15
Attending: INTERNAL MEDICINE
Payer: MEDICARE

## 2021-10-15 DIAGNOSIS — M25.50 POLYARTHRALGIA: ICD-10-CM

## 2021-10-15 DIAGNOSIS — R76.8 RIBONUCLEOPROTEIN ANTIBODY POSITIVE: ICD-10-CM

## 2021-10-15 DIAGNOSIS — R76.8 ANA POSITIVE: ICD-10-CM

## 2021-10-15 DIAGNOSIS — M35.00 SICCA SYNDROME (HCC): ICD-10-CM

## 2021-10-15 PROCEDURE — 86160 COMPLEMENT ANTIGEN: CPT

## 2021-10-15 PROCEDURE — 36415 COLL VENOUS BLD VENIPUNCTURE: CPT

## 2021-10-15 PROCEDURE — 85652 RBC SED RATE AUTOMATED: CPT

## 2021-10-15 PROCEDURE — 86038 ANTINUCLEAR ANTIBODIES: CPT

## 2021-10-15 PROCEDURE — 82784 ASSAY IGA/IGD/IGG/IGM EACH: CPT

## 2021-10-15 PROCEDURE — 86140 C-REACTIVE PROTEIN: CPT

## 2021-10-15 PROCEDURE — 86235 NUCLEAR ANTIGEN ANTIBODY: CPT

## 2021-10-19 ENCOUNTER — OFFICE VISIT (OUTPATIENT)
Dept: PHYSICAL THERAPY | Age: 71
End: 2021-10-19
Attending: INTERNAL MEDICINE
Payer: MEDICARE

## 2021-10-19 PROCEDURE — 97110 THERAPEUTIC EXERCISES: CPT

## 2021-10-19 PROCEDURE — 97140 MANUAL THERAPY 1/> REGIONS: CPT

## 2021-10-19 NOTE — PROGRESS NOTES
Dx: R RTC tendinitis         Insurance (Authorized # of Visits):  8 Medicare       Authorizing Physician: Dr. Talat Parker  Next MD visit: none scheduled  Fall Risk: standard         Precautions: n/a           Progress Note and Update to POC  Subjective:   Woke w cord x10  Extension x10  Pulleys for assisted flexion  - wand for flexion x20   Protraction x30 in supine  Ball pass behind back   x12 R/L  wand for shoulder extension x10, IR ( some pain)  - abd x10   - wall walks with ball into flexion x10    Practice pl mins  , R scapular mobs 4 min snf glide R shoulder w/HBB   STM to R upper trap  mins  , R scapular mobs  min inf glide R shoulder w/HBB,  8 mins  STM to R upper trap  mins  , R scapular mobs  min inf glide R shoulder w/HBB,  8 mins       Manual CTX, 10 sec

## 2021-10-21 ENCOUNTER — OFFICE VISIT (OUTPATIENT)
Dept: PHYSICAL THERAPY | Age: 71
End: 2021-10-21
Attending: INTERNAL MEDICINE
Payer: MEDICARE

## 2021-10-21 ENCOUNTER — TELEPHONE (OUTPATIENT)
Dept: RHEUMATOLOGY | Facility: CLINIC | Age: 71
End: 2021-10-21

## 2021-10-21 PROCEDURE — 97110 THERAPEUTIC EXERCISES: CPT

## 2021-10-21 PROCEDURE — 97140 MANUAL THERAPY 1/> REGIONS: CPT

## 2021-10-21 NOTE — TELEPHONE ENCOUNTER
LVM at home (and unable to leave VM on cell) for pt to call back to discuss results.      Apryl Oneill, DO  [facility]  10/21/2021        10/2021  HEMAL by IFA 1: 160 speckled  IgA, IgG normal  IgM borderline low 40  C3 137 normal  C4 31.4 normal  CRP n

## 2021-10-21 NOTE — PROGRESS NOTES
Dx: R RTC tendinitis         Insurance (Authorized # of Visits):  8 Medicare       Authorizing Physician: Dr. Dawood Tariq  Next MD visit: none scheduled  Fall Risk: standard         Precautions: n/a           Progress Note and Update to POC  Subjective:   Woke w mns  scifit 4 mins  scifit 5 mins  scifit 5 mins  scifit 6 mins    Side lying abd 2x10 ( to 100 deg),  R ER 2x10 with 1# wt,   Side lying flexion: stopped due to pain Pulleys for assisted int rot 3 mins   - scap sets x10  -row with sport cord x10  Extensio shoulder flex/ext x15 each  Wall walk into flexion x20    Wand for bilateral shoulder extension and then ER Wand for bilateral shoulder flexion , bilateral extension , CAITLIN IR , x12 each  Standing AROM flex, abd to 90 deg (stopped due to pain) Standing AROM

## 2021-10-22 ENCOUNTER — TELEPHONE (OUTPATIENT)
Dept: RHEUMATOLOGY | Facility: CLINIC | Age: 71
End: 2021-10-22

## 2021-10-22 DIAGNOSIS — R76.8 ANA POSITIVE: ICD-10-CM

## 2021-10-22 DIAGNOSIS — R76.8 RIBONUCLEOPROTEIN ANTIBODY POSITIVE: ICD-10-CM

## 2021-10-22 DIAGNOSIS — M25.50 POLYARTHRALGIA: Primary | ICD-10-CM

## 2021-10-22 NOTE — TELEPHONE ENCOUNTER
Called pt and discussed test results in detail. Labs grossly normal with the exception of equivocal HEMAL/RNP, lower than previously done in April. Recommended against plaquenil for now.   Encouraged pt to continue interventions as discussed at last office

## 2021-11-02 ENCOUNTER — TELEPHONE (OUTPATIENT)
Dept: INTERNAL MEDICINE CLINIC | Facility: CLINIC | Age: 71
End: 2021-11-02

## 2021-11-02 ENCOUNTER — OFFICE VISIT (OUTPATIENT)
Dept: PHYSICAL THERAPY | Age: 71
End: 2021-11-02
Attending: INTERNAL MEDICINE
Payer: MEDICARE

## 2021-11-02 PROCEDURE — 97140 MANUAL THERAPY 1/> REGIONS: CPT

## 2021-11-02 PROCEDURE — 97110 THERAPEUTIC EXERCISES: CPT

## 2021-11-02 RX ORDER — CYCLOBENZAPRINE HCL 5 MG
5 TABLET ORAL 3 TIMES DAILY PRN
Qty: 10 TABLET | Refills: 0 | Status: SHIPPED | OUTPATIENT
Start: 2021-11-02 | End: 2021-11-22

## 2021-11-02 NOTE — TELEPHONE ENCOUNTER
Pt called stating she saw her physical therapist today for her neck and right shoulder pain. Pt last saw Bernabe thompson for this issue, she would like to know if she is gil to get a muscle relaxer or inflammatory to help the pain.     Pt refused appt and re

## 2021-11-02 NOTE — TELEPHONE ENCOUNTER
I received a message from PT (otherwise she would have needed an appt before further scripts). I placed an order for  flexeril (short supply) to help with neck tension. However, she will need to have follow-up within 2 weeks or sooner PRN.

## 2021-11-02 NOTE — TELEPHONE ENCOUNTER
LOV 09/17/2021  Assessment & Plan:      (M19.011) Arthritis of right acromioclavicular joint  (primary encounter diagnosis)  Comment: as above. Improving appropriately   Plan: continue with supportive care and follow-up with PT.  Discussed when to reach out

## 2021-11-02 NOTE — PROGRESS NOTES
Dx:  R RTC tendinitis         Insurance (Authorized # of Visits): 11 Medicare       Authorizing Physician: Dr. Cleveland Bowman  Next MD visit: none scheduled  Fall Risk: standard         Precautions: n/a           Progress Note and Update to POC  Subjective:      Tee Lr scifit 5 mins  scifit 5 mins  scifit 6 mins  skip   Ball pass behind back   x12 R/L  wand for shoulder extension x10, IR ( some pain)  - abd x10   - wall walks with ball into flexion x10    Practice placing 2# wt overhead 3 times   Biceps curls 3# x20   R scapular mobs , STM to scap muscles: 6 mins ,  Inf glide R shoulder w/HBB  Post , inf glides GR III    Manual: posterior and inf glides Gr III, scapular mobs, STM to R scapular muscles  Manual: posterior and inf glides Gr III, scapular mobs, STM to R scapu

## 2021-11-03 NOTE — TELEPHONE ENCOUNTER
Spoke with patient notified Flexeril script sent to pharmacy on file. Patient transferred to  for scheduling.

## 2021-11-04 ENCOUNTER — TELEPHONE (OUTPATIENT)
Dept: PHYSICAL THERAPY | Facility: HOSPITAL | Age: 71
End: 2021-11-04

## 2021-11-04 ENCOUNTER — OFFICE VISIT (OUTPATIENT)
Dept: FAMILY MEDICINE CLINIC | Facility: CLINIC | Age: 71
End: 2021-11-04
Payer: MEDICARE

## 2021-11-04 ENCOUNTER — HOSPITAL ENCOUNTER (OUTPATIENT)
Age: 71
Discharge: HOME | End: 2021-11-04
Attending: EMERGENCY MEDICINE
Payer: MEDICARE

## 2021-11-04 ENCOUNTER — APPOINTMENT (OUTPATIENT)
Dept: PHYSICAL THERAPY | Age: 71
End: 2021-11-04
Attending: INTERNAL MEDICINE
Payer: MEDICARE

## 2021-11-04 ENCOUNTER — APPOINTMENT (OUTPATIENT)
Dept: GENERAL RADIOLOGY | Age: 71
End: 2021-11-04
Attending: EMERGENCY MEDICINE
Payer: MEDICARE

## 2021-11-04 VITALS
TEMPERATURE: 98 F | SYSTOLIC BLOOD PRESSURE: 120 MMHG | HEIGHT: 60 IN | BODY MASS INDEX: 32 KG/M2 | DIASTOLIC BLOOD PRESSURE: 72 MMHG | RESPIRATION RATE: 20 BRPM | HEART RATE: 105 BPM | WEIGHT: 163 LBS | OXYGEN SATURATION: 98 %

## 2021-11-04 VITALS
SYSTOLIC BLOOD PRESSURE: 131 MMHG | BODY MASS INDEX: 31.02 KG/M2 | HEIGHT: 60 IN | HEART RATE: 100 BPM | DIASTOLIC BLOOD PRESSURE: 68 MMHG | OXYGEN SATURATION: 96 % | TEMPERATURE: 99 F | WEIGHT: 158 LBS | RESPIRATION RATE: 16 BRPM

## 2021-11-04 DIAGNOSIS — Z02.9 ENCOUNTERS FOR ADMINISTRATIVE PURPOSE: Primary | ICD-10-CM

## 2021-11-04 DIAGNOSIS — S16.1XXA STRAIN OF NECK MUSCLE, INITIAL ENCOUNTER: Primary | ICD-10-CM

## 2021-11-04 PROCEDURE — 99213 OFFICE O/P EST LOW 20 MIN: CPT

## 2021-11-04 PROCEDURE — 99203 OFFICE O/P NEW LOW 30 MIN: CPT

## 2021-11-04 PROCEDURE — 72050 X-RAY EXAM NECK SPINE 4/5VWS: CPT | Performed by: EMERGENCY MEDICINE

## 2021-11-04 RX ORDER — HYDROCODONE BITARTRATE AND ACETAMINOPHEN 5; 325 MG/1; MG/1
1-2 TABLET ORAL EVERY 6 HOURS PRN
Qty: 10 TABLET | Refills: 0 | Status: SHIPPED | OUTPATIENT
Start: 2021-11-04 | End: 2021-11-11

## 2021-11-04 RX ORDER — HYDROCODONE BITARTRATE AND ACETAMINOPHEN 5; 325 MG/1; MG/1
1 TABLET ORAL ONCE
Status: COMPLETED | OUTPATIENT
Start: 2021-11-04 | End: 2021-11-04

## 2021-11-04 NOTE — PROGRESS NOTES
Pt presented with c/o posterior neck pain radiating up to scalp. Has been going to PT for shoulder and neck pain; symptoms were improving. On 10/31 put a rolled up blanket behind her neck for support.   When she got up her neck was stiff and states unable

## 2021-11-04 NOTE — ED INITIAL ASSESSMENT (HPI)
Patient presents to IC with c/o stiff neck since Halloween. No direct injury noted. Denies numbness or tingling. Pain with movement to left and right unrelieved by muscle relaxer. No fever or neuro deficit.

## 2021-11-05 NOTE — ED PROVIDER NOTES
Patient Seen in: [facility]      History   Patient presents with:  Neck Pain    Stated Complaint: neck pain with movement; no injury    Subjective:   HPI    72-year-old female comes in with neck pain.   She rolled up a blanket a few days a no injury  Other systems are as noted in HPI. Constitutional and vital signs reviewed. All other systems reviewed and negative except as noted above.     Physical Exam     ED Triage Vitals [11/04/21 1630]   /68   Pulse 100   Resp 16   Temp 98.9 Technologist)  5 days ago pt     started feeling pain and stiffness to head and neck. Pt cant touch head to     wash hair. No injury. FINDINGS:        BONES:  There is no evidence of acute osseous injuries.   Normal alignment     of the cervic

## 2021-11-09 ENCOUNTER — IMMUNIZATION (OUTPATIENT)
Dept: INTERNAL MEDICINE CLINIC | Facility: CLINIC | Age: 71
End: 2021-11-09
Payer: MEDICARE

## 2021-11-09 ENCOUNTER — OFFICE VISIT (OUTPATIENT)
Dept: PHYSICAL THERAPY | Age: 71
End: 2021-11-09
Attending: INTERNAL MEDICINE
Payer: MEDICARE

## 2021-11-09 DIAGNOSIS — Z23 NEED FOR VACCINATION: Primary | ICD-10-CM

## 2021-11-09 DIAGNOSIS — M54.2 CERVICAL SPINE PAIN: Primary | ICD-10-CM

## 2021-11-09 PROCEDURE — 97140 MANUAL THERAPY 1/> REGIONS: CPT

## 2021-11-09 PROCEDURE — 97110 THERAPEUTIC EXERCISES: CPT

## 2021-11-09 PROCEDURE — 90662 IIV NO PRSV INCREASED AG IM: CPT | Performed by: INTERNAL MEDICINE

## 2021-11-09 PROCEDURE — G0008 ADMIN INFLUENZA VIRUS VAC: HCPCS | Performed by: INTERNAL MEDICINE

## 2021-11-09 NOTE — PROGRESS NOTES
Dx:  R RTC tendinitis  , cervical pain        Insurance (Authorized # of Visits): 11 Medicare       Authorizing Physician: Dr. Dante Dickson  Next MD visit: none scheduled  Fall Risk: standard         Precautions: n/a           Progress Note and Update to POC, add 10/12/2021  Tx#: 6/11 10/14/2021  7/11 10/19/2021  8/11 10/21/2021  9/11   11/2/2021  10/11 11/9/2021  11/11+ 3   scifit 4 mins  scifit 5 mins  scifit 5 mins  scifit 6 mins  skip Scifit 5 mins    wand for shoulder extension x10, IR ( some pain)  - abd x10 protraction x20  - supine flexion with 2# wt x15, horiz abd/addx15,protraction x15 Seated CPA to lower C and upper t spine Gr II    scap sets x10  skip HP to R shoulder and upper trap 12 mins, instructed pt on purchase o HP  AROM x5 shoulder flexion, abd

## 2021-11-11 ENCOUNTER — OFFICE VISIT (OUTPATIENT)
Dept: PHYSICAL THERAPY | Age: 71
End: 2021-11-11
Attending: INTERNAL MEDICINE
Payer: MEDICARE

## 2021-11-11 PROCEDURE — 97140 MANUAL THERAPY 1/> REGIONS: CPT

## 2021-11-11 PROCEDURE — 97110 THERAPEUTIC EXERCISES: CPT

## 2021-11-11 PROCEDURE — 97112 NEUROMUSCULAR REEDUCATION: CPT

## 2021-11-11 NOTE — PROGRESS NOTES
Dx: R RTC tendinitis  , cervical pain        Insurance (Authorized # of Visits): 11 Medicare       Authorizing Physician: Dr. Yolanda Neal  Next MD visit: none scheduled  Fall Risk: standard         Precautions: n/a               Subjective:      Feeling better. shoulder and cervical pain.  Review C isometrics, c stabilizer  10/14/2021  7/11 10/19/2021  8/11 10/21/2021  9/11   11/2/2021  10/11 11/9/2021  11/11+ 3 11/11/2021 12/ 14   scifit 5 mins  scifit 5 mins  scifit 6 mins  skip Scifit 5 mins Pulleys for flexio Supine flexion w/ wand x20  Supine flexion w/ wand x20 , protraction x20  - supine flexion with 2# wt x15, horiz abd/addx15,protraction x15 Seated CPA to lower C and upper t spine Gr II Prone CPA cervical spine and UPA to c spine, Gr II-III  CPA to mid T

## 2021-11-15 ENCOUNTER — OFFICE VISIT (OUTPATIENT)
Dept: PHYSICAL THERAPY | Age: 71
End: 2021-11-15
Attending: INTERNAL MEDICINE
Payer: MEDICARE

## 2021-11-15 ENCOUNTER — OFFICE VISIT (OUTPATIENT)
Dept: INTERNAL MEDICINE CLINIC | Facility: CLINIC | Age: 71
End: 2021-11-15
Payer: MEDICARE

## 2021-11-15 VITALS
SYSTOLIC BLOOD PRESSURE: 120 MMHG | HEART RATE: 86 BPM | HEIGHT: 60 IN | TEMPERATURE: 99 F | WEIGHT: 160.81 LBS | BODY MASS INDEX: 31.57 KG/M2 | RESPIRATION RATE: 18 BRPM | OXYGEN SATURATION: 96 % | DIASTOLIC BLOOD PRESSURE: 64 MMHG

## 2021-11-15 DIAGNOSIS — M54.2 NECK PAIN: Primary | ICD-10-CM

## 2021-11-15 DIAGNOSIS — M25.511 RIGHT SHOULDER PAIN, UNSPECIFIED CHRONICITY: ICD-10-CM

## 2021-11-15 DIAGNOSIS — E11.29 TYPE 2 DIABETES MELLITUS WITH ALBUMINURIA (HCC): ICD-10-CM

## 2021-11-15 DIAGNOSIS — R80.9 TYPE 2 DIABETES MELLITUS WITH ALBUMINURIA (HCC): ICD-10-CM

## 2021-11-15 PROCEDURE — 97110 THERAPEUTIC EXERCISES: CPT

## 2021-11-15 PROCEDURE — 97112 NEUROMUSCULAR REEDUCATION: CPT

## 2021-11-15 PROCEDURE — 83036 HEMOGLOBIN GLYCOSYLATED A1C: CPT | Performed by: INTERNAL MEDICINE

## 2021-11-15 PROCEDURE — 99214 OFFICE O/P EST MOD 30 MIN: CPT | Performed by: INTERNAL MEDICINE

## 2021-11-15 PROCEDURE — 97140 MANUAL THERAPY 1/> REGIONS: CPT

## 2021-11-15 NOTE — PROGRESS NOTES
Subjective:   Shweta Patel is a 73year old female with past medical history of DM2, HLD, polyarthralgia  who presents for Neck Pain, Shoulder Pain, and Follow - Up     Neck pain/R shoulder pain- doing PT with improvement. Inflammatory markers wnl.    H • cyclobenzaprine 5 MG Oral Tab Take 1 tablet (5 mg total) by mouth 3 (three) times daily as needed for Muscle spasms. (Patient not taking: Reported on 11/15/2021) 10 tablet 0       Review of Systems:  Pertinent items are noted in HPI.   A comprehensive

## 2021-11-15 NOTE — PROGRESS NOTES
Dx: R RTC tendinitis  , cervical pain        Insurance (Authorized # of Visits): 11 Medicare       Authorizing Physician: Dr. Lance Bell  Next MD visit: none scheduled  Fall Risk: standard         Precautions: n/a               Subjective:      Feeling better. Review C isometrics, c stabilizer   11/22.  Patient will need update to POC at next visit  10/19/2021  8/11 10/21/2021  9/11   11/2/2021  10/11 11/9/2021  11/11+ 3 11/11/2021  12/ 14 11/15/2021  13/14   scifit 5 mins  scifit 6 mins  skip Scifit 5 mins Pulle for C retraction x10    Bilateral flexion with ball x12  Supine flexion w/ wand x20  Supine flexion w/ wand x20 , protraction x20  - supine flexion with 2# wt x15, horiz abd/addx15,protraction x15 Seated CPA to lower C and upper t spine Gr II Prone CPA cer

## 2021-11-22 ENCOUNTER — OFFICE VISIT (OUTPATIENT)
Dept: PHYSICAL THERAPY | Age: 71
End: 2021-11-22
Attending: INTERNAL MEDICINE
Payer: MEDICARE

## 2021-11-22 PROCEDURE — 97140 MANUAL THERAPY 1/> REGIONS: CPT

## 2021-11-22 PROCEDURE — 97110 THERAPEUTIC EXERCISES: CPT

## 2021-11-22 NOTE — PROGRESS NOTES
Dx:  R RTC tendinitis  , cervical pain        Insurance (Authorized # of Visits): 11 Medicare       Authorizing Physician: Dr. Solo Knapp  Next MD visit: none scheduled  Fall Risk: standard         Precautions: n/a             Progress Summary and Update to POC Patient is IND with comprehensive HEP program.          Plan:  Cont PT for R shoulder and cervical pain. Reassess R shoulder IR . Assess benefit of Kinesiotape.    10/21/2021  9/11   11/2/2021  10/11 11/9/2021  11/11+ 3 11/11/2021  12/ 14 11/15/2021  13/14 to mid T spine GR II   Wall walk into flexion x20  CHP supine 12 mins    Want for shoulder extension, IR and horiz abd/add x10 each  C stabilizer for C retraction x10  C stabilizer for C retraction x10  kinesiotape to support R anterior shoulder    Supine

## 2021-11-29 ENCOUNTER — OFFICE VISIT (OUTPATIENT)
Dept: PHYSICAL THERAPY | Age: 71
End: 2021-11-29
Attending: INTERNAL MEDICINE
Payer: MEDICARE

## 2021-11-29 PROCEDURE — 97110 THERAPEUTIC EXERCISES: CPT

## 2021-11-29 PROCEDURE — 97140 MANUAL THERAPY 1/> REGIONS: CPT

## 2021-11-29 NOTE — PROGRESS NOTES
Dx: R RTC tendinitis  , cervical pain        Insurance (Authorized # of Visits): 11 Medicare       Authorizing Physician: Dr. Collazo ref.  provider found  Next MD visit: none scheduled  Fall Risk: standard         Precautions: n/a               Subjective: pain with repeated motion)   * Patient is IND with comprehensive HEP program.          Plan:  Cont PT for R shoulder and cervical pain. Reassess R shoulder IR .  Cont x2  11/2/2021  10/11 11/9/2021  11/11+ 3 11/11/2021  12/ 14 11/15/2021  13/14 11/22/2021 Prone CPA cervical spine and UPA to c spine, Gr II-III  CPA to mid T spine GR II   CHP supine 12 mins    Want for shoulder extension, IR and horiz abd/add x10 each  C stabilizer for C retraction x10  C stabilizer for C retraction x10  kinesiotape to suppor

## 2021-12-08 ENCOUNTER — TELEPHONE (OUTPATIENT)
Dept: INTERNAL MEDICINE CLINIC | Facility: CLINIC | Age: 71
End: 2021-12-08

## 2021-12-08 DIAGNOSIS — Z12.31 ENCOUNTER FOR SCREENING MAMMOGRAM FOR MALIGNANT NEOPLASM OF BREAST: Primary | ICD-10-CM

## 2021-12-08 NOTE — TELEPHONE ENCOUNTER
Pt is scheduled for 12/15 for AWV. Pt is requesting to have lab orders placed prior to coming in for appointment. Pt is also requesting a mammogram order placed. Please advise.

## 2021-12-10 ENCOUNTER — LAB ENCOUNTER (OUTPATIENT)
Dept: LAB | Age: 71
End: 2021-12-10
Attending: INTERNAL MEDICINE
Payer: MEDICARE

## 2021-12-10 DIAGNOSIS — D36.9 TUBULAR ADENOMA: ICD-10-CM

## 2021-12-10 DIAGNOSIS — E11.29 TYPE 2 DIABETES MELLITUS WITH ALBUMINURIA (HCC): ICD-10-CM

## 2021-12-10 DIAGNOSIS — R80.9 TYPE 2 DIABETES MELLITUS WITH ALBUMINURIA (HCC): ICD-10-CM

## 2021-12-10 DIAGNOSIS — D50.0 IRON DEFICIENCY ANEMIA DUE TO CHRONIC BLOOD LOSS: ICD-10-CM

## 2021-12-10 DIAGNOSIS — M85.88 OSTEOPENIA OF LUMBAR SPINE: ICD-10-CM

## 2021-12-10 PROCEDURE — 36415 COLL VENOUS BLD VENIPUNCTURE: CPT

## 2021-12-10 PROCEDURE — 85025 COMPLETE CBC W/AUTO DIFF WBC: CPT

## 2021-12-10 PROCEDURE — 80048 BASIC METABOLIC PNL TOTAL CA: CPT

## 2021-12-10 PROCEDURE — 80061 LIPID PANEL: CPT

## 2021-12-10 PROCEDURE — 84460 ALANINE AMINO (ALT) (SGPT): CPT

## 2021-12-10 PROCEDURE — 82306 VITAMIN D 25 HYDROXY: CPT

## 2021-12-10 PROCEDURE — 84450 TRANSFERASE (AST) (SGOT): CPT

## 2021-12-10 PROCEDURE — 82728 ASSAY OF FERRITIN: CPT

## 2021-12-14 ENCOUNTER — OFFICE VISIT (OUTPATIENT)
Dept: PHYSICAL THERAPY | Age: 71
End: 2021-12-14
Attending: INTERNAL MEDICINE
Payer: MEDICARE

## 2021-12-14 PROCEDURE — 97110 THERAPEUTIC EXERCISES: CPT

## 2021-12-14 PROCEDURE — 97140 MANUAL THERAPY 1/> REGIONS: CPT

## 2021-12-14 NOTE — PROGRESS NOTES
Dx: R RTC tendinitis  , cervical pain        Insurance (Authorized # of Visits): 11 Medicare       Authorizing Physician: Dr. Collazo ref.  provider found  Next MD visit: none scheduled  Fall Risk: standard         Precautions: n/a             Subjective:      F Scifit 5 mins Pulleys for flexion warm up  Scifit  5 mins warm up  Scifit 6 mins warm up skip Scifit 6 mins warm up   Manual CTX : 5 mins  - PROM supine C rot R/L and lat flexion R/L  - Gr II R/L lateral glides  - Gr III upslopes R/L    Manual CTX : 5 mins CHP supine 12 mins    Want for shoulder extension, IR and horiz abd/add x10 each  C stabilizer for C retraction x10  C stabilizer for C retraction x10  kinesiotape to support R anterior shoulder  Cervical isometrics in lat flex and then rot : 10 sec x10

## 2021-12-15 ENCOUNTER — OFFICE VISIT (OUTPATIENT)
Dept: INTERNAL MEDICINE CLINIC | Facility: CLINIC | Age: 71
End: 2021-12-15
Payer: MEDICARE

## 2021-12-15 VITALS
SYSTOLIC BLOOD PRESSURE: 118 MMHG | RESPIRATION RATE: 16 BRPM | DIASTOLIC BLOOD PRESSURE: 60 MMHG | TEMPERATURE: 98 F | OXYGEN SATURATION: 95 % | HEIGHT: 60 IN | BODY MASS INDEX: 31.73 KG/M2 | HEART RATE: 90 BPM | WEIGHT: 161.63 LBS

## 2021-12-15 DIAGNOSIS — E66.9 DIABETES MELLITUS TYPE 2 IN OBESE (HCC): ICD-10-CM

## 2021-12-15 DIAGNOSIS — K21.9 GASTROESOPHAGEAL REFLUX DISEASE WITHOUT ESOPHAGITIS: ICD-10-CM

## 2021-12-15 DIAGNOSIS — E11.29 TYPE 2 DIABETES MELLITUS WITH ALBUMINURIA (HCC): ICD-10-CM

## 2021-12-15 DIAGNOSIS — D50.0 IRON DEFICIENCY ANEMIA DUE TO CHRONIC BLOOD LOSS: ICD-10-CM

## 2021-12-15 DIAGNOSIS — E11.69 HYPERLIPIDEMIA ASSOCIATED WITH TYPE 2 DIABETES MELLITUS (HCC): ICD-10-CM

## 2021-12-15 DIAGNOSIS — E11.69 DIABETES MELLITUS TYPE 2 IN OBESE (HCC): ICD-10-CM

## 2021-12-15 DIAGNOSIS — R80.9 TYPE 2 DIABETES MELLITUS WITH ALBUMINURIA (HCC): ICD-10-CM

## 2021-12-15 DIAGNOSIS — E78.5 HYPERLIPIDEMIA ASSOCIATED WITH TYPE 2 DIABETES MELLITUS (HCC): ICD-10-CM

## 2021-12-15 DIAGNOSIS — N39.41 URGE URINARY INCONTINENCE: ICD-10-CM

## 2021-12-15 DIAGNOSIS — Z00.00 ROUTINE GENERAL MEDICAL EXAMINATION AT A HEALTH CARE FACILITY: Primary | ICD-10-CM

## 2021-12-15 DIAGNOSIS — M85.88 OSTEOPENIA OF LUMBAR SPINE: ICD-10-CM

## 2021-12-15 PROCEDURE — G0439 PPPS, SUBSEQ VISIT: HCPCS | Performed by: INTERNAL MEDICINE

## 2021-12-15 PROCEDURE — 99214 OFFICE O/P EST MOD 30 MIN: CPT | Performed by: INTERNAL MEDICINE

## 2021-12-15 RX ORDER — ROSUVASTATIN CALCIUM 20 MG/1
20 TABLET, COATED ORAL DAILY
Qty: 90 TABLET | Refills: 3 | Status: SHIPPED | OUTPATIENT
Start: 2021-12-15

## 2021-12-15 RX ORDER — EMPAGLIFLOZIN 25 MG/1
25 TABLET, FILM COATED ORAL DAILY
Qty: 90 TABLET | Refills: 3 | Status: SHIPPED | OUTPATIENT
Start: 2021-12-15

## 2021-12-15 NOTE — PROGRESS NOTES
Robinson Read is a 73year old female. HPI:   Patient presents for medicare wellness exam and additional issues noted below. 1. Vitamin D level - borderlien. She will increase daily dose  2. HLP - she is tolerating switch to rosuvastatin.    3. DM - (Thrombocytopenia) Other         Sibling   • Heart Disorder Maternal Grandmother         CVA   • Heart Disorder Maternal Grandfather         MI   • Cancer Brother         esophageal cancer      Past Medical History:   Diagnosis Date   • Abdominal hernia without g/m/r  GI: soft non tender nondistended no hsm bs throughout  NEURO: CN 2-12 grossly intact  PSYCH: pleasant  EXTREMITIES: no cyanosis, clubbing or edema  Neck - has limited ROM with flexion/extension   R shoulder - has FROM    ASSESSMENT AND PLAN: Cont calcium/vit D supplementation as well. Encourage weight bearing excercises as this is not an active part of her lifestyle. # Constipation, h/o Diverticulitis (x2 episodes), colonic polyps, antral gastritis. Chronic, controlled with high fiber diet.

## 2021-12-15 NOTE — PATIENT INSTRUCTIONS
Please increase your daily vitamin D3 to 4000 units. For your diabetes, please start jardiance 25 mg once you have completed your supply of invokana.      You can use over the counter lidocaine (lidoderm) patches and you wear them for 12 hours and then

## 2021-12-20 ENCOUNTER — OFFICE VISIT (OUTPATIENT)
Dept: PHYSICAL THERAPY | Age: 71
End: 2021-12-20
Attending: INTERNAL MEDICINE
Payer: MEDICARE

## 2021-12-20 PROCEDURE — 97140 MANUAL THERAPY 1/> REGIONS: CPT

## 2021-12-20 PROCEDURE — 97110 THERAPEUTIC EXERCISES: CPT

## 2021-12-20 NOTE — PROGRESS NOTES
Dx: R RTC tendinitis  , cervical pain        Insurance (Authorized # of Visits): 11 Medicare       Authorizing Physician: Dr. Collazo ref.  provider found  Next MD visit: none scheduled  Fall Risk: standard         Precautions: n/a           Discharge Summary 11/11/2021  12/ 14 11/15/2021  13/14 11/22/2021  14/14  +3sch 11/29/2021  15/17 obdulia 12/14/2021 16/17 12/20/2021 17/17   Pulleys for flexion warm up  Scifit  5 mins warm up  Scifit 6 mins warm up skip Scifit 6 mins warm up Scifit 6 mins warm up   Manual kinesiotape to support R anterior shoulder  Cervical isometrics in lat flex and then rot : 10 sec x10  - Contract relax to increase C rot R Supine cane flex, x20 Supine protraction with 2# wt x15, flexion with 1# x20 , horiz abd/add x20    Prone CPA cervic

## 2021-12-28 ENCOUNTER — HOSPITAL ENCOUNTER (OUTPATIENT)
Dept: MAMMOGRAPHY | Age: 71
Discharge: HOME | End: 2021-12-28
Attending: INTERNAL MEDICINE
Payer: MEDICARE

## 2021-12-28 DIAGNOSIS — Z12.31 ENCOUNTER FOR SCREENING MAMMOGRAM FOR MALIGNANT NEOPLASM OF BREAST: ICD-10-CM

## 2021-12-28 PROCEDURE — 77067 SCR MAMMO BI INCL CAD: CPT | Performed by: INTERNAL MEDICINE

## 2021-12-28 PROCEDURE — 77063 BREAST TOMOSYNTHESIS BI: CPT | Performed by: INTERNAL MEDICINE

## 2022-01-11 ENCOUNTER — TELEPHONE (OUTPATIENT)
Dept: INTERNAL MEDICINE CLINIC | Facility: CLINIC | Age: 72
End: 2022-01-11

## 2022-01-11 NOTE — TELEPHONE ENCOUNTER
Pt calling to discuss mammogram results. Explained they were automatically released and Dr. Mel Kay will have review them before the nurse calls her back.

## 2022-01-13 ENCOUNTER — TELEPHONE (OUTPATIENT)
Dept: INTERNAL MEDICINE CLINIC | Facility: CLINIC | Age: 72
End: 2022-01-13

## 2022-01-13 NOTE — TELEPHONE ENCOUNTER
Incoming diabetic eye exam via fax from [facility]. HM has been updated, report placed in KS in basket.

## 2022-03-15 DIAGNOSIS — E11.649 HYPOGLYCEMIA ASSOCIATED WITH DIABETES (HCC): ICD-10-CM

## 2022-03-16 RX ORDER — GLYBURIDE 2.5 MG/1
2.5 TABLET ORAL
Qty: 90 TABLET | Refills: 3 | Status: SHIPPED | OUTPATIENT
Start: 2022-03-16 | End: 2022-06-22

## 2022-04-12 ENCOUNTER — OFFICE VISIT (OUTPATIENT)
Dept: RHEUMATOLOGY | Facility: CLINIC | Age: 72
End: 2022-04-12
Payer: MEDICARE

## 2022-04-12 VITALS
OXYGEN SATURATION: 96 % | BODY MASS INDEX: 31.37 KG/M2 | SYSTOLIC BLOOD PRESSURE: 128 MMHG | HEIGHT: 60.5 IN | WEIGHT: 164 LBS | DIASTOLIC BLOOD PRESSURE: 62 MMHG | TEMPERATURE: 98 F | HEART RATE: 82 BPM | RESPIRATION RATE: 16 BRPM

## 2022-04-12 DIAGNOSIS — M35.00 SICCA SYNDROME (HCC): ICD-10-CM

## 2022-04-12 DIAGNOSIS — M25.561 CHRONIC PAIN OF BOTH KNEES: ICD-10-CM

## 2022-04-12 DIAGNOSIS — R76.8 ANA POSITIVE: ICD-10-CM

## 2022-04-12 DIAGNOSIS — R76.8 RIBONUCLEOPROTEIN ANTIBODY POSITIVE: ICD-10-CM

## 2022-04-12 DIAGNOSIS — M25.50 POLYARTHRALGIA: ICD-10-CM

## 2022-04-12 DIAGNOSIS — G89.29 CHRONIC PAIN OF BOTH KNEES: ICD-10-CM

## 2022-04-12 DIAGNOSIS — M25.562 CHRONIC PAIN OF BOTH KNEES: ICD-10-CM

## 2022-04-12 DIAGNOSIS — M15.4 EROSIVE OSTEOARTHRITIS OF BOTH HANDS: ICD-10-CM

## 2022-04-12 DIAGNOSIS — M15.9 PRIMARY OSTEOARTHRITIS INVOLVING MULTIPLE JOINTS: Primary | ICD-10-CM

## 2022-04-12 DIAGNOSIS — M62.838 MUSCLE SPASMS OF NECK: ICD-10-CM

## 2022-04-12 PROCEDURE — 99214 OFFICE O/P EST MOD 30 MIN: CPT | Performed by: INTERNAL MEDICINE

## 2022-04-12 RX ORDER — CYCLOBENZAPRINE HCL 5 MG
5 TABLET ORAL NIGHTLY PRN
Qty: 30 TABLET | Refills: 0 | Status: SHIPPED | OUTPATIENT
Start: 2022-04-12

## 2022-04-20 ENCOUNTER — LAB ENCOUNTER (OUTPATIENT)
Dept: LAB | Age: 72
End: 2022-04-20
Attending: INTERNAL MEDICINE
Payer: MEDICARE

## 2022-04-20 DIAGNOSIS — R76.8 ANA POSITIVE: ICD-10-CM

## 2022-04-20 DIAGNOSIS — M25.50 POLYARTHRALGIA: ICD-10-CM

## 2022-04-20 DIAGNOSIS — R76.8 RIBONUCLEOPROTEIN ANTIBODY POSITIVE: ICD-10-CM

## 2022-04-20 LAB
CRP SERPL-MCNC: <0.29 MG/DL (ref ?–0.3)
ERYTHROCYTE [SEDIMENTATION RATE] IN BLOOD: 15 MM/HR
IGA SERPL-MCNC: 174 MG/DL (ref 70–312)
IGM SERPL-MCNC: 35 MG/DL (ref 43–279)
IMMUNOGLOBULIN PNL SER-MCNC: 866 MG/DL (ref 791–1643)

## 2022-04-20 PROCEDURE — 86038 ANTINUCLEAR ANTIBODIES: CPT

## 2022-04-20 PROCEDURE — 86235 NUCLEAR ANTIGEN ANTIBODY: CPT

## 2022-04-20 PROCEDURE — 86140 C-REACTIVE PROTEIN: CPT

## 2022-04-20 PROCEDURE — 85652 RBC SED RATE AUTOMATED: CPT

## 2022-04-20 PROCEDURE — 82784 ASSAY IGA/IGD/IGG/IGM EACH: CPT

## 2022-04-20 PROCEDURE — 36415 COLL VENOUS BLD VENIPUNCTURE: CPT

## 2022-04-27 LAB — ENA RNP AB SER-ACNC: 146 AU/ML (ref ?–100)

## 2022-05-04 ENCOUNTER — OFFICE VISIT (OUTPATIENT)
Dept: INTERNAL MEDICINE CLINIC | Facility: CLINIC | Age: 72
End: 2022-05-04
Payer: MEDICARE

## 2022-05-04 VITALS
TEMPERATURE: 98 F | BODY MASS INDEX: 31.84 KG/M2 | HEART RATE: 86 BPM | DIASTOLIC BLOOD PRESSURE: 50 MMHG | OXYGEN SATURATION: 97 % | RESPIRATION RATE: 16 BRPM | HEIGHT: 60 IN | SYSTOLIC BLOOD PRESSURE: 105 MMHG | WEIGHT: 162.19 LBS

## 2022-05-04 DIAGNOSIS — R80.9 TYPE 2 DIABETES MELLITUS WITH ALBUMINURIA (HCC): Primary | ICD-10-CM

## 2022-05-04 DIAGNOSIS — E78.5 HYPERLIPIDEMIA ASSOCIATED WITH TYPE 2 DIABETES MELLITUS (HCC): ICD-10-CM

## 2022-05-04 DIAGNOSIS — E66.9 DIABETES MELLITUS TYPE 2 IN OBESE (HCC): ICD-10-CM

## 2022-05-04 DIAGNOSIS — D50.0 IRON DEFICIENCY ANEMIA DUE TO CHRONIC BLOOD LOSS: ICD-10-CM

## 2022-05-04 DIAGNOSIS — E11.29 TYPE 2 DIABETES MELLITUS WITH ALBUMINURIA (HCC): Primary | ICD-10-CM

## 2022-05-04 DIAGNOSIS — E11.69 DIABETES MELLITUS TYPE 2 IN OBESE (HCC): ICD-10-CM

## 2022-05-04 DIAGNOSIS — M85.88 OSTEOPENIA OF LUMBAR SPINE: ICD-10-CM

## 2022-05-04 DIAGNOSIS — E11.69 HYPERLIPIDEMIA ASSOCIATED WITH TYPE 2 DIABETES MELLITUS (HCC): ICD-10-CM

## 2022-05-04 PROCEDURE — 99214 OFFICE O/P EST MOD 30 MIN: CPT | Performed by: INTERNAL MEDICINE

## 2022-05-04 RX ORDER — OMEPRAZOLE 20 MG/1
20 CAPSULE, DELAYED RELEASE ORAL
Qty: 90 CAPSULE | Refills: 3 | Status: SHIPPED | OUTPATIENT
Start: 2022-05-04 | End: 2022-05-04

## 2022-05-04 RX ORDER — OMEPRAZOLE 20 MG/1
20 CAPSULE, DELAYED RELEASE ORAL
Qty: 90 CAPSULE | Refills: 3 | Status: SHIPPED | OUTPATIENT
Start: 2022-05-04

## 2022-05-06 ENCOUNTER — LAB ENCOUNTER (OUTPATIENT)
Dept: LAB | Age: 72
End: 2022-05-06
Attending: INTERNAL MEDICINE
Payer: MEDICARE

## 2022-05-06 DIAGNOSIS — M85.88 OSTEOPENIA OF LUMBAR SPINE: ICD-10-CM

## 2022-05-06 DIAGNOSIS — E11.69 HYPERLIPIDEMIA ASSOCIATED WITH TYPE 2 DIABETES MELLITUS (HCC): ICD-10-CM

## 2022-05-06 DIAGNOSIS — E66.9 DIABETES MELLITUS TYPE 2 IN OBESE (HCC): ICD-10-CM

## 2022-05-06 DIAGNOSIS — E78.5 HYPERLIPIDEMIA ASSOCIATED WITH TYPE 2 DIABETES MELLITUS (HCC): ICD-10-CM

## 2022-05-06 DIAGNOSIS — R80.9 TYPE 2 DIABETES MELLITUS WITH ALBUMINURIA (HCC): ICD-10-CM

## 2022-05-06 DIAGNOSIS — E11.29 TYPE 2 DIABETES MELLITUS WITH ALBUMINURIA (HCC): ICD-10-CM

## 2022-05-06 DIAGNOSIS — D50.0 IRON DEFICIENCY ANEMIA DUE TO CHRONIC BLOOD LOSS: ICD-10-CM

## 2022-05-06 DIAGNOSIS — E11.69 DIABETES MELLITUS TYPE 2 IN OBESE (HCC): ICD-10-CM

## 2022-05-06 LAB
ALT SERPL-CCNC: 35 U/L
ANION GAP SERPL CALC-SCNC: 7 MMOL/L (ref 0–18)
AST SERPL-CCNC: 27 U/L (ref 15–37)
BASOPHILS # BLD AUTO: 0.06 X10(3) UL (ref 0–0.2)
BASOPHILS NFR BLD AUTO: 0.8 %
BUN BLD-MCNC: 18 MG/DL (ref 7–18)
CALCIUM BLD-MCNC: 9.6 MG/DL (ref 8.5–10.1)
CHLORIDE SERPL-SCNC: 102 MMOL/L (ref 98–112)
CHOLEST SERPL-MCNC: 120 MG/DL (ref ?–200)
CO2 SERPL-SCNC: 29 MMOL/L (ref 21–32)
CREAT BLD-MCNC: 0.74 MG/DL
CREAT UR-SCNC: 40 MG/DL
DEPRECATED HBV CORE AB SER IA-ACNC: 21.6 NG/ML
EOSINOPHIL # BLD AUTO: 0.61 X10(3) UL (ref 0–0.7)
EOSINOPHIL NFR BLD AUTO: 7.7 %
ERYTHROCYTE [DISTWIDTH] IN BLOOD BY AUTOMATED COUNT: 12.8 %
EST. AVERAGE GLUCOSE BLD GHB EST-MCNC: 157 MG/DL (ref 68–126)
FASTING PATIENT LIPID ANSWER: NO
FASTING STATUS PATIENT QL REPORTED: NO
GLUCOSE BLD-MCNC: 187 MG/DL (ref 70–99)
HBA1C MFR BLD: 7.1 % (ref ?–5.7)
HCT VFR BLD AUTO: 41.5 %
HDLC SERPL-MCNC: 37 MG/DL (ref 40–59)
HGB BLD-MCNC: 13.2 G/DL
IMM GRANULOCYTES # BLD AUTO: 0.06 X10(3) UL (ref 0–1)
IMM GRANULOCYTES NFR BLD: 0.8 %
LDLC SERPL CALC-MCNC: 63 MG/DL (ref ?–100)
LYMPHOCYTES # BLD AUTO: 1.82 X10(3) UL (ref 1–4)
LYMPHOCYTES NFR BLD AUTO: 23 %
MCH RBC QN AUTO: 31.4 PG (ref 26–34)
MCHC RBC AUTO-ENTMCNC: 31.8 G/DL (ref 31–37)
MCV RBC AUTO: 98.8 FL
MICROALBUMIN UR-MCNC: 1.03 MG/DL
MICROALBUMIN/CREAT 24H UR-RTO: 25.8 UG/MG (ref ?–30)
MONOCYTES # BLD AUTO: 0.62 X10(3) UL (ref 0.1–1)
MONOCYTES NFR BLD AUTO: 7.8 %
NEUTROPHILS # BLD AUTO: 4.75 X10 (3) UL (ref 1.5–7.7)
NEUTROPHILS # BLD AUTO: 4.75 X10(3) UL (ref 1.5–7.7)
NEUTROPHILS NFR BLD AUTO: 59.9 %
NONHDLC SERPL-MCNC: 83 MG/DL (ref ?–130)
OSMOLALITY SERPL CALC.SUM OF ELEC: 293 MOSM/KG (ref 275–295)
PLATELET # BLD AUTO: 166 10(3)UL (ref 150–450)
POTASSIUM SERPL-SCNC: 4 MMOL/L (ref 3.5–5.1)
RBC # BLD AUTO: 4.2 X10(6)UL
SODIUM SERPL-SCNC: 138 MMOL/L (ref 136–145)
T4 FREE SERPL-MCNC: 1 NG/DL (ref 0.8–1.7)
TRIGL SERPL-MCNC: 108 MG/DL (ref 30–149)
TSI SER-ACNC: 1.46 MIU/ML (ref 0.36–3.74)
VIT D+METAB SERPL-MCNC: 60.8 NG/ML (ref 30–100)
VLDLC SERPL CALC-MCNC: 16 MG/DL (ref 0–30)
WBC # BLD AUTO: 7.9 X10(3) UL (ref 4–11)

## 2022-05-06 PROCEDURE — 84460 ALANINE AMINO (ALT) (SGPT): CPT

## 2022-05-06 PROCEDURE — 84450 TRANSFERASE (AST) (SGOT): CPT

## 2022-05-06 PROCEDURE — 80048 BASIC METABOLIC PNL TOTAL CA: CPT

## 2022-05-06 PROCEDURE — 85025 COMPLETE CBC W/AUTO DIFF WBC: CPT

## 2022-05-06 PROCEDURE — 83036 HEMOGLOBIN GLYCOSYLATED A1C: CPT

## 2022-05-06 PROCEDURE — 80061 LIPID PANEL: CPT

## 2022-05-06 PROCEDURE — 84443 ASSAY THYROID STIM HORMONE: CPT

## 2022-05-06 PROCEDURE — 82728 ASSAY OF FERRITIN: CPT

## 2022-05-06 PROCEDURE — 82043 UR ALBUMIN QUANTITATIVE: CPT

## 2022-05-06 PROCEDURE — 82306 VITAMIN D 25 HYDROXY: CPT

## 2022-05-06 PROCEDURE — 84439 ASSAY OF FREE THYROXINE: CPT

## 2022-05-06 PROCEDURE — 82570 ASSAY OF URINE CREATININE: CPT

## 2022-05-06 PROCEDURE — 36415 COLL VENOUS BLD VENIPUNCTURE: CPT

## 2022-06-15 ENCOUNTER — TELEPHONE (OUTPATIENT)
Dept: INTERNAL MEDICINE CLINIC | Facility: CLINIC | Age: 72
End: 2022-06-15

## 2022-06-15 NOTE — TELEPHONE ENCOUNTER
I'll leave note here for Dr. Wing Blanchard to review upon her return to make sure nothing else is needed.

## 2022-06-15 NOTE — TELEPHONE ENCOUNTER
Patient has upcoming appointment. Future Appointments   Date Time Provider [place]   6/20/2022 10:00 AM Bimal Jean MD [facility] None   6/22/2022 10:00 AM Anjelica Li MD [facility] [place]   7/20/2022  1:30 PM PriscaLucita chairez, DO [facility] [place]   7/27/2022  2:00 PM PriscaJulio mckeona, DO [facility] [place]   8/3/2022 10:00 AM Prisca Lucita, DO [facility] [place]   8/10/2022  1:45 PM Prisca, Lucita, DO [facility] [place]   8/17/2022 11:15 AM Prisca, Lucita, DO [facility] [place]   8/24/2022 11:00 AM Prisca Lucita, DO [facility] [place]   10/12/2022 10:00 AM PriscaJulio chaireza, DO [facility] [place]     Patient had labs done 05/06/2022. Patient wanted to make sure Dr. Roberto Jones doesn't need her to get anything done before her appointment.

## 2022-06-20 ENCOUNTER — OFFICE VISIT (OUTPATIENT)
Dept: UROLOGY | Facility: CLINIC | Age: 72
End: 2022-06-20
Attending: OBSTETRICS & GYNECOLOGY
Payer: MEDICARE

## 2022-06-20 VITALS — BODY MASS INDEX: 31.8 KG/M2 | HEIGHT: 60 IN | TEMPERATURE: 98 F | WEIGHT: 162 LBS

## 2022-06-20 DIAGNOSIS — N81.84 PELVIC MUSCLE WASTING: ICD-10-CM

## 2022-06-20 DIAGNOSIS — N39.41 URGE INCONTINENCE: ICD-10-CM

## 2022-06-20 DIAGNOSIS — R35.0 FREQUENCY OF URINATION: Primary | ICD-10-CM

## 2022-06-20 DIAGNOSIS — N32.81 OAB (OVERACTIVE BLADDER): ICD-10-CM

## 2022-06-20 PROCEDURE — 99212 OFFICE O/P EST SF 10 MIN: CPT

## 2022-06-20 RX ORDER — BUPRENORPHINE HCL 8 MG/1
1 TABLET SUBLINGUAL DAILY
COMMUNITY

## 2022-06-22 ENCOUNTER — OFFICE VISIT (OUTPATIENT)
Dept: INTERNAL MEDICINE CLINIC | Facility: CLINIC | Age: 72
End: 2022-06-22
Payer: MEDICARE

## 2022-06-22 VITALS
OXYGEN SATURATION: 99 % | HEIGHT: 60 IN | BODY MASS INDEX: 31.88 KG/M2 | DIASTOLIC BLOOD PRESSURE: 82 MMHG | RESPIRATION RATE: 16 BRPM | SYSTOLIC BLOOD PRESSURE: 115 MMHG | TEMPERATURE: 98 F | WEIGHT: 162.38 LBS | HEART RATE: 82 BPM

## 2022-06-22 DIAGNOSIS — E11.649 HYPOGLYCEMIA ASSOCIATED WITH DIABETES (HCC): ICD-10-CM

## 2022-06-22 DIAGNOSIS — E11.69 DIABETES MELLITUS TYPE 2 IN OBESE (HCC): ICD-10-CM

## 2022-06-22 DIAGNOSIS — G89.29 CHRONIC PAIN OF BOTH KNEES: ICD-10-CM

## 2022-06-22 DIAGNOSIS — M25.561 CHRONIC PAIN OF BOTH KNEES: ICD-10-CM

## 2022-06-22 DIAGNOSIS — E66.9 DIABETES MELLITUS TYPE 2 IN OBESE (HCC): ICD-10-CM

## 2022-06-22 DIAGNOSIS — M25.562 CHRONIC PAIN OF BOTH KNEES: ICD-10-CM

## 2022-06-22 DIAGNOSIS — D50.0 IRON DEFICIENCY ANEMIA DUE TO CHRONIC BLOOD LOSS: ICD-10-CM

## 2022-06-22 DIAGNOSIS — E78.5 HYPERLIPIDEMIA ASSOCIATED WITH TYPE 2 DIABETES MELLITUS (HCC): ICD-10-CM

## 2022-06-22 DIAGNOSIS — M85.88 OSTEOPENIA OF LUMBAR SPINE: ICD-10-CM

## 2022-06-22 DIAGNOSIS — E11.69 HYPERLIPIDEMIA ASSOCIATED WITH TYPE 2 DIABETES MELLITUS (HCC): ICD-10-CM

## 2022-06-22 DIAGNOSIS — K13.0 RASH ON LIPS: Primary | ICD-10-CM

## 2022-06-22 PROCEDURE — 99214 OFFICE O/P EST MOD 30 MIN: CPT | Performed by: INTERNAL MEDICINE

## 2022-06-22 RX ORDER — CLOTRIMAZOLE AND BETAMETHASONE DIPROPIONATE 10; .64 MG/G; MG/G
1 CREAM TOPICAL 2 TIMES DAILY PRN
Qty: 60 G | Refills: 0 | Status: SHIPPED | OUTPATIENT
Start: 2022-06-22

## 2022-06-22 RX ORDER — GLYBURIDE 5 MG/1
5 TABLET ORAL
Qty: 90 TABLET | Refills: 1 | Status: SHIPPED | OUTPATIENT
Start: 2022-06-22

## 2022-06-22 RX ORDER — BETAMETHASONE DIPROPIONATE 0.5 MG/G
1 CREAM TOPICAL 2 TIMES DAILY PRN
Qty: 15 G | Refills: 0 | Status: SHIPPED | OUTPATIENT
Start: 2022-06-22

## 2022-06-22 NOTE — PATIENT INSTRUCTIONS
Please stop the jardiance and increase glyburide to 5 mg every morning. Please send me your blood sugar log in 14 days. Please perform knee exercises for 15 minutes every day. If you are not feeling better in 6 weeks, I recommend formal physical therapy. Please try tylenol 1000 mg twice daily. However, if your pain is very uncontrolled then we can consider meloxicam.     You can also use the following topical agents for your pain. They are over the counter:  1. Topical lidocaine (lidoderm patches). Wear for 12 hours and off for 12 hours  2. Topical diclofenac gel can be used several times/day    Please complete your labs in November, 2022.

## 2022-06-24 ENCOUNTER — TELEPHONE (OUTPATIENT)
Dept: INTERNAL MEDICINE CLINIC | Facility: CLINIC | Age: 72
End: 2022-06-24

## 2022-06-24 NOTE — TELEPHONE ENCOUNTER
Pt called stating Mandy Mazariegos discontinued pts Gardiance and had her double her dose of Glyburide. Pt states her sugar levels are worse since making that change, he fasting sugar was 203, pt is very thirsty and is using the bathroom frequently. Pt denied lightheadedness, nausea, dizziness. Pt is worried and would like to know what she should do.

## 2022-06-24 NOTE — TELEPHONE ENCOUNTER
Spoke to patient relayed KS response. She states she had a death in the family and took her sugar after that. She thinks that was the cause as it came down after checking it again. Will f/u in 14 days.

## 2022-06-24 NOTE — TELEPHONE ENCOUNTER
It has only been 48 hours so I do not recommend raising the glyburide further right now. She should continue to hydrate. She should share her blood sugars with me in 14 days as advised during the office visit.

## 2022-06-24 NOTE — TELEPHONE ENCOUNTER
Please advise. Thank you! LOV 6/22/22    # Rash of lips - likely from jardiance so discontinue. Also discussed safe use of topical steroids.

## 2022-07-01 ENCOUNTER — OFFICE VISIT (OUTPATIENT)
Dept: UROLOGY | Facility: CLINIC | Age: 72
End: 2022-07-01
Attending: OBSTETRICS & GYNECOLOGY
Payer: MEDICARE

## 2022-07-01 VITALS — RESPIRATION RATE: 16 BRPM | TEMPERATURE: 97 F | BODY MASS INDEX: 31.8 KG/M2 | WEIGHT: 162 LBS | HEIGHT: 60 IN

## 2022-07-01 DIAGNOSIS — N32.81 OAB (OVERACTIVE BLADDER): Primary | ICD-10-CM

## 2022-07-01 DIAGNOSIS — N39.41 URGE INCONTINENCE: ICD-10-CM

## 2022-07-01 LAB
BLOOD URINE: NEGATIVE
CONTROL RUN WITHIN 24 HOURS?: YES
LEUKOCYTE ESTERASE URINE: NEGATIVE
NITRITE URINE: NEGATIVE

## 2022-07-01 PROCEDURE — 51728 CYSTOMETROGRAM W/VP: CPT

## 2022-07-01 PROCEDURE — 51741 ELECTRO-UROFLOWMETRY FIRST: CPT

## 2022-07-01 PROCEDURE — 81002 URINALYSIS NONAUTO W/O SCOPE: CPT

## 2022-07-01 PROCEDURE — 51797 INTRAABDOMINAL PRESSURE TEST: CPT

## 2022-07-01 PROCEDURE — 51784 ANAL/URINARY MUSCLE STUDY: CPT

## 2022-07-11 ENCOUNTER — OFFICE VISIT (OUTPATIENT)
Dept: UROLOGY | Facility: CLINIC | Age: 72
End: 2022-07-11
Attending: OBSTETRICS & GYNECOLOGY
Payer: MEDICARE

## 2022-07-11 VITALS
SYSTOLIC BLOOD PRESSURE: 121 MMHG | BODY MASS INDEX: 31.8 KG/M2 | WEIGHT: 162 LBS | HEART RATE: 84 BPM | TEMPERATURE: 99 F | HEIGHT: 60 IN | RESPIRATION RATE: 18 BRPM | DIASTOLIC BLOOD PRESSURE: 67 MMHG

## 2022-07-11 DIAGNOSIS — N81.84 PELVIC MUSCLE WASTING: ICD-10-CM

## 2022-07-11 DIAGNOSIS — N32.81 OAB (OVERACTIVE BLADDER): Primary | ICD-10-CM

## 2022-07-11 DIAGNOSIS — N39.41 URGE INCONTINENCE: ICD-10-CM

## 2022-07-11 PROCEDURE — 99212 OFFICE O/P EST SF 10 MIN: CPT

## 2022-07-11 RX ORDER — TROSPIUM CHLORIDE ER 60 MG/1
60 CAPSULE ORAL DAILY
Qty: 30 CAPSULE | Refills: 0 | Status: SHIPPED | OUTPATIENT
Start: 2022-07-11

## 2022-07-12 NOTE — TELEPHONE ENCOUNTER
Pt requesting refill    One Touch Verio Flex Test Strips    [id number] - [place] Manish, [address] [facility], [phone], [phone]

## 2022-07-13 RX ORDER — BLOOD SUGAR DIAGNOSTIC
STRIP MISCELLANEOUS
Qty: 200 STRIP | Refills: 1 | Status: SHIPPED | OUTPATIENT
Start: 2022-07-13 | End: 2023-07-12

## 2022-07-15 ENCOUNTER — TELEPHONE (OUTPATIENT)
Dept: INTERNAL MEDICINE CLINIC | Facility: CLINIC | Age: 72
End: 2022-07-15

## 2022-07-15 NOTE — TELEPHONE ENCOUNTER
Incoming diabetic eye exam via fax from [facility].  Flowsheet has been updated,report placed in KS in basket

## 2022-07-20 ENCOUNTER — OFFICE VISIT (OUTPATIENT)
Dept: RHEUMATOLOGY | Facility: CLINIC | Age: 72
End: 2022-07-20
Payer: MEDICARE

## 2022-07-20 VITALS — DIASTOLIC BLOOD PRESSURE: 64 MMHG | SYSTOLIC BLOOD PRESSURE: 118 MMHG

## 2022-07-20 DIAGNOSIS — M17.11 PRIMARY OSTEOARTHRITIS OF RIGHT KNEE: ICD-10-CM

## 2022-07-20 DIAGNOSIS — M25.461 EFFUSION OF RIGHT KNEE: ICD-10-CM

## 2022-07-20 DIAGNOSIS — G89.29 CHRONIC PAIN OF RIGHT KNEE: Primary | ICD-10-CM

## 2022-07-20 DIAGNOSIS — M25.561 CHRONIC PAIN OF RIGHT KNEE: Primary | ICD-10-CM

## 2022-07-20 LAB
BASOPHILS NFR SNV: 0 %
EOSINOPHIL NFR SNV: 0 %
LYMPHOCYTES NFR SNV: 70 %
MONOS+MACROS NFR SNV: 25 %
NEUTROPHILS NFR SNV: 5 %
TOTAL CELLS COUNTED FLD: 20

## 2022-07-20 PROCEDURE — 87070 CULTURE OTHR SPECIMN AEROBIC: CPT | Performed by: INTERNAL MEDICINE

## 2022-07-20 PROCEDURE — 20611 DRAIN/INJ JOINT/BURSA W/US: CPT | Performed by: INTERNAL MEDICINE

## 2022-07-20 PROCEDURE — 89050 BODY FLUID CELL COUNT: CPT | Performed by: INTERNAL MEDICINE

## 2022-07-20 PROCEDURE — 87205 SMEAR GRAM STAIN: CPT | Performed by: INTERNAL MEDICINE

## 2022-07-20 PROCEDURE — 89060 EXAM SYNOVIAL FLUID CRYSTALS: CPT | Performed by: INTERNAL MEDICINE

## 2022-07-20 RX ORDER — TRIAMCINOLONE ACETONIDE 40 MG/ML
40 INJECTION, SUSPENSION INTRA-ARTICULAR; INTRAMUSCULAR ONCE
Status: COMPLETED | OUTPATIENT
Start: 2022-07-20 | End: 2022-07-20

## 2022-07-20 RX ORDER — LIDOCAINE HYDROCHLORIDE 10 MG/ML
6 INJECTION, SOLUTION INFILTRATION; PERINEURAL ONCE
Status: COMPLETED | OUTPATIENT
Start: 2022-07-20 | End: 2022-07-20

## 2022-07-20 RX ADMIN — TRIAMCINOLONE ACETONIDE 40 MG: 40 INJECTION, SUSPENSION INTRA-ARTICULAR; INTRAMUSCULAR at 14:16:00

## 2022-07-20 RX ADMIN — LIDOCAINE HYDROCHLORIDE 6 ML: 10 INJECTION, SOLUTION INFILTRATION; PERINEURAL at 14:15:00

## 2022-07-21 NOTE — PROCEDURES
Full Knee exam    Right Knee Ultrasound Examination  Procedure: Ultrasound examination of the right knee. Equipment: [employer] II ultrasound machine. Indication: Pain in right, difficulty performing blind aspiration/injection. Obesity no  Findings: Ultrasound examination is performed according to standard EULAR recommendations(1). Using the 15-6 MHz transducer, as well as color power Doppler settings, real-time imaging of the knee was performed to evaluate the bones, subcutaneous tissues, muscles, tendons and the joint spaces. Longitudinal and transverse images were obtained over the suprapatellar region as well as medial and lateral joint spaces. Findings:  -Suprapatelar Longitudinal View: normal quadriceps tendon, quadriceps fat pad, and patellar fat pad. Patella without irregularity. small amount of fluid in the suprapatellar recess  -Suprapatelar Coronal View: small amount of fluid in the suprapatellar recess with synovial hypertrophy; quadriceps tendon and fat pads without irregularity  -Medial Longitudinal View: Medial Collateral ligament intact, meniscus without obvious tearing, pes anserine visualized with no significant amount of fluid in pes anserine bursa; moderate to severe amount of spurring visualized  -Lateral Longitudinal View: Lateral Collateral Ligament intact, meniscus visualized without obvious tearing, popliteus muscle noted within popliteal groove, moderate amount of spurring noted  -Infrapatellar Longitudinal View: Patellar Tendon Noted without evidence of imhomogenicity; Hoffa Fat Pad Visualized without abnormality; mild spurring of the patella    Impression: Osteoarthritis of the right knee. Right knee effusion    Clinical correlation and other imaging studies may be indicated.   Reference: Guidelines for musculoskeletal ultrasound in rheumatology  Mickey Green., Timothy Garibay., Renuka Cardona., [place], [address] working group for musculoskeletal ultrasound in the EULAR standing committee on international clinical studies including therapeutic trials. Annals rheumatic disease 2001 July; 60(7): 641-9      Knee Procedure Note  Hyalgan #1   Ultrasound guided injection of Right knee. Indication: Aspiration and injection of knee. Insertion of needle requires require proper placement into suprapatellar recess. This is difficult to do without any sense of accuracy in a blind fashion. In addition, this is much more difficult if patient has obesity severe arthritis. In this situation direct visualization by ultrasound allows insertion of the needle into the proper area without any damage to the cartilage, neuromuscular structures. Consent was obtained and alternatives as well as potential risks were discussed with the patient prior to procedure. These included And not limited to ineffectiveness, infection, pain, The right knee was evaluated using a [employer] ultrasound machine with a 15-6 megahertz transducer. The suprapatellar recess was identified. This was located on both longitudinal and transverse views lying beneath the quadriceps tendon and suprapatellar fat pad and superficial to the prefemoral femoral fat. This was approximately 1-2 cm superior to the patella. After cleansing with hibiclens and alcohol and in a transverse view with an inline approach the suprapatellar recess was entered. This was done under real time imaging. 5 cc of 1% lidocaine was injected followed by hyalgan then 1cc lidocaine along with 40 mg of Kenalog. Prior to injecting medications, approximately 8cc of pale yellow clear synovial fluid was aspirated. Fluid to be sent to lab for analysis. There were no complications and followup instructions were given    ?   Eduard Skaggs, DO  [facility]  07/20/2022

## 2022-07-22 LAB
CRYSTALS SNV QL MICRO: NEGATIVE
GRANULOCYTES # SNV AUTO: 215 /MM3 (ref 0–200)
RBC # FLD AUTO: <3000 /MM3 (ref ?–1)
TURBIDITY CSF QL: CLEAR

## 2022-07-25 ENCOUNTER — NURSE ONLY (OUTPATIENT)
Dept: UROLOGY | Facility: CLINIC | Age: 72
End: 2022-07-25
Attending: OBSTETRICS & GYNECOLOGY
Payer: MEDICARE

## 2022-07-25 VITALS — TEMPERATURE: 98 F | HEIGHT: 60 IN | WEIGHT: 162 LBS | BODY MASS INDEX: 31.8 KG/M2

## 2022-07-25 DIAGNOSIS — R33.9 INCOMPLETE EMPTYING OF BLADDER: Primary | ICD-10-CM

## 2022-07-25 DIAGNOSIS — N32.81 OAB (OVERACTIVE BLADDER): ICD-10-CM

## 2022-07-25 PROCEDURE — 99212 OFFICE O/P EST SF 10 MIN: CPT

## 2022-07-25 PROCEDURE — 51798 US URINE CAPACITY MEASURE: CPT

## 2022-07-26 NOTE — PROCEDURES
[facility] Pelvic [facility]  Bladder Scanner Note    Date:  2022    Patient Name:  Chase Reynolds  Patient :  1950   Patient MRN:  [id number]  Patient Age:  81year old      Diagnosis:  Incomplete Bladder Emptying    Procedure:  [address] Bladder Scanner    Physician:  Dr. Gena Rausch    RN:  Danuta Rivera RN     Pt here for PVR check. Voided 250cc Bladder scanned per procedure with a residual amount of 62cc. Dr. Juventino Tsang informed.

## 2022-07-27 ENCOUNTER — OFFICE VISIT (OUTPATIENT)
Dept: RHEUMATOLOGY | Facility: CLINIC | Age: 72
End: 2022-07-27
Payer: MEDICARE

## 2022-07-27 VITALS — SYSTOLIC BLOOD PRESSURE: 118 MMHG | DIASTOLIC BLOOD PRESSURE: 64 MMHG

## 2022-07-27 DIAGNOSIS — M25.561 CHRONIC PAIN OF RIGHT KNEE: Primary | ICD-10-CM

## 2022-07-27 DIAGNOSIS — M17.11 PRIMARY OSTEOARTHRITIS OF RIGHT KNEE: ICD-10-CM

## 2022-07-27 DIAGNOSIS — G89.29 CHRONIC PAIN OF RIGHT KNEE: Primary | ICD-10-CM

## 2022-07-27 PROCEDURE — 20611 DRAIN/INJ JOINT/BURSA W/US: CPT | Performed by: INTERNAL MEDICINE

## 2022-07-27 RX ORDER — LIDOCAINE HYDROCHLORIDE 10 MG/ML
5 INJECTION, SOLUTION INFILTRATION; PERINEURAL ONCE
Status: COMPLETED | OUTPATIENT
Start: 2022-07-27 | End: 2022-07-27

## 2022-07-27 RX ADMIN — LIDOCAINE HYDROCHLORIDE 5 ML: 10 INJECTION, SOLUTION INFILTRATION; PERINEURAL at 14:28:00

## 2022-07-27 NOTE — PROCEDURES
Knee Procedure Note  Hyalgan #2  Ultrasound guided injection of Right knee. Indication: Aspiration and injection of knee. Insertion of needle requires require proper placement into suprapatellar recess. This is difficult to do without any sense of accuracy in a blind fashion. In addition, this is much more difficult if patient has obesity severe arthritis. In this situation direct visualization by ultrasound allows insertion of the needle into the proper area without any damage to the cartilage, neuromuscular structures. Consent was obtained and alternatives as well as potential risks were discussed with the patient prior to procedure. These included And not limited to ineffectiveness, infection, pain, The right knee was evaluated using a [employer] ultrasound machine with a 15-6 megahertz transducer. The suprapatellar recess was identified. This was located on both longitudinal and transverse views lying beneath the quadriceps tendon and suprapatellar fat pad and superficial to the prefemoral femoral fat. This was approximately 1-2 cm superior to the patella. After cleansing with hibiclens and alcohol and in a transverse view with an inline approach the suprapatellar recess was entered. This was done under real time imaging. 5 cc of 1% lidocaine was injected followed by hyalgan. There were no complications and followup instructions were given. Pt had some elevated blood sugars after last week's injection- thought a combination of the kenalog as well as insurance issues covering her diabetes medications. When she comes for the left knee series, will consider avoiding steroids with the gel. ?   Iris Quick, DO  [facility]  07/27/2022

## 2022-07-28 ENCOUNTER — PATIENT MESSAGE (OUTPATIENT)
Dept: INTERNAL MEDICINE CLINIC | Facility: CLINIC | Age: 72
End: 2022-07-28

## 2022-07-28 DIAGNOSIS — E11.649 HYPOGLYCEMIA ASSOCIATED WITH DIABETES (HCC): ICD-10-CM

## 2022-07-29 RX ORDER — GLYBURIDE 2.5 MG/1
2.5 TABLET ORAL
Qty: 90 TABLET | Refills: 0 | Status: SHIPPED | OUTPATIENT
Start: 2022-07-29

## 2022-07-29 NOTE — TELEPHONE ENCOUNTER
Patient is calling because she received a call from the pharmacy telling her they couldn't fill her glyburide because it was too soon to fill. I explained that it was a different dose. Patient would like to speak to the nurse she says that she feels the medication isn't helping and she would like to try something different.

## 2022-07-29 NOTE — TELEPHONE ENCOUNTER
KS please advise:     Spoke with pt. Pt stated that her lip rash has cleared. But she is concerned for her high blood sugars. She stated that she got a cortisone shot last week, so feels that raised her sugars. Pt has actually been taking 10 mg of Glyburide daily this past week because she was concerned. 5 mg in the morning and 5 mg at night. Sugars still high on that. She feels Glyburide increases her appetite. Mentioned she'd like to try something else, maybe Rybelsus.

## 2022-07-29 NOTE — TELEPHONE ENCOUNTER
From: Tylor Fletcher  To: Sabino Abel MD  Sent: 7/28/2022 5:53 PM CDT  Subject: Luca Lindsay,    Attached is my blood sugar tracking/numbers. I was not able to stop by your office. I hope you can see the images. If not, please let me know and I will find an alternate solution.      Thank you,  Tylor Fletcher

## 2022-08-03 ENCOUNTER — OFFICE VISIT (OUTPATIENT)
Dept: RHEUMATOLOGY | Facility: CLINIC | Age: 72
End: 2022-08-03
Payer: MEDICARE

## 2022-08-03 VITALS — DIASTOLIC BLOOD PRESSURE: 78 MMHG | SYSTOLIC BLOOD PRESSURE: 132 MMHG

## 2022-08-03 DIAGNOSIS — M17.11 PRIMARY OSTEOARTHRITIS OF RIGHT KNEE: Primary | ICD-10-CM

## 2022-08-03 DIAGNOSIS — M25.561 CHRONIC PAIN OF RIGHT KNEE: ICD-10-CM

## 2022-08-03 DIAGNOSIS — G89.29 CHRONIC PAIN OF RIGHT KNEE: ICD-10-CM

## 2022-08-03 PROCEDURE — 20611 DRAIN/INJ JOINT/BURSA W/US: CPT | Performed by: INTERNAL MEDICINE

## 2022-08-03 RX ORDER — LIDOCAINE HYDROCHLORIDE 10 MG/ML
5 INJECTION, SOLUTION INFILTRATION; PERINEURAL ONCE
Status: COMPLETED | OUTPATIENT
Start: 2022-08-03 | End: 2022-08-03

## 2022-08-03 RX ADMIN — LIDOCAINE HYDROCHLORIDE 5 ML: 10 INJECTION, SOLUTION INFILTRATION; PERINEURAL at 10:29:00

## 2022-08-03 NOTE — PROCEDURES
Knee Procedure Note  Hyalgan #3  Ultrasound guided injection of Right knee. Indication: Aspiration and injection of knee. Insertion of needle requires require proper placement into suprapatellar recess. This is difficult to do without any sense of accuracy in a blind fashion. In addition, this is much more difficult if patient has obesity severe arthritis. In this situation direct visualization by ultrasound allows insertion of the needle into the proper area without any damage to the cartilage, neuromuscular structures. Consent was obtained and alternatives as well as potential risks were discussed with the patient prior to procedure. These included And not limited to ineffectiveness, infection, pain, The right knee was evaluated using a [employer] ultrasound machine with a 15-6 megahertz transducer. The suprapatellar recess was identified. This was located on both longitudinal and transverse views lying beneath the quadriceps tendon and suprapatellar fat pad and superficial to the prefemoral femoral fat. This was approximately 1-2 cm superior to the patella. After cleansing with hibiclens and alcohol and in a transverse view with an inline approach the suprapatellar recess was entered. This was done under real time imaging. 5 cc of 1% lidocaine was injected followed by hyalgan. There were no complications and followup instructions were given.     Ozzie Hernández DO  [facility]  08/03/2022

## 2022-08-06 DIAGNOSIS — R80.9 TYPE 2 DIABETES MELLITUS WITH ALBUMINURIA (HCC): Primary | ICD-10-CM

## 2022-08-06 DIAGNOSIS — E11.29 TYPE 2 DIABETES MELLITUS WITH ALBUMINURIA (HCC): Primary | ICD-10-CM

## 2022-08-08 ENCOUNTER — VIRTUAL PHONE E/M (OUTPATIENT)
Dept: UROLOGY | Facility: CLINIC | Age: 72
End: 2022-08-08
Attending: OBSTETRICS & GYNECOLOGY
Payer: MEDICARE

## 2022-08-08 VITALS — BODY MASS INDEX: 31.8 KG/M2 | WEIGHT: 162 LBS | HEIGHT: 60 IN

## 2022-08-08 DIAGNOSIS — N81.84 PELVIC MUSCLE WASTING: ICD-10-CM

## 2022-08-08 DIAGNOSIS — N32.81 OAB (OVERACTIVE BLADDER): Primary | ICD-10-CM

## 2022-08-08 RX ORDER — TROSPIUM CHLORIDE ER 60 MG/1
60 CAPSULE ORAL DAILY
Qty: 90 CAPSULE | Refills: 0 | Status: SHIPPED | OUTPATIENT
Start: 2022-08-08

## 2022-08-08 NOTE — TELEPHONE ENCOUNTER
If she is taking glyburide 5mg BID, then when she gets the cortisone injection, she can increase it to 7.5mg in the morning (still 5mg in the evening) if the fasting BG > 180. Close follow-up with PCP/endo.

## 2022-08-10 ENCOUNTER — OFFICE VISIT (OUTPATIENT)
Dept: RHEUMATOLOGY | Facility: CLINIC | Age: 72
End: 2022-08-10
Payer: MEDICARE

## 2022-08-10 VITALS — SYSTOLIC BLOOD PRESSURE: 130 MMHG | DIASTOLIC BLOOD PRESSURE: 78 MMHG

## 2022-08-10 DIAGNOSIS — G89.29 CHRONIC PAIN OF LEFT KNEE: Primary | ICD-10-CM

## 2022-08-10 DIAGNOSIS — M25.462 EFFUSION OF LEFT KNEE: ICD-10-CM

## 2022-08-10 DIAGNOSIS — M17.12 PRIMARY OSTEOARTHRITIS OF LEFT KNEE: ICD-10-CM

## 2022-08-10 DIAGNOSIS — M25.562 CHRONIC PAIN OF LEFT KNEE: Primary | ICD-10-CM

## 2022-08-10 LAB
BASOPHILS NFR SNV: 0 %
COLOR FLD: YELLOW
CRYSTALS SNV QL MICRO: NEGATIVE
EOSINOPHIL NFR SNV: 0 %
GRANULOCYTES # SNV AUTO: 62 /MM3 (ref 0–200)
LYMPHOCYTES NFR SNV: 88 %
MONOS+MACROS NFR SNV: 4 %
NEUTROPHILS NFR SNV: 8 %
RBC # FLD AUTO: <3000 /MM3 (ref ?–1)
TOTAL CELLS COUNTED FLD: 100
TURBIDITY CSF QL: CLEAR

## 2022-08-10 PROCEDURE — 89060 EXAM SYNOVIAL FLUID CRYSTALS: CPT | Performed by: INTERNAL MEDICINE

## 2022-08-10 PROCEDURE — 20611 DRAIN/INJ JOINT/BURSA W/US: CPT | Performed by: INTERNAL MEDICINE

## 2022-08-10 PROCEDURE — 89050 BODY FLUID CELL COUNT: CPT | Performed by: INTERNAL MEDICINE

## 2022-08-10 PROCEDURE — 87070 CULTURE OTHR SPECIMN AEROBIC: CPT | Performed by: INTERNAL MEDICINE

## 2022-08-10 PROCEDURE — 87205 SMEAR GRAM STAIN: CPT | Performed by: INTERNAL MEDICINE

## 2022-08-10 RX ORDER — TRIAMCINOLONE ACETONIDE 40 MG/ML
20 INJECTION, SUSPENSION INTRA-ARTICULAR; INTRAMUSCULAR ONCE
Status: COMPLETED | OUTPATIENT
Start: 2022-08-10 | End: 2022-08-10

## 2022-08-10 RX ORDER — LIDOCAINE HYDROCHLORIDE 10 MG/ML
5.5 INJECTION, SOLUTION INFILTRATION; PERINEURAL ONCE
Status: COMPLETED | OUTPATIENT
Start: 2022-08-10 | End: 2022-08-10

## 2022-08-10 RX ADMIN — TRIAMCINOLONE ACETONIDE 20 MG: 40 INJECTION, SUSPENSION INTRA-ARTICULAR; INTRAMUSCULAR at 14:22:00

## 2022-08-10 RX ADMIN — LIDOCAINE HYDROCHLORIDE 5.5 ML: 10 INJECTION, SOLUTION INFILTRATION; PERINEURAL at 14:21:00

## 2022-08-10 NOTE — PROCEDURES
Full Knee exam    Left Knee Ultrasound Examination  Procedure: Ultrasound examination of the left knee. Equipment: [employer] II ultrasound machine. Indication: Pain in left, difficulty performing blind aspiration/injection. Obesity no  Findings: Ultrasound examination is performed according to standard EULAR recommendations(1). Using the 15-6 MHz transducer, as well as color power Doppler settings, real-time imaging of the knee was performed to evaluate the bones, subcutaneous tissues, muscles, tendons and the joint spaces. Longitudinal and transverse images were obtained over the suprapatellar region as well as medial and lateral joint spaces. Findings:  -Suprapatelar Longitudinal View: normal quadriceps tendon, quadriceps fat pad, and patellar fat pad. Patella without irregularity. small amount of fluid in the suprapatellar recess  -Suprapatelar Coronal View: small amount of fluid in the suprapatellar recess with synovial hypertrophy; quadriceps tendon and fat pads without irregularity  -Medial Longitudinal View: Medial Collateral ligament intact, meniscus without obvious tearing, pes anserine visualized with no significant amount of fluid in pes anserine bursa; moderate to severe amount of spurring visualized  -Lateral Longitudinal View: Lateral Collateral Ligament intact, meniscus visualized without obvious tearing, popliteus muscle noted within popliteal groove, moderate amount of spurring noted  -Infrapatellar Longitudinal View: Patellar Tendon Noted without evidence of imhomogenicity; Hoffa Fat Pad Visualized without abnormality; mild spurring of the patella    Impression: Osteoarthritis of the left knee. Left knee effusion    Clinical correlation and other imaging studies may be indicated.   Reference: Guidelines for musculoskeletal ultrasound in rheumatology  Eduardo Goldstein., Timothy Warren., Saskia Matson., [place], [address] working group for musculoskeletal ultrasound in the EULAR standing committee on international clinical studies including therapeutic trials. Annals rheumatic disease 2001 July; 60(7): 641-9      Knee Procedure Note  Hyalgan #1   Ultrasound guided injection of Left knee. Indication: Aspiration and injection of knee. Insertion of needle requires require proper placement into suprapatellar recess. This is difficult to do without any sense of accuracy in a blind fashion. In addition, this is much more difficult if patient has obesity severe arthritis. In this situation direct visualization by ultrasound allows insertion of the needle into the proper area without any damage to the cartilage, neuromuscular structures. Consent was obtained and alternatives as well as potential risks were discussed with the patient prior to procedure. These included And not limited to ineffectiveness, infection, pain, The left knee was evaluated using a [employer] ultrasound machine with a 15-6 megahertz transducer. The suprapatellar recess was identified. This was located on both longitudinal and transverse views lying beneath the quadriceps tendon and suprapatellar fat pad and superficial to the prefemoral femoral fat. This was approximately 1-2 cm superior to the patella. After cleansing with hibiclens and alcohol and in a transverse view with an inline approach the suprapatellar recess was entered. This was done under real time imaging. 5 cc of 1% lidocaine was injected followed by hyalgan then 1cc lidocaine along with 20 mg of Kenalog. Prior to injecting medications, approximately 5cc of pale yellow clear synovial fluid was aspirated. Fluid to be sent to lab for analysis. There were no complications and followup instructions were given. Lower dose kenalog used compared to left knee due to pt's blood sugars being elevated following last dose. ?   Diana Marino DO  [facility]  08/10/2022

## 2022-08-17 ENCOUNTER — OFFICE VISIT (OUTPATIENT)
Dept: RHEUMATOLOGY | Facility: CLINIC | Age: 72
End: 2022-08-17
Payer: MEDICARE

## 2022-08-17 VITALS — SYSTOLIC BLOOD PRESSURE: 116 MMHG | DIASTOLIC BLOOD PRESSURE: 58 MMHG

## 2022-08-17 DIAGNOSIS — M25.562 CHRONIC PAIN OF LEFT KNEE: Primary | ICD-10-CM

## 2022-08-17 DIAGNOSIS — G89.29 CHRONIC PAIN OF LEFT KNEE: Primary | ICD-10-CM

## 2022-08-17 DIAGNOSIS — M17.12 PRIMARY OSTEOARTHRITIS OF LEFT KNEE: ICD-10-CM

## 2022-08-17 PROCEDURE — 20611 DRAIN/INJ JOINT/BURSA W/US: CPT | Performed by: INTERNAL MEDICINE

## 2022-08-17 RX ORDER — LIDOCAINE HYDROCHLORIDE 10 MG/ML
5 INJECTION, SOLUTION INFILTRATION; PERINEURAL ONCE
Status: COMPLETED | OUTPATIENT
Start: 2022-08-17 | End: 2022-08-17

## 2022-08-17 RX ADMIN — LIDOCAINE HYDROCHLORIDE 5 ML: 10 INJECTION, SOLUTION INFILTRATION; PERINEURAL at 12:09:00

## 2022-08-17 NOTE — PROCEDURES
Knee Procedure Note  Hyalgan #2  Ultrasound guided injection of left knee. Indication: Aspiration and injection of knee. Insertion of needle requires require proper placement into suprapatellar recess. This is difficult to do without any sense of accuracy in a blind fashion. In addition, this is much more difficult if patient has obesity severe arthritis. In this situation direct visualization by ultrasound allows insertion of the needle into the proper area without any damage to the cartilage, neuromuscular structures. Consent was obtained and alternatives as well as potential risks were discussed with the patient prior to procedure. These included And not limited to ineffectiveness, infection, pain, The left knee was evaluated using a [employer] ultrasound machine with a 15-6 megahertz transducer. The suprapatellar recess was identified. This was located on both longitudinal and transverse views lying beneath the quadriceps tendon and suprapatellar fat pad and superficial to the prefemoral femoral fat. This was approximately 1-2 cm superior to the patella. After cleansing with hibiclens and alcohol and in a transverse view with an inline approach the suprapatellar recess was entered. This was done under real time imaging. 5 cc of 1% lidocaine was injected followed by hyalgan. There were no complications and followup instructions were given. Pt had some elevated blood sugars and BP after last week's injection- but pt states not as bad as prior steroid injection and already improving. Is working with PCP on better blood sugar control too.   ?   Alanis Ortega, DO  [facility]  08/17/2022

## 2022-08-24 ENCOUNTER — OFFICE VISIT (OUTPATIENT)
Dept: RHEUMATOLOGY | Facility: CLINIC | Age: 72
End: 2022-08-24
Payer: MEDICARE

## 2022-08-24 VITALS — SYSTOLIC BLOOD PRESSURE: 118 MMHG | DIASTOLIC BLOOD PRESSURE: 64 MMHG

## 2022-08-24 DIAGNOSIS — M25.562 CHRONIC PAIN OF LEFT KNEE: ICD-10-CM

## 2022-08-24 DIAGNOSIS — G89.29 CHRONIC PAIN OF LEFT KNEE: ICD-10-CM

## 2022-08-24 DIAGNOSIS — M17.12 PRIMARY OSTEOARTHRITIS OF LEFT KNEE: Primary | ICD-10-CM

## 2022-08-24 PROCEDURE — 20611 DRAIN/INJ JOINT/BURSA W/US: CPT | Performed by: INTERNAL MEDICINE

## 2022-08-24 RX ORDER — LIDOCAINE HYDROCHLORIDE 10 MG/ML
5 INJECTION, SOLUTION INFILTRATION; PERINEURAL ONCE
Status: COMPLETED | OUTPATIENT
Start: 2022-08-24 | End: 2022-08-24

## 2022-08-24 RX ADMIN — LIDOCAINE HYDROCHLORIDE 5 ML: 10 INJECTION, SOLUTION INFILTRATION; PERINEURAL at 11:23:00

## 2022-08-24 NOTE — PROCEDURES
Knee Procedure Note  Hyalgan #3  Ultrasound guided injection of left knee. Indication: Aspiration and injection of knee. Insertion of needle requires require proper placement into suprapatellar recess. This is difficult to do without any sense of accuracy in a blind fashion. In addition, this is much more difficult if patient has obesity severe arthritis. In this situation direct visualization by ultrasound allows insertion of the needle into the proper area without any damage to the cartilage, neuromuscular structures. Consent was obtained and alternatives as well as potential risks were discussed with the patient prior to procedure. These included And not limited to ineffectiveness, infection, pain, The left knee was evaluated using a [employer] ultrasound machine with a 15-6 megahertz transducer. The suprapatellar recess was identified. This was located on both longitudinal and transverse views lying beneath the quadriceps tendon and suprapatellar fat pad and superficial to the prefemoral femoral fat. This was approximately 1-2 cm superior to the patella. After cleansing with hibiclens and alcohol and in a transverse view with an inline approach the suprapatellar recess was entered. This was done under real time imaging. 5 cc of 1% lidocaine was injected followed by hyalgan. There were no complications and followup instructions were given. ?   Kylee James DO  [facility]  08/24/2022

## 2022-08-29 ENCOUNTER — TELEPHONE (OUTPATIENT)
Dept: UROLOGY | Facility: CLINIC | Age: 72
End: 2022-08-29

## 2022-08-29 DIAGNOSIS — R30.0 DYSURIA: Primary | ICD-10-CM

## 2022-08-29 NOTE — TELEPHONE ENCOUNTER
Pt calls to report dysuria all day yesterday, denies fever. Reports sx better today, notes a little low abdominal pressure today. Orders placed for urine culture, pt will submit tomorrow, prefers to wait for culture results. Pt will call if sx persist or worsen.

## 2022-08-30 ENCOUNTER — LAB ENCOUNTER (OUTPATIENT)
Dept: LAB | Age: 72
End: 2022-08-30
Attending: OBSTETRICS & GYNECOLOGY
Payer: MEDICARE

## 2022-08-30 DIAGNOSIS — R30.0 DYSURIA: ICD-10-CM

## 2022-08-30 PROCEDURE — 87086 URINE CULTURE/COLONY COUNT: CPT

## 2022-09-22 NOTE — TELEPHONE ENCOUNTER
Protocol passed     Requesting: metformin 1000mg     LOV:6/22/22   # Controlled type 2 DM: A1C at goal in 5/2022. Doing well with lifestyle interventions.    - Will cont metformin,  trulicity, and increase glyburide to 5 mg as we are discontinuing jardiance (lip rash)  RTC: 12/22   Filled: 10/6/21 #180 3 refills   Recent Labs: 5/6/22     Upcoming OV: 12/23/22

## 2022-09-23 ENCOUNTER — OFFICE VISIT (OUTPATIENT)
Dept: INTERNAL MEDICINE CLINIC | Facility: CLINIC | Age: 72
End: 2022-09-23

## 2022-09-23 VITALS
SYSTOLIC BLOOD PRESSURE: 128 MMHG | WEIGHT: 162.19 LBS | TEMPERATURE: 98 F | OXYGEN SATURATION: 98 % | BODY MASS INDEX: 32 KG/M2 | DIASTOLIC BLOOD PRESSURE: 60 MMHG | RESPIRATION RATE: 16 BRPM | HEART RATE: 84 BPM

## 2022-09-23 DIAGNOSIS — E78.5 HYPERLIPIDEMIA ASSOCIATED WITH TYPE 2 DIABETES MELLITUS (HCC): ICD-10-CM

## 2022-09-23 DIAGNOSIS — R80.9 TYPE 2 DIABETES MELLITUS WITH ALBUMINURIA (HCC): ICD-10-CM

## 2022-09-23 DIAGNOSIS — E11.69 DIABETES MELLITUS TYPE 2 IN OBESE (HCC): Primary | ICD-10-CM

## 2022-09-23 DIAGNOSIS — E66.9 DIABETES MELLITUS TYPE 2 IN OBESE (HCC): Primary | ICD-10-CM

## 2022-09-23 DIAGNOSIS — M85.88 OSTEOPENIA OF LUMBAR SPINE: ICD-10-CM

## 2022-09-23 DIAGNOSIS — E11.29 TYPE 2 DIABETES MELLITUS WITH ALBUMINURIA (HCC): ICD-10-CM

## 2022-09-23 DIAGNOSIS — E11.69 HYPERLIPIDEMIA ASSOCIATED WITH TYPE 2 DIABETES MELLITUS (HCC): ICD-10-CM

## 2022-09-23 LAB
CARTRIDGE LOT#: ABNORMAL NUMERIC
HEMOGLOBIN A1C: 7.3 % (ref 4.3–5.6)

## 2022-09-23 PROCEDURE — 99214 OFFICE O/P EST MOD 30 MIN: CPT | Performed by: INTERNAL MEDICINE

## 2022-09-23 PROCEDURE — 83036 HEMOGLOBIN GLYCOSYLATED A1C: CPT | Performed by: INTERNAL MEDICINE

## 2022-09-23 RX ORDER — EMPAGLIFLOZIN 25 MG/1
25 TABLET, FILM COATED ORAL DAILY
COMMUNITY

## 2022-09-23 NOTE — PATIENT INSTRUCTIONS
If your fasting blood sugar average in the next 2 weeks is not improving to less than 140, please increase the glyburide to 7.5 mg once daily. Please repeat your labs after December 23, 2022 and see me 1 week later. Please complete your DEXA (bone x-ray) in December and see me 1 week later. I do advise you get the TDAP (tetanus, diptheriae, pertussis) vaccine every 10 years but it can cost up to $65.    I recommend the bi-valent COVID vaccine. I advise you get the annual flu shot every September, October. You can also use the following topical agents for your pain. They are over the counter:  1. Topical lidocaine (lidoderm patches). Wear for 12 hours and off for 12 hours  2. Topical diclofenac gel can be used several times/day    Please do not exceed 3000 mg (3 grams) of tylenol in 24 hours. It is very important you look at the amount of tylenol (acetaminophen) in any of your over the counter medications to ensure you do not exceed a safe amount of tylenol per day.

## 2022-09-30 ENCOUNTER — IMMUNIZATION (OUTPATIENT)
Dept: LAB | Age: 72
End: 2022-09-30
Attending: EMERGENCY MEDICINE

## 2022-09-30 DIAGNOSIS — Z23 NEED FOR VACCINATION: Primary | ICD-10-CM

## 2022-09-30 PROCEDURE — 0124A SARSCOV2 VAC BVL 30MCG/0.3ML: CPT

## 2022-10-03 ENCOUNTER — TELEPHONE (OUTPATIENT)
Dept: INTERNAL MEDICINE CLINIC | Facility: CLINIC | Age: 72
End: 2022-10-03

## 2022-10-04 RX ORDER — DULAGLUTIDE 1.5 MG/.5ML
INJECTION, SOLUTION SUBCUTANEOUS
Qty: 6 ML | Refills: 3 | Status: SHIPPED | OUTPATIENT
Start: 2022-10-04

## 2022-10-07 NOTE — TELEPHONE ENCOUNTER
Spoke to pt. Is aware of dr message. States she will look more into it. And call us back if she cannot get this taken care of. Is waiting on a call from the pharmacy. May call back to schedule an appt.

## 2022-10-07 NOTE — TELEPHONE ENCOUNTER
Pt stated Dr. Peace Etienne gave her a coupon for this prescription about 2 years ago and it is now . Pt requesting to have another coupon because pt can't afford the cost of prescription. Please advise. Pt needs this by tomorrow and per pt request send this as urgent.

## 2022-10-07 NOTE — TELEPHONE ENCOUNTER
Unfortunately I do not have any savings cards. I looked online and it appears she may be able to explore a saving card online but typically these don't apply to medicare patients. If she cannot afford trulicity we will have to re-assess her med regimen.

## 2022-10-10 ENCOUNTER — TELEPHONE (OUTPATIENT)
Dept: INTERNAL MEDICINE CLINIC | Facility: CLINIC | Age: 72
End: 2022-10-10

## 2022-10-10 DIAGNOSIS — R19.7 DIARRHEA, UNSPECIFIED TYPE: Primary | ICD-10-CM

## 2022-10-10 NOTE — TELEPHONE ENCOUNTER
Patient requesting to speak with the nurse in regards to abdominal pain, belching, and diarrhea.    She stated she would like to speak with nurse first.   No available appointments with [facility]

## 2022-10-10 NOTE — TELEPHONE ENCOUNTER
KS- multiple diarrhea episodes, please advise- VV/OV-stool specimen? OTC management? Spoke with patient via phone for further triage. Complaint of diarrhea episodes \"full liquid\" started 10/1 and continued through 10/3. Had \"no bowel movement 10/4 or 10/5\" and then small formed bowel movement 10/6.      10/7 \"full liquid diarrhea episodes returned-woke her up from sleep and kept her up all night\". Episodes continued 10/8, x2 in am, okay during the day then returned again throughout the night. 10/9 \"was similar but not as bad\". Today has had x2 episodes this am, and nothing since. Reports having \"abdominal cramping\" prior to episodes that resolves post BM. Denies fever any blood in stool, lightheadedness or abdominal pain. Feels a little \"weak/tired\" from not sleeping well over the weekend because of diarrhea episodes\". Denies any change in eating habits or medications. Appetite is \"low\"- but is still eating and taking fluids without difficulty. Following bland BRAT diet currently.

## 2022-10-11 NOTE — TELEPHONE ENCOUNTER
Pt called back. Informed her to stop metformin. Also informed her stool labs ordered and to  container at lab. Pt v/u.

## 2022-10-13 ENCOUNTER — LAB ENCOUNTER (OUTPATIENT)
Dept: LAB | Age: 72
End: 2022-10-13
Attending: INTERNAL MEDICINE
Payer: MEDICARE

## 2022-10-13 DIAGNOSIS — R19.7 DIARRHEA, UNSPECIFIED TYPE: ICD-10-CM

## 2022-10-13 PROCEDURE — 87427 SHIGA-LIKE TOXIN AG IA: CPT

## 2022-10-13 PROCEDURE — 87046 STOOL CULTR AEROBIC BACT EA: CPT

## 2022-10-13 PROCEDURE — 87493 C DIFF AMPLIFIED PROBE: CPT

## 2022-10-13 PROCEDURE — 87045 FECES CULTURE AEROBIC BACT: CPT

## 2022-10-13 PROCEDURE — 89055 LEUKOCYTE ASSESSMENT FECAL: CPT

## 2022-10-14 LAB — C DIFF TOX B STL QL: NEGATIVE

## 2022-10-17 NOTE — TELEPHONE ENCOUNTER
Is patient to resume Metformin at this time ? Reports diarrhea is resolved but has not had a BM since last Thursday, 4 days. Denied any signs of constipation, bloating, pain/cramping. Is consuming small amounts of food and plenty of water. Declined OV at this time. See lab result note.

## 2022-10-18 NOTE — TELEPHONE ENCOUNTER
Needs to remain off metformin as it was likely the causative agent for her diarrhea  Monitor blood sugars for 14 days and send me log  Did she use immodium during her diarrhea? If not, she should increase her dietary fiber and can use miralax.

## 2022-10-20 ENCOUNTER — OFFICE VISIT (OUTPATIENT)
Dept: RHEUMATOLOGY | Facility: CLINIC | Age: 72
End: 2022-10-20
Payer: MEDICARE

## 2022-10-20 VITALS
OXYGEN SATURATION: 97 % | DIASTOLIC BLOOD PRESSURE: 60 MMHG | HEIGHT: 60.5 IN | RESPIRATION RATE: 16 BRPM | TEMPERATURE: 98 F | BODY MASS INDEX: 30.6 KG/M2 | WEIGHT: 160 LBS | SYSTOLIC BLOOD PRESSURE: 114 MMHG | HEART RATE: 94 BPM

## 2022-10-20 DIAGNOSIS — G89.29 CHRONIC RIGHT HIP PAIN: ICD-10-CM

## 2022-10-20 DIAGNOSIS — M15.9 PRIMARY OSTEOARTHRITIS INVOLVING MULTIPLE JOINTS: Primary | ICD-10-CM

## 2022-10-20 DIAGNOSIS — M54.42 CHRONIC BILATERAL LOW BACK PAIN WITH BILATERAL SCIATICA: ICD-10-CM

## 2022-10-20 DIAGNOSIS — M15.4 EROSIVE OSTEOARTHRITIS OF BOTH HANDS: ICD-10-CM

## 2022-10-20 DIAGNOSIS — M54.41 CHRONIC BILATERAL LOW BACK PAIN WITH BILATERAL SCIATICA: ICD-10-CM

## 2022-10-20 DIAGNOSIS — G89.29 CHRONIC BILATERAL LOW BACK PAIN WITH BILATERAL SCIATICA: ICD-10-CM

## 2022-10-20 DIAGNOSIS — M25.551 CHRONIC RIGHT HIP PAIN: ICD-10-CM

## 2022-10-20 PROCEDURE — 99214 OFFICE O/P EST MOD 30 MIN: CPT | Performed by: INTERNAL MEDICINE

## 2022-10-20 RX ORDER — CELECOXIB 100 MG/1
100 CAPSULE ORAL 2 TIMES DAILY PRN
Qty: 60 CAPSULE | Refills: 0 | Status: SHIPPED | OUTPATIENT
Start: 2022-10-20 | End: 2022-11-19

## 2022-10-25 ENCOUNTER — HOSPITAL ENCOUNTER (OUTPATIENT)
Dept: GENERAL RADIOLOGY | Age: 72
Discharge: HOME | End: 2022-10-25
Attending: INTERNAL MEDICINE
Payer: MEDICARE

## 2022-10-25 DIAGNOSIS — M15.9 PRIMARY OSTEOARTHRITIS INVOLVING MULTIPLE JOINTS: ICD-10-CM

## 2022-10-25 DIAGNOSIS — M25.551 CHRONIC RIGHT HIP PAIN: ICD-10-CM

## 2022-10-25 DIAGNOSIS — M54.41 CHRONIC BILATERAL LOW BACK PAIN WITH BILATERAL SCIATICA: ICD-10-CM

## 2022-10-25 DIAGNOSIS — G89.29 CHRONIC BILATERAL LOW BACK PAIN WITH BILATERAL SCIATICA: ICD-10-CM

## 2022-10-25 DIAGNOSIS — M54.42 CHRONIC BILATERAL LOW BACK PAIN WITH BILATERAL SCIATICA: ICD-10-CM

## 2022-10-25 DIAGNOSIS — G89.29 CHRONIC RIGHT HIP PAIN: ICD-10-CM

## 2022-10-25 PROCEDURE — 73523 X-RAY EXAM HIPS BI 5/> VIEWS: CPT | Performed by: INTERNAL MEDICINE

## 2022-10-25 PROCEDURE — 72110 X-RAY EXAM L-2 SPINE 4/>VWS: CPT | Performed by: INTERNAL MEDICINE

## 2022-10-26 ENCOUNTER — TELEPHONE (OUTPATIENT)
Dept: INTERNAL MEDICINE CLINIC | Facility: CLINIC | Age: 72
End: 2022-10-26

## 2022-10-26 NOTE — TELEPHONE ENCOUNTER
Patient calling to speak to nurse. Offered appt for tomorrow with Dr. Abiel Wheeler says she'd like to speak to the nurse. Patient is experiencing constipation since last Thursday 10/20/2022. No BM until today and it was a quarter sized pebble. She has been taking probiotics, stool softener, and eating prunes, and plums.

## 2022-10-26 NOTE — TELEPHONE ENCOUNTER
Spoke to pt, encouraged walking, increased water, can also ask for OTC recommendation from pharmacist. Pt v/u.

## 2022-10-26 NOTE — TELEPHONE ENCOUNTER
Any further recommendations other than increasing fluids, fiber, OTC stool softener? Please advise-TY!

## 2022-10-27 ENCOUNTER — TELEPHONE (OUTPATIENT)
Dept: INTERNAL MEDICINE CLINIC | Facility: CLINIC | Age: 72
End: 2022-10-27

## 2022-10-27 DIAGNOSIS — Z12.31 ENCOUNTER FOR MAMMOGRAM TO ESTABLISH BASELINE MAMMOGRAM: Primary | ICD-10-CM

## 2022-11-15 NOTE — TELEPHONE ENCOUNTER
Unfortunately there is no test to find out if she received enough flu shot. However, there is no harm in her receiving another one. I have ordered high dose flu shot for her.  TY Island Pedicle Flap With Canthal Suspension Text: The defect edges were debeveled with a #15 scalpel blade.  Given the location of the defect, shape of the defect and the proximity to free margins an island pedicle advancement flap was deemed most appropriate.  Using a sterile surgical marker, an appropriate advancement flap was drawn incorporating the defect, outlining the appropriate donor tissue and placing the expected incisions within the relaxed skin tension lines where possible. The area thus outlined was incised deep to adipose tissue with a #15 scalpel blade.  The skin margins were undermined to an appropriate distance in all directions around the primary defect and laterally outward around the island pedicle utilizing iris scissors.  There was minimal undermining beneath the pedicle flap. A suspension suture was placed in the canthal tendon to prevent tension and prevent ectropion.

## 2022-11-16 ENCOUNTER — TELEPHONE (OUTPATIENT)
Dept: ORTHOPEDICS CLINIC | Facility: CLINIC | Age: 72
End: 2022-11-16

## 2022-11-16 NOTE — TELEPHONE ENCOUNTER
Patient is coming in for Right hip pain . Patient had imaging done,Imaging can be viewed in Epic. Please review imaging, and if further imaging is needed please place Rx . 1. Findings involving the superior lateral acetabulum bilaterally may be chronic and a sequela of degenerative change or prior injury, correlate clinically.      Future Appointments   Date Time Provider Del Kim   12/2/2022  9:40 AM Mauricio Bhakta MD [facility] [id number]   12/23/2022 11:30 AM Elvin Khanna MD [facility] [place]   12/29/2022  9:20 AM HOB ESTELLA RM1 HOB MAMMO [place]   12/29/2022 10:00 AM HOB DEXA RM1 HOB DEXA [place]   3/15/2023  9:00 AM Lucita Cope DO EMGR[facility]UBSN [facility]

## 2022-11-18 RX ORDER — TROSPIUM CHLORIDE ER 60 MG/1
CAPSULE ORAL
Qty: 90 CAPSULE | Refills: 3 | Status: SHIPPED | OUTPATIENT
Start: 2022-11-18

## 2022-12-02 ENCOUNTER — OFFICE VISIT (OUTPATIENT)
Dept: ORTHOPEDICS CLINIC | Facility: CLINIC | Age: 72
End: 2022-12-02
Payer: MEDICARE

## 2022-12-02 VITALS — BODY MASS INDEX: 30.98 KG/M2 | HEIGHT: 60.5 IN | OXYGEN SATURATION: 98 % | HEART RATE: 91 BPM | WEIGHT: 162 LBS

## 2022-12-02 DIAGNOSIS — M54.42 CHRONIC BILATERAL LOW BACK PAIN WITH BILATERAL SCIATICA: Primary | ICD-10-CM

## 2022-12-02 DIAGNOSIS — M54.41 CHRONIC BILATERAL LOW BACK PAIN WITH BILATERAL SCIATICA: Primary | ICD-10-CM

## 2022-12-02 DIAGNOSIS — G89.29 CHRONIC BILATERAL LOW BACK PAIN WITH BILATERAL SCIATICA: Primary | ICD-10-CM

## 2022-12-02 PROCEDURE — 99203 OFFICE O/P NEW LOW 30 MIN: CPT | Performed by: ORTHOPAEDIC SURGERY

## 2022-12-14 ENCOUNTER — TELEPHONE (OUTPATIENT)
Dept: PHYSICAL THERAPY | Facility: HOSPITAL | Age: 72
End: 2022-12-14

## 2022-12-15 ENCOUNTER — TELEPHONE (OUTPATIENT)
Dept: PHYSICAL THERAPY | Facility: HOSPITAL | Age: 72
End: 2022-12-15

## 2022-12-15 ENCOUNTER — OFFICE VISIT (OUTPATIENT)
Dept: FAMILY MEDICINE CLINIC | Facility: CLINIC | Age: 72
End: 2022-12-15
Payer: MEDICARE

## 2022-12-15 VITALS
BODY MASS INDEX: 30.98 KG/M2 | TEMPERATURE: 97 F | HEART RATE: 100 BPM | RESPIRATION RATE: 20 BRPM | HEIGHT: 60.5 IN | WEIGHT: 162 LBS | DIASTOLIC BLOOD PRESSURE: 65 MMHG | SYSTOLIC BLOOD PRESSURE: 134 MMHG | OXYGEN SATURATION: 97 %

## 2022-12-15 DIAGNOSIS — U07.1 POSITIVE SELF-ADMINISTERED ANTIGEN TEST FOR COVID-19: Primary | ICD-10-CM

## 2022-12-15 PROCEDURE — 99213 OFFICE O/P EST LOW 20 MIN: CPT | Performed by: NURSE PRACTITIONER

## 2022-12-15 RX ORDER — NIRMATRELVIR AND RITONAVIR 300-100 MG
KIT ORAL
Qty: 30 TABLET | Refills: 0 | Status: SHIPPED | OUTPATIENT
Start: 2022-12-15 | End: 2022-12-15

## 2022-12-15 RX ORDER — NIRMATRELVIR AND RITONAVIR 300-100 MG
KIT ORAL
Qty: 30 TABLET | Refills: 0 | Status: SHIPPED | OUTPATIENT
Start: 2022-12-15 | End: 2022-12-20

## 2022-12-15 NOTE — PATIENT INSTRUCTIONS
Hold Rosuvastatin until three days AFTER you complete the Paxlovid. Stay home through Sunday the 18th, may return to activity on Monday the 19th ONLY if fever free and symptoms improving  Follow up with Dr. Roberto Jones after treatment if symptoms linger. Seek emergency care for difficulty breathing or worsening symtpoms.

## 2022-12-16 LAB — SARS-COV-2 RNA RESP QL NAA+PROBE: DETECTED

## 2022-12-20 ENCOUNTER — APPOINTMENT (OUTPATIENT)
Dept: PHYSICAL THERAPY | Facility: HOSPITAL | Age: 72
End: 2022-12-20
Attending: OBSTETRICS & GYNECOLOGY
Payer: MEDICARE

## 2022-12-27 ENCOUNTER — OFFICE VISIT (OUTPATIENT)
Dept: PHYSICAL THERAPY | Facility: HOSPITAL | Age: 72
End: 2022-12-27
Attending: OBSTETRICS & GYNECOLOGY
Payer: MEDICARE

## 2022-12-27 ENCOUNTER — OFFICE VISIT (OUTPATIENT)
Dept: ENDOCRINOLOGY CLINIC | Facility: CLINIC | Age: 72
End: 2022-12-27
Payer: MEDICARE

## 2022-12-27 ENCOUNTER — LAB ENCOUNTER (OUTPATIENT)
Dept: LAB | Age: 72
End: 2022-12-27
Attending: INTERNAL MEDICINE
Payer: MEDICARE

## 2022-12-27 VITALS
DIASTOLIC BLOOD PRESSURE: 60 MMHG | BODY MASS INDEX: 31 KG/M2 | WEIGHT: 162.38 LBS | RESPIRATION RATE: 18 BRPM | HEART RATE: 90 BPM | OXYGEN SATURATION: 94 % | SYSTOLIC BLOOD PRESSURE: 118 MMHG

## 2022-12-27 DIAGNOSIS — E11.65 TYPE 2 DIABETES MELLITUS WITH HYPERGLYCEMIA, WITHOUT LONG-TERM CURRENT USE OF INSULIN (HCC): Primary | ICD-10-CM

## 2022-12-27 DIAGNOSIS — M85.88 OSTEOPENIA OF LUMBAR SPINE: ICD-10-CM

## 2022-12-27 DIAGNOSIS — E66.9 DIABETES MELLITUS TYPE 2 IN OBESE (HCC): ICD-10-CM

## 2022-12-27 DIAGNOSIS — E11.65 TYPE 2 DIABETES MELLITUS WITH HYPERGLYCEMIA, WITHOUT LONG-TERM CURRENT USE OF INSULIN (HCC): ICD-10-CM

## 2022-12-27 DIAGNOSIS — E11.69 DIABETES MELLITUS TYPE 2 IN OBESE (HCC): ICD-10-CM

## 2022-12-27 DIAGNOSIS — D50.0 IRON DEFICIENCY ANEMIA DUE TO CHRONIC BLOOD LOSS: ICD-10-CM

## 2022-12-27 LAB
ALT SERPL-CCNC: 40 U/L
ANION GAP SERPL CALC-SCNC: 7 MMOL/L (ref 0–18)
AST SERPL-CCNC: 23 U/L (ref 15–37)
BASOPHILS # BLD AUTO: 0.06 X10(3) UL (ref 0–0.2)
BASOPHILS NFR BLD AUTO: 0.8 %
BUN BLD-MCNC: 24 MG/DL (ref 7–18)
BUN/CREAT SERPL: 32.4 (ref 10–20)
CALCIUM BLD-MCNC: 9.3 MG/DL (ref 8.5–10.1)
CARTRIDGE LOT#: 535 NUMERIC
CHLORIDE SERPL-SCNC: 105 MMOL/L (ref 98–112)
CO2 SERPL-SCNC: 28 MMOL/L (ref 21–32)
CREAT BLD-MCNC: 0.74 MG/DL
DEPRECATED HBV CORE AB SER IA-ACNC: 33.7 NG/ML
DEPRECATED RDW RBC AUTO: 45.3 FL (ref 35.1–46.3)
EOSINOPHIL # BLD AUTO: 0.23 X10(3) UL (ref 0–0.7)
EOSINOPHIL NFR BLD AUTO: 3 %
ERYTHROCYTE [DISTWIDTH] IN BLOOD BY AUTOMATED COUNT: 12.7 % (ref 11–15)
EST. AVERAGE GLUCOSE BLD GHB EST-MCNC: 169 MG/DL (ref 68–126)
FASTING STATUS PATIENT QL REPORTED: YES
GFR SERPLBLD BASED ON 1.73 SQ M-ARVRAT: 87 ML/MIN/1.73M2 (ref 60–?)
GLUCOSE BLD-MCNC: 214 MG/DL (ref 70–99)
HBA1C MFR BLD: 7.5 % (ref ?–5.7)
HCT VFR BLD AUTO: 44.7 %
HEMOGLOBIN A1C: 7.5 % (ref 4.3–5.6)
HGB BLD-MCNC: 14.2 G/DL
IMM GRANULOCYTES # BLD AUTO: 0.03 X10(3) UL (ref 0–1)
IMM GRANULOCYTES NFR BLD: 0.4 %
LYMPHOCYTES # BLD AUTO: 1.5 X10(3) UL (ref 1–4)
LYMPHOCYTES NFR BLD AUTO: 19.5 %
MCH RBC QN AUTO: 30.7 PG (ref 26–34)
MCHC RBC AUTO-ENTMCNC: 31.8 G/DL (ref 31–37)
MCV RBC AUTO: 96.5 FL
MONOCYTES # BLD AUTO: 0.57 X10(3) UL (ref 0.1–1)
MONOCYTES NFR BLD AUTO: 7.4 %
NEUTROPHILS # BLD AUTO: 5.32 X10 (3) UL (ref 1.5–7.7)
NEUTROPHILS # BLD AUTO: 5.32 X10(3) UL (ref 1.5–7.7)
NEUTROPHILS NFR BLD AUTO: 68.9 %
OSMOLALITY SERPL CALC.SUM OF ELEC: 300 MOSM/KG (ref 275–295)
PLATELET # BLD AUTO: 163 10(3)UL (ref 150–450)
POTASSIUM SERPL-SCNC: 3.9 MMOL/L (ref 3.5–5.1)
RBC # BLD AUTO: 4.63 X10(6)UL
SODIUM SERPL-SCNC: 140 MMOL/L (ref 136–145)
VIT D+METAB SERPL-MCNC: 32.6 NG/ML (ref 30–100)
WBC # BLD AUTO: 7.7 X10(3) UL (ref 4–11)

## 2022-12-27 PROCEDURE — 82607 VITAMIN B-12: CPT

## 2022-12-27 PROCEDURE — 84460 ALANINE AMINO (ALT) (SGPT): CPT

## 2022-12-27 PROCEDURE — 82306 VITAMIN D 25 HYDROXY: CPT

## 2022-12-27 PROCEDURE — 97140 MANUAL THERAPY 1/> REGIONS: CPT

## 2022-12-27 PROCEDURE — 80048 BASIC METABOLIC PNL TOTAL CA: CPT

## 2022-12-27 PROCEDURE — 84450 TRANSFERASE (AST) (SGOT): CPT

## 2022-12-27 PROCEDURE — 83036 HEMOGLOBIN GLYCOSYLATED A1C: CPT

## 2022-12-27 PROCEDURE — 85025 COMPLETE CBC W/AUTO DIFF WBC: CPT

## 2022-12-27 PROCEDURE — 82728 ASSAY OF FERRITIN: CPT

## 2022-12-27 PROCEDURE — 99215 OFFICE O/P EST HI 40 MIN: CPT | Performed by: NURSE PRACTITIONER

## 2022-12-27 PROCEDURE — 97162 PT EVAL MOD COMPLEX 30 MIN: CPT

## 2022-12-27 PROCEDURE — 97112 NEUROMUSCULAR REEDUCATION: CPT

## 2022-12-27 PROCEDURE — 95250 CONT GLUC MNTR PHYS/QHP EQP: CPT | Performed by: NURSE PRACTITIONER

## 2022-12-27 PROCEDURE — 83036 HEMOGLOBIN GLYCOSYLATED A1C: CPT | Performed by: NURSE PRACTITIONER

## 2022-12-27 RX ORDER — METFORMIN HYDROCHLORIDE 500 MG/1
500 TABLET, EXTENDED RELEASE ORAL
Qty: 30 TABLET | Refills: 0 | Status: SHIPPED | OUTPATIENT
Start: 2022-12-27 | End: 2022-12-28

## 2022-12-27 RX ORDER — DULAGLUTIDE 3 MG/.5ML
3 INJECTION, SOLUTION SUBCUTANEOUS WEEKLY
Qty: 6 ML | Refills: 0 | Status: SHIPPED | OUTPATIENT
Start: 2022-12-27

## 2022-12-27 NOTE — PROGRESS NOTES
MUSCULOSKELETAL AND PELVIC FLOOR EVALUATION:     Diagnosis:   Frequency of urination (R35.0)  OAB (overactive bladder) (N32.81)  Urge incontinence (N39.41)  Pelvic muscle wasting (N81.84)        Referring Provider: Jhonatan Fulton  Date of Evaluation:    2022    Precautions:  Type II Diabetes Next MD visit:   none scheduled  Date of Surgery: n/a     PATIENT SUMMARY   Geovani Santillan is a 81year old female who presents to therapy today with complaints of urinary urgency, frequency, and urge incontinence. Symptoms have been present and steadily worsening for ~1 year at this point. She denies ALLEY, as leakage only occurs in association with urgency. She reports a moderate to significant amount of leakage. She did have a cystocele repair that occurred at the same time as her hysterectomy in . She does report history of constipation/straining during defecation. She takes metamucil and probiotics regularly. She also reports hovering during urination when using public restrooms. She often feels that she has not completely emptied her bladder after urinating. She does drink ~40 oz of water per day. She is on a medication for OAB, which seemed to be helping at first but she no longer notices any benefit now. Reports that mornings are the worst for urgency. Estimates urinating 10-15x/day and 2-3x/night. Current symptoms include: urgency/frequency, incomplete emptying and leakage    Pt describes pain level: current 0/10, best 0/10, worst 0/10. Pregnant Now: No  Obstetrical/Gynecological history: : 3  Para: 3  Delivery method: vaginal  Urodynamic Test: none  Manometry: none  Occupation/Activities: Retired. PFDI-20: to be completed next visit. Danuta Cruz describes prior level of function as having mild urgency prior to ~1 year ago. Pt goals include resolve urgency, frequency, and leakage. Past medical history was reviewed with Lexi.  She  has a past medical history of Abdominal hernia, Abdominal pain, Arthritis, Constipation, Diabetes mellitus ([address] Diarrhea, unspecified, Diverticulitis, Dyslipidemia, Flatulence/gas pain/belching, Gastrointestinal bleeding, History of cardiac murmur, Osteopenia, Other and unspecified hyperlipidemia, Pain in joints, Type 2 diabetes mellitus with proteinuria or albuminuria, Type II or unspecified type diabetes mellitus without mention of complication, not stated as uncontrolled, and Vitamin D deficiency. URINARY HABITS  Types of symptoms: urge incontinence, incomplete emptying and nocturia  Events associated with the onset of urinary complaints: insidious  Abdominal/Vaginal Pressure complaints: no  Urinary Frequency: 10-15x/day  Urine Stream: wnl  Amount: decreased  Leaking occurs: with urgency  Episodes of Leakage: a few times per week  Amount of leakage: mod to significant  Pad use: NA  Nocturia: 2-3x  Fluid Intake: 40 oz water  Bladder irritants: no  Urine Stop test: NA  Post void dribble: no  Hovering: yes  Empty bladder just in case: yes  Do you ever leak urine without knowing it? no    BOWEL HABITS  Types of symptoms: Constipation   Frequency of bowel movements: ~1x/day  Stool consistency: Fallon Stool Scale: 3  Do you strain with defecation: Yes   Laxative use: No, does use metamucil    SEXUAL HEALTH STATUS  Marinoff Scale: 0  History of Sexual Abuse: NA  Sexual [place] Status: NA  Pain with initial and/or deep penetration: No  Pain with pelvic exam/tampon use: No    ASSESSMENT  Lexi presents to physical therapy evaluation with primary c/o urinary urgency and frequency. The results of the objective tests and measures show impaired lumbar mobility, altered gait mechanics, diminished cough reflex, and significant PFM strength, endurance, and power deficits per PERF score . Functional deficits include but are not limited to impaired QoL and function due to urinary urgency, frequency and leakage.   Signs and symptoms are consistent with diagnosis of overactive bladder. Pt and PT discussed evaluation findings, pathology, POC and HEP. Pt voiced understanding and performs HEP correctly without reported pain. Skilled Pelvic Physical Therapy is medically necessary to address the above impairments and reach functional goals. OBJECTIVE:   Posture: kyphotic  Pelvic Alignment: NT due to time constraints  Deep Tendon Reflexes:  NT  Gait: pt ambulates on level ground with decreased step length CAITLIN, decreased foot clearance CAITLIN and stooped posture/forward lean. External Palpation: NT  Scars (location/surgery): None  Abdominal Wall Integrity: NT    Range Of Motion  Lumbar AROM screen: decreased sagittal plane motion  LE AROM screen: grossly WNL     Strength (MMT) 5/5 CAITLIN LE except 3/5 in hip flexors, abductors, extensors  Transverse Abdominis: 3/5    Flexibility Summary: WNL CAITLIN LE    Special Tests  NT    Informed consent for internal pelvic evaluation given: Yes    External Observation:   Voluntary contraction: present   Voluntary relaxation: present  Involuntary contraction: present  Involuntary relaxation: present    Mons pubis: WNL  Labia majora: redness  Labia minora: redness and other: decreased rugae observed  Urethral meatus: redness  Introitus: redness  Perineal body: redness    Sensory/Reflex:  Vestibule: Not Tested  Anal Kathleen: Not Tested    Internal Examination   Scar: None    Pelvic Floor Muscle strength: (PERF= Power/Endurance/Reps/Fast) MMT: 2/2/2/5 -- fair coordination but markedly impaired power generation and endurance  External Anal Sphincter: NT  Accessory Muscle Use: gluteals, adductors  Poor breath regulation with chest dominant breathing style  Cough reflex: diminished    Tissue Laxity Test:  Anterior Wall: Mod  Posterior Wall:  Mod  Apical: WNL    Eccentric lengthening contraction: Fair   Bearing down Valsalva maneuver (2-3x): NT    Internal Palpation: WNL except Iliococcygeus R moderate restriction    Today's Treatment and Response:   Patient education was provided on objective findings of external and internal evaluation and expectations with treatment outcomes. Educated on pelvic anatomy and function with diagrams and pelvic model, bladder normatives, adequate hydration levels, proper toileting posture, instructed in bladder, bowel, and diet diary log and issued handout , stress/urge urinary incontinence strategies and coordination of diaphragmatic breathing and pelvic floor contraction     Neuro:   Extensive education provided on pelvic floor and bladder anatomy and function and strategies discussed for decreasing urinary urgency/frequency. Discussed strategies for decreasing void frequency including PFM quick flick contractions and distraction techniques to re-establish optimal brain/bladder connection via messi's loop 3. Discussed ultimate goal of decreasing frequency to 1x every 2.5-3 hours. 5 handouts provided including bladder diary, bladder retraining, bladder health, bladder irritants, and HEP. 20 mins  Prescribed quick flicks, elevators, endurance contractions prescribed as HEP. Manual:   Internal exam performed and cues provided on proper PFM contraction, relaxation, and bearing down. Cued in proper endurance and fast-twitch contractions, as well as elevator contractions to assess motor control, power, and endurance. 10 mins      Charges: PT Eval Moderate Complexity, neuro x 1, manual x 1      Total Timed Treatment: 30 min     Total Treatment Time: 45 min     Based on clinical rationale and outcome measures, this evaluation involved Moderate Complexity decision making due to 1-2 personal factors/comorbidities, 3 body structures involved/activity limitations, and evolving symptoms including urge urinary incontinence  PLAN OF CARE:    Goals: (to be met in 10 visits)  Patient will improve PERF score to at least 3/5/5//7 for optimal pelvic floor function and pelvic organ support.   Patient will report decreased urinary frequency to <10x/day and 2x/night indicating normalized micturition reflex for optimal bladder health and function. Patient will report consistent grade 4 on BSS for improved bowel emptying without straining. Patient will report ability to postpone urination without leakage for at least 10 mins after initial urge presents. Patient will report adherence to HEP for continued exercise benefits  following cessation of PT. Frequency / Duration: Patient will be seen for 1-2 x/week or a total of 10 visits over a 90 day period. Treatment will include: Manual Therapy, Neuromuscular Re-education, Therapeutic Activities, Therapeutic Exercise, Home Exercise Program instruction and Modalities to include: Electrical stimulation (unattended) and biofeedback     Education or treatment limitation: None  Rehab Potential:good      Patient/Family/Caregiver was advised of these findings, precautions, and treatment options and has agreed to actively participate in planning and for this course of care. Thank you for your referral. Please co-sign or sign and return this letter via fax as soon as possible to [phone]. If you have any questions, please contact me at Dept: [phone]    Sincerely,  Electronically signed by therapist: Kenrick Barros PT  [de-identified] certification required: Yes  I certify the need for these services furnished under this plan of treatment and while under my care.     X___________________________________________________ Date____________________    Certification From: [id number]  To:3/27/2023

## 2022-12-27 NOTE — PROGRESS NOTES
A continuous glucose sensor for 24 hour blood sugar monitoring was placed on patient today per [facility] order. Serial NUMBER:[id number] Exp date: (2/28/23)  - After cleansing skin with alcohol prep, Sensor (Radha pRO)was inserted on Left Posterior upper arm area without difficulty. Small amount of skin prep was added around sensor tape after placement to help with sensor adhesive. Area remains free of any bleeding or irritation or pain at site when patient left DM center. Patient instructions:   Record daily food/drink intake and activity in log book  Call Diabetes center with any questions or concerns.    instructed to record food, exercise, insulin doses (if taking) on log provided

## 2022-12-28 ENCOUNTER — OFFICE VISIT (OUTPATIENT)
Dept: INTERNAL MEDICINE CLINIC | Facility: CLINIC | Age: 72
End: 2022-12-28
Payer: MEDICARE

## 2022-12-28 VITALS
OXYGEN SATURATION: 97 % | RESPIRATION RATE: 16 BRPM | DIASTOLIC BLOOD PRESSURE: 60 MMHG | SYSTOLIC BLOOD PRESSURE: 99 MMHG | HEIGHT: 60 IN | WEIGHT: 159.63 LBS | HEART RATE: 95 BPM | BODY MASS INDEX: 31.34 KG/M2 | TEMPERATURE: 98 F

## 2022-12-28 DIAGNOSIS — R80.9 TYPE 2 DIABETES MELLITUS WITH ALBUMINURIA (HCC): ICD-10-CM

## 2022-12-28 DIAGNOSIS — D50.0 IRON DEFICIENCY ANEMIA DUE TO CHRONIC BLOOD LOSS: ICD-10-CM

## 2022-12-28 DIAGNOSIS — E11.69 HYPERLIPIDEMIA ASSOCIATED WITH TYPE 2 DIABETES MELLITUS (HCC): ICD-10-CM

## 2022-12-28 DIAGNOSIS — Z00.00 ROUTINE GENERAL MEDICAL EXAMINATION AT A HEALTH CARE FACILITY: Primary | ICD-10-CM

## 2022-12-28 DIAGNOSIS — M85.88 OSTEOPENIA OF LUMBAR SPINE: ICD-10-CM

## 2022-12-28 DIAGNOSIS — E78.5 HYPERLIPIDEMIA ASSOCIATED WITH TYPE 2 DIABETES MELLITUS (HCC): ICD-10-CM

## 2022-12-28 DIAGNOSIS — E66.9 DIABETES MELLITUS TYPE 2 IN OBESE (HCC): ICD-10-CM

## 2022-12-28 DIAGNOSIS — E11.69 DIABETES MELLITUS TYPE 2 IN OBESE (HCC): ICD-10-CM

## 2022-12-28 DIAGNOSIS — E11.29 TYPE 2 DIABETES MELLITUS WITH ALBUMINURIA (HCC): ICD-10-CM

## 2022-12-28 DIAGNOSIS — K21.9 GASTROESOPHAGEAL REFLUX DISEASE WITHOUT ESOPHAGITIS: ICD-10-CM

## 2022-12-28 DIAGNOSIS — D12.6 TUBULAR ADENOMA OF COLON: ICD-10-CM

## 2022-12-28 LAB — VIT B12 SERPL-MCNC: 704 PG/ML (ref 193–986)

## 2022-12-28 PROCEDURE — 1125F AMNT PAIN NOTED PAIN PRSNT: CPT | Performed by: INTERNAL MEDICINE

## 2022-12-28 PROCEDURE — 99214 OFFICE O/P EST MOD 30 MIN: CPT | Performed by: INTERNAL MEDICINE

## 2022-12-28 PROCEDURE — G0439 PPPS, SUBSEQ VISIT: HCPCS | Performed by: INTERNAL MEDICINE

## 2022-12-29 ENCOUNTER — HOSPITAL ENCOUNTER (OUTPATIENT)
Dept: MAMMOGRAPHY | Age: 72
Discharge: HOME | End: 2022-12-29
Attending: INTERNAL MEDICINE
Payer: MEDICARE

## 2022-12-29 ENCOUNTER — HOSPITAL ENCOUNTER (OUTPATIENT)
Dept: BONE DENSITY | Age: 72
Discharge: HOME | End: 2022-12-29
Attending: INTERNAL MEDICINE
Payer: MEDICARE

## 2022-12-29 DIAGNOSIS — M85.88 OSTEOPENIA OF LUMBAR SPINE: ICD-10-CM

## 2022-12-29 DIAGNOSIS — Z12.31 ENCOUNTER FOR MAMMOGRAM TO ESTABLISH BASELINE MAMMOGRAM: ICD-10-CM

## 2022-12-29 PROCEDURE — 77080 DXA BONE DENSITY AXIAL: CPT | Performed by: INTERNAL MEDICINE

## 2022-12-29 PROCEDURE — 77063 BREAST TOMOSYNTHESIS BI: CPT | Performed by: INTERNAL MEDICINE

## 2022-12-29 PROCEDURE — 77067 SCR MAMMO BI INCL CAD: CPT | Performed by: INTERNAL MEDICINE

## 2023-01-03 ENCOUNTER — OFFICE VISIT (OUTPATIENT)
Dept: PHYSICAL THERAPY | Facility: HOSPITAL | Age: 73
End: 2023-01-03
Attending: OBSTETRICS & GYNECOLOGY
Payer: MEDICARE

## 2023-01-03 PROCEDURE — 97140 MANUAL THERAPY 1/> REGIONS: CPT

## 2023-01-03 PROCEDURE — 97112 NEUROMUSCULAR REEDUCATION: CPT

## 2023-01-09 RX ORDER — ROSUVASTATIN CALCIUM 20 MG/1
TABLET, COATED ORAL
Qty: 90 TABLET | Refills: 3 | Status: SHIPPED | OUTPATIENT
Start: 2023-01-09

## 2023-01-10 ENCOUNTER — OFFICE VISIT (OUTPATIENT)
Dept: PHYSICAL THERAPY | Facility: HOSPITAL | Age: 73
End: 2023-01-10
Attending: OBSTETRICS & GYNECOLOGY
Payer: MEDICARE

## 2023-01-10 PROCEDURE — 97140 MANUAL THERAPY 1/> REGIONS: CPT

## 2023-01-10 PROCEDURE — 97110 THERAPEUTIC EXERCISES: CPT

## 2023-01-10 PROCEDURE — 97112 NEUROMUSCULAR REEDUCATION: CPT

## 2023-01-12 ENCOUNTER — OFFICE VISIT (OUTPATIENT)
Dept: ENDOCRINOLOGY CLINIC | Facility: CLINIC | Age: 73
End: 2023-01-12
Payer: MEDICARE

## 2023-01-12 VITALS
BODY MASS INDEX: 31 KG/M2 | WEIGHT: 161 LBS | DIASTOLIC BLOOD PRESSURE: 64 MMHG | OXYGEN SATURATION: 94 % | SYSTOLIC BLOOD PRESSURE: 98 MMHG | RESPIRATION RATE: 18 BRPM | HEART RATE: 89 BPM

## 2023-01-12 DIAGNOSIS — E11.65 TYPE 2 DIABETES MELLITUS WITH HYPERGLYCEMIA, WITHOUT LONG-TERM CURRENT USE OF INSULIN (HCC): Primary | ICD-10-CM

## 2023-01-12 PROCEDURE — 95251 CONT GLUC MNTR ANALYSIS I&R: CPT | Performed by: NURSE PRACTITIONER

## 2023-01-12 PROCEDURE — 99214 OFFICE O/P EST MOD 30 MIN: CPT | Performed by: NURSE PRACTITIONER

## 2023-01-12 RX ORDER — GLIPIZIDE 5 MG/1
5 TABLET, FILM COATED, EXTENDED RELEASE ORAL DAILY
Qty: 90 TABLET | Refills: 1 | Status: SHIPPED | OUTPATIENT
Start: 2023-01-12

## 2023-01-12 RX ORDER — DULAGLUTIDE 1.5 MG/.5ML
INJECTION, SOLUTION SUBCUTANEOUS
COMMUNITY
Start: 2023-01-04

## 2023-01-12 RX ORDER — METFORMIN HYDROCHLORIDE 500 MG/1
500 TABLET, EXTENDED RELEASE ORAL
Qty: 90 TABLET | Refills: 1 | Status: SHIPPED | OUTPATIENT
Start: 2023-01-12

## 2023-01-12 RX ORDER — METFORMIN HYDROCHLORIDE 500 MG/1
500 TABLET, EXTENDED RELEASE ORAL
Qty: 30 TABLET | Refills: 0 | COMMUNITY
Start: 2023-01-12 | End: 2023-01-12

## 2023-01-17 ENCOUNTER — OFFICE VISIT (OUTPATIENT)
Dept: PHYSICAL THERAPY | Facility: HOSPITAL | Age: 73
End: 2023-01-17
Attending: OBSTETRICS & GYNECOLOGY
Payer: MEDICARE

## 2023-01-17 PROCEDURE — 97140 MANUAL THERAPY 1/> REGIONS: CPT

## 2023-01-17 PROCEDURE — 97110 THERAPEUTIC EXERCISES: CPT

## 2023-01-17 PROCEDURE — 97112 NEUROMUSCULAR REEDUCATION: CPT

## 2023-01-19 DIAGNOSIS — E11.65 TYPE 2 DIABETES MELLITUS WITH HYPERGLYCEMIA, WITHOUT LONG-TERM CURRENT USE OF INSULIN (HCC): ICD-10-CM

## 2023-01-19 RX ORDER — DULAGLUTIDE 3 MG/.5ML
3 INJECTION, SOLUTION SUBCUTANEOUS WEEKLY
Qty: 6 ML | Refills: 0 | Status: SHIPPED | OUTPATIENT
Start: 2023-01-19

## 2023-01-19 NOTE — TELEPHONE ENCOUNTER
Pt states [facility] is out of Tr[facility]icity and told her the will not get it for her. She said [facility] to get [facility] order and they said they don't have it. Pt said pharmacy reached out to us and we never got back to them. Told pt we did not received anything from the pharm, but that's no problem and I'll have KR send new Rx to [facility]. She said [facility] doesn't [facility], but told her they'll get for her. She said she has inj for next Wed and after that she'll be out. Pt instructed to give [facility] a week to get and if not, let us know and we can see if she can get a sample. KR, please send new Rx that I pended. Thank you.

## 2023-01-24 ENCOUNTER — OFFICE VISIT (OUTPATIENT)
Dept: PHYSICAL THERAPY | Facility: HOSPITAL | Age: 73
End: 2023-01-24
Attending: OBSTETRICS & GYNECOLOGY
Payer: MEDICARE

## 2023-01-24 PROCEDURE — 97112 NEUROMUSCULAR REEDUCATION: CPT

## 2023-01-24 PROCEDURE — 97140 MANUAL THERAPY 1/> REGIONS: CPT

## 2023-01-24 PROCEDURE — 97110 THERAPEUTIC EXERCISES: CPT

## 2023-01-24 RX ORDER — EMPAGLIFLOZIN 25 MG/1
25 TABLET, FILM COATED ORAL DAILY
Qty: 90 TABLET | Refills: 3 | Status: SHIPPED | OUTPATIENT
Start: 2023-01-24

## 2023-01-25 ENCOUNTER — TELEPHONE (OUTPATIENT)
Dept: ENDOCRINOLOGY CLINIC | Facility: CLINIC | Age: 73
End: 2023-01-25

## 2023-01-25 NOTE — TELEPHONE ENCOUNTER
Left message for patient to continue on 1.5 mg dose if can obtain from pharmacy. Call office with any issues.
Ok to continue on 1.5 mg dose until 3 mg is in stock
Pt left message that she can't find trulicity 3 mg dose. Returned call, no 3 mg available. Should she continue 1.5 mg for now if she can obtain? Due next Wednesday.
no

## 2023-01-31 ENCOUNTER — OFFICE VISIT (OUTPATIENT)
Dept: PHYSICAL THERAPY | Facility: HOSPITAL | Age: 73
End: 2023-01-31
Attending: OBSTETRICS & GYNECOLOGY
Payer: MEDICARE

## 2023-01-31 PROCEDURE — 97110 THERAPEUTIC EXERCISES: CPT

## 2023-01-31 PROCEDURE — 97112 NEUROMUSCULAR REEDUCATION: CPT

## 2023-02-02 ENCOUNTER — TELEPHONE (OUTPATIENT)
Dept: INTERNAL MEDICINE CLINIC | Facility: CLINIC | Age: 73
End: 2023-02-02

## 2023-02-07 ENCOUNTER — OFFICE VISIT (OUTPATIENT)
Dept: PHYSICAL THERAPY | Facility: HOSPITAL | Age: 73
End: 2023-02-07
Attending: OBSTETRICS & GYNECOLOGY
Payer: MEDICARE

## 2023-02-07 PROCEDURE — 97112 NEUROMUSCULAR REEDUCATION: CPT

## 2023-02-07 PROCEDURE — 97110 THERAPEUTIC EXERCISES: CPT

## 2023-02-14 ENCOUNTER — OFFICE VISIT (OUTPATIENT)
Dept: PHYSICAL THERAPY | Facility: HOSPITAL | Age: 73
End: 2023-02-14
Attending: OBSTETRICS & GYNECOLOGY
Payer: MEDICARE

## 2023-02-14 PROCEDURE — 97110 THERAPEUTIC EXERCISES: CPT

## 2023-02-14 PROCEDURE — 97112 NEUROMUSCULAR REEDUCATION: CPT

## 2023-02-16 DIAGNOSIS — E11.65 TYPE 2 DIABETES MELLITUS WITH HYPERGLYCEMIA, WITHOUT LONG-TERM CURRENT USE OF INSULIN (HCC): ICD-10-CM

## 2023-02-16 RX ORDER — DULAGLUTIDE 3 MG/.5ML
3 INJECTION, SOLUTION SUBCUTANEOUS WEEKLY
Qty: 6 ML | Refills: 0 | Status: SHIPPED | OUTPATIENT
Start: 2023-02-16

## 2023-02-21 ENCOUNTER — OFFICE VISIT (OUTPATIENT)
Dept: PHYSICAL THERAPY | Facility: HOSPITAL | Age: 73
End: 2023-02-21
Attending: OBSTETRICS & GYNECOLOGY
Payer: MEDICARE

## 2023-02-21 PROCEDURE — 97110 THERAPEUTIC EXERCISES: CPT

## 2023-02-21 PROCEDURE — 97112 NEUROMUSCULAR REEDUCATION: CPT

## 2023-02-28 RX ORDER — LANCETS
EACH MISCELLANEOUS
Qty: 200 EACH | Refills: 0 | Status: SHIPPED | OUTPATIENT
Start: 2023-02-28

## 2023-03-08 ENCOUNTER — OFFICE VISIT (OUTPATIENT)
Dept: ENDOCRINOLOGY CLINIC | Facility: CLINIC | Age: 73
End: 2023-03-08
Payer: MEDICARE

## 2023-03-08 VITALS
BODY MASS INDEX: 31 KG/M2 | WEIGHT: 157.38 LBS | SYSTOLIC BLOOD PRESSURE: 102 MMHG | DIASTOLIC BLOOD PRESSURE: 62 MMHG | RESPIRATION RATE: 16 BRPM | HEART RATE: 95 BPM | OXYGEN SATURATION: 94 %

## 2023-03-08 DIAGNOSIS — E11.65 TYPE 2 DIABETES MELLITUS WITH HYPERGLYCEMIA, WITHOUT LONG-TERM CURRENT USE OF INSULIN (HCC): Primary | ICD-10-CM

## 2023-03-08 LAB
CREAT UR-SCNC: 49.8 MG/DL
MICROALBUMIN UR-MCNC: 3.66 MG/DL
MICROALBUMIN/CREAT 24H UR-RTO: 73.5 UG/MG (ref ?–30)

## 2023-03-08 PROCEDURE — 99214 OFFICE O/P EST MOD 30 MIN: CPT | Performed by: NURSE PRACTITIONER

## 2023-03-08 PROCEDURE — 82570 ASSAY OF URINE CREATININE: CPT | Performed by: NURSE PRACTITIONER

## 2023-03-08 PROCEDURE — 82043 UR ALBUMIN QUANTITATIVE: CPT | Performed by: NURSE PRACTITIONER

## 2023-03-08 RX ORDER — DULAGLUTIDE 3 MG/.5ML
3 INJECTION, SOLUTION SUBCUTANEOUS WEEKLY
Qty: 6 ML | Refills: 1 | Status: SHIPPED
Start: 2023-03-08

## 2023-03-15 ENCOUNTER — HOSPITAL ENCOUNTER (OUTPATIENT)
Dept: GENERAL RADIOLOGY | Age: 73
Discharge: HOME | End: 2023-03-15
Attending: INTERNAL MEDICINE
Payer: MEDICARE

## 2023-03-15 ENCOUNTER — OFFICE VISIT (OUTPATIENT)
Dept: RHEUMATOLOGY | Facility: CLINIC | Age: 73
End: 2023-03-15
Payer: MEDICARE

## 2023-03-15 VITALS
WEIGHT: 155.81 LBS | OXYGEN SATURATION: 97 % | RESPIRATION RATE: 16 BRPM | DIASTOLIC BLOOD PRESSURE: 62 MMHG | HEART RATE: 83 BPM | HEIGHT: 60 IN | SYSTOLIC BLOOD PRESSURE: 110 MMHG | TEMPERATURE: 98 F | BODY MASS INDEX: 30.59 KG/M2

## 2023-03-15 DIAGNOSIS — M54.42 CHRONIC BILATERAL LOW BACK PAIN WITH BILATERAL SCIATICA: ICD-10-CM

## 2023-03-15 DIAGNOSIS — M25.562 CHRONIC PAIN OF BOTH KNEES: ICD-10-CM

## 2023-03-15 DIAGNOSIS — M15.4 EROSIVE OSTEOARTHRITIS OF BOTH HANDS: ICD-10-CM

## 2023-03-15 DIAGNOSIS — G89.29 CHRONIC BILATERAL LOW BACK PAIN WITH BILATERAL SCIATICA: ICD-10-CM

## 2023-03-15 DIAGNOSIS — G89.29 CHRONIC PAIN OF BOTH KNEES: ICD-10-CM

## 2023-03-15 DIAGNOSIS — M25.561 CHRONIC PAIN OF BOTH KNEES: ICD-10-CM

## 2023-03-15 DIAGNOSIS — M54.41 CHRONIC BILATERAL LOW BACK PAIN WITH BILATERAL SCIATICA: ICD-10-CM

## 2023-03-15 DIAGNOSIS — M15.9 PRIMARY OSTEOARTHRITIS INVOLVING MULTIPLE JOINTS: Primary | ICD-10-CM

## 2023-03-15 PROCEDURE — 73565 X-RAY EXAM OF KNEES: CPT | Performed by: INTERNAL MEDICINE

## 2023-03-15 PROCEDURE — 99214 OFFICE O/P EST MOD 30 MIN: CPT | Performed by: INTERNAL MEDICINE

## 2023-04-06 ENCOUNTER — TELEPHONE (OUTPATIENT)
Dept: ORTHOPEDICS CLINIC | Facility: CLINIC | Age: 73
End: 2023-04-06

## 2023-04-06 DIAGNOSIS — R52 PAIN: Primary | ICD-10-CM

## 2023-04-06 NOTE — TELEPHONE ENCOUNTER
Patient is scheduled with Dr. Rizwana Sampson for bilateral knee pain. Please advise if imaging is needed.

## 2023-05-19 ENCOUNTER — HOSPITAL ENCOUNTER (OUTPATIENT)
Dept: GENERAL RADIOLOGY | Age: 73
Discharge: HOME | End: 2023-05-19
Attending: ORTHOPAEDIC SURGERY
Payer: MEDICARE

## 2023-05-19 ENCOUNTER — OFFICE VISIT (OUTPATIENT)
Dept: ORTHOPEDICS CLINIC | Facility: CLINIC | Age: 73
End: 2023-05-19
Payer: MEDICARE

## 2023-05-19 VITALS — BODY MASS INDEX: 30.98 KG/M2 | HEIGHT: 60.5 IN | WEIGHT: 162 LBS

## 2023-05-19 DIAGNOSIS — R52 PAIN: ICD-10-CM

## 2023-05-19 DIAGNOSIS — M17.0 PRIMARY OSTEOARTHRITIS OF BOTH KNEES: Primary | ICD-10-CM

## 2023-05-19 PROCEDURE — 99213 OFFICE O/P EST LOW 20 MIN: CPT | Performed by: ORTHOPAEDIC SURGERY

## 2023-05-19 PROCEDURE — 73564 X-RAY EXAM KNEE 4 OR MORE: CPT | Performed by: ORTHOPAEDIC SURGERY

## 2023-05-19 NOTE — PROGRESS NOTES
[facility] Progress Note    Subjective: Return patient here for complaint of bilateral knee pain right worse than left. She reports that this has been going on for a while. She is notices that the right knee is starting to bow outward as well. Pain is along the inside of the knee, points to the medial joint line. No radiation of pain. She states that she did have a combination viscosupplementation and steroid injection that did not provide much relief. She cannot take NSAIDs due to GI issues. She does take Tylenol. Pain is activity and lifestyle limiting. Objective: Patient is comfortable, right knee with mild effusion compared to the left. She does have varus alignment to the right knee. There is mild tenderness about the medial joint line both knees. Imaging: X-rays of both knees personally viewed, independently interpreted and radiology report read. Demonstrates severe osteoarthritis, varus, with complete loss of medial joint space/bone-on-bone      Assessment/Plan: 28-year-old female with symptomatic bilateral knee radiographically severe varus osteoarthritis, right worse than left. I discussed the etiology, natural history, and management options for symptomatic knee osteoarthritis. I discussed nonsurgical and surgical treatments, with nonsurgical treatments to include anti-inflammatory medications, injections, activity modification, weight loss, low impact exercise and possible therapy. Surgery would be with knee replacement and is an elective operation reserved for when nonsurgical treatments no longer alleviate symptoms sufficiently. She thinks knee replacement may be in her future, but does not feel ready to proceed with this right now. She is concerned about pain control especially given that she feels she has a number of intolerances to medications. She is concerned about steroid injection as she is worried this may spike her blood sugar.   We did discuss possibility of sustained release with Sreekanth Mejia. She would like to proceed with this and this has been ordered. She will continue Tylenol as needed. She would like to proceed to dissipate with therapy as well, she does have an order for this and will follow up with it. If she has any issues with her current therapy order she may contact us to put 1 in.     Lionel Li MD, [address] [facility]  Phone [phone]  Fax [phone]

## 2023-05-31 ENCOUNTER — TELEPHONE (OUTPATIENT)
Dept: ORTHOPEDICS CLINIC | Facility: CLINIC | Age: 73
End: 2023-05-31

## 2023-05-31 NOTE — TELEPHONE ENCOUNTER
Future Appointments   Date Time Provider Del Alvarez   6/2/2023  3:00 PM Last, LYNNETTE Flowers [facility]   6/13/2023 10:00 AM LYNNETTE Thomas [facility]   6/16/2023  8:20 AM Marquez Harkins MD [facility] [id number]   9/6/2023 11:30 AM Parul Cope DO [facility]

## 2023-06-08 ENCOUNTER — LAB ENCOUNTER (OUTPATIENT)
Dept: LAB | Age: 73
End: 2023-06-08
Payer: MEDICARE

## 2023-06-08 ENCOUNTER — TELEPHONE (OUTPATIENT)
Dept: PHYSICAL THERAPY | Facility: HOSPITAL | Age: 73
End: 2023-06-08

## 2023-06-08 DIAGNOSIS — R74.01 ELEVATED ALT MEASUREMENT: ICD-10-CM

## 2023-06-08 LAB
ALBUMIN SERPL-MCNC: 3.9 G/DL (ref 3.4–5)
ALBUMIN/GLOB SERPL: 1.1 {RATIO} (ref 1–2)
ALP LIVER SERPL-CCNC: 74 U/L
ALT SERPL-CCNC: 39 U/L
ANION GAP SERPL CALC-SCNC: 5 MMOL/L (ref 0–18)
AST SERPL-CCNC: 27 U/L (ref 15–37)
BILIRUB SERPL-MCNC: 0.3 MG/DL (ref 0.1–2)
BUN BLD-MCNC: 17 MG/DL (ref 7–18)
CALCIUM BLD-MCNC: 9.3 MG/DL (ref 8.5–10.1)
CHLORIDE SERPL-SCNC: 104 MMOL/L (ref 98–112)
CO2 SERPL-SCNC: 27 MMOL/L (ref 21–32)
CREAT BLD-MCNC: 0.69 MG/DL
FASTING STATUS PATIENT QL REPORTED: YES
GFR SERPLBLD BASED ON 1.73 SQ M-ARVRAT: 93 ML/MIN/1.73M2 (ref 60–?)
GLOBULIN PLAS-MCNC: 3.5 G/DL (ref 2.8–4.4)
GLUCOSE BLD-MCNC: 160 MG/DL (ref 70–99)
OSMOLALITY SERPL CALC.SUM OF ELEC: 287 MOSM/KG (ref 275–295)
POTASSIUM SERPL-SCNC: 4 MMOL/L (ref 3.5–5.1)
PROT SERPL-MCNC: 7.4 G/DL (ref 6.4–8.2)
SODIUM SERPL-SCNC: 136 MMOL/L (ref 136–145)

## 2023-06-08 PROCEDURE — 80053 COMPREHEN METABOLIC PANEL: CPT

## 2023-06-12 ENCOUNTER — LAB ENCOUNTER (OUTPATIENT)
Dept: LAB | Age: 73
End: 2023-06-12
Attending: NURSE PRACTITIONER
Payer: MEDICARE

## 2023-06-12 DIAGNOSIS — E11.65 TYPE 2 DIABETES MELLITUS WITH HYPERGLYCEMIA, WITHOUT LONG-TERM CURRENT USE OF INSULIN (HCC): ICD-10-CM

## 2023-06-12 LAB
CHOLEST SERPL-MCNC: 150 MG/DL (ref ?–200)
EST. AVERAGE GLUCOSE BLD GHB EST-MCNC: 160 MG/DL (ref 68–126)
FASTING PATIENT LIPID ANSWER: YES
HBA1C MFR BLD: 7.2 % (ref ?–5.7)
HDLC SERPL-MCNC: 40 MG/DL (ref 40–59)
LDLC SERPL CALC-MCNC: 76 MG/DL (ref ?–100)
NONHDLC SERPL-MCNC: 110 MG/DL (ref ?–130)
TRIGL SERPL-MCNC: 200 MG/DL (ref 30–149)
VLDLC SERPL CALC-MCNC: 31 MG/DL (ref 0–30)

## 2023-06-12 PROCEDURE — 80061 LIPID PANEL: CPT

## 2023-06-12 PROCEDURE — 83036 HEMOGLOBIN GLYCOSYLATED A1C: CPT

## 2023-06-13 ENCOUNTER — OFFICE VISIT (OUTPATIENT)
Dept: ENDOCRINOLOGY CLINIC | Facility: CLINIC | Age: 73
End: 2023-06-13
Payer: MEDICARE

## 2023-06-13 VITALS
HEART RATE: 87 BPM | BODY MASS INDEX: 30 KG/M2 | DIASTOLIC BLOOD PRESSURE: 73 MMHG | RESPIRATION RATE: 20 BRPM | OXYGEN SATURATION: 94 % | WEIGHT: 158.63 LBS | SYSTOLIC BLOOD PRESSURE: 116 MMHG

## 2023-06-13 DIAGNOSIS — E11.65 TYPE 2 DIABETES MELLITUS WITH HYPERGLYCEMIA, WITHOUT LONG-TERM CURRENT USE OF INSULIN (HCC): Primary | ICD-10-CM

## 2023-06-13 PROCEDURE — 99214 OFFICE O/P EST MOD 30 MIN: CPT | Performed by: NURSE PRACTITIONER

## 2023-06-13 RX ORDER — GLIPIZIDE 5 MG/1
5 TABLET, FILM COATED, EXTENDED RELEASE ORAL DAILY
Qty: 90 TABLET | Refills: 1 | COMMUNITY
Start: 2023-06-13

## 2023-06-13 RX ORDER — GLIPIZIDE 2.5 MG/1
2.5 TABLET, EXTENDED RELEASE ORAL
Qty: 90 TABLET | Refills: 1 | Status: SHIPPED | OUTPATIENT
Start: 2023-06-13

## 2023-06-16 ENCOUNTER — OFFICE VISIT (OUTPATIENT)
Dept: ORTHOPEDICS CLINIC | Facility: CLINIC | Age: 73
End: 2023-06-16
Payer: MEDICARE

## 2023-06-16 DIAGNOSIS — M17.0 PRIMARY OSTEOARTHRITIS OF BOTH KNEES: Primary | ICD-10-CM

## 2023-06-16 RX ORDER — OMEPRAZOLE 20 MG/1
20 CAPSULE, DELAYED RELEASE ORAL EVERY MORNING
Qty: 90 CAPSULE | Refills: 0 | Status: SHIPPED | OUTPATIENT
Start: 2023-06-16

## 2023-06-16 NOTE — PROCEDURES
After informed consent, the patient's right and left knees were marked, locally anesthetized with skin refrigerant, prepped with topical antiseptic, and injected with 5mL of 32mg/5mL Zilretta through the inferolateral portal.  A band-aid was applied. The patient tolerated the procedure well.     Leonel Arriaga MD, [address] [place] [facility]  Phone [phone]  Fax [phone]

## 2023-06-20 ENCOUNTER — OFFICE VISIT (OUTPATIENT)
Dept: PHYSICAL THERAPY | Age: 73
End: 2023-06-20
Attending: INTERNAL MEDICINE
Payer: MEDICARE

## 2023-06-20 DIAGNOSIS — G89.29 CHRONIC PAIN OF BOTH KNEES: ICD-10-CM

## 2023-06-20 DIAGNOSIS — M25.561 CHRONIC PAIN OF BOTH KNEES: ICD-10-CM

## 2023-06-20 DIAGNOSIS — M25.562 CHRONIC PAIN OF BOTH KNEES: ICD-10-CM

## 2023-06-20 PROCEDURE — 97110 THERAPEUTIC EXERCISES: CPT

## 2023-06-20 PROCEDURE — 97161 PT EVAL LOW COMPLEX 20 MIN: CPT

## 2023-06-23 ENCOUNTER — OFFICE VISIT (OUTPATIENT)
Dept: PHYSICAL THERAPY | Age: 73
End: 2023-06-23
Attending: INTERNAL MEDICINE
Payer: MEDICARE

## 2023-06-23 PROCEDURE — 97140 MANUAL THERAPY 1/> REGIONS: CPT

## 2023-06-23 PROCEDURE — 97110 THERAPEUTIC EXERCISES: CPT

## 2023-06-27 ENCOUNTER — OFFICE VISIT (OUTPATIENT)
Dept: PHYSICAL THERAPY | Age: 73
End: 2023-06-27
Attending: INTERNAL MEDICINE
Payer: MEDICARE

## 2023-06-27 PROCEDURE — 97110 THERAPEUTIC EXERCISES: CPT

## 2023-06-27 PROCEDURE — 97140 MANUAL THERAPY 1/> REGIONS: CPT

## 2023-06-29 ENCOUNTER — OFFICE VISIT (OUTPATIENT)
Dept: PHYSICAL THERAPY | Age: 73
End: 2023-06-29
Attending: INTERNAL MEDICINE
Payer: MEDICARE

## 2023-06-29 PROCEDURE — 97140 MANUAL THERAPY 1/> REGIONS: CPT

## 2023-06-29 PROCEDURE — 97110 THERAPEUTIC EXERCISES: CPT

## 2023-07-06 DIAGNOSIS — E11.65 TYPE 2 DIABETES MELLITUS WITH HYPERGLYCEMIA, WITHOUT LONG-TERM CURRENT USE OF INSULIN (HCC): ICD-10-CM

## 2023-07-06 RX ORDER — GLIPIZIDE 5 MG/1
5 TABLET, FILM COATED, EXTENDED RELEASE ORAL DAILY
Qty: 90 TABLET | Refills: 1 | Status: SHIPPED | OUTPATIENT
Start: 2023-07-06

## 2023-07-06 NOTE — TELEPHONE ENCOUNTER
Requested Prescriptions     Pending Prescriptions Disp Refills    GLIPIZIDE ER 5 MG Oral Tablet 24 Hr [Pharmacy Med Name: GLIPIZIDE ER 5MG TABLETS] 90 tablet 1     Sig: TAKE 1 TABLET(5 MG) BY MOUTH DAILY     Future Appointments   Date Time Provider Del Kim   7/7/2023 10:45 AM Dudley Mejia, PT [facility] [place]   7/10/2023 10:00 AM Dudley Mejia, PT [facility] [place]   7/18/2023 12:30 PM Dudley Mejia PT [facility] [place]   7/21/2023 11:30 AM Dudley Mejia, PT [facility] [place]   9/6/2023 11:30 AM Lucita Cope, DO [facility] [place]   9/12/2023 10:00 AM LYNNETTE Barnes [facility] [address]   9/26/2023  9:15 AM Amina Hands, DO [facility]   9/26/2023  9:45 AM Marvine Hands, DO [facility]     Last A1c value was 7.2% done 6/12/2023.   REFILL 06/13/23  LOV 06/16/23

## 2023-07-07 ENCOUNTER — OFFICE VISIT (OUTPATIENT)
Dept: PHYSICAL THERAPY | Age: 73
End: 2023-07-07
Attending: INTERNAL MEDICINE
Payer: MEDICARE

## 2023-07-07 PROCEDURE — 97110 THERAPEUTIC EXERCISES: CPT

## 2023-07-07 NOTE — PROGRESS NOTES
Diagnosis:   Bilateral knee pain R>L , bilateral knee OA     Referring Provider: Celina Cordova  Date of Evaluation:    6/20/23    Precautions:    No NSAIDs Next MD visit:   none scheduled  Date of Surgery: n/a   Insurance Primary/Secondary: MEDICARE / Tiffanie Weber     # Auth Visits: 8 in POC            Subjective: Was able to do a fair amount of walking in [place] and it wasn't too bad. Also was able to navigate the ramp in the hotel . Feels therapy is helping her pain and mobility   Pain:   3/10  R/L knee     Objective:    AAROM supine :   R knee= 4-125    L= 0-130 deg   TTP at R pes anserine, decreased adductor flexility     Goals: (To be met in 8  visits)   Pt will improve knee extension ROM to 0 deg to allow proper heel strike during gait and improve ability to walk on declines   Pt will improve knee AROM flexion from 120 to greater than 125degrees to improve ability to complete transfers up and down from floor   Pt will improve quad strength to 5/5 to ascend 1 flight of stairs reciprocally with UE assist min to no difficulty   Pt will increase hip and knee strength to grossly 4+/5 to be able to improve 30 sec sit to stand from 8 reps to 10 reps ( n=10 reps)   Pt will improve SLS L from 15 secs to 20 secs  to  improve safety and independence with gait on uneven surfaces such as grass  Pt will be independent and compliant with comprehensive HEP to maintain progress achieved in PT    Assessment:   Minimal R medial knee soreness today. Tolerated increase in weight for LAQ and addition of standing hip flexion well. Plan: Cont 2x week with bilateral LE strengthening, ROM  to improve bilateral knee function. Date: 6/23/2023  TX#: 2/8 obdulia Date:   6/27/2023              TX#: 3/8 Date:  6/29/2023               TX#: 4/8 Date:   7/7/2023               TX#: 5/8 Date:    Tx#: 6/   Nustep st 6 , L4   6 mins warm up Nustep st 6 , L3  5 min Nustep st 6 , L3  5 min Nustep 12 mins while updating HEP and providing written instructions.      Standing hip abd x10 R/L  Standing hip ext x10 R/L  With YTB  TKE on ball supine, x10, 5 sec hold  SB ham curl, x20  Standing hip abd x10 R/L  Standing hip ext x10 R/L  With YTB   PROM, flex in sitting, ext supine SB ham curl, x20  SLR, R/L, x10  Standing hip abd x10 R/L  Standing hip ext x10 R/L  With YTB   PROM, flex in sitting, ext supine  LAQ, R/L 1#, x20  Sitting, abd squeeze, x20 Rocker board with UE support  A/P x20   - 3 way kicks with YTB    X12 R/L    Shuttle - leg press, R/L  DL: x20, 4c  SL: x20, 3c        Rocker board with UE support  A/P x20   Hold level : 1-2 mins of practice  Shuttle - leg press, R/L  DL: x20, 4c  SL: x20, 3c  Rocker board with UE support  A/P x20  Shuttle - leg press, R/L  DL: x20, 4c  SL: x20, 3c  Rocker board with UE support  A/P x20   TKE WB ball at wall, R/L, x20 TKE with ball R/L x20, (standing)   , LAQ with 2# wt x20 , seated ball squeeze 3 sec hold x20     Manual  15 mins  : tib-femoral GR II-III traction, stretch into flexion in sitting and supine, bilateral patellar medial glides   - supine tibial posterior glides in partial flexion , Gr II  , x 20  - gentle overpressure into knee extension R   Quad set(bilat)  with towel roll 10 sec x10      Manual 15 min  STM distal ITB, lat ham  gentle overpressure into knee extension R   R>L patellar medial and superior glides   Manual 15 min  STM distal ITB, distal adductor/lat ham for pes anserine on R  gentle overpressure into knee extension R   R>L patellar medial and superior glides     HEP:   Partial squats x10 reps TID , standing hip abd, hip ext x10-15 once a day ( yellow t band )  6/23/2023 bilat quad sets with towel roll, daily 10 sec x10  7/7/2023   TKE with ball R/L x20, , LAQ with 1# wt x20 , seated ball squeeze 3 sec hold x20     Charges: ex 3 45        Total Timed Treatment: 45min  Total Treatment Time: 45 min

## 2023-07-10 ENCOUNTER — OFFICE VISIT (OUTPATIENT)
Dept: PHYSICAL THERAPY | Age: 73
End: 2023-07-10
Attending: INTERNAL MEDICINE
Payer: MEDICARE

## 2023-07-10 PROCEDURE — 97140 MANUAL THERAPY 1/> REGIONS: CPT

## 2023-07-10 PROCEDURE — 97110 THERAPEUTIC EXERCISES: CPT

## 2023-07-10 NOTE — PROGRESS NOTES
Diagnosis:   Bilateral knee pain R>L , bilateral knee OA     Referring Provider: Tonya Marquis  Date of Evaluation:    6/20/23    Precautions:    No NSAIDs Next MD visit:   none scheduled  Date of Surgery: n/a   Insurance Primary/Secondary: MEDICARE / Will Juarez     # Auth Visits: 8 in POC            Subjective: Was able to do a fair amount of walking in [place] and it wasn't too bad. Also was able to navigate the ramp in the hotel . Feels therapy is helping her pain and mobility . Notes she occasionally has a sharp pain in her R ant thigh. Hip xray was neg. Pain:   3/10  R/L knee     Objective:    AAROM supine :   R knee= 4-125    L= 0-130 deg   TTP at R pes anserine, decreased adductor flexility     Goals: (To be met in 8  visits)   Pt will improve knee extension ROM to 0 deg to allow proper heel strike during gait and improve ability to walk on declines   Pt will improve knee AROM flexion from 120 to greater than 125degrees to improve ability to complete transfers up and down from floor   Pt will improve quad strength to 5/5 to ascend 1 flight of stairs reciprocally with UE assist min to no difficulty   Pt will increase hip and knee strength to grossly 4+/5 to be able to improve 30 sec sit to stand from 8 reps to 10 reps ( n=10 reps)   Pt will improve SLS L from 15 secs to 20 secs  to  improve safety and independence with gait on uneven surfaces such as grass  Pt will be independent and compliant with comprehensive HEP to maintain progress achieved in PT    Assessment:   Minimal R medial knee soreness today. Tolerated increase in weight for LAQ and addition of standing hip flexion well as well as increase in tension on shuttle. Plan: Cont 2x week with bilateral LE strengthening, ROM  to improve bilateral knee function.    Date: 6/23/2023  TX#: 2/8 obdulia Date:   6/27/2023              TX#: 3/8 Date:  6/29/2023               TX#: 4/8 Date:   7/7/2023               TX#: 5/8 Date: 7/10/2023   Tx[facility] 6/8   [facility]   6 mins warm up Nustep st 6 , L3  5 min Nustep st 6 , L3  5 min Nustep 12 mins while updating HEP and providing written instructions.   Nustep 8 mins while reviewing ice massage instructions   Standing hip abd x10 R/L  Standing hip ext x10 R/L  With YTB  TKE on ball supine, x10, 5 sec hold  SB ham curl, x20  Standing hip abd x10 R/L  Standing hip ext x10 R/L  With YTB   PROM, flex in sitting, ext supine SB ham curl, x20  SLR, R/L, x10  Standing hip abd x10 R/L  Standing hip ext x10 R/L  With YTB   PROM, flex in sitting, ext supine  LAQ, R/L 1#, x20  Sitting, abd squeeze, x20 Rocker board with UE support  A/P x20   - 3 way kicks with YTB    X12 R/L    Shuttle - leg press, R/L  DL: x20, 4c  SL: x20, 3c     Shuttle - leg press, R/L  DL: x30 , 4c  SL: x20, 4c    DHR:  L3 x20     - discussed seated lumbar flexion for spine stretch   Rocker board with UE support  A/P x20   Hold level : 1-2 mins of practice  Shuttle - leg press, R/L  DL: x20, 4c  SL: x20, 3c  Rocker board with UE support  A/P x20  Shuttle - leg press, R/L  DL: x20, 4c  SL: x20, 3c  Rocker board with UE support  A/P x20   TKE WB ball at wall, R/L, x20 TKE with ball R/L x20, (standing)   , LAQ with 2# wt x20 , seated ball squeeze 3 sec hold x20  TKE:progressed to 3# wt x20   Supine: ball  DKTC x20  - Bridge x15 with manual assist for LE stab   Manual  15 mins  : tib-femoral GR II-III traction, stretch into flexion in sitting and supine, bilateral patellar medial glides   - supine tibial posterior glides in partial flexion , Gr II  , x 20  - gentle overpressure into knee extension R   Quad set(bilat)  with towel roll 10 sec x10      Manual 15 min  STM distal ITB, lat ham  gentle overpressure into knee extension R   R>L patellar medial and superior glides   Manual 15 min  STM distal ITB, distal adductor/lat ham for pes anserine on R  gentle overpressure into knee extension R   R>L patellar medial and superior glides  Manual 15 min  - posterior tibial glides Gr III  , 20 oscillations R/L  STM distal ITB, distal adductor/lat ham for pes anserine on R  gentle overpressure into knee extension R   R,L patellar medial and superior glides   HEP:   Partial squats x10 reps TID , standing hip abd, hip ext x10-15 once a day ( yellow t band )  6/23/2023 bilat quad sets with towel roll, daily 10 sec x10  7/7/2023   TKE with ball R/L x20, , LAQ with 1# wt x20 , seated ball squeeze 3 sec hold x20     Charges: ex 2, M       Total Timed Treatment: 45min  Total Treatment Time: 45 min

## 2023-07-18 ENCOUNTER — OFFICE VISIT (OUTPATIENT)
Dept: PHYSICAL THERAPY | Age: 73
End: 2023-07-18
Attending: INTERNAL MEDICINE
Payer: MEDICARE

## 2023-07-18 PROCEDURE — 97110 THERAPEUTIC EXERCISES: CPT

## 2023-07-18 PROCEDURE — 97140 MANUAL THERAPY 1/> REGIONS: CPT

## 2023-07-18 NOTE — PROGRESS NOTES
Diagnosis:   Bilateral knee pain R>L , bilateral knee OA     Referring Provider: No ref. provider found  Date of Evaluation:    6/20/23    Precautions:    No NSAIDs Next MD visit:   none scheduled  Date of Surgery: n/a   Insurance Primary/Secondary: MEDICARE / Will Juarez     # Auth Visits: 8 in POC            Subjective:. Feels therapy is helping her pain and mobility . Notes she occasionally has a sharp pain in her R ant thigh but it hasn't been frequent and tends come and go . Hip xray was neg. Pain:   3/10  R knee     Objective:    AAROM supine :   R knee= 4-125    L= 0-130 deg   Min TTP at R pes anserine, decreased adductor flexibility  30 sec sit to stand=10 reps        Goals: (To be met in 8  visits)   Pt will improve knee extension ROM to 0 deg to allow proper heel strike during gait and improve ability to walk on declines  IMPROVED   Pt will improve knee AROM flexion from 120 to greater than 125degrees to improve ability to complete transfers up and down from floor MET   Pt will improve quad strength to 5/5 to ascend 1 flight of stairs reciprocally with UE assist min to no difficulty  MET  Pt will increase hip and knee strength to grossly 4+/5 to be able to improve 30 sec sit to stand from 8 reps to 10 reps ( n=10 reps)  MET  Pt will improve SLS L from 15 secs to 20 secs  to  improve safety and independence with gait on uneven surfaces such as grass  Pt will be independent and compliant with comprehensive HEP to maintain progress achieved in PT    Assessment:   Tolerated  increase in tension on shuttle. Had some R PF pain with increase in weight for TKEs. Pain is low grade and manageable. Plan: Cont x1 Friday.   Recheck SLS R/L, pes anserine bursa  Date: 6/23/2023  TX#: 2/8 obdulia Date:   6/27/2023              TX#: 3/8 Date:  6/29/2023               TX#: 4/8 Date:   7/7/2023               TX#: 5/8 Date: 7/10/2023   Tx#: 6/8 7/18/2023 7/8   Nustep st 6 , L4   6 mins warm up Nustep st 6 , L3  5 min Nustep st 6 , L3  5 min Nustep 12 mins while updating HEP and providing written instructions.   Nustep 8 mins while reviewing ice massage instructions Nustep  5 mins    Standing hip abd x10 R/L  Standing hip ext x10 R/L  With YTB  TKE on ball supine, x10, 5 sec hold  SB ham curl, x20  Standing hip abd x10 R/L  Standing hip ext x10 R/L  With YTB   PROM, flex in sitting, ext supine SB ham curl, x20  SLR, R/L, x10  Standing hip abd x10 R/L  Standing hip ext x10 R/L  With YTB   PROM, flex in sitting, ext supine  LAQ, R/L 1#, x20  Sitting, abd squeeze, x20 Rocker board with UE support  A/P x20   - 3 way kicks with YTB    X12 R/L    Shuttle - leg press, R/L  DL: x20, 4c  SL: x20, 3c     Shuttle - leg press, R/L  DL: x30 , 4c  SL: x20, 4c    DHR:  L3 x20     - discussed seated lumbar flexion for spine stretch Shuttle - leg press, R/L  DL: x30 , 5c  SL: x20, 4c  Medial R patellar glide for tracking    DHR:  L3 x20    Rocker board with UE support  A/P x20   Hold level : 1-2 mins of practice  Shuttle - leg press, R/L  DL: x20, 4c  SL: x20, 3c  Rocker board with UE support  A/P x20  Shuttle - leg press, R/L  DL: x20, 4c  SL: x20, 3c  Rocker board with UE support  A/P x20   TKE WB ball at wall, R/L, x20 TKE with ball R/L x20, (standing)   , LAQ with 2# wt x20 , seated ball squeeze 3 sec hold x20  TKE:progressed to 3# wt x20   Supine: ball  DKTC x20  - Bridge x15 with manual assist for LE stab Tke 4# x20 ( slight ant knee pain)   Supine DKTC with ball x20   - LTR x30 R/L * added to HEP  SLR x20 R/L with QS  - sit to stand x10   Manual  15 mins  : tib-femoral GR II-III traction, stretch into flexion in sitting and supine, bilateral patellar medial glides   - supine tibial posterior glides in partial flexion , Gr II  , x 20  - gentle overpressure into knee extension R   Quad set(bilat)  with towel roll 10 sec x10      Manual 15 min  STM distal ITB, lat ham  gentle overpressure into knee extension R   R>L patellar medial and superior glides   Manual 15 min  STM distal ITB, distal adductor/lat ham for pes anserine on R  gentle overpressure into knee extension R   R>L patellar medial and superior glides  Manual 15 min  - posterior tibial glides Gr III  , 20 oscillations R/L  STM distal ITB, distal adductor/lat ham for pes anserine on R  gentle overpressure into knee extension R   R,L patellar medial and superior glides Manual 15 min  - posterior tibial glides Gr III  , 20 oscillations R/L  STM distal ITB, distal adductor/lat ham for pes anserine on R  gentle overpressure into knee extension R   R,L patellar medial and superior glides   HEP:   Partial squats x10 reps TID , standing hip abd, hip ext x10-15 once a day ( yellow t band )  6/23/2023 bilat quad sets with towel roll, daily 10 sec x10  7/7/2023   TKE with ball R/L x20, , LAQ with 1# wt x20 , seated ball squeeze 3 sec hold x20   7/18/2023    DKTC with exercise ball x20     Changed frequency of all exercises to 4x week.      Charges: ex 2, M       Total Timed Treatment: 45min  Total Treatment Time: 45 min

## 2023-07-21 ENCOUNTER — OFFICE VISIT (OUTPATIENT)
Dept: PHYSICAL THERAPY | Age: 73
End: 2023-07-21
Attending: INTERNAL MEDICINE
Payer: MEDICARE

## 2023-07-21 DIAGNOSIS — E11.65 TYPE 2 DIABETES MELLITUS WITH HYPERGLYCEMIA, WITHOUT LONG-TERM CURRENT USE OF INSULIN (HCC): ICD-10-CM

## 2023-07-21 PROCEDURE — 97110 THERAPEUTIC EXERCISES: CPT

## 2023-07-21 PROCEDURE — 97140 MANUAL THERAPY 1/> REGIONS: CPT

## 2023-07-21 RX ORDER — METFORMIN HYDROCHLORIDE 500 MG/1
500 TABLET, EXTENDED RELEASE ORAL
Qty: 90 TABLET | Refills: 1 | Status: SHIPPED | OUTPATIENT
Start: 2023-07-21

## 2023-07-21 NOTE — TELEPHONE ENCOUNTER
Requested Prescriptions     Pending Prescriptions Disp Refills    METFORMIN  MG Oral Tablet 24 Hr [Pharmacy Med Name: METFORMIN ER 500MG 24HR TABS] 90 tablet 1     Sig: TAKE 1 TABLET(500 MG) BY MOUTH DAILY WITH DINNER      Your appointments       Date & Time Appointment Department [facility])    Sep 06, 2023 11:30 AM CDT Exam - Established with Carlos Dimas DO [address], [address] [facility] ([address])        Sep 12, 2023 10:00 AM CDT Diabetes Pump follow up with LYNNETTE Daniel [facility], [facility] ( [facility])        Sep 26, 2023  9:15 AM CDT Colonoscopy with Corina Thayer DO [facility] ([facility])    Please arrive 60 minutes prior to your scheduled procedure time. Sep 26, 2023  9:45 AM CDT EGD with Corina Thayer DO [facility] ([facility])    Please arrive 60 minutes prior to your scheduled procedure time. [facility], [address], [place]   [address] [place] [facility], [place], [address] [facility] [place] [address] [facility] [address] [phone] [facility]  [id number] [place] [address] [phone]       Last A1c value was 7.2% done 6/12/2023.      refill : 1/23/23  LOV: 6/13/23    EGFR: 6/8/23  eGFR-Cr  >=60 mL/min/1.73m2 93

## 2023-07-21 NOTE — PROGRESS NOTES
Diagnosis:   Bilateral knee pain R>L , bilateral knee OA     Referring Provider: No ref. provider found  Date of Evaluation:    6/20/23    Precautions:    No NSAIDs Next MD visit:   none scheduled  Date of Surgery: n/a   Insurance Primary/Secondary: MEDICARE / Severiano Precise     # Auth Visits: 8 in POC          Discharge Summary   LEFS Score  LEFS Score: 52.5 % (6/20/2023  3:01 PM)   Post LEFS Score  Post LEFS Score: 78.75 % (7/21/2023 12:13 PM)    26.25 % improvement   Subjective:. Feels therapy is helping her pain and mobility . Notes some soreness in L medial tibia when she walks, but it isn't bad. Notes she occasionally has a sharp pain in her R ant thigh but it hasn't been frequent and tends come and go . Hip xray was neg. Pain:   3/10  R knee     Objective:    AAROM supine :   R knee= 4-125    L= 0-130 deg   Min TTP at R medial tibial, no swelling at pes anserine bursa   30 sec sit to stand=10 reps   SLS R/L =4 secs        Goals: (To be met in 8  visits)   Pt will improve knee extension ROM to 0 deg to allow proper heel strike during gait and improve ability to walk on declines  IMPROVED   Pt will improve knee AROM flexion from 120 to greater than 125degrees to improve ability to complete transfers up and down from floor MET   Pt will improve quad strength to 5/5 to ascend 1 flight of stairs reciprocally with UE assist min to no difficulty  MET  Pt will increase hip and knee strength to grossly 4+/5 to be able to improve 30 sec sit to stand from 8 reps to 10 reps ( n=10 reps)  MET  Pt will improve SLS L from 4  secs to 20 secs  to  improve safety and independence with gait on uneven surfaces such as grass  Not met   Pt will be independent and compliant with comprehensive HEP to maintain progress achieved in PT    Assessment:   Tolerated  increase in tension on shuttle. Had some R PF pain with increase in weight for TKEs. Pain is low grade and manageable.     Plan: Discharge PT   Date: 6/23/2023  TX#: 2/8 obdulia Date:   6/27/2023              TX#: 3/8 Date:  6/29/2023               TX#: 4/8 Date:   7/7/2023               TX#: 5/8 Date: 7/10/2023   Tx#: 6/8 7/18/2023 7/8 7/21/2023 8/8   Nustep st 6 , L4   6 mins warm up Nustep st 6 , L3  5 min Nustep st 6 , L3  5 min Nustep 12 mins while updating HEP and providing written instructions.   Nustep 8 mins while reviewing ice massage instructions Nustep  5 mins  Nustep 5 mins    Standing hip abd x10 R/L  Standing hip ext x10 R/L  With YTB  TKE on ball supine, x10, 5 sec hold  SB ham curl, x20  Standing hip abd x10 R/L  Standing hip ext x10 R/L  With YTB   PROM, flex in sitting, ext supine SB ham curl, x20  SLR, R/L, x10  Standing hip abd x10 R/L  Standing hip ext x10 R/L  With YTB   PROM, flex in sitting, ext supine  LAQ, R/L 1#, x20  Sitting, abd squeeze, x20 Rocker board with UE support  A/P x20   - 3 way kicks with YTB    X12 R/L    Shuttle - leg press, R/L  DL: x20, 4c  SL: x20, 3c     Shuttle - leg press, R/L  DL: x30 , 4c  SL: x20, 4c    DHR:  L3 x20     - discussed seated lumbar flexion for spine stretch Shuttle - leg press, R/L  DL: x30 , 5c  SL: x20, 4c  Medial R patellar glide for tracking    DHR:  L3 x20  SLS R/L 3 attempts Shuttle - leg press, R/L  DL: x30 , 5c  SL: x20, 4c  Medial R patellar glide for tracking    DHR:  L3 x20      Rocker board with UE support  A/P x20   Hold level : 1-2 mins of practice  Shuttle - leg press, R/L  DL: x20, 4c  SL: x20, 3c  Rocker board with UE support  A/P x20  Shuttle - leg press, R/L  DL: x20, 4c  SL: x20, 3c  Rocker board with UE support  A/P x20   TKE WB ball at wall, R/L, x20 TKE with ball R/L x20, (standing)   , LAQ with 2# wt x20 , seated ball squeeze 3 sec hold x20  TKE:progressed to 3# wt x20   Supine: ball  DKTC x20  - Bridge x15 with manual assist for LE stab Tke 4# x20 ( slight ant knee pain)   Supine DKTC with ball x20   - LTR x30 R/L * added to HEP  SLR x20 R/L with QS  - sit to stand x10 Hip add  with ball 3 sec x 20   Vestibular board A/P and hold level    Manual  15 mins  : tib-femoral GR II-III traction, stretch into flexion in sitting and supine, bilateral patellar medial glides   - supine tibial posterior glides in partial flexion , Gr II  , x 20  - gentle overpressure into knee extension R   Quad set(bilat)  with towel roll 10 sec x10      Manual 15 min  STM distal ITB, lat ham  gentle overpressure into knee extension R   R>L patellar medial and superior glides   Manual 15 min  STM distal ITB, distal adductor/lat ham for pes anserine on R  gentle overpressure into knee extension R   R>L patellar medial and superior glides  Manual 15 min  - posterior tibial glides Gr III  , 20 oscillations R/L  STM distal ITB, distal adductor/lat ham for pes anserine on R  gentle overpressure into knee extension R   R,L patellar medial and superior glides Manual 15 min  - posterior tibial glides Gr III  , 20 oscillations R/L  STM distal ITB, distal adductor/lat ham for pes anserine on R  gentle overpressure into knee extension R   R,L patellar medial and superior glides Manual 15 min  - posterior tibial glides Gr III  , 20 oscillations R/L  STM distal ITB, distal adductor/lat ham for pes anserine on R  gentle overpressure into knee extension R   R,L patellar medial and superior glides  - seated tibial- femoral glides. HEP:   Partial squats x10 reps TID , standing hip abd, hip ext x10-15 once a day ( yellow t band )  6/23/2023 bilat quad sets with towel roll, daily 10 sec x10  7/7/2023   TKE with ball R/L x20, , LAQ with 1# wt x20 , seated ball squeeze 3 sec hold x20   7/18/2023    DKTC with exercise ball x20     Changed frequency of all exercises to 4x week.      Charges: ex 2, M       Total Timed Treatment: 45min  Total Treatment Time: 45 min

## 2023-07-25 ENCOUNTER — TELEPHONE (OUTPATIENT)
Dept: ENDOCRINOLOGY CLINIC | Facility: CLINIC | Age: 73
End: 2023-07-25

## 2023-07-25 NOTE — TELEPHONE ENCOUNTER
Pt contacted the office in regards to med refills for metformin for 90 days. Pt only has 1 tablet left.

## 2023-07-26 ENCOUNTER — TELEPHONE (OUTPATIENT)
Dept: ENDOCRINOLOGY CLINIC | Facility: CLINIC | Age: 73
End: 2023-07-26

## 2023-07-26 NOTE — TELEPHONE ENCOUNTER
[facility] called in regards to Metformin they state they never received the refill pt was inquiring. I was going to do a verbal on the metformin, but pharmacist had a question if pt should be on Jardiance, Glipizide and Metformin. Since Shauna Juarez was written by Catherine Clayton. Please advise.

## 2023-07-26 NOTE — TELEPHONE ENCOUNTER
Pt states that pharmacy declined the refill for Metformin. Pt advised that a refill was sent for metformin on 7/21/23. Pt was instructed by pharmacy to contact provider ofc.

## 2023-08-08 ENCOUNTER — TELEPHONE (OUTPATIENT)
Dept: INTERNAL MEDICINE CLINIC | Facility: CLINIC | Age: 73
End: 2023-08-08

## 2023-09-06 ENCOUNTER — OFFICE VISIT (OUTPATIENT)
Dept: RHEUMATOLOGY | Facility: CLINIC | Age: 73
End: 2023-09-06
Payer: MEDICARE

## 2023-09-06 VITALS
OXYGEN SATURATION: 94 % | TEMPERATURE: 98 F | WEIGHT: 155 LBS | HEART RATE: 92 BPM | HEIGHT: 60.5 IN | DIASTOLIC BLOOD PRESSURE: 60 MMHG | SYSTOLIC BLOOD PRESSURE: 114 MMHG | BODY MASS INDEX: 29.65 KG/M2 | RESPIRATION RATE: 16 BRPM

## 2023-09-06 DIAGNOSIS — M35.00 SICCA SYNDROME (HCC): ICD-10-CM

## 2023-09-06 DIAGNOSIS — M15.4 EROSIVE OSTEOARTHRITIS OF BOTH HANDS: ICD-10-CM

## 2023-09-06 DIAGNOSIS — M15.9 PRIMARY OSTEOARTHRITIS INVOLVING MULTIPLE JOINTS: Primary | ICD-10-CM

## 2023-09-06 DIAGNOSIS — R76.8 ANA POSITIVE: ICD-10-CM

## 2023-09-06 DIAGNOSIS — R76.8 RIBONUCLEOPROTEIN ANTIBODY POSITIVE: ICD-10-CM

## 2023-09-06 PROCEDURE — 99213 OFFICE O/P EST LOW 20 MIN: CPT | Performed by: INTERNAL MEDICINE

## 2023-09-12 ENCOUNTER — OFFICE VISIT (OUTPATIENT)
Dept: ENDOCRINOLOGY CLINIC | Facility: CLINIC | Age: 73
End: 2023-09-12
Payer: MEDICARE

## 2023-09-12 VITALS
TEMPERATURE: 97 F | WEIGHT: 157.38 LBS | HEIGHT: 59.84 IN | RESPIRATION RATE: 18 BRPM | OXYGEN SATURATION: 96 % | HEART RATE: 86 BPM | SYSTOLIC BLOOD PRESSURE: 120 MMHG | DIASTOLIC BLOOD PRESSURE: 58 MMHG | BODY MASS INDEX: 30.9 KG/M2

## 2023-09-12 DIAGNOSIS — E11.65 TYPE 2 DIABETES MELLITUS WITH HYPERGLYCEMIA, WITHOUT LONG-TERM CURRENT USE OF INSULIN (HCC): Primary | ICD-10-CM

## 2023-09-12 LAB
CARTRIDGE LOT#: 611 NUMERIC
CREAT UR-SCNC: 43.2 MG/DL
HEMOGLOBIN A1C: 7.3 % (ref 4.3–5.6)
MICROALBUMIN UR-MCNC: 2.43 MG/DL
MICROALBUMIN/CREAT 24H UR-RTO: 56.3 UG/MG (ref ?–30)

## 2023-09-12 PROCEDURE — 83036 HEMOGLOBIN GLYCOSYLATED A1C: CPT | Performed by: NURSE PRACTITIONER

## 2023-09-12 PROCEDURE — 82043 UR ALBUMIN QUANTITATIVE: CPT | Performed by: NURSE PRACTITIONER

## 2023-09-12 PROCEDURE — 82570 ASSAY OF URINE CREATININE: CPT | Performed by: NURSE PRACTITIONER

## 2023-09-12 PROCEDURE — 99214 OFFICE O/P EST MOD 30 MIN: CPT | Performed by: NURSE PRACTITIONER

## 2023-09-12 RX ORDER — DULAGLUTIDE 3 MG/.5ML
3 INJECTION, SOLUTION SUBCUTANEOUS WEEKLY
Qty: 6 ML | Refills: 1 | Status: SHIPPED | OUTPATIENT
Start: 2023-09-12 | End: 2023-09-12 | Stop reason: ALTCHOICE

## 2023-09-13 ENCOUNTER — TELEPHONE (OUTPATIENT)
Dept: ENDOCRINOLOGY CLINIC | Facility: CLINIC | Age: 73
End: 2023-09-13

## 2023-09-13 DIAGNOSIS — R80.9 TYPE 2 DIABETES MELLITUS WITH MICROALBUMINURIA, WITHOUT LONG-TERM CURRENT USE OF INSULIN: Primary | ICD-10-CM

## 2023-09-13 DIAGNOSIS — E11.29 TYPE 2 DIABETES MELLITUS WITH MICROALBUMINURIA, WITHOUT LONG-TERM CURRENT USE OF INSULIN: Primary | ICD-10-CM

## 2023-09-13 RX ORDER — LOSARTAN POTASSIUM 25 MG/1
25 TABLET ORAL DAILY
Qty: 90 TABLET | Refills: 1 | Status: SHIPPED | OUTPATIENT
Start: 2023-09-13

## 2023-09-13 RX ORDER — BLOOD SUGAR DIAGNOSTIC
STRIP MISCELLANEOUS
Qty: 200 STRIP | Refills: 5 | Status: SHIPPED | OUTPATIENT
Start: 2023-09-13 | End: 2024-09-12

## 2023-09-13 RX ORDER — LANCETS 33 GAUGE
EACH MISCELLANEOUS
Qty: 200 EACH | Refills: 5 | Status: SHIPPED | OUTPATIENT
Start: 2023-09-13

## 2023-09-26 ENCOUNTER — PATIENT MESSAGE (OUTPATIENT)
Dept: ENDOCRINOLOGY CLINIC | Facility: CLINIC | Age: 73
End: 2023-09-26

## 2023-09-26 PROBLEM — Z86.0101 HISTORY OF ADENOMATOUS POLYP OF COLON: Status: ACTIVE | Noted: 2023-09-26

## 2023-09-26 PROBLEM — Z80.0 FAMILY HISTORY OF ESOPHAGEAL CANCER: Status: ACTIVE | Noted: 2023-09-26

## 2023-09-26 PROBLEM — D12.5 BENIGN NEOPLASM OF SIGMOID COLON: Status: ACTIVE | Noted: 2023-09-26

## 2023-09-26 PROBLEM — D12.2 BENIGN NEOPLASM OF ASCENDING COLON: Status: ACTIVE | Noted: 2023-09-26

## 2023-09-26 PROBLEM — D12.0 BENIGN NEOPLASM OF CECUM: Status: ACTIVE | Noted: 2023-09-26

## 2023-09-26 PROBLEM — Z86.010 HISTORY OF ADENOMATOUS POLYP OF COLON: Status: ACTIVE | Noted: 2023-09-26

## 2023-10-17 ENCOUNTER — TELEPHONE (OUTPATIENT)
Dept: ENDOCRINOLOGY CLINIC | Facility: CLINIC | Age: 73
End: 2023-10-17

## 2023-10-17 NOTE — TELEPHONE ENCOUNTER
Call from patient stating she has tried multiple pharmacies and no one has 2 mg dose of ozempic. Informed patient we will need to temp decrease dose until shortage resolves. Will pend 1 mg dose to [facility]. LOV: 09/12/2023  Future Appointments   Date Time Provider Del Alvarez   10/24/2023 10:30 AM Corina Jett MD [facility]   10/25/2023  9:00 AM LYNNETTE Brady [facility]   12/12/2023  9:45 AM LYNNETTE Clark [facility] [place]   1/3/2024 10:00 AM Naeem Don MD [facility] [place]   3/6/2024 11:00 AM Lucita Cope DO [facility] [place]     Last A1c value was 7.3% done 9/12/2023.

## 2023-10-24 ENCOUNTER — OFFICE VISIT (OUTPATIENT)
Facility: LOCATION | Age: 73
End: 2023-10-24

## 2023-10-24 DIAGNOSIS — K64.8 INTERNAL HEMORRHOIDS WITH COMPLICATION: ICD-10-CM

## 2023-10-24 DIAGNOSIS — K62.5 RECTAL BLEEDING: Primary | ICD-10-CM

## 2023-10-24 PROCEDURE — 46600 DIAGNOSTIC ANOSCOPY SPX: CPT | Performed by: SURGERY

## 2023-10-24 PROCEDURE — 99204 OFFICE O/P NEW MOD 45 MIN: CPT | Performed by: SURGERY

## 2023-10-24 NOTE — H&P
New Patient Visit Note       Active Problems      1. Rectal bleeding    2. Internal hemorrhoids with complication        Chief Complaint   Patient presents with:  Hemorrhoids: NP - Hemorrhoids, unspecified hemorrhoid type, ref by Dr. Harris Sen - Pt c/o bleeding w/ BM's       History of Present Illness     Patient presents at the request of her primary care physician and gastroenterologist for evaluation of rectal bleeding. The patient has been experiencing rectal bleeding and anemia for which she had EGD and colonoscopy. I have reviewed the reports; multiple lesions identified including internal hemorrhoids. Patient is referred for evaluation management of her internal hemorrhoids. The patient denies fever, chills, chest pain, shortness of breath, dyspnea. The patient also denies hematemesis, melena. The patient denies change in bowel or bladder habits. There is no complaint of hematuria or dysuria. Allergies  Lexi is allergic to boniva [ibandronate sodium] and lisinopril. Past Medical / Surgical / Social / Family History    The past medical and past surgical history have been reviewed by me today.     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 OOPHORECTOMY Bilateral 2004    total hystero. OTHER SURGICAL HISTORY      varicose vein procedures    TONSILLECTOMY      TOTAL ABDOM HYSTERECTOMY      UPPER GI ENDOSCOPY,EXAM  01/01/2008       The family history and social history have been reviewed by me today. Family History   Problem Relation Age of Onset    Other (Lung ca) Mother     Other (Dementia) Father     Other (Thrombocytopenia) Other         Sibling    Heart Disorder Maternal Grandmother         CVA    Heart Disorder Maternal Grandfather         MI    Cancer Brother         esophageal cancer     Social History    Socioeconomic History      Marital status:       Number of children: 3    Occupational History      Occupation: Food service        Employer: [employer]    Tobacco Use      Smoking status: Never      Smokeless tobacco: Never    Vaping Use      Vaping Use: Never used    Substance and Sexual Activity      Alcohol use: No      Drug use: No    Other Topics      Concerns:        Caffeine Concern: No        Exercise: Yes          walking        Seat Belt: Yes        Special Diet: Yes          diabetic        Stress Concern: No        Weight Concern: Yes       Current Outpatient Medications:     semaglutide 4 MG/3ML Subcutaneous Solution Pen-injector, Inject 1 mg into the skin once a week., Disp: 3 mL, Rfl: 1    pantoprazole 40 MG Oral Tab EC, Take one tablet (40 mg total) by mouth once daily, 30 minutes prior to breakfast., Disp: 90 tablet, Rfl: 3    semaglutide 8 MG/3ML Subcutaneous Solution Pen-injector, Inject 2 mg into the skin once a week., Disp: , Rfl:     losartan 25 MG Oral Tab, Take 1 tablet (25 mg total) by mouth daily. , Disp: 90 tablet, Rfl: 1    Glucose Blood (ONETOUCH VERIO) In Vitro Strip, Use to test blood sugar twice daily, Disp: 200 strip, Rfl: 5    OneTouch Delica Lancets 33G Does not apply Misc, Use with test strips to check blood sugar twice daily, Disp: 200 each, Rfl: 5    semaglutide 8 MG/3ML Subcutaneous Solution Pen-injector, Inject 2 mg into the skin once a week., Disp: 1 each, Rfl: 12    metFORMIN  MG Oral Tablet 24 Hr, Take 1 tablet (500 mg total) by mouth daily with dinner., Disp: 90 tablet, Rfl: 1    glipiZIDE ER 5 MG Oral Tablet 24 Hr, Take 1 tablet (5 mg total) by mouth daily. , Disp: 90 tablet, Rfl: 1    omeprazole 20 MG Oral Capsule Delayed Release, Take 1 capsule (20 mg total) by mouth every morning., Disp: 90 capsule, Rfl: 0    glipiZIDE ER 2.5 MG Oral Tablet 24 Hr, Take 1 tablet (2.5 mg total) by mouth daily with breakfast. Take with 5 mg dose for total of 7.5 mg daily. , Disp: 90 tablet, Rfl: 1    PEG 3350-KCl-NaBcb-NaCl-NaSulf (PEG 3350/ELECTROLYTES) 240 g Oral Recon Soln, Take as directed by physician., Disp: 4000 mL, Rfl: 0    Empagliflozin (JARDIANCE) 25 MG Oral Tab, Take 25 mg by mouth daily. , Disp: 90 tablet, Rfl: 3    ROSUVASTATIN 20 MG Oral Tab, TAKE 1 TABLET DAILY, Disp: 90 tablet, Rfl: 3    TROSPIUM CHLORIDE ER 60 MG Oral Capsule SR 24 Hr, TAKE 1 CAPSULE(60 MG) BY MOUTH DAILY, Disp: 90 capsule, Rfl: 3    Betamethasone Dipropionate Aug 0.05 % External Cream, Apply 1 Application topically 2 (two) times daily as needed (do not use longer than 14 days in a row). , Disp: 15 g, Rfl: 0    Multiple Vitamin (DAILY VALUE MULTIVITAMIN) Oral Tab, Take 1 tablet by mouth daily. , Disp: , Rfl:     Blood Glucose Monitoring Suppl (ONETOUCH VERIO FLEX SYSTEM) w/Device Does not apply Kit, Use machine to test blood sugar twice a day as directed., Disp: 1 kit, Rfl: 0    ferrous sulfate 325 (65 FE) MG Oral Tab EC, Take 1 tablet (325 mg total) by mouth daily with breakfast., Disp: , Rfl:     Cholecalciferol (VITAMIN D) 1000 UNITS Oral Cap, Take 4,000 mg by mouth daily. , Disp: , Rfl:       Review of Systems  The Review of Systems has been reviewed by me during today.  Review of Systems   Constitutional:  Negative for chills, diaphoresis, fatigue, fever and unexpected weight change. HENT:  Negative for hearing loss, nosebleeds, sore throat and trouble swallowing. Respiratory:  Negative for apnea, cough, shortness of breath and wheezing. Cardiovascular:  Negative for chest pain, palpitations and leg swelling. Gastrointestinal:  Negative for abdominal distention, abdominal pain, anal bleeding, blood in stool, constipation, diarrhea, nausea and vomiting. Genitourinary:  Negative for difficulty urinating, dysuria, frequency and urgency. Musculoskeletal:  Negative for arthralgias and myalgias. Skin:  Negative for color change and rash. Neurological:  Negative for tremors, syncope and weakness. Hematological:  Negative for adenopathy. Does not bruise/bleed easily. Psychiatric/Behavioral:  Negative for behavioral problems and sleep disturbance. Physical Findings   LMP  (LMP Unknown)   Physical Exam  Vitals and nursing note reviewed. Constitutional:       General: She is not in acute distress. Appearance: She is well-developed. HENT:      Head: Normocephalic and atraumatic. Eyes:      General: No scleral icterus. Neck:      Trachea: No tracheal deviation. Cardiovascular:      Rate and Rhythm: Normal rate and regular rhythm. Heart sounds: S1 normal and S2 normal. No murmur heard. Pulmonary:      Effort: No tachypnea, accessory muscle usage or respiratory distress. Breath sounds: No decreased breath sounds, wheezing, rhonchi or rales. Genitourinary:     Exam position: Prone. Rectum: Internal hemorrhoid present. No mass, tenderness, anal fissure or external hemorrhoid. Normal anal tone.       Comments: No Rectocele  No Rectovaginal Fistula  Anal Sphincter Intact  No Pruritis Ani  No Lichenification  No Abscess  No Fistula in ano  No Anterior Fissure  No Posterior Fissure    See Procedures:  Anoscopy reveals multiple, prominent internal hemorrhoids. No active bleeding now. No other lesions in the anorectal canal.    Digital rectal examination reveals normal rectal tone no masses no blood. Palpable internal hemorrhoids. Skin:     General: Skin is warm and dry. Neurological:      Mental Status: She is alert and oriented to person, place, and time. Psychiatric:         Speech: Speech normal.         Behavior: Behavior normal. Behavior is cooperative. Thought Content: Thought content normal.         Judgment: Judgment normal.             Assessment   Rectal bleeding  (primary encounter diagnosis)  Internal hemorrhoids with complication      Plan     The patient will be scheduled for in-office rubber band ligation phenol injection of internal hemorrhoids. The sydnee-operative care plan was discussed with the patient, who voices understanding. Activity and lifting recommendations were discussed in length. The risks, benefits, and alternatives to the procedure were explained to the patient. The risks explained include, but are not limited to, bleeding, infection, pain wound complications, recurrence, incorrect diagnosis, injury to adjacent organs and structures. We also discussed the possibile need for further therapeutic, diagnostic, or surgical intervention. The patient voiced understanding, and after all questions were answered to the patient's satisfaction, the patient provided willing and informed consent to proceed. No orders of the defined types were placed in this encounter. Imaging & Referrals   None    Follow Up  No follow-ups on file.     Jacob Ram MD

## 2023-10-31 ENCOUNTER — TELEPHONE (OUTPATIENT)
Dept: ORTHOPEDICS CLINIC | Facility: CLINIC | Age: 73
End: 2023-10-31

## 2023-10-31 DIAGNOSIS — M17.0 PRIMARY OSTEOARTHRITIS OF BOTH KNEES: Primary | ICD-10-CM

## 2023-10-31 NOTE — TELEPHONE ENCOUNTER
Patient called and asked if she can get another Gel injection for her CAITLIN Knees. Please advise. Thanks.     Patient may be reached at [phone] none

## 2023-10-31 NOTE — TELEPHONE ENCOUNTER
Please see patients request and advise,    Last visit was 6/16/23 for bilateral knee zilretta injections   I called patient to clarify its the zilretta she would like received no answer     Please place approval for Kelli Mon as call note states to get another injection and zilretta was last administered by our office.     I also sent my-chart message to make sure

## 2023-11-06 ENCOUNTER — TELEPHONE (OUTPATIENT)
Dept: ORTHOPEDICS CLINIC | Facility: CLINIC | Age: 73
End: 2023-11-06

## 2023-11-28 ENCOUNTER — OFFICE VISIT (OUTPATIENT)
Facility: LOCATION | Age: 73
End: 2023-11-28
Payer: MEDICARE

## 2023-11-28 VITALS — HEART RATE: 99 BPM | TEMPERATURE: 97 F

## 2023-11-28 DIAGNOSIS — K64.8 INTERNAL HEMORRHOIDS WITH COMPLICATION: Primary | ICD-10-CM

## 2023-11-28 PROCEDURE — 46221 LIGATION OF HEMORRHOID(S): CPT | Performed by: SURGERY

## 2023-11-28 NOTE — PROGRESS NOTES
Follow Up Visit Note       Active Problems      No diagnosis found. Chief Complaint   Chief Complaint   Patient presents with    [facility]     EP- 1st Banding         History of Present Illness        Allergies  Lexi is allergic to boniva [ibandronate sodium] and lisinopril. Past Medical / Surgical / Social / Family History    The past medical and past surgical history have been reviewed by me today. 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 OOPHORECTOMY Bilateral 2004    total hystero. OTHER SURGICAL HISTORY      varicose vein procedures    TONSILLECTOMY      TOTAL ABDOM HYSTERECTOMY      UPPER GI ENDOSCOPY,EXAM  01/01/2008       The family history and social history have been reviewed by me today.     Family History   Problem Relation Age of Onset    Other (Lung ca) Mother     Other (Dementia) Father     Other (Thrombocytopenia) Other         Sibling    Heart Disorder Maternal Grandmother         CVA    Heart Disorder Maternal Grandfather         MI    Cancer Brother esophageal cancer     Social History     Socioeconomic History    Marital status:     Number of children: 3   Occupational History    Occupation: Food service     Employer: [employer]   Tobacco Use    Smoking status: Never    Smokeless tobacco: Never   Vaping Use    Vaping Use: Never used   Substance and Sexual Activity    Alcohol use: No    Drug use: No   Other Topics Concern    Caffeine Concern No    Exercise Yes     Comment: walking    Seat Belt Yes    Special Diet Yes     Comment: diabetic    Stress Concern No    Weight Concern Yes        Current Outpatient Medications:     semaglutide 4 MG/3ML Subcutaneous Solution Pen-injector, Inject 1 mg into the skin once a week., Disp: 3 mL, Rfl: 1    pantoprazole 40 MG Oral Tab EC, Take one tablet (40 mg total) by mouth once daily, 30 minutes prior to breakfast., Disp: 90 tablet, Rfl: 3    semaglutide 8 MG/3ML Subcutaneous Solution Pen-injector, Inject 2 mg into the skin once a week., Disp: , Rfl:     losartan 25 MG Oral Tab, Take 1 tablet (25 mg total) by mouth daily. , Disp: 90 tablet, Rfl: 1    Glucose Blood (ONETOUCH VERIO) In Vitro Strip, Use to test blood sugar twice daily, Disp: 200 strip, Rfl: 5    OneTouch Delica Lancets 41Y Does not apply Misc, Use with test strips to check blood sugar twice daily, Disp: 200 each, Rfl: 5    semaglutide 8 MG/3ML Subcutaneous Solution Pen-injector, Inject 2 mg into the skin once a week., Disp: 1 each, Rfl: 12    metFORMIN  MG Oral Tablet 24 Hr, Take 1 tablet (500 mg total) by mouth daily with dinner., Disp: 90 tablet, Rfl: 1    glipiZIDE ER 5 MG Oral Tablet 24 Hr, Take 1 tablet (5 mg total) by mouth daily. , Disp: 90 tablet, Rfl: 1    glipiZIDE ER 2.5 MG Oral Tablet 24 Hr, Take 1 tablet (2.5 mg total) by mouth daily with breakfast. Take with 5 mg dose for total of 7.5 mg daily. , Disp: 90 tablet, Rfl: 1    Empagliflozin (JARDIANCE) 25 MG Oral Tab, Take 25 mg by mouth daily. , Disp: 90 tablet, Rfl: 3 ROSUVASTATIN 20 MG Oral Tab, TAKE 1 TABLET DAILY, Disp: 90 tablet, Rfl: 3    TROSPIUM CHLORIDE ER 60 MG Oral Capsule SR 24 Hr, TAKE 1 CAPSULE(60 MG) BY MOUTH DAILY, Disp: 90 capsule, Rfl: 3    Betamethasone Dipropionate Aug 0.05 % External Cream, Apply 1 Application topically 2 (two) times daily as needed (do not use longer than 14 days in a row). , Disp: 15 g, Rfl: 0    Multiple Vitamin (DAILY VALUE MULTIVITAMIN) Oral Tab, Take 1 tablet by mouth daily. , Disp: , Rfl:     Blood Glucose Monitoring Suppl (ONETOUCH VERIO FLEX SYSTEM) w/Device Does not apply Kit, Use machine to test blood sugar twice a day as directed., Disp: 1 kit, Rfl: 0    ferrous sulfate 325 (65 FE) MG Oral Tab EC, Take 1 tablet (325 mg total) by mouth daily with breakfast., Disp: , Rfl:     Cholecalciferol (VITAMIN D) 1000 UNITS Oral Cap, Take 4,000 mg by mouth daily. , Disp: , Rfl:      Review of Systems  The Review of Systems has been reviewed by me during today. Review of Systems   Constitutional:  Negative for chills, diaphoresis, fatigue, fever and unexpected weight change. HENT:  Negative for hearing loss, nosebleeds, sore throat and trouble swallowing. Respiratory:  Negative for apnea, cough, shortness of breath and wheezing. Cardiovascular:  Negative for chest pain, palpitations and leg swelling. Gastrointestinal:  Negative for abdominal distention, abdominal pain, anal bleeding, blood in stool, constipation, diarrhea, nausea and vomiting. Genitourinary:  Negative for difficulty urinating, dysuria, frequency and urgency. Musculoskeletal:  Negative for arthralgias and myalgias. Skin:  Negative for color change and rash. Neurological:  Negative for tremors, syncope and weakness. Hematological:  Negative for adenopathy. Does not bruise/bleed easily. Psychiatric/Behavioral:  Negative for behavioral problems and sleep disturbance.          Physical Findings   LMP  (LMP Unknown)   Physical Exam     Assessment   No diagnosis found.    Plan        No orders of the defined types were placed in this encounter. Imaging & Referrals   None    Follow Up  No follow-ups on file.     Haley Castañeda MD

## 2023-12-01 ENCOUNTER — OFFICE VISIT (OUTPATIENT)
Dept: ORTHOPEDICS CLINIC | Facility: CLINIC | Age: 73
End: 2023-12-01
Payer: MEDICARE

## 2023-12-01 VITALS — WEIGHT: 151 LBS | HEIGHT: 60.5 IN | BODY MASS INDEX: 28.88 KG/M2

## 2023-12-01 DIAGNOSIS — M17.0 PRIMARY OSTEOARTHRITIS OF BOTH KNEES: Primary | ICD-10-CM

## 2023-12-01 NOTE — PROCEDURES
Felt great relief from previous Zilretta injections until about 1 month ago, but pain now is not as intense as it was previously. After informed consent, the patient's right and left knees were marked, locally anesthetized with skin refrigerant, prepped with topical antiseptic, and injected with 5mL of 32mg/5mL Zilretta through the inferolateral portal.  A band-aid was applied. The patient tolerated the procedure well.     Luis Melo PA-C  [address] [facility]

## 2023-12-07 DIAGNOSIS — E11.65 TYPE 2 DIABETES MELLITUS WITH HYPERGLYCEMIA, WITHOUT LONG-TERM CURRENT USE OF INSULIN (HCC): ICD-10-CM

## 2023-12-07 RX ORDER — GLIPIZIDE 2.5 MG/1
TABLET, EXTENDED RELEASE ORAL
Qty: 90 TABLET | Refills: 1 | Status: SHIPPED | OUTPATIENT
Start: 2023-12-07

## 2023-12-07 NOTE — TELEPHONE ENCOUNTER
Requested Prescriptions     Pending Prescriptions Disp Refills    GLIPIZIDE ER 2.5 MG Oral Tablet 24 Hr [Pharmacy Med Name: GLIPIZIDE ER 2.5MG TABLETS] 90 tablet 1     Sig: TAKE 1 TABLET BY MOUTH EVERY DAY WITH BREAKFAST. TAKE WITH 5 MG DOSE FOR TOTAL OF 7.5 MG DAILY     Last A1c value was 7.3% done 9/12/2023.   LOV 9/12/23  Future Appointments   Date Time Provider [place]   12/12/2023  9:30 AM LYNNETTE Pineda [facility] [address]   12/13/2023  1:00 PM Ignacio Jauregui MD [facility]   12/28/2023  9:00 AM Javier Coy MD [facility] [place]   1/3/2024  1:00 PM Ignacio Jauregui MD [facility]   1/17/2024  1:30 PM Ignacio Jauregui MD [facility]   3/6/2024 11:00 AM Maurilio Cope,  [facility]

## 2023-12-11 ENCOUNTER — TELEPHONE (OUTPATIENT)
Dept: INTERNAL MEDICINE CLINIC | Facility: CLINIC | Age: 73
End: 2023-12-11

## 2023-12-11 DIAGNOSIS — Z12.31 SCREENING MAMMOGRAM FOR BREAST CANCER: Primary | ICD-10-CM

## 2023-12-12 ENCOUNTER — OFFICE VISIT (OUTPATIENT)
Dept: ENDOCRINOLOGY CLINIC | Facility: CLINIC | Age: 73
End: 2023-12-12
Payer: MEDICARE

## 2023-12-12 ENCOUNTER — LAB ENCOUNTER (OUTPATIENT)
Dept: LAB | Age: 73
End: 2023-12-12
Attending: NURSE PRACTITIONER
Payer: MEDICARE

## 2023-12-12 VITALS
TEMPERATURE: 98 F | WEIGHT: 148.81 LBS | OXYGEN SATURATION: 96 % | BODY MASS INDEX: 28.46 KG/M2 | HEIGHT: 60.63 IN | HEART RATE: 82 BPM | DIASTOLIC BLOOD PRESSURE: 66 MMHG | SYSTOLIC BLOOD PRESSURE: 112 MMHG | RESPIRATION RATE: 18 BRPM

## 2023-12-12 DIAGNOSIS — Z23 FLU VACCINE NEED: ICD-10-CM

## 2023-12-12 DIAGNOSIS — E11.65 TYPE 2 DIABETES MELLITUS WITH HYPERGLYCEMIA, WITHOUT LONG-TERM CURRENT USE OF INSULIN (HCC): ICD-10-CM

## 2023-12-12 DIAGNOSIS — E11.65 TYPE 2 DIABETES MELLITUS WITH HYPERGLYCEMIA, WITHOUT LONG-TERM CURRENT USE OF INSULIN (HCC): Primary | ICD-10-CM

## 2023-12-12 LAB
CARTRIDGE LOT#: 634 NUMERIC
HEMOGLOBIN A1C: 7.6 % (ref 4.3–5.6)

## 2023-12-12 PROCEDURE — G0008 ADMIN INFLUENZA VIRUS VAC: HCPCS | Performed by: NURSE PRACTITIONER

## 2023-12-12 PROCEDURE — 84681 ASSAY OF C-PEPTIDE: CPT

## 2023-12-12 PROCEDURE — 99214 OFFICE O/P EST MOD 30 MIN: CPT | Performed by: NURSE PRACTITIONER

## 2023-12-12 PROCEDURE — 83036 HEMOGLOBIN GLYCOSYLATED A1C: CPT | Performed by: NURSE PRACTITIONER

## 2023-12-12 PROCEDURE — 36415 COLL VENOUS BLD VENIPUNCTURE: CPT

## 2023-12-12 PROCEDURE — 1126F AMNT PAIN NOTED NONE PRSNT: CPT | Performed by: NURSE PRACTITIONER

## 2023-12-12 PROCEDURE — 90662 IIV NO PRSV INCREASED AG IM: CPT | Performed by: NURSE PRACTITIONER

## 2023-12-12 RX ORDER — GLIPIZIDE 10 MG/1
10 TABLET, FILM COATED, EXTENDED RELEASE ORAL DAILY
Qty: 90 TABLET | Refills: 0 | Status: SHIPPED | OUTPATIENT
Start: 2023-12-12

## 2023-12-13 ENCOUNTER — OFFICE VISIT (OUTPATIENT)
Facility: LOCATION | Age: 73
End: 2023-12-13
Payer: MEDICARE

## 2023-12-13 DIAGNOSIS — K64.8 INTERNAL HEMORRHOIDS WITH COMPLICATION: Primary | ICD-10-CM

## 2023-12-13 LAB — C-PEPTIDE: 6.9 NG/ML

## 2023-12-13 PROCEDURE — 46221 LIGATION OF HEMORRHOID(S): CPT | Performed by: SURGERY

## 2023-12-13 NOTE — PROGRESS NOTES
C-peptide is elevated indicating insulin resistance.  Please ask pt to come in for RN to place richard pro and then RTC in 2 weeks to review CGM and a diet log with CDE

## 2023-12-13 NOTE — PROGRESS NOTES
Follow Up Visit Note       Active Problems      1. Internal hemorrhoids with complication          Chief Complaint   Chief Complaint   Patient presents with    Hemorrhoid Banding     2nd Banding          History of Present Illness    The patient presents for rubber band ligation of internal hemorrhoids. The patient has no new complaints since our last encounter. Allergies  Lexi is allergic to boniva [ibandronate sodium] and lisinopril. Past Medical / Surgical / Social / Family History    The past medical and past surgical history have been reviewed by me today. 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 OOPHORECTOMY Bilateral 2004    total hystero. OTHER SURGICAL HISTORY      varicose vein procedures    TONSILLECTOMY      TOTAL ABDOM HYSTERECTOMY      UPPER GI ENDOSCOPY,EXAM  01/01/2008       The family history and social history have been reviewed by me today.     Family History   Problem Relation Age of Onset    Other (Lung ca) Mother     Other (Dementia) Father     Other (Thrombocytopenia) Other         Sibling    Heart Disorder Maternal Grandmother         CVA    Heart Disorder Maternal Grandfather         MI    Cancer Brother         esophageal cancer     Social History     Socioeconomic History    Marital status:     Number of children: 3   Occupational History    Occupation: Food service     Employer: [employer]   Tobacco Use    Smoking status: Never    Smokeless tobacco: Never   Vaping Use    Vaping Use: Never used   Substance and Sexual Activity    Alcohol use: No    Drug use: No   Other Topics Concern    Caffeine Concern No    Exercise Yes     Comment: walking    Seat Belt Yes    Special Diet Yes     Comment: diabetic    Stress Concern No    Weight Concern Yes      Current Outpatient Medications   Medication Sig Dispense Refill    glipiZIDE ER 10 MG Oral Tablet 24 Hr Take 1 tablet (10 mg total) by mouth daily. 90 tablet 0    semaglutide 4 MG/3ML Subcutaneous Solution Pen-injector Inject 1 mg into the skin once a week. (Patient not taking: Reported on 12/12/2023) 3 mL 1    pantoprazole 40 MG Oral Tab EC Take one tablet (40 mg total) by mouth once daily, 30 minutes prior to breakfast. 90 tablet 3    losartan 25 MG Oral Tab Take 1 tablet (25 mg total) by mouth daily. 90 tablet 1    Glucose Blood (ONETOUCH VERIO) In Vitro Strip Use to test blood sugar twice daily 200 strip 5    OneTouch Delica Lancets 81E Does not apply Misc Use with test strips to check blood sugar twice daily 200 each 5    semaglutide 8 MG/3ML Subcutaneous Solution Pen-injector Inject 2 mg into the skin once a week. 1 each 12    metFORMIN  MG Oral Tablet 24 Hr Take 1 tablet (500 mg total) by mouth daily with dinner. 90 tablet 1    Empagliflozin (JARDIANCE) 25 MG Oral Tab Take 25 mg by mouth daily.  90 tablet 3    ROSUVASTATIN 20 MG Oral Tab TAKE 1 TABLET DAILY 90 tablet 3    TROSPIUM CHLORIDE ER 60 MG Oral Capsule SR 24 Hr TAKE 1 CAPSULE(60 MG) BY MOUTH DAILY 90 capsule 3    Betamethasone Dipropionate Aug 0.05 % External Cream Apply 1 Application topically 2 (two) times daily as needed (do not use longer than 14 days in a row). 15 g 0    Multiple Vitamin (DAILY VALUE MULTIVITAMIN) Oral Tab Take 1 tablet by mouth daily. Blood Glucose Monitoring Suppl ([address]) w/Device Does not apply Kit Use machine to test blood sugar twice a day as directed. 1 kit 0    ferrous sulfate 325 (65 FE) MG Oral Tab EC Take 1 tablet (325 mg total) by mouth daily with breakfast.      Cholecalciferol (VITAMIN D) 1000 UNITS Oral Cap Take 4,000 mg by mouth daily. Review of Systems  The Review of Systems has been reviewed by me during today. Review of Systems     Physical Findings   LMP  (LMP Unknown)   Pre op diagnosis: Internal Hemorrhoids    Post op diagnosis: Same    Procedure: Anoscopy with O-ring Rubber Band Ligation of Internal Hemorrhoids    History of present illness: This patient has internal hemorrhoids that are symptomatic. Operative findings: The internal hemorrhoid was successfully ligated in the standard fashion with an O-ring ligator. Operative summary:  The patient was draped and exposed in the usual fashion. A medical assistant was present at all times. External visualization of the perineum and gluteal cleft was performed. Digital exam was performed with a well lubricated examining finger. Diagnostic Anoscopy was performed with lubrication. The anal canal was well visualized. An internal hemorrhoid was identified and well visualized. The internal hemorrhoid was grasped well above the dentate line with the grasper. The internal hemorrhoid was pulled within the O-ring ligator. The O-ring ligator was fired without complication. A 5% phenol solution was injected into the hemorrhoid and at its base. The patient tolerated the procedure and was observed in our office for at least 5 minutes prior to discharge.     All findings are listed in the physical exam section of this note. Assessment   1. Internal hemorrhoids with complication        Plan     Successful treatment of internal hemorrhoid of the right teddy-lateral position by rubber band ligation and phenol injection. The care plan is discussed with the patient who voiced understanding. Dietary, activity, and exercise recommendations were discussed with the patient. The patient is encouraged to avoid straining, continue dietary fiber supplementation, stool softeners, and adequate hydration. Follow-up in 2 weeks for continued treatment of internal hemorrhoids. The patient was provided ample opportunity to ask questions. All of the patient's questions were answered in detail. The patient voiced understanding of the care plan. No orders of the defined types were placed in this encounter. Imaging & Referrals   None    Follow Up  No follow-ups on file.     Yuly Man MD

## 2023-12-15 ENCOUNTER — NURSE ONLY (OUTPATIENT)
Dept: ENDOCRINOLOGY CLINIC | Facility: CLINIC | Age: 73
End: 2023-12-15
Payer: MEDICARE

## 2023-12-15 NOTE — PROGRESS NOTES
A continuous glucose sensor for 24 hour blood sugar monitoring was placed on patient today per [facility] order. Serial NUMBER:   [id number]   Exp date:  01/31/2024  After cleansing skin with alcohol prep, Sensor (F98)was inserted on Left Posterior upper arm area without difficulty. Small amount of skin prep was added around sensor tape after placement to help with sensor adhesive. Area remains free of any bleeding or irritation or pain at site when patient left DM center. Patient instructions:   Record daily food/drink intake and activity in log book  Call Diabetes center with any questions or concerns.    instructed to record food, exercise, insulin doses (if taking) on log provided

## 2023-12-28 ENCOUNTER — OFFICE VISIT (OUTPATIENT)
Dept: INTERNAL MEDICINE CLINIC | Facility: CLINIC | Age: 73
End: 2023-12-28
Payer: MEDICARE

## 2023-12-28 VITALS
BODY MASS INDEX: 29.18 KG/M2 | HEART RATE: 80 BPM | DIASTOLIC BLOOD PRESSURE: 60 MMHG | HEIGHT: 60 IN | SYSTOLIC BLOOD PRESSURE: 118 MMHG | WEIGHT: 148.63 LBS | OXYGEN SATURATION: 97 % | RESPIRATION RATE: 16 BRPM | TEMPERATURE: 99 F

## 2023-12-28 DIAGNOSIS — M17.0 PRIMARY OSTEOARTHRITIS OF BOTH KNEES: ICD-10-CM

## 2023-12-28 DIAGNOSIS — Z00.00 ROUTINE GENERAL MEDICAL EXAMINATION AT A HEALTH CARE FACILITY: Primary | ICD-10-CM

## 2023-12-28 DIAGNOSIS — M85.88 OSTEOPENIA OF LUMBAR SPINE: ICD-10-CM

## 2023-12-28 DIAGNOSIS — E11.69 HYPERLIPIDEMIA ASSOCIATED WITH TYPE 2 DIABETES MELLITUS  (HCC): ICD-10-CM

## 2023-12-28 DIAGNOSIS — R80.9 TYPE 2 DIABETES MELLITUS WITH ALBUMINURIA  (HCC): ICD-10-CM

## 2023-12-28 DIAGNOSIS — D50.9 IRON DEFICIENCY ANEMIA, UNSPECIFIED IRON DEFICIENCY ANEMIA TYPE: ICD-10-CM

## 2023-12-28 DIAGNOSIS — E11.29 TYPE 2 DIABETES MELLITUS WITH ALBUMINURIA  (HCC): ICD-10-CM

## 2023-12-28 DIAGNOSIS — E78.5 HYPERLIPIDEMIA ASSOCIATED WITH TYPE 2 DIABETES MELLITUS  (HCC): ICD-10-CM

## 2023-12-28 DIAGNOSIS — E66.3 OVERWEIGHT (BMI 25.0-29.9): ICD-10-CM

## 2023-12-28 DIAGNOSIS — K21.9 GASTROESOPHAGEAL REFLUX DISEASE WITHOUT ESOPHAGITIS: ICD-10-CM

## 2023-12-28 PROBLEM — E66.9 DIABETES MELLITUS TYPE 2 IN OBESE  (HCC): Status: RESOLVED | Noted: 2021-07-14 | Resolved: 2023-12-28

## 2023-12-28 PROBLEM — D36.9 TUBULAR ADENOMA: Status: RESOLVED | Noted: 2018-03-30 | Resolved: 2023-12-28

## 2023-12-28 PROBLEM — Z80.0 FAMILY HISTORY OF ESOPHAGEAL CANCER: Status: RESOLVED | Noted: 2023-09-26 | Resolved: 2023-12-28

## 2023-12-28 PROBLEM — M25.562 CHRONIC PAIN OF BOTH KNEES: Status: RESOLVED | Noted: 2022-06-22 | Resolved: 2023-12-28

## 2023-12-28 PROBLEM — G89.29 CHRONIC PAIN OF BOTH KNEES: Status: RESOLVED | Noted: 2022-06-22 | Resolved: 2023-12-28

## 2023-12-28 PROBLEM — Z86.0101 HISTORY OF ADENOMATOUS POLYP OF COLON: Status: RESOLVED | Noted: 2023-09-26 | Resolved: 2023-12-28

## 2023-12-28 PROBLEM — D12.5 BENIGN NEOPLASM OF SIGMOID COLON: Status: RESOLVED | Noted: 2023-09-26 | Resolved: 2023-12-28

## 2023-12-28 PROBLEM — M25.561 CHRONIC PAIN OF BOTH KNEES: Status: RESOLVED | Noted: 2022-06-22 | Resolved: 2023-12-28

## 2023-12-28 PROBLEM — K62.5 RECTAL BLEEDING: Status: RESOLVED | Noted: 2023-10-24 | Resolved: 2023-12-28

## 2023-12-28 PROBLEM — E66.9 DIABETES MELLITUS TYPE 2 IN OBESE: Status: RESOLVED | Noted: 2021-07-14 | Resolved: 2023-12-28

## 2023-12-28 PROBLEM — D12.2 BENIGN NEOPLASM OF ASCENDING COLON: Status: RESOLVED | Noted: 2023-09-26 | Resolved: 2023-12-28

## 2023-12-28 PROBLEM — D12.0 BENIGN NEOPLASM OF CECUM: Status: RESOLVED | Noted: 2023-09-26 | Resolved: 2023-12-28

## 2023-12-28 PROBLEM — K59.00 CONSTIPATION: Status: RESOLVED | Noted: 2018-08-22 | Resolved: 2023-12-28

## 2023-12-28 PROBLEM — Z86.010 HISTORY OF ADENOMATOUS POLYP OF COLON: Status: RESOLVED | Noted: 2023-09-26 | Resolved: 2023-12-28

## 2023-12-28 PROBLEM — H69.93 EUSTACHIAN TUBE DYSFUNCTION, BILATERAL: Status: RESOLVED | Noted: 2018-12-26 | Resolved: 2023-12-28

## 2023-12-28 PROCEDURE — 99214 OFFICE O/P EST MOD 30 MIN: CPT | Performed by: INTERNAL MEDICINE

## 2023-12-28 PROCEDURE — 1125F AMNT PAIN NOTED PAIN PRSNT: CPT | Performed by: INTERNAL MEDICINE

## 2023-12-28 PROCEDURE — G0439 PPPS, SUBSEQ VISIT: HCPCS | Performed by: INTERNAL MEDICINE

## 2023-12-28 RX ORDER — BETAMETHASONE DIPROPIONATE 0.5 MG/G
1 CREAM TOPICAL 2 TIMES DAILY PRN
Qty: 15 G | Refills: 0 | Status: SHIPPED | OUTPATIENT
Start: 2023-12-28

## 2023-12-28 RX ORDER — TACROLIMUS 1 MG/G
OINTMENT TOPICAL
COMMUNITY
Start: 2023-12-14 | End: 2023-12-28

## 2023-12-28 RX ORDER — ROSUVASTATIN CALCIUM 20 MG/1
20 TABLET, COATED ORAL DAILY
Qty: 90 TABLET | Refills: 3 | Status: SHIPPED | OUTPATIENT
Start: 2023-12-28

## 2023-12-28 RX ORDER — NYSTATIN AND TRIAMCINOLONE ACETONIDE 100000; 1 [USP'U]/G; MG/G
OINTMENT TOPICAL
COMMUNITY
Start: 2023-12-14 | End: 2023-12-28

## 2023-12-28 NOTE — PATIENT INSTRUCTIONS
I recommend the following vaccines -  RSV vaccine as soon as possible. Stay up to date with COVID vaccines.      Please complete your labs and urine testing in middle March, 2024

## 2023-12-29 ENCOUNTER — NURSE ONLY (OUTPATIENT)
Dept: ENDOCRINOLOGY CLINIC | Facility: CLINIC | Age: 73
End: 2023-12-29
Payer: MEDICARE

## 2024-01-03 ENCOUNTER — OFFICE VISIT (OUTPATIENT)
Facility: LOCATION | Age: 74
End: 2024-01-03
Payer: MEDICARE

## 2024-01-03 VITALS — HEART RATE: 105 BPM | TEMPERATURE: 97 F

## 2024-01-03 DIAGNOSIS — K64.8 INTERNAL HEMORRHOIDS WITH COMPLICATION: Primary | ICD-10-CM

## 2024-01-03 PROCEDURE — 1160F RVW MEDS BY RX/DR IN RCRD: CPT | Performed by: SURGERY

## 2024-01-03 PROCEDURE — 46221 LIGATION OF HEMORRHOID(S): CPT | Performed by: SURGERY

## 2024-01-03 PROCEDURE — 1159F MED LIST DOCD IN RCRD: CPT | Performed by: SURGERY

## 2024-01-03 NOTE — PROGRESS NOTES
Follow Up Visit Note       Active Problems      No diagnosis found.      Chief Complaint   Chief Complaint   Patient presents with    [facility]     EP - 3RD Hemorrhoid Banding,          History of Present Illness        Allergies  Lexi is allergic to boniva [ibandronate sodium] and lisinopril.    Past Medical / Surgical / Social / Family History    The past medical and past surgical history have been reviewed by me today.    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    OOPHORECTOMY Bilateral 2004    total hystero.    OTHER SURGICAL HISTORY      varicose vein procedures    TONSILLECTOMY      TOTAL ABDOM HYSTERECTOMY      UPPER GI ENDOSCOPY,EXAM  01/01/2008       The family history and social history have been reviewed by me today.    Family History   Problem Relation Age of Onset    Other (Lung ca) Mother     Other (Dementia) Father     Other (Thrombocytopenia) Other         Sibling    Heart Disorder Maternal Grandmother         CVA    Heart Disorder Maternal Grandfather         MI     Cancer Brother         esophageal cancer     Social History     Socioeconomic History    Marital status:     Number of children: 3   Occupational History    Occupation:      Employer: [employer]   Tobacco Use    Smoking status: Never    Smokeless tobacco: Never   Vaping Use    Vaping Use: Never used   Substance and Sexual Activity    Alcohol use: No    Drug use: No   Other Topics Concern    Caffeine Concern No    Exercise Yes     Comment: walking    Seat Belt Yes    Special Diet Yes     Comment: diabetic    Stress Concern No    Weight Concern Yes        Current Outpatient Medications:     Betamethasone Dipropionate Aug 0.05 % External Cream, Apply 1 Application  topically 2 (two) times daily as needed (do not use longer than 14 days in a row)., Disp: 15 g, Rfl: 0    rosuvastatin 20 MG Oral Tab, Take 1 tablet (20 mg total) by mouth daily., Disp: 90 tablet, Rfl: 3    glipiZIDE ER 10 MG Oral Tablet 24 Hr, Take 1 tablet (10 mg total) by mouth daily., Disp: 90 tablet, Rfl: 0    pantoprazole 40 MG Oral Tab EC, Take one tablet (40 mg total) by mouth once daily, 30 minutes prior to breakfast., Disp: 90 tablet, Rfl: 3    losartan 25 MG Oral Tab, Take 1 tablet (25 mg total) by mouth daily. (Patient taking differently: Take 1 tablet (25 mg total) by mouth every evening.), Disp: 90 tablet, Rfl: 1    Glucose Blood (ONETOUCH VERIO) In Vitro Strip, Use to test blood sugar twice daily, Disp: 200 strip, Rfl: 5    OneTouch Delica Lancets 33G Does not apply Misc, Use with test strips to check blood sugar twice daily, Disp: 200 each, Rfl: 5    semaglutide 8 MG/3ML Subcutaneous Solution Pen-injector, Inject 2 mg into the skin once a week., Disp: 1 each, Rfl: 12    metFORMIN  MG Oral Tablet 24 Hr, Take 1 tablet (500 mg total) by mouth daily with dinner., Disp: 90 tablet, Rfl: 1    Empagliflozin (JARDIANCE) 25 MG Oral Tab, Take 25 mg by mouth daily., Disp: 90 tablet, Rfl: 3    Multiple Vitamin (DAILY VALUE  MULTIVITAMIN) Oral Tab, Take 1 tablet by mouth daily., Disp: , Rfl:     Blood Glucose Monitoring Suppl (ONETOUCH VERIO FLEX SYSTEM) w/Device Does not apply Kit, Use machine to test blood sugar twice a day as directed., Disp: 1 kit, Rfl: 0    ferrous sulfate 325 (65 FE) MG Oral Tab EC, Take 1 tablet (325 mg total) by mouth daily with breakfast., Disp: , Rfl:     Cholecalciferol (VITAMIN D) 1000 UNITS Oral Cap, Take 4,000 mg by mouth daily., Disp: , Rfl:      Review of Systems  The Review of Systems has been reviewed by me during today.  Review of Systems   Constitutional: Negative.    HENT: Negative.     Eyes: Negative.    Respiratory: Negative.     Cardiovascular: Negative.    Gastrointestinal: Negative.    Genitourinary: Negative.    Musculoskeletal: Negative.    Skin: Negative.    Neurological: Negative.    Psychiatric/Behavioral: Negative.          Physical Findings   LMP  (LMP Unknown)   Physical Exam     Assessment   No diagnosis found.    Plan        No orders of the defined types were placed in this encounter.      Imaging & Referrals   None    Follow Up  No follow-ups on file.    Logan Jett MD

## 2024-01-03 NOTE — PROGRESS NOTES
Follow Up Visit Note       Active Problems      1. Internal hemorrhoids with complication          Chief Complaint   Chief Complaint   Patient presents with    \[facility]\"" Care     EP - 3RD Hemorrhoid Banding, bleeding after a bowel movement, no other symptoms.         History of Present Illness    The patient presents for rubber band ligation of internal hemorrhoids.  The patient has no new complaints since our last encounter.     Allergies  Lexi is allergic to boniva [ibandronate sodium] and lisinopril.    Past Medical / Surgical / Social / Family History    The past medical and past surgical history have been reviewed by me today.    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    OOPHORECTOMY Bilateral 2004    total hystero.    OTHER SURGICAL HISTORY      varicose vein procedures    TONSILLECTOMY      TOTAL ABDOM HYSTERECTOMY      UPPER GI ENDOSCOPY,EXAM  01/01/2008       The family history and social history have been reviewed by me today.    Family History   Problem Relation Age of Onset     Other (Lung ca) Mother     Other (Dementia) Father     Other (Thrombocytopenia) Other         Sibling    Heart Disorder Maternal Grandmother         CVA    Heart Disorder Maternal Grandfather         MI    Cancer Brother         esophageal cancer     Social History     Socioeconomic History    Marital status:     Number of children: 3   Occupational History    Occupation:      Employer: [employer]   Tobacco Use    Smoking status: Never    Smokeless tobacco: Never   Vaping Use    Vaping Use: Never used   Substance and Sexual Activity    Alcohol use: No    Drug use: No   Other Topics Concern    Caffeine Concern No    Exercise Yes     Comment: walking    Seat Belt Yes    Special Diet Yes     Comment: diabetic    Stress Concern No    Weight Concern Yes      Current Outpatient Medications   Medication Sig Dispense Refill    Betamethasone Dipropionate Aug 0.05 % External Cream Apply 1 Application  topically 2 (two) times daily as needed (do not use longer than 14 days in a row). 15 g 0    rosuvastatin 20 MG Oral Tab Take 1 tablet (20 mg total) by mouth daily. 90 tablet 3    glipiZIDE ER 10 MG Oral Tablet 24 Hr Take 1 tablet (10 mg total) by mouth daily. 90 tablet 0    pantoprazole 40 MG Oral Tab EC Take one tablet (40 mg total) by mouth once daily, 30 minutes prior to breakfast. 90 tablet 3    losartan 25 MG Oral Tab Take 1 tablet (25 mg total) by mouth daily. (Patient taking differently: Take 1 tablet (25 mg total) by mouth every evening.) 90 tablet 1    Glucose Blood (ONETOUCH VERIO) In Vitro Strip Use to test blood sugar twice daily 200 strip 5    OneTouch Delica Lancets 33G Does not apply Misc Use with test strips to check blood sugar twice daily 200 each 5    semaglutide 8 MG/3ML Subcutaneous Solution Pen-injector Inject 2 mg into the skin once a week. 1 each 12    metFORMIN  MG Oral Tablet 24 Hr Take 1 tablet (500 mg total) by mouth daily with dinner. 90 tablet 1    Empagliflozin  (JARDIANCE) 25 MG Oral Tab Take 25 mg by mouth daily. 90 tablet 3    Multiple Vitamin (DAILY VALUE MULTIVITAMIN) Oral Tab Take 1 tablet by mouth daily.      Blood Glucose Monitoring Suppl (ONETOUCH VERIO FLEX SYSTEM) w/Device Does not apply Kit Use machine to test blood sugar twice a day as directed. 1 kit 0    ferrous sulfate 325 (65 FE) MG Oral Tab EC Take 1 tablet (325 mg total) by mouth daily with breakfast.      Cholecalciferol (VITAMIN D) 1000 UNITS Oral Cap Take 4,000 mg by mouth daily.          Review of Systems  The Review of Systems has been reviewed by me during today.  Review of Systems     Physical Findings   Pulse 105   Temp 97.3 °F (36.3 °C) (Temporal)   LMP  (LMP Unknown)   Pre op diagnosis: Internal Hemorrhoids    Post op diagnosis: Same    Procedure: Anoscopy with O-ring Rubber Band Ligation of Internal Hemorrhoids    History of present illness:   This patient has internal hemorrhoids that are symptomatic.    Operative findings:   The internal hemorrhoid was successfully ligated in the standard fashion with an O-ring ligator.      Operative summary:  The patient was draped and exposed in the usual fashion.    A medical assistant was present at all times.    External visualization of the perineum and gluteal cleft was performed.    Digital exam was performed with a well lubricated examining finger.    Diagnostic Anoscopy was performed with lubrication.    The anal canal was well visualized.    An internal hemorrhoid was identified and well visualized.    The internal hemorrhoid was grasped well above the dentate line with the grasper.    The internal hemorrhoid was pulled within the O-ring ligator.    The O-ring ligator was fired without complication.    A 5% phenol solution was injected into the hemorrhoid and at its base.    The patient tolerated the procedure and was observed in our office for at least 5 minutes prior to discharge.    All findings are listed in the physical exam section of this  note.           Assessment   1. Internal hemorrhoids with complication        Plan     Successful treatment of internal hemorrhoid by rubber band ligation and phenol injection.    The care plan is discussed with the patient who voiced understanding.    Dietary, activity, and exercise recommendations were discussed with the patient.    The patient is encouraged to avoid straining, continue dietary fiber supplementation, stool softeners, and adequate hydration.    Follow-up in 2 weeks for continued treatment of internal hemorrhoids.    The patient was provided ample opportunity to ask questions.  All of the patient's questions were answered in detail.  The patient voiced understanding of the care plan.       No orders of the defined types were placed in this encounter.      Imaging & Referrals   None    Follow Up  No follow-ups on file.    Logan Jett MD

## 2024-01-09 ENCOUNTER — HOSPITAL ENCOUNTER (OUTPATIENT)
Dept: MAMMOGRAPHY | Age: 74
End: 2024-01-09
Attending: INTERNAL MEDICINE
Payer: MEDICARE

## 2024-01-09 ENCOUNTER — HOSPITAL ENCOUNTER (OUTPATIENT)
Dept: MAMMOGRAPHY | Age: 74
Discharge: HOME | End: 2024-01-09
Attending: INTERNAL MEDICINE
Payer: MEDICARE

## 2024-01-09 DIAGNOSIS — Z12.31 SCREENING MAMMOGRAM FOR BREAST CANCER: ICD-10-CM

## 2024-01-09 PROCEDURE — 77067 SCR MAMMO BI INCL CAD: CPT | Performed by: INTERNAL MEDICINE

## 2024-01-09 PROCEDURE — 77063 BREAST TOMOSYNTHESIS BI: CPT | Performed by: INTERNAL MEDICINE

## 2024-01-11 ENCOUNTER — DOCUMENTATION ONLY (OUTPATIENT)
Dept: ENDOCRINOLOGY CLINIC | Facility: CLINIC | Age: 74
End: 2024-01-11

## 2024-01-11 NOTE — PROGRESS NOTES
Left message with spouse for pt to call office.    11:30 am, pt returned call, left voicemail.  11:53 am, attempted to reach pt on cell [phone], no answer.    01/15/2024 @ 12:07 pm.  Spoke with patient, scheduled MNT, will request order from PCP.

## 2024-01-11 NOTE — PROGRESS NOTES
Benjy pro not previously received. Printed and reviewed.     Pro Freestyle Benjy CGM  Analysis of data: 12/15/23 - 12/29/23  Active wear time: 100% (Goal 70%)  Sensor download: full report  in media  Average glucose : 186 mg/dl   GMI: 7.8%     CV (coefficient of variation) : 28.4%      31% time above 180mg /dl (Goal < 25%)  15% time above 250 mg/dl (Goal < 5%)  54% time in target range:  mg/dl (Goal > 70%)  0% time below target range: 70mg/dl (Goal < 4%)     Evaluation   1. Baseline glucose just above target range  2. postprandial elevation with return to baseline  3. low frequency of hypoglycemia     Pt mostly has post prandial elevations. Please contact pt and have her schedule with CDE for MNT to review diet. PCP will need to sign order. Will keep appt with me in March.

## 2024-01-15 ENCOUNTER — TELEPHONE (OUTPATIENT)
Dept: ENDOCRINOLOGY CLINIC | Facility: CLINIC | Age: 74
End: 2024-01-15

## 2024-01-15 DIAGNOSIS — E11.29 TYPE 2 DIABETES MELLITUS WITH ALBUMINURIA  (HCC): Primary | ICD-10-CM

## 2024-01-15 DIAGNOSIS — R80.9 TYPE 2 DIABETES MELLITUS WITH ALBUMINURIA  (HCC): Primary | ICD-10-CM

## 2024-01-15 NOTE — TELEPHONE ENCOUNTER
Hi Dr Galvan, could you please sign order for Diabetes MNT for pt? Thank you.    Future Appointments   Date Time Provider Department Center   1/17/2024  1:30 PM Logan Jett MD [id number]   2/13/2024 11:00 AM Tahira Moncada RD [facility]   3/6/2024 11:00 AM Lucita Cope,  [id number]   3/12/2024  9:45 AM Ayala Torres APRN [facility]

## 2024-01-16 DIAGNOSIS — E11.65 TYPE 2 DIABETES MELLITUS WITH HYPERGLYCEMIA, WITHOUT LONG-TERM CURRENT USE OF INSULIN (HCC): ICD-10-CM

## 2024-01-16 RX ORDER — METFORMIN HYDROCHLORIDE 500 MG/1
500 TABLET, EXTENDED RELEASE ORAL
Qty: 90 TABLET | Refills: 1 | Status: SHIPPED | OUTPATIENT
Start: 2024-01-16

## 2024-01-16 NOTE — TELEPHONE ENCOUNTER
Requested Prescriptions     Pending Prescriptions Disp Refills    METFORMIN  MG Oral Tablet 24 Hr [Pharmacy Med Name: METFORMIN ER 500MG 24HR TABS] 90 tablet 1     Sig: TAKE 1 TABLET BY MOUTH DAILY WITH DINNER     Your appointments       Date & Time Appointment Department ([place])    Jan 17, 2024  1:30 PM CST Procedure with Logan Jett MD [facility] ([facility])        Feb 13, 2024 11:00 AM CST DIAB MEDICAL NUTRITION with Tahira Moncada RD [address] ([facility] [place])        Mar 06, 2024 11:00 AM CST Exam - Established with Lucita Cope DO [facility], [facility] ([facility])        Mar 12, 2024  9:45 AM CDT Diabetes Pump follow up with Ayala Torres APRN [address] ([facility] [place])              [address]  [facility] [place]  [address] [place] [address] [address]  [facility] [id number] [phone] [facility] [place][note 2]  [address]  [facility] [id number] [phone] [facility]  [address] [facility] [address]  [phone]          HGBA1C:    Lab Results   Component Value Date    A1C 7.6 (A) 12/12/2023    A1C 7.3 (A) 09/12/2023    A1C 7.2 (H) 06/12/2023     (H) 06/12/2023     LOV: 12-12-23    REFILL: 7-21-23

## 2024-01-17 ENCOUNTER — OFFICE VISIT (OUTPATIENT)
Facility: LOCATION | Age: 74
End: 2024-01-17
Payer: MEDICARE

## 2024-01-17 VITALS — TEMPERATURE: 98 F | HEART RATE: 105 BPM

## 2024-01-17 DIAGNOSIS — K64.8 INTERNAL HEMORRHOIDS WITH COMPLICATION: Primary | ICD-10-CM

## 2024-01-17 DIAGNOSIS — K62.5 RECTAL BLEEDING: ICD-10-CM

## 2024-01-17 PROCEDURE — 1159F MED LIST DOCD IN RCRD: CPT | Performed by: SURGERY

## 2024-01-17 PROCEDURE — 1160F RVW MEDS BY RX/DR IN RCRD: CPT | Performed by: SURGERY

## 2024-01-17 PROCEDURE — 99212 OFFICE O/P EST SF 10 MIN: CPT | Performed by: SURGERY

## 2024-01-17 RX ORDER — EMPAGLIFLOZIN 25 MG/1
1 TABLET, FILM COATED ORAL DAILY
Qty: 90 TABLET | Refills: 3 | Status: SHIPPED | OUTPATIENT
Start: 2024-01-17

## 2024-01-17 NOTE — PROGRESS NOTES
Follow Up Visit Note       Active Problems      1. Internal hemorrhoids with complication    2. Rectal bleeding          Chief Complaint   Chief Complaint   Patient presents with    Hemorrhoid Banding     EP- 4th Hemorrhoid Banding, Pt. Stated to have rectal bleeding 3 since last banding.          History of Present Illness    Patient presents for continued follow-up of her internal hemorrhoids.  The patient is here to assess response to treatment after rubber band ligation and phenol injection.  Patient states that since our last encounter she had 1 episode of blood on the toilet tissue; otherwise no other symptoms present.  The patient reports no perianal itching, drainage or gross hematochezia.    The patient denies fever, chills, chest pain, shortness of breath, dyspnea. The patient also denies hematemesis, melena, or hematochezia. The patient denies change in bowel or bladder habits. There is no complaint of hematuria or dysuria.    Allergies  Lexi is allergic to boniva [ibandronate sodium] and lisinopril.    Past Medical / Surgical / Social / Family History    The past medical and past surgical history have been reviewed by me today.    Past Medical History:   Diagnosis Date    Abdominal hernia     On bellybutton    Abdominal pain     Occasional    Arthritis     Bloating     Constipation     Occasionally    Diabetes mellitus (HCC)     Diarrhea, unspecified     Occasionally    Diverticulitis     Dyslipidemia     Flatulence/gas pain/belching     Frequent urination     Gastrointestinal bleeding     PUDz in setting of ASA/boniva use    Hemorrhoids     History of cardiac murmur     Irregular bowel habits     Leaking of urine     Osteopenia     Other and unspecified hyperlipidemia     Pain in joints     Type 2 diabetes mellitus with proteinuria or albuminuria     Type II or unspecified type diabetes mellitus without mention of complication, not stated as uncontrolled     Vitamin D deficiency      Past Surgical  History:   Procedure Laterality Date    COLONOSCOPY  01/01/2008    Hemorrhoid, Recheck 7-10 years    COLONOSCOPY  04/2013    adenomatous polyp    COLONOSCOPY      HYSTERECTOMY  2004    total hystero.    OOPHORECTOMY Bilateral 2004    total hystero.    OTHER SURGICAL HISTORY      varicose vein procedures    TONSILLECTOMY      TOTAL ABDOM HYSTERECTOMY      UPPER GI ENDOSCOPY,EXAM  01/01/2008       The family history and social history have been reviewed by me today.    Family History   Problem Relation Age of Onset    Other (Lung ca) Mother     Other (Dementia) Father     Other (Thrombocytopenia) Other         Sibling    Heart Disorder Maternal Grandmother         CVA    Heart Disorder Maternal Grandfather         MI    Cancer Brother         esophageal cancer     Social History     Socioeconomic History    Marital status:     Number of children: 3   Occupational History    Occupation: Food service     Employer: [employer]   Tobacco Use    Smoking status: Never    Smokeless tobacco: Never   Vaping Use    Vaping Use: Never used   Substance and Sexual Activity    Alcohol use: No    Drug use: No   Other Topics Concern    Caffeine Concern No    Exercise Yes     Comment: walking    Seat Belt Yes    Special Diet Yes     Comment: diabetic    Stress Concern No    Weight Concern Yes        Current Outpatient Medications:     metFORMIN  MG Oral Tablet 24 Hr, Take 1 tablet (500 mg total) by mouth daily with dinner., Disp: 90 tablet, Rfl: 1    Betamethasone Dipropionate Aug 0.05 % External Cream, Apply 1 Application  topically 2 (two) times daily as needed (do not use longer than 14 days in a row)., Disp: 15 g, Rfl: 0    rosuvastatin 20 MG Oral Tab, Take 1 tablet (20 mg total) by mouth daily., Disp: 90 tablet, Rfl: 3    glipiZIDE ER 10 MG Oral Tablet 24 Hr, Take 1 tablet (10 mg total) by mouth daily., Disp: 90 tablet, Rfl: 0    pantoprazole 40 MG Oral Tab EC, Take one tablet (40 mg total) by mouth once daily, 30  minutes prior to breakfast., Disp: 90 tablet, Rfl: 3    losartan 25 MG Oral Tab, Take 1 tablet (25 mg total) by mouth daily. (Patient taking differently: Take 1 tablet (25 mg total) by mouth every evening.), Disp: 90 tablet, Rfl: 1    Glucose Blood (ONETOUCH VERIO) In Vitro Strip, Use to test blood sugar twice daily, Disp: 200 strip, Rfl: 5    OneTouch Delica Lancets 33G Does not apply Misc, Use with test strips to check blood sugar twice daily, Disp: 200 each, Rfl: 5    semaglutide 8 MG/3ML Subcutaneous Solution Pen-injector, Inject 2 mg into the skin once a week., Disp: 1 each, Rfl: 12    Empagliflozin (JARDIANCE) 25 MG Oral Tab, Take 25 mg by mouth daily., Disp: 90 tablet, Rfl: 3    Multiple Vitamin (DAILY VALUE MULTIVITAMIN) Oral Tab, Take 1 tablet by mouth daily., Disp: , Rfl:     Blood Glucose Monitoring Suppl (ONETOUCH VERIO FLEX SYSTEM) w/Device Does not apply Kit, Use machine to test blood sugar twice a day as directed., Disp: 1 kit, Rfl: 0    ferrous sulfate 325 (65 FE) MG Oral Tab EC, Take 1 tablet (325 mg total) by mouth daily with breakfast., Disp: , Rfl:     Cholecalciferol (VITAMIN D) 1000 UNITS Oral Cap, Take 4,000 mg by mouth daily., Disp: , Rfl:      Review of Systems  The Review of Systems has been reviewed by me during today.  Review of Systems   Constitutional: Negative.    HENT: Negative.     Eyes: Negative.    Respiratory: Negative.     Cardiovascular: Negative.    Gastrointestinal: Negative.    Genitourinary: Negative.    Musculoskeletal: Negative.    Skin: Negative.    Neurological: Negative.    Psychiatric/Behavioral: Negative.          Physical Findings   Pulse 105   Temp 98.4 °F (36.9 °C) (Temporal)   LMP  (LMP Unknown)   Physical Exam  Constitutional:       Appearance: She is well-developed.   Cardiovascular:      Rate and Rhythm: Normal rate and regular rhythm.      Heart sounds: Normal heart sounds.   Pulmonary:      Effort: Pulmonary effort is normal.      Breath sounds: Normal  breath sounds.   Abdominal:      General: Bowel sounds are normal.      Palpations: Abdomen is soft.   Skin:     General: Skin is warm and dry.   Neurological:      Mental Status: She is alert and oriented to person, place, and time.      Deep Tendon Reflexes: Reflexes are normal and symmetric.          Assessment   1. Internal hemorrhoids with complication    2. Rectal bleeding        Plan     The patient is recovering nicely following rubber band ligation and phenol injection of internal hemorrhoids.    The anticipated postoperative recovery was discussed with the patient in detail.    Dietary, activity, and exercise recommendations along with restrictions were discussed with the patient during today's visit.    Wound care instructions were discussed during today's visit.    The patient will return to my attention on an as needed basis.    The patient is encouraged to continue seeing the primary care physician for ongoing medical needs.    The patient was given ample opportunity to ask questions.  The patient's questions were answered in detail and to the patient's satisfaction.  The patient voiced understanding of the postoperative care plan.         No orders of the defined types were placed in this encounter.      Imaging & Referrals   None    Follow Up  No follow-ups on file.    Logan Jett MD

## 2024-02-12 ENCOUNTER — TELEPHONE (OUTPATIENT)
Dept: ENDOCRINOLOGY CLINIC | Facility: CLINIC | Age: 74
End: 2024-02-12

## 2024-03-06 ENCOUNTER — OFFICE VISIT (OUTPATIENT)
Dept: RHEUMATOLOGY | Facility: CLINIC | Age: 74
End: 2024-03-06
Payer: MEDICARE

## 2024-03-06 VITALS
BODY MASS INDEX: 28.93 KG/M2 | SYSTOLIC BLOOD PRESSURE: 118 MMHG | HEIGHT: 60 IN | DIASTOLIC BLOOD PRESSURE: 60 MMHG | WEIGHT: 147.38 LBS | RESPIRATION RATE: 16 BRPM | TEMPERATURE: 97 F | HEART RATE: 83 BPM | OXYGEN SATURATION: 94 %

## 2024-03-06 DIAGNOSIS — M15.9 PRIMARY OSTEOARTHRITIS INVOLVING MULTIPLE JOINTS: Primary | ICD-10-CM

## 2024-03-06 DIAGNOSIS — M15.4 EROSIVE OSTEOARTHRITIS OF BOTH HANDS: ICD-10-CM

## 2024-03-06 DIAGNOSIS — R76.8 RIBONUCLEOPROTEIN ANTIBODY POSITIVE: ICD-10-CM

## 2024-03-06 DIAGNOSIS — R76.8 ANA POSITIVE: ICD-10-CM

## 2024-03-06 DIAGNOSIS — M35.00 SICCA SYNDROME (HCC): ICD-10-CM

## 2024-03-06 PROCEDURE — 1160F RVW MEDS BY RX/DR IN RCRD: CPT | Performed by: INTERNAL MEDICINE

## 2024-03-06 PROCEDURE — 99214 OFFICE O/P EST MOD 30 MIN: CPT | Performed by: INTERNAL MEDICINE

## 2024-03-06 PROCEDURE — 3074F SYST BP LT 130 MM HG: CPT | Performed by: INTERNAL MEDICINE

## 2024-03-06 PROCEDURE — 1159F MED LIST DOCD IN RCRD: CPT | Performed by: INTERNAL MEDICINE

## 2024-03-06 PROCEDURE — 3078F DIAST BP <80 MM HG: CPT | Performed by: INTERNAL MEDICINE

## 2024-03-06 PROCEDURE — 3008F BODY MASS INDEX DOCD: CPT | Performed by: INTERNAL MEDICINE

## 2024-03-06 NOTE — PROGRESS NOTES
?  RHEUMATOLOGY FOLLOW UP   Date of visit: 03/06/2024  ?  Chief Complaint   Patient presents with    Follow - Up     LOV 9/6/2023. Pt has been doing well with minimal pain. She states the pain is usually in her hands, shoulders, and knees. She still does her PT exercises at home.      ?  ASSESSMENT, DISCUSSION & PLAN   Assessment:  1. Primary osteoarthritis involving multiple joints    2. Erosive osteoarthritis of both hands    3. Sicca syndrome (HCC)    4. HEMAL positive    5. Ribonucleoprotein antibody positive        Discussion:  Ms. Lexi Ahumada is a 74 yo woman who presented for worsened joint pain over the years. Pain located mostly in the joints of her hands, knees as well as shoulders. At this time, she has clear signs of deformities of the hands which suggest more of an erosive osteoarthritis rather than rheumatoid arthritis. Imaging work up is more consistent with osteoarthritis rather than systemic inflammatory disease.   She did have prior RNP antibody positivity, however patient lacks any overt signs consistent with MCTD (lacks signs consistent with RA, lupus, inflammatory myopathy, scleroderma or Raynaud's).     Previously discussed at length treatment options for osteoarthritis which is likely cause of symptoms. Can try the following  -- tylenol extra strength for arthritis -- pt already doing - recommended she increase this  -- topical NSAIDs like voltaren gel up to 4x daily- recommend she use this with her topical aspercream/lidocaine   -- oral NSAIDs, OTC vs prescription--- relatively contraindicated due to other comorbidities and hx of stomach ulcers   -- low dose antidepressant like duloxetine (previously not interested due to knowing someone who had committed suicide while on medication)-- recommended she consider this medication again.   -- pain management with opioids like Norco or other meds like tramadol- encouraged her to reach out if she'd like to try     Can also consider trial of  plaquenil.   She will call/message me if she changes her mind on medication intervention.     She has followed with ortho and had long acting steroid injection for the knees which has helped with PT.  Recommended she continue home exercises previously taught during PT and monitor symptoms.   Okay to follow up in 6 months or sooner as needed. Will consider annual visits thereafter.   She was encouraged to get the labs ordered by PCP and update me if abnormalities present.     Patient verbalized understanding of above instructions. No further questions at this time.    Code selection for this visit was based on time spent (30min) on date of service in preparing to see the patient, obtaining and/or reviewing separately obtained history, performing a medically appropriate examination, counseling and educating the patient/family/caregiver, ordering medications or testing, referring and communicating with other healthcare providers, documenting clinical information in the E HR, independently interpreting results and communicating results to the patient/family/caregiver and care coordination with the patient's other providers.    ?  Plan:  Diagnoses and all orders for this visit:    Primary osteoarthritis involving multiple joints    Erosive osteoarthritis of both hands    Sicca syndrome (HCC)    HEMAL positive    Ribonucleoprotein antibody positive          Return in about 6 months (around 9/6/2024).  ?  HPI   Lexi Ahumada is a 73 year old female with the following active problems who was initially seen for evaluation of joint pain and dx with osteoarthritis. She presents for follow up.    Since her last visit, she feels okay.  Has followed with orthopedics and had injection in December with long acting steroids. Feels like helping. Has not discussed gel injections since the last set did not help. Is holding off on surgery if able.     Occasional hip pain but intermittent. Feels like the pain goes down the thigh. Denies  weakness in the leg.   Continues with hand pain, some days better than others. Still hard to open jars. Okay with doors since doesn't have door knobs but handles instead.  Similar with the shoulders, now both sides.   Denies pain waking up from sleep but can have some trouble finding a comfortable position.   Does have hx of stomach ulcers so avoids NSAIDs.   Applies aspercream and takes tylenol for arthritis as needed when pain more severe.      Can get some swelling over the knuckles and has trouble getting rings on.   Denies new nodules but stable deformities.     + dry eyes, uses Refresh drops which helps, applies twice daily. Started on drops for early glaucoma (follows with Dr. Pete)   + dry mouth, states mild, improved after stopping medication for overactive bladder.   Still working on getting her blood sugars better controlled, has upcoming appt.   Denies skin rashes  Denies raynaud's  Denies hair loss thinning  Denies oral or nasal ulcers   Denies falls/fractures   Had cscope last year, found to have hemorrhoids s/p seeing gen sx and banding. Also found to have some stomach polyps and continued PPI. Plans to repeat EGD in one year and cscope in 3 years.       HPI from initial consultation  referred for rheumatologic evaluation due to worsened joint pain.     Has been having worsened joint pain over the past year but has been present for several years. Has noticed worsened nodularities over the years with some pain associated. Also pain at the base of the thumbs b/l.   Main areas are the arms/shoulders right worse than left, knees as well as the hands.  Can get swelling of the knees.     Does take tylenol for arthritis without much relief.   Does feel like overall strength in her hands is decrease.     + hair thinning when she was having irregular menstrual cycles, s/p hysterectomy in 2004, hair thinning never got better but did not get worse  + hx of iron deficiency anemia, still takes oral iron; never  required blood transfusion   + reflux on omeprazole, well controlled   + dry eyes, uses OTC drops- Refresh, has not had prescription, hx of punctal plugs gets every 6 months (follows with Dr. Pete)   + dry mouth, has not tried any products, more intermittent; thought could be related to diabetes medication   + hx of bleeding stomach ulcer, no recent  + hx of diverticulitis, has had few episodes, no recurrence in over 5 years. Watches diet closely. Never required surgical intervention. Last colonoscopy around 2018.    Hx of osteopenia and was on boniva for some time. Did have a bleeding ulcer following tx, unclear if related or if due to aspirin. Was on Evista (raloxifene) for 5 years as well has been several years, not currently taking.     No history of significant morning stiffness.  The patient denies oral or nasal ulcers, photosensitive rash, elevated or scarring rashes, Raynaud's phenomenon, prior renal or liver disease, or history of seizures.  Denies hx of pericarditis or pleuritis.   No history of prior blood clot in the legs or lungs, strokes or ischemic phenomenon, prior miscarriages.  Denies nonhealing ulcers on the fingertips or trouble swallowing.  The patient denies any history of uveitis, nodular painful shin bruises, Achilles heel pain or symptoms of enthesitis, psoriatic lesions, spooning or pitting of the nails, history of dactylitis, or pain awakening the patient from sleep.  Denies recurrent cavities or swelling of the cheeks or under the jawbone.   No fevers, chills, lymphadenopathy, night sweats, unexpected weight loss, easy bruising or bleeding.  Denies chronic sinus infections/disease or epistaxis.  Denies chronic cough or hemoptysis.   Denies obvious blood in the urine.     Family hx:   Mother  of lung cancer in her 50's  Father had leg cramping, unclear details  No formally known autoimmune disease except oldest daughter with multiple sclerosis (dx at age 40)      Past Medical  History:  Past Medical History:   Diagnosis Date    Abdominal hernia     On [facility]    Abdominal pain     Occasional    Arthritis     Bloating     Constipation     Occasionally    Diabetes mellitus (HCC)     Diarrhea, unspecified     Occasionally    Diverticulitis     Dyslipidemia     Flatulence/gas pain/belching     Frequent urination     Gastrointestinal bleeding     PUDz in setting of ASA/boniva use    Hemorrhoids     History of cardiac murmur     Irregular bowel habits     Leaking of urine     Osteopenia     Other and unspecified hyperlipidemia     Pain in joints     Type 2 diabetes mellitus with proteinuria or albuminuria     Type II or unspecified type diabetes mellitus without mention of complication, not stated as uncontrolled     Vitamin D deficiency      Past Surgical History:  Past Surgical History:   Procedure Laterality Date    COLONOSCOPY  01/01/2008    Hemorrhoid, Recheck 7-10 years    COLONOSCOPY  04/2013    adenomatous polyp    COLONOSCOPY      HYSTERECTOMY  2004    total hystero.    OOPHORECTOMY Bilateral 2004    total hystero.    OTHER SURGICAL HISTORY      varicose vein procedures    TONSILLECTOMY      TOTAL ABDOM HYSTERECTOMY      UPPER GI ENDOSCOPY,EXAM  01/01/2008     Family History:  Family History   Problem Relation Age of Onset    Other (Lung ca) Mother     Other (Dementia) Father     Other (Thrombocytopenia) Other         Sibling    Heart Disorder Maternal Grandmother         CVA    Heart Disorder Maternal Grandfather         MI    Cancer Brother         esophageal cancer     Social History:  Social History     Socioeconomic History    Marital status:     Number of children: 3   Occupational History    Occupation: Food service     Employer: [employer]   Tobacco Use    Smoking status: Never    Smokeless tobacco: Never   Vaping Use    Vaping Use: Never used   Substance and Sexual Activity    Alcohol use: No    Drug use: No   Other Topics Concern    Caffeine Concern No     Exercise Yes     Comment: walking    Seat Belt Yes    Special Diet Yes     Comment: diabetic    Stress Concern No    Weight Concern Yes     Medications:  Outpatient Medications Marked as Taking for the 3/6/24 encounter (Office Visit) with Lucita Cope DO   Medication Sig Dispense Refill    latanoprost 0.005 % Ophthalmic Solution       Empagliflozin (JARDIANCE) 25 MG Oral Tab Take 1 tablet by mouth daily. 90 tablet 3    metFORMIN  MG Oral Tablet 24 Hr Take 1 tablet (500 mg total) by mouth daily with dinner. 90 tablet 1    Betamethasone Dipropionate Aug 0.05 % External Cream Apply 1 Application  topically 2 (two) times daily as needed (do not use longer than 14 days in a row). 15 g 0    rosuvastatin 20 MG Oral Tab Take 1 tablet (20 mg total) by mouth daily. 90 tablet 3    glipiZIDE ER 10 MG Oral Tablet 24 Hr Take 1 tablet (10 mg total) by mouth daily. 90 tablet 0    pantoprazole 40 MG Oral Tab EC Take one tablet (40 mg total) by mouth once daily, 30 minutes prior to breakfast. 90 tablet 3    losartan 25 MG Oral Tab Take 1 tablet (25 mg total) by mouth daily. (Patient taking differently: Take 1 tablet (25 mg total) by mouth every evening.) 90 tablet 1    semaglutide 8 MG/3ML Subcutaneous Solution Pen-injector Inject 2 mg into the skin once a week. 1 each 12    Multiple Vitamin (DAILY VALUE MULTIVITAMIN) Oral Tab Take 1 tablet by mouth daily.      ferrous sulfate 325 (65 FE) MG Oral Tab EC Take 1 tablet (325 mg total) by mouth daily with breakfast.      Cholecalciferol (VITAMIN D) 1000 UNITS Oral Cap Take 4,000 mg by mouth daily.       Modified Medications    No medications on file     There are no discontinued medications.  ?  Allergies:  Allergies   Allergen Reactions    Boniva [Ibandronate Sodium] UNKNOWN     Bleeding ulcers    Lisinopril Coughing     TABS     ?  REVIEW OF SYSTEMS   ?  Review of Systems   Constitutional:  Negative for chills, fever and malaise/fatigue.   Eyes:  Negative for blurred vision,  pain and redness.   Respiratory:  Negative for cough and shortness of breath.    Cardiovascular:  Negative for chest pain, palpitations and leg swelling.   Gastrointestinal:  Positive for constipation (improved on miralax). Negative for abdominal pain, blood in stool, diarrhea, heartburn (on PPI) and nausea.   Genitourinary:  Positive for frequency and urgency. Negative for dysuria and hematuria.   Musculoskeletal:  Positive for back pain, joint pain and neck pain. Negative for myalgias.   Skin:  Negative for itching and rash.   Neurological:  Positive for weakness (in the hands d/t pain). Negative for dizziness, tingling and headaches.   Endo/Heme/Allergies:  Does not bruise/bleed easily.     PHYSICAL EXAM   Today's Vitals:  Temperature Blood Pressure Heart Rate Resp Rate SpO2   Temp: 97.2 °F (36.2 °C) BP: 118/60 Pulse: 83 Resp: 16 SpO2: 94 %   ?  Current Weight Height BMI BSA Pain   Wt Readings from Last 1 Encounters:   03/06/24 147 lb 6.4 oz (66.9 kg)    Height: 5' (152.4 cm) Body mass index is 28.79 kg/m². Body surface area is 1.64 meters squared.         Physical Exam  Vitals and nursing note reviewed.   Constitutional:       General: She is not in acute distress.     Appearance: Normal appearance. She is well-developed. She is not diaphoretic.   HENT:      Head: Normocephalic and atraumatic.   Eyes:      General: No scleral icterus.     Extraocular Movements: Extraocular movements intact.      Conjunctiva/sclera: Conjunctivae normal.   Neck:      Vascular: No JVD.      Trachea: No tracheal deviation.   Cardiovascular:      Rate and Rhythm: Normal rate and regular rhythm.      Heart sounds: Normal heart sounds. No murmur heard.  Pulmonary:      Effort: Pulmonary effort is normal. No respiratory distress.      Breath sounds: Normal breath sounds. No wheezing.   Musculoskeletal:         General: Deformity present. No swelling or tenderness.      Cervical back: Neck supple.      Comments: Diffuse  heberden/bouchards nodules, large CMC B/L without synovitis.   No swelling, tenderness, redness or restriction of motion of the MCPs, wrists, elbows, ankles, or joints of the feet.  Bilateral shoulders with full ROM, some restriction on right  Bilateral knees with medial joint line tenderness, moderate crepitus, no effusion. Right bony enlargement compared to left. Improved overall   Lymphadenopathy:      Cervical: No cervical adenopathy.   Skin:     General: Skin is warm and dry.      Findings: No erythema or rash.      Comments: No periungal erythema  No nail pitting/onycholysis    Neurological:      Mental Status: She is alert and oriented to person, place, and time.      Cranial Nerves: No cranial nerve deficit.      Gait: Gait abnormal (improved).   Psychiatric:         Mood and Affect: Mood normal.         Behavior: Behavior normal.       ?  Radiology review:       PROCEDURE:  XR CERVICAL SPINE (4VIEWS) (CPT=72050)       TECHNIQUE:  AP, lateral, obliques, and coned down view of the spine were obtained.       COMPARISON:  None.       INDICATIONS:  neck pain with movement; no injury       PATIENT STATED HISTORY: (As transcribed by Technologist)  5 days ago pt started feeling pain and stiffness to head and neck. Pt cant touch head to wash hair. No injury.            FINDINGS:       BONES:  There is no evidence of acute osseous injuries.  Normal alignment of the cervical spine.  There are mild to moderate multilevel posterior articular facet joint and uncovertebral joint degenerative changes.   DISC SPACES:  There are moderate multilevel degenerative disc changes, with multilevel disc height loss and endplate osteophytes.   PARASPINOUS:  Negative.  No paraspinous abnormality is seen.   OTHER:  Negative.                           Impression   CONCLUSION:     1. No acute process.   2. Mild to moderate multilevel disc disease and facet arthropathy of the cervical spine.           Dictated by (CST): Aung Ann DO  on 11/04/2021 at 6:06 PM       Finalized by (CST): Aung Ann DO on 11/04/2021 at 6:09 PM          PROCEDURE:  XR SHOULDER, COMPLETE (MIN 2 VIEWS), RIGHT (CPT=73030)          TECHNIQUE:  Multiple views were obtained.       COMPARISON:  [facility], XR, SHOULDER-BURSITIS(2 VWS),RIGHT, 4/06/2010, 8:35 AM.       INDICATIONS:  M25.511 Right shoulder pain, unspecified chronicity       PATIENT STATED HISTORY: (As transcribed by Technologist)  right shoulder pain x 5 days, no injury            FINDINGS:  Negative for fracture, dislocation, deformity or other acute bony abnormality. Osteoarthritic changes are noted, mild degree, mostly involving the AC joint, and only minimally the glenohumeral joint.  No soft tissue abnormalities.                            Impression   CONCLUSION:  Mild osteoarthritic changes are present, mostly involving the AC joint. No acute fracture or other acute bony process.       Dictated by (CST): Cole Landry MD on 9/03/2021 at 3:03 PM       Finalized by (CST): Cole Landry MD on 9/03/2021 at 3:07 PM        PROCEDURE:  XR HAND (MIN 3 VIEWS), RIGHT (CPT=73130)       TECHNIQUE:  Three views were obtained.       COMPARISON:  [facility], XR, XR HAND (MIN 3 VIEWS), LEFT (CPT=73130), 4/12/2021, 9:59 AM.       INDICATIONS:  M25.50 Polyarthralgia G89.29 Chronic pain of both knees M25.562 Chronic pain of both knees M25.561 Chronic pain of both knees M79.642 Bilateral hand pain M79.6*       PATIENT STATED HISTORY: (As transcribed by Technologist)  Bilateral hand pain, right worse than left. No injury.            FINDINGS:  Osseous structures are intact.  Interphalangeal joint space narrowing, severe involving the 2nd and 3rd distal interphalangeal joints and 2nd and 5th proximal interphalangeal joints with marginal osteophytes (Heberden and Myaco nodules   respectively).  Findings most consistent with osteoarthritis.  Deformity involves the 2nd digit and to a lesser extent the 3rd digit due  to degenerative change.    Degenerative change also noted involving the carpal 1st metacarpal joint.  No dislocation.                        Impression   CONCLUSION:     1. Findings most consistent with osteoarthritis as detailed above, noted to a greater extent when compared to contralateral left hand radiographs.           Dictated by (CST): Nasra Cosby MD on 4/12/2021 at 11:16 AM       Finalized by (CST): Nasra Cosby MD on 4/12/2021 at 11:21 AM        PROCEDURE:  XR HAND (MIN 3 VIEWS), LEFT (CPT=73130)       TECHNIQUE:  Three views were obtained.       COMPARISON:  [facility], XR, XR HAND (MIN 3 VIEWS), RIGHT (CPT=73130), 4/12/2021, 9:57 AM.       INDICATIONS:  M25.50 Polyarthralgia G89.29 Chronic pain of both knees M25.562 Chronic pain of both knees M25.561 Chronic pain of both knees M79.642 Bilateral hand pain M79.6*       PATIENT STATED HISTORY: (As transcribed by Technologist)  Bilateral hand pain, right worse than left. No injury.            FINDINGS:  Osseous structures are intact.  Interphalangeal joint space narrowing predominantly involves the 2nd and 3rd distal interphalangeal joint with marginal osteophytes (Heberden nodes) and surrounding soft tissue swelling consistent with   osteoarthritis.  Degenerative change also involves the carpal 1st metacarpal joint.  No dislocation.  Incidentally noted is linear sclerosis involving the distal radial metaphysis, correlate clinically with prior trauma.                        Impression   CONCLUSION:     1. Findings most consistent with osteoarthritis noted to a lesser extent when compared to contralateral right hand radiographs.           Dictated by (CST): Nasra Cosby MD on 4/12/2021 at 11:21 AM       Finalized by (CST): Nasra Cosby MD on 4/12/2021 at 11:23 AM        PROCEDURE:  XR KNEE BILAT STANDING (CPT=73565)       TECHNIQUE:  Bilateral standing view of the knee was obtained.       COMPARISON:  None.       INDICATIONS:       PATIENT STATED HISTORY: (As  transcribed by Technologist)  Bilateral knee pain. Right worse than left. No known injury.             FINDINGS:     BONES:  Osseous structures are intact.  Bilateral medial compartmental narrowing, right greater than left with near complete obliteration of the right medial compartment which demonstrates marginal osteophytes consistent with degenerative change.   SOFT TISSUES:  Right medial soft tissue swelling at the level of the distal femur.                        Impression   CONCLUSION:     1. Degenerative change predominantly involves the medial compartments, right greater than left as detailed above.           Dictated by (CST): Nasra Cosby MD on 4/12/2021 at 11:04 AM       Finalized by (CST): Nasra Cosby MD on 4/12/2021 at 11:06 AM        PROCEDURE:  XR DEXA BONE DENSITOMETRY (CPT=77080)       COMPARISON:  [facility] XR DEXA BONE DENSITOMETRY (CPT=77080), 12/19/2018, 8:45 AM.       INDICATIONS:    M85.88 Osteopenia of lumbar spine       PATIENT STATED HISTORY: (As transcribed by Technologist)  Osteopenia            LUMBAR SPINE ANALYSIS RESULTS:       Bone mineral density (BMD) (g/cm2):  .855     Lumbar T-Score:  -1.7       % young normals:  82       % age matched controls:  103       Change from prior spine examination:  -2.3%                TOTAL HIP ANALYSIS RESULTS:         Bone mineral density (BMD) (g/cm2):  .821       Total Hip T-Score:  -1.1       % young normals:  85       % age matched controls:  105       Change from prior hip examination:  -0.6%                FEMORAL NECK ANALYSIS RESULTS:         Bone mineral density (BMD) (g/cm2):  .788       Femoral neck T-Score:  -0.7       % young normals:  90       % age matched controls:  116       Change from prior hip examination:  6.8*%               ADDITIONAL FINDINGS:  FRAX = 4.2%/0.3%.                          Impression   CONCLUSION:  Bone mineral density values for lumbar spine and left hip are compatible with the diagnosis of osteopenia by WHO  definition (T score between -1.0 and -2.5).  If therapy is initiated, follow-up study is recommended in 2 years for further   evaluation of therapeutic efficacy.          Labs:  Lab Results   Component Value Date    WBC 7.7 12/27/2022    RBC 4.63 12/27/2022    HGB 14.2 12/27/2022    HCT 44.7 12/27/2022    .0 12/27/2022    MPV 13.5 (H) 06/22/2012    MCV 96.5 12/27/2022    MCH 30.7 12/27/2022    MCHC 31.8 12/27/2022    RDW 12.7 12/27/2022    NEPRELIM 5.32 12/27/2022    NEPERCENT 68.9 12/27/2022    LYPERCENT 19.5 12/27/2022    MOPERCENT 7.4 12/27/2022    EOPERCENT 3.0 12/27/2022    BAPERCENT 0.8 12/27/2022    NE 5.32 12/27/2022    LYMABS 1.50 12/27/2022    MOABSO 0.57 12/27/2022    EOABSO 0.23 12/27/2022    BAABSO 0.06 12/27/2022     Lab Results   Component Value Date     (H) 06/08/2023    BUN 17 06/08/2023    BUNCREA 32.4 (H) 12/27/2022    CREATSERUM 0.69 06/08/2023    ANIONGAP 5 06/08/2023    GFR 92 10/25/2017    GFRNAA 83 05/06/2022    GFRAA 96 05/06/2022    CA 9.3 06/08/2023    OSMOCALC 287 06/08/2023    ALKPHO 74 06/08/2023    AST 27 06/08/2023    ALT 39 06/08/2023    BILT 0.3 06/08/2023    TP 7.4 06/08/2023    ALB 3.9 06/08/2023    GLOBULIN 3.5 06/08/2023     06/08/2023    K 4.0 06/08/2023     06/08/2023    CO2 27.0 06/08/2023       Additional Labs:  04/2022  CRP normal   ESR 15 normal   IGA, IGG normal  IGM 35 borderline low    borderline   HEMAL negative    10/2021  HEMAL by IFA 1: 160 speckled  IgA, IgG normal  IgM borderline low 40  C3 137 normal  C4 31.4 normal  CRP normal  ESR 9 normal   equivocal    04/2021  HEMAL screen positive    Remainder of [facility] FERNANDO panel negative  Uric acid 4.0 normal  CCP negative  RF negative  CRP normal  ESR 6 normal    Lucita Prisca, DO  [facility]  03/06/2024

## 2024-03-11 DIAGNOSIS — R80.9 TYPE 2 DIABETES MELLITUS WITH MICROALBUMINURIA, WITHOUT LONG-TERM CURRENT USE OF INSULIN (HCC): ICD-10-CM

## 2024-03-11 DIAGNOSIS — E11.29 TYPE 2 DIABETES MELLITUS WITH MICROALBUMINURIA, WITHOUT LONG-TERM CURRENT USE OF INSULIN (HCC): ICD-10-CM

## 2024-03-11 DIAGNOSIS — E11.65 TYPE 2 DIABETES MELLITUS WITH HYPERGLYCEMIA, WITHOUT LONG-TERM CURRENT USE OF INSULIN (HCC): ICD-10-CM

## 2024-03-11 RX ORDER — LOSARTAN POTASSIUM 25 MG/1
25 TABLET ORAL DAILY
Qty: 90 TABLET | Refills: 0 | Status: SHIPPED | OUTPATIENT
Start: 2024-03-11

## 2024-03-11 RX ORDER — GLIPIZIDE 10 MG/1
10 TABLET, FILM COATED, EXTENDED RELEASE ORAL DAILY
Qty: 90 TABLET | Refills: 0 | Status: SHIPPED | OUTPATIENT
Start: 2024-03-11

## 2024-03-11 NOTE — TELEPHONE ENCOUNTER
Requested Prescriptions     Pending Prescriptions Disp Refills    GLIPIZIDE ER 10 MG Oral Tablet 24 Hr [Pharmacy Med Name: GLIPIZIDE ER 10MG TABLETS] 90 tablet 0     Sig: TAKE 1 TABLET(10 MG) BY MOUTH DAILY    LOSARTAN 25 MG Oral Tab [Pharmacy Med Name: LOSARTAN 25MG TABLETS] 90 tablet 1     Sig: TAKE 1 TABLET(25 MG) BY MOUTH DAILY     Future Appointments   Date Time Provider Department Center   3/12/2024  9:00 AM [facility]   3/12/2024  9:45 AM Ayala Torres, APRN [facility]   9/27/2024 12:15 PM Lucita Cope DO [facility]     Last A1c value was 7.6% done 12/12/2023.  Refill 12/12/23  LOV 12/12/23

## 2024-03-12 ENCOUNTER — LAB ENCOUNTER (OUTPATIENT)
Dept: LAB | Age: 74
End: 2024-03-12
Attending: INTERNAL MEDICINE
Payer: MEDICARE

## 2024-03-12 ENCOUNTER — OFFICE VISIT (OUTPATIENT)
Dept: ENDOCRINOLOGY CLINIC | Facility: CLINIC | Age: 74
End: 2024-03-12
Payer: MEDICARE

## 2024-03-12 VITALS
WEIGHT: 146 LBS | SYSTOLIC BLOOD PRESSURE: 116 MMHG | HEART RATE: 85 BPM | TEMPERATURE: 98 F | BODY MASS INDEX: 28.66 KG/M2 | RESPIRATION RATE: 18 BRPM | HEIGHT: 60 IN | DIASTOLIC BLOOD PRESSURE: 66 MMHG | OXYGEN SATURATION: 96 %

## 2024-03-12 DIAGNOSIS — E66.3 OVERWEIGHT (BMI 25.0-29.9): ICD-10-CM

## 2024-03-12 DIAGNOSIS — M85.88 OSTEOPENIA OF LUMBAR SPINE: ICD-10-CM

## 2024-03-12 DIAGNOSIS — R80.9 TYPE 2 DIABETES MELLITUS WITH MICROALBUMINURIA, WITHOUT LONG-TERM CURRENT USE OF INSULIN (HCC): Primary | ICD-10-CM

## 2024-03-12 DIAGNOSIS — Z00.00 ROUTINE GENERAL MEDICAL EXAMINATION AT A HEALTH CARE FACILITY: ICD-10-CM

## 2024-03-12 DIAGNOSIS — E11.69 HYPERLIPIDEMIA ASSOCIATED WITH TYPE 2 DIABETES MELLITUS (HCC): ICD-10-CM

## 2024-03-12 DIAGNOSIS — E11.29 TYPE 2 DIABETES MELLITUS WITH MICROALBUMINURIA, WITHOUT LONG-TERM CURRENT USE OF INSULIN (HCC): Primary | ICD-10-CM

## 2024-03-12 DIAGNOSIS — E11.29 TYPE 2 DIABETES MELLITUS WITH ALBUMINURIA (HCC): ICD-10-CM

## 2024-03-12 DIAGNOSIS — R80.9 TYPE 2 DIABETES MELLITUS WITH ALBUMINURIA (HCC): ICD-10-CM

## 2024-03-12 DIAGNOSIS — E78.5 HYPERLIPIDEMIA ASSOCIATED WITH TYPE 2 DIABETES MELLITUS (HCC): ICD-10-CM

## 2024-03-12 LAB
ALT SERPL-CCNC: 37 U/L
ANION GAP SERPL CALC-SCNC: 5 MMOL/L (ref 0–18)
AST SERPL-CCNC: 20 U/L (ref 15–37)
BASOPHILS # BLD AUTO: 0.05 X10(3) UL (ref 0–0.2)
BASOPHILS NFR BLD AUTO: 0.6 %
BUN BLD-MCNC: 19 MG/DL (ref 9–23)
CALCIUM BLD-MCNC: 9.9 MG/DL (ref 8.5–10.1)
CHLORIDE SERPL-SCNC: 103 MMOL/L (ref 98–112)
CHOLEST SERPL-MCNC: 152 MG/DL (ref ?–200)
CO2 SERPL-SCNC: 29 MMOL/L (ref 21–32)
CREAT BLD-MCNC: 0.81 MG/DL
CREAT UR-SCNC: 49.5 MG/DL
DEPRECATED HBV CORE AB SER IA-ACNC: 47.9 NG/ML
EGFRCR SERPLBLD CKD-EPI 2021: 78 ML/MIN/1.73M2 (ref 60–?)
EOSINOPHIL # BLD AUTO: 0.18 X10(3) UL (ref 0–0.7)
EOSINOPHIL NFR BLD AUTO: 2.2 %
ERYTHROCYTE [DISTWIDTH] IN BLOOD BY AUTOMATED COUNT: 12.9 %
EST. AVERAGE GLUCOSE BLD GHB EST-MCNC: 180 MG/DL (ref 68–126)
FASTING PATIENT LIPID ANSWER: YES
FASTING STATUS PATIENT QL REPORTED: YES
GLUCOSE BLD-MCNC: 161 MG/DL (ref 70–99)
HBA1C MFR BLD: 7.9 % (ref ?–5.7)
HCT VFR BLD AUTO: 45.2 %
HDLC SERPL-MCNC: 42 MG/DL (ref 40–59)
HGB BLD-MCNC: 14.8 G/DL
IMM GRANULOCYTES # BLD AUTO: 0.04 X10(3) UL (ref 0–1)
IMM GRANULOCYTES NFR BLD: 0.5 %
LDLC SERPL CALC-MCNC: 76 MG/DL (ref ?–100)
LYMPHOCYTES # BLD AUTO: 1.66 X10(3) UL (ref 1–4)
LYMPHOCYTES NFR BLD AUTO: 19.9 %
MCH RBC QN AUTO: 32.4 PG (ref 26–34)
MCHC RBC AUTO-ENTMCNC: 32.7 G/DL (ref 31–37)
MCV RBC AUTO: 98.9 FL
MICROALBUMIN UR-MCNC: 10.7 MG/DL
MICROALBUMIN/CREAT 24H UR-RTO: 216.2 UG/MG (ref ?–30)
MONOCYTES # BLD AUTO: 0.69 X10(3) UL (ref 0.1–1)
MONOCYTES NFR BLD AUTO: 8.3 %
NEUTROPHILS # BLD AUTO: 5.71 X10 (3) UL (ref 1.5–7.7)
NEUTROPHILS # BLD AUTO: 5.71 X10(3) UL (ref 1.5–7.7)
NEUTROPHILS NFR BLD AUTO: 68.5 %
NONHDLC SERPL-MCNC: 110 MG/DL (ref ?–130)
OSMOLALITY SERPL CALC.SUM OF ELEC: 290 MOSM/KG (ref 275–295)
PLATELET # BLD AUTO: 154 10(3)UL (ref 150–450)
POTASSIUM SERPL-SCNC: 4.1 MMOL/L (ref 3.5–5.1)
RBC # BLD AUTO: 4.57 X10(6)UL
SODIUM SERPL-SCNC: 137 MMOL/L (ref 136–145)
TRIGL SERPL-MCNC: 206 MG/DL (ref 30–149)
VLDLC SERPL CALC-MCNC: 32 MG/DL (ref 0–30)
WBC # BLD AUTO: 8.3 X10(3) UL (ref 4–11)

## 2024-03-12 PROCEDURE — 82043 UR ALBUMIN QUANTITATIVE: CPT

## 2024-03-12 PROCEDURE — 84450 TRANSFERASE (AST) (SGOT): CPT

## 2024-03-12 PROCEDURE — 83036 HEMOGLOBIN GLYCOSYLATED A1C: CPT

## 2024-03-12 PROCEDURE — 82570 ASSAY OF URINE CREATININE: CPT

## 2024-03-12 PROCEDURE — 80048 BASIC METABOLIC PNL TOTAL CA: CPT

## 2024-03-12 PROCEDURE — 84460 ALANINE AMINO (ALT) (SGPT): CPT

## 2024-03-12 PROCEDURE — 85025 COMPLETE CBC W/AUTO DIFF WBC: CPT

## 2024-03-12 PROCEDURE — 99214 OFFICE O/P EST MOD 30 MIN: CPT | Performed by: NURSE PRACTITIONER

## 2024-03-12 PROCEDURE — 80061 LIPID PANEL: CPT

## 2024-03-12 PROCEDURE — 82728 ASSAY OF FERRITIN: CPT

## 2024-03-12 RX ORDER — LOSARTAN POTASSIUM 25 MG/1
25 TABLET ORAL DAILY
Qty: 90 TABLET | Refills: 1 | Status: SHIPPED | OUTPATIENT
Start: 2024-03-12

## 2024-03-12 RX ORDER — GLIPIZIDE 10 MG/1
10 TABLET, FILM COATED, EXTENDED RELEASE ORAL DAILY
Qty: 90 TABLET | Refills: 1 | Status: SHIPPED | OUTPATIENT
Start: 2024-03-12

## 2024-03-12 NOTE — PROGRESS NOTES
Chief Complaint   Patient presents with    Diabetes     HPI:   Lexi Ahumada is a 73 year old female who presents for a follow up visit for management of diabetes. Last A1c value was 7.6% done 12/12/2023. Since last visit diabetes management has been stable per pt. Pt had labs this am but not yet resulted. Pt forgot glucometer but states glucose has been running 150-170 mg/dl still fasting.     Diabetes history:  Type: 2  Onset: 1997 (prediabetic for 8 years prior to that)  Pt has not been admitted to the hospital for blood sugars.   Pt does not have a hx of pancreatitis.     Current DM regimen:  Glipizide ER 10 mg daily   Jardiance 25 mg daily  Metformin  mg at dinner (highest tolerated dose)  Ozempic 2 mg weekly injection     Previous DM therapies:  Metformin HCL- diarrhea  Invokana - change to jardiance  Actos - weight gain per pt  Glyburide - change to glipizide due to age  Trulicity - ineffective at glucose and appetite mgmt    Complications/Co-morbidities:   Proteinuria       Modifying factors:  Medication adherence: yes  Recent illness/steroids:  no  Eating patterns: balanced portions, limited snacking or sweets  Physical activity patterns: minimal due to knee and hip/back pain - has gone through PT and continues exercises    Overall glucose control:   HGBA1C:    Lab Results   Component Value Date    A1C 7.6 (A) 12/12/2023    A1C 7.3 (A) 09/12/2023    A1C 7.2 (H) 06/12/2023     (H) 06/12/2023            Past History:   She  has a past medical history of Abdominal hernia, Abdominal pain, Arthritis, Bloating, Constipation, Diabetes mellitus (HCC), Diarrhea, unspecified, Diverticulitis, Dyslipidemia, Flatulence/gas pain/belching, Frequent urination, Gastrointestinal bleeding, Hemorrhoids, History of cardiac murmur, Irregular bowel habits, Leaking of urine, Osteopenia, Other and unspecified hyperlipidemia, Pain in joints, Type 2 diabetes mellitus with proteinuria or albuminuria, Type II or  unspecified type diabetes mellitus without mention of complication, not stated as uncontrolled, and Vitamin D deficiency.   Her family history includes Cancer in her brother; Dementia in her father; Heart Disorder in her maternal grandfather and maternal grandmother; Lung ca in her mother; Thrombocytopenia in an other family member.   She  reports that she has never smoked. She has never used smokeless tobacco. She reports that she does not drink alcohol and does not use drugs.     She is allergic to boniva [ibandronate sodium] and lisinopril.     Current Outpatient Medications on File Prior to Visit   Medication Sig    latanoprost 0.005 % Ophthalmic Solution     Empagliflozin (JARDIANCE) 25 MG Oral Tab Take 1 tablet by mouth daily.    metFORMIN  MG Oral Tablet 24 Hr Take 1 tablet (500 mg total) by mouth daily with dinner.    rosuvastatin 20 MG Oral Tab Take 1 tablet (20 mg total) by mouth daily.    pantoprazole 40 MG Oral Tab EC Take one tablet (40 mg total) by mouth once daily, 30 minutes prior to breakfast.    Glucose Blood (ONETOUCH VERIO) In Vitro Strip Use to test blood sugar twice daily    OneTouch Delica Lancets 33G Does not apply Misc Use with test strips to check blood sugar twice daily    semaglutide 8 MG/3ML Subcutaneous Solution Pen-injector Inject 2 mg into the skin once a week.    Multiple Vitamin (DAILY VALUE MULTIVITAMIN) Oral Tab Take 1 tablet by mouth daily.    Blood Glucose Monitoring Suppl (ONETOUCH VERIO FLEX SYSTEM) w/Device Does not apply Kit Use machine to test blood sugar twice a day as directed.    ferrous sulfate 325 (65 FE) MG Oral Tab EC Take 1 tablet (325 mg total) by mouth daily with breakfast.    Cholecalciferol (VITAMIN D) 1000 UNITS Oral Cap Take 4,000 mg by mouth daily.    Betamethasone Dipropionate Aug 0.05 % External Cream Apply 1 Application  topically 2 (two) times daily as needed (do not use longer than 14 days in a row). (Patient not taking: Reported on 3/12/2024)      No current facility-administered medications on file prior to visit.       BP Readings from Last 3 Encounters:   03/12/24 116/66   03/06/24 118/60   12/28/23 118/60     Wt Readings from Last 3 Encounters:   03/12/24 146 lb (66.2 kg)   03/06/24 147 lb 6.4 oz (66.9 kg)   12/28/23 148 lb 9.6 oz (67.4 kg)       CMP  (most recent labs)   Lab Results   Component Value Date     (H) 06/08/2023    BUN 17 06/08/2023    BUNCREA 32.4 (H) 12/27/2022    CREATSERUM 0.69 06/08/2023    ANIONGAP 5 06/08/2023    EGFRCR 93 06/08/2023    GFR 92 10/25/2017    GFRNAA 83 05/06/2022    GFRAA 96 05/06/2022    CA 9.3 06/08/2023    OSMOCALC 287 06/08/2023    ALKPHO 74 06/08/2023    AST 27 06/08/2023    ALT 39 06/08/2023    BILT 0.3 06/08/2023    TP 7.4 06/08/2023    ALB 3.9 06/08/2023    GLOBULIN 3.5 06/08/2023     06/08/2023    K 4.0 06/08/2023     06/08/2023    CO2 27.0 06/08/2023         Lipids  (most recent labs)   Lab Results   Component Value Date/Time    CHOLEST 150 06/12/2023 08:38 AM    TRIG 200 (H) 06/12/2023 08:38 AM    HDL 40 06/12/2023 08:38 AM    LDL 76 06/12/2023 08:38 AM    NONHDLC 110 06/12/2023 08:38 AM         Thyroid  (most recent labs)   Lab Results   Component Value Date/Time    TSH 1.460 05/06/2022 09:27 AM    T4F 1.0 05/06/2022 09:27 AM        Micro Albumen/Creatinine:    Lab Results   Component Value Date    MICROALBCREA 56.3 (H) 09/12/2023    MICROALBCREA 73.5 (H) 03/08/2023    MICROALBCREA 25.8 05/06/2022          Lab results reviewed with patient.    REVIEW OF SYSTEMS:   Review of Systems  GENERAL HEALTH: feels well otherwise  SKIN: denies any unusual skin lesions or rashes  EYES: reports occasional vision changes  RESPIRATORY: denies shortness of breath with exertion  CARDIOVASCULAR: denies chest pain on exertion  GI: denies abdominal pain, N/V/D  NEURO: denies headaches and denies numbness and tingling to extremities  ENDO: denies polyuria, polyphagia, polydipsia     EXAM:   /66    Pulse 85   Temp 97.9 °F (36.6 °C)   Resp 18   Ht 5' (1.524 m)   Wt 146 lb (66.2 kg)   LMP  (LMP Unknown)   SpO2 96%   BMI 28.51 kg/m²  Estimated body mass index is 28.51 kg/m² as calculated from the following:    Height as of this encounter: 5' (1.524 m).    Weight as of this encounter: 146 lb (66.2 kg).   Physical Exam  Vitals reviewed  Constitutional: Normal appearance, no acute distress  Pulmonary: Pulmonary effort is normal  Neurologic: Alert and oriented  Psychiatric: Normal mood and affect      ASSESSMENT AND PLAN:   As for her Diabetes, it is needs improvement. Waiting lab results then will call pt. Discussed if additional agent needed can look into adding actos once again or low dose glipizide in the evening and pt willing to try higher dose metformin again if necessary. Mounjaro not yet covered on insurance plan - once available will also consider changing and further dose titration.   Medications:  Continue:   Glipizide ER 10 mg daily   Jardiance 25 mg daily  Metformin  mg at dinner (highest tolerated dose)  Ozempic 2 mg weekly injection       Recommendations:   BGM 1x daily per MCR allowable, targets reviewed and provided in AVS  lose weight by increased dietary compliance and exercise   check feet daily  Labs UTD until June      Cardiovascular:  The 10-year ASCVD risk score (Ashanti JOHNSON, et al., 2019) is: 15.9%    Values used to calculate the score:      Age: 73 years      Sex: Female      Is Non- : No      Diabetic: Yes      Tobacco smoker: No      Systolic Blood Pressure: 116 mmHg      Is BP treated: No      HDL Cholesterol: 40 mg/dL      Total Cholesterol: 150 mg/dL     As for her hypertension, Blood Pressure is well controlled. Pt is on ace/arb. Started due to continued proteinuria.   PLAN: will continue present medications, reviewed diet, exercise and weight control     As for her cholesterol, Lipids are well controlled other than elevated triglycerides. Pt is on  high intensity statin.   PLAN: will continue present medications, reviewed diet, exercise and weight control    Patient is not on aspirin therapy. The USPSTF recommends against initiating low-dose aspirin use for the primary prevention of CVD in adults 60 years or older.    DM Health Maintenance  Nephropathy screening: continue arb and SGLT2  Last dilated eye exam: Last Dilated Eye Exam: 24   Exam shows retinopathy? Eye Exam shows Diabetic Retinopathy?: No    Last diabetic foot exam: Last Foot Exam: 23    Date of last PHQ-2 depression screen: PHQ-2 - Date of last depression screenin2023    Patient  reports that she has never smoked. She has never used smokeless tobacco.  When is flu vaccine due? No recommendations at this time  When is pneumonia vaccine due? No recommendations at this time  Dentist : recommend every 6m     The patient indicates understanding of these issues and agrees to the plan.  Refills sent at time of office visit.    Diagnoses and all orders for this visit:    Type 2 diabetes mellitus with microalbuminuria, without long-term current use of insulin (HCC)  -     losartan 25 MG Oral Tab; Take 1 tablet (25 mg total) by mouth daily.  -     glipiZIDE ER 10 MG Oral Tablet 24 Hr; Take 1 tablet (10 mg total) by mouth daily.           Do the chronicity and potential for complications of disease pt will need ongoing monitoring.   Return in about 3 months (around 2024).    Spent 30 min obtaining patient history, evaluating patient, reviewing blood glucose trends, discussing treatment options, lifestyle modifications and completing documentation -this time does not including sensor interpretation time if applicable.   The risks and benefits of my recommendations, as well as other treatment options were discussed with the patient today. Questions were also answered to the best of my knowledge.    Ayala CHURCH

## 2024-05-21 ENCOUNTER — TELEPHONE (OUTPATIENT)
Dept: ORTHOPEDICS CLINIC | Facility: CLINIC | Age: 74
End: 2024-05-21

## 2024-05-21 DIAGNOSIS — M17.0 PRIMARY OSTEOARTHRITIS OF BOTH KNEES: Primary | ICD-10-CM

## 2024-05-28 NOTE — TELEPHONE ENCOUNTER
Patient called inquiring if knee injections arrived at location. She was also questioning what the Trimcinolone was for on her approval from insurance for the injections. Please advise

## 2024-05-28 NOTE — TELEPHONE ENCOUNTER
Called patient to inform her that the approval letter she received was for the same medication she had administered 06/2023 and 12/2023      I informed her Triamcinolone ER is zilretta   Which is the Extended-release for the steroid injection     I also informed patient we are waiting for the medic ation to come in and once in had we will reach out to get her scheduled     Patient understood

## 2024-05-31 NOTE — TELEPHONE ENCOUNTER
Called patient to get scheduled for injections  Patient is scheduled for 6/12/24 att 11:40 am in [place] with Shaheed newman Pa-c    Injection authorization expires 6/20/24  Medication labeled and placed in medication cabinet for upcoming appointment

## 2024-06-06 DIAGNOSIS — E11.29 TYPE 2 DIABETES MELLITUS WITH MICROALBUMINURIA, WITHOUT LONG-TERM CURRENT USE OF INSULIN (HCC): ICD-10-CM

## 2024-06-06 DIAGNOSIS — R80.9 TYPE 2 DIABETES MELLITUS WITH MICROALBUMINURIA, WITHOUT LONG-TERM CURRENT USE OF INSULIN (HCC): ICD-10-CM

## 2024-06-06 RX ORDER — LOSARTAN POTASSIUM 25 MG/1
25 TABLET ORAL DAILY
Qty: 90 TABLET | Refills: 1 | Status: SHIPPED | OUTPATIENT
Start: 2024-06-06

## 2024-06-06 RX ORDER — GLIPIZIDE 10 MG/1
10 TABLET, FILM COATED, EXTENDED RELEASE ORAL DAILY
Qty: 90 TABLET | Refills: 1 | Status: SHIPPED | OUTPATIENT
Start: 2024-06-06

## 2024-06-06 NOTE — TELEPHONE ENCOUNTER
Requested Prescriptions     Pending Prescriptions Disp Refills    GLIPIZIDE ER 10 MG Oral Tablet 24 Hr [Pharmacy Med Name: GLIPIZIDE ER 10MG TABLETS] 90 tablet 1     Sig: TAKE 1 TABLET(10 MG) BY MOUTH DAILY    LOSARTAN 25 MG Oral Tab [Pharmacy Med Name: LOSARTAN 25MG TABLETS] 90 tablet 1     Sig: TAKE 1 TABLET(25 MG) BY MOUTH DAILY     Your appointments       Date & Time Appointment Department ([place])    Jun 12, 2024 11:40 AM CDT Procedure with Shaheed Knight PA-C [address] ([facility])        Jun 25, 2024 9:30 AM CDT Apn/Md Diabetic Follow Up with Ayala Torres APRN [address] ([facility] [place])        Sep 27, 2024 12:15 PM CDT Exam - Established with Lucita Cope DO [facility] ([facility])              [address]  [facility] [place]  [address]  [facility] [id number] [phone] [address]  [address]  [facility] [id number] [phone] [facility]  [address] [facility] [address]  [facility] [id number] [phone]          Last A1c value was 7.9% done 3/12/2024.    Refill 3/12/24  LOV 3/12/24

## 2024-06-12 ENCOUNTER — OFFICE VISIT (OUTPATIENT)
Dept: ORTHOPEDICS CLINIC | Facility: CLINIC | Age: 74
End: 2024-06-12
Payer: MEDICARE

## 2024-06-12 VITALS — BODY MASS INDEX: 28.66 KG/M2 | WEIGHT: 146 LBS | HEIGHT: 60 IN

## 2024-06-12 DIAGNOSIS — M17.0 PRIMARY OSTEOARTHRITIS OF BOTH KNEES: Primary | ICD-10-CM

## 2024-06-12 PROCEDURE — 1159F MED LIST DOCD IN RCRD: CPT | Performed by: PHYSICIAN ASSISTANT

## 2024-06-12 PROCEDURE — 3008F BODY MASS INDEX DOCD: CPT | Performed by: PHYSICIAN ASSISTANT

## 2024-06-12 PROCEDURE — 1125F AMNT PAIN NOTED PAIN PRSNT: CPT | Performed by: PHYSICIAN ASSISTANT

## 2024-06-12 PROCEDURE — 20610 DRAIN/INJ JOINT/BURSA W/O US: CPT | Performed by: PHYSICIAN ASSISTANT

## 2024-06-12 NOTE — PROCEDURES
Risks and benefits of knee injection discussed with the patient, with risks including but not limited to pain and swelling at the injection site and/or within the knee joint, infection, elevation in blood pressure and/or glucose levels, facial flushing. After informed consent, the patient's right and left knees were marked, locally anesthetized with skin refrigerant, prepped with topical antiseptic, and injected with 5mL of 32mg/5mL Zilretta through the inferolateral portal.  A band-aid was applied.  The patient tolerated the procedure well.    Shaheed Knight PA-C  [facility]

## 2024-06-25 ENCOUNTER — OFFICE VISIT (OUTPATIENT)
Dept: ENDOCRINOLOGY CLINIC | Facility: CLINIC | Age: 74
End: 2024-06-25

## 2024-06-25 VITALS
BODY MASS INDEX: 28 KG/M2 | WEIGHT: 143.38 LBS | SYSTOLIC BLOOD PRESSURE: 122 MMHG | OXYGEN SATURATION: 98 % | HEART RATE: 82 BPM | RESPIRATION RATE: 18 BRPM | DIASTOLIC BLOOD PRESSURE: 78 MMHG

## 2024-06-25 DIAGNOSIS — R80.9 TYPE 2 DIABETES MELLITUS WITH MICROALBUMINURIA, WITHOUT LONG-TERM CURRENT USE OF INSULIN (HCC): Primary | ICD-10-CM

## 2024-06-25 DIAGNOSIS — E11.29 TYPE 2 DIABETES MELLITUS WITH MICROALBUMINURIA, WITHOUT LONG-TERM CURRENT USE OF INSULIN (HCC): Primary | ICD-10-CM

## 2024-06-25 LAB — HEMOGLOBIN A1C: 7.4 % (ref 4.3–5.6)

## 2024-06-25 PROCEDURE — 99213 OFFICE O/P EST LOW 20 MIN: CPT | Performed by: NURSE PRACTITIONER

## 2024-06-25 PROCEDURE — 3074F SYST BP LT 130 MM HG: CPT | Performed by: NURSE PRACTITIONER

## 2024-06-25 PROCEDURE — 3078F DIAST BP <80 MM HG: CPT | Performed by: NURSE PRACTITIONER

## 2024-06-25 PROCEDURE — 1159F MED LIST DOCD IN RCRD: CPT | Performed by: NURSE PRACTITIONER

## 2024-06-25 PROCEDURE — 83036 HEMOGLOBIN GLYCOSYLATED A1C: CPT | Performed by: NURSE PRACTITIONER

## 2024-06-25 PROCEDURE — 1160F RVW MEDS BY RX/DR IN RCRD: CPT | Performed by: NURSE PRACTITIONER

## 2024-06-25 NOTE — PATIENT INSTRUCTIONS
Summary from visit:   Last A1c value was 7.9% done 3/12/2024.      Diabetes Medications:   Continue:   Glipizide ER 10 mg daily   Jardiance 25 mg daily  Ozempic 2 mg weekly injection     Change:  Metformin  mg at dinner --> Increase to 2x daily if able to tolerate      It is important to take all of your medications as prescribed. Please call me if you cannot get the prescriptions filled or are having issues with refills.   Also, please call me if you have any issues with medication questions, side effects, dosing questions or problems with your blood sugar trends BEFORE CHANGING OR STOPPING ANY MEDICATIONS.    Remember to bring your glucose meter or blood sugar logbook to every appointment here at the diabetes center.   In order for me to determine any patterns in your blood sugars, you will need to test your blood sugar 1 times daily.   Please call with any concerns and Call if 2 glucose readings <80 mg/dl in 1 week so medication adjustments can be made.  prior to your next visit.   Please schedule a nurse visit for [employer] Pro CGM 2 weeks before next visit.   Return in about 3 months (around 9/25/2024).  ---------------------------------------------------------------------------------------------------------------------------------------------------    Reminders:  The A1C:  The A1C test provides us with your average blood sugar for the past 3 months. Keeping an A1C less than 7% for most people helps reduce or delay health problems that are related to diabetes. We sometimes make exceptions based on age, health history and other factors.     Blood sugar targets:  Before breakfast:   (preferably less than 110)  2 hours After meals: less than 180 (preferably less than 150)   Call for persistent blood sugars less than  75 or more than  200.   Blood sugars greater than 200 are not acceptable to reach your goal of improving diabetes      Hypoglycemia:    Watch for low blood sugars: (less than 70)  Symptoms  of low blood sugar:   Shakiness or dizziness  Cold, clammy skin or sweating  Feeling hungry  Headache  Nervousness  A hard, fast heartbeat  Weakness  Confusion or irritability  Blurred eyesight  Having nightmares or waking up confused or sweating  Numbness or tingling in the lips or tongue    Treatment of Low Blood sugar Action Plan  1. Check blood glucose to be sure that it is low. You can’t always go by symptoms. If in doubt, treat your low blood glucose anyway.  2. Take 15 grams of carbohydrate (carb). Here are some choices:  4 oz. regular fruit juice  3-4 glucose tablets  6 oz. regular soda   7-8 jelly beans  3. Recheck blood glucose after 10-15 minutes. If blood glucose is still low (less than 70 mg/dl) repeat the treatment (step 2).  4. If your next meal is more than one hour away, eat a small snack.  5. If you’re not sure what caused your low blood glucose, call your healthcare provider.  6. Always check your blood glucose before you drive           Diabetes Center Refill policy:     Allow 2-3 business days for refills  Contact your pharmacy at least 5 days prior to running out of medication and have them send an electronic request or submit request through the “request refill” option in your [employer] account.  Refills are not addressed after hours or on weekends; covering providers  do not authorize routine medications on weekends.  If your prescription is due for a refill, you may be due for a follow up appointment. This may impact the ability for you to get a 90 supply if requested.   To best provide you care, patients receiving routine medications need to be seen at least twice per year however if the A1C is above 8% you will be need to be seen more frequently.   Yearly blood work may also required for many medications to insure safe prescribing. If you are due for labs, you will have 30 days to complete the  requested labs before future refills are authorized.   In the event that your preferred pharmacy  does not have the requested medication in stock (e.g. Backordered), it is your responsibility to find another pharmacy that has the requested medication available.  We will gladly send a new prescription to that pharmacy at your request.       More and more people are living long and healthy lives with diabetes. We are here to help you manage your diabetes. Let’s work together to make a plan that you can balance in your daily life. Please continue with your primary care physician/provider for your routine health care maintenance.   Thank you,   Ayala Torres [facility] [place]

## 2024-06-25 NOTE — PROGRESS NOTES
Chief Complaint   Patient presents with    Diabetes     Follow up-Meter-sugar thing morning 207     HPI:   Lexi Ahumada is a 73 year old female who presents for a follow up visit for management of diabetes. Last A1c value was 7.4% done 6/25/2024. Since last visit diabetes management has been improving. Over the last 2 weeks readings elevated after steroid knee injections.       Diabetes history:  Type: 2  Onset: 1997 (prediabetic for 8 years prior to that)  Pt has not been admitted to the hospital for blood sugars.   Pt does not have a hx of pancreatitis.     Current DM regimen:  Glipizide ER 10 mg daily   Jardiance 25 mg daily  Metformin  mg at dinner (highest tolerated dose)  Ozempic 2 mg weekly injection     Previous DM therapies:  Metformin HCL- diarrhea  Invokana - change to jardiance  Actos - weight gain per pt  Glyburide - change to glipizide due to age  Trulicity - ineffective at glucose and appetite mgmt    Complications/Co-morbidities:   Proteinuria       Modifying factors:  Medication adherence: yes  Recent illness/steroids:  no  Eating patterns: balanced portions, limited snacking or sweets  Physical activity patterns: minimal due to knee and hip/back pain - has gone through PT and continues exercises    Overall glucose control:   HGBA1C:    Lab Results   Component Value Date    A1C 7.4 (A) 06/25/2024    A1C 7.9 (H) 03/12/2024    A1C 7.6 (A) 12/12/2023     (H) 03/12/2024            Past History:   She  has a past medical history of Abdominal hernia, Abdominal pain, Arthritis, Bloating, Constipation, Diabetes mellitus (HCC), Diarrhea, unspecified, Diverticulitis, Dyslipidemia, Flatulence/gas pain/belching, Frequent urination, Gastrointestinal bleeding, Hemorrhoids, History of cardiac murmur, Irregular bowel habits, Leaking of urine, Osteopenia, Other and unspecified hyperlipidemia, Pain in joints, Type 2 diabetes mellitus with proteinuria or albuminuria, Type II or unspecified type  diabetes mellitus without mention of complication, not stated as uncontrolled, and Vitamin D deficiency.   Her family history includes Cancer in her brother; Dementia in her father; Heart Disorder in her maternal grandfather and maternal grandmother; Lung ca in her mother; Thrombocytopenia in an other family member.   She  reports that she has never smoked. She has never used smokeless tobacco. She reports that she does not drink alcohol and does not use drugs.     She is allergic to boniva [ibandronate sodium] and lisinopril.     Current Outpatient Medications on File Prior to Visit   Medication Sig    GLIPIZIDE ER 10 MG Oral Tablet 24 Hr TAKE 1 TABLET(10 MG) BY MOUTH DAILY    LOSARTAN 25 MG Oral Tab TAKE 1 TABLET(25 MG) BY MOUTH DAILY    latanoprost 0.005 % Ophthalmic Solution     Empagliflozin (JARDIANCE) 25 MG Oral Tab Take 1 tablet by mouth daily.    metFORMIN  MG Oral Tablet 24 Hr Take 1 tablet (500 mg total) by mouth daily with dinner.    Betamethasone Dipropionate Aug 0.05 % External Cream Apply 1 Application  topically 2 (two) times daily as needed (do not use longer than 14 days in a row).    rosuvastatin 20 MG Oral Tab Take 1 tablet (20 mg total) by mouth daily.    pantoprazole 40 MG Oral Tab EC Take one tablet (40 mg total) by mouth once daily, 30 minutes prior to breakfast.    Glucose Blood (ONETOUCH VERIO) In Vitro Strip Use to test blood sugar twice daily    OneTouch Delica Lancets 33G Does not apply Misc Use with test strips to check blood sugar twice daily    semaglutide 8 MG/3ML Subcutaneous Solution Pen-injector Inject 2 mg into the skin once a week.    Multiple Vitamin (DAILY VALUE MULTIVITAMIN) Oral Tab Take 1 tablet by mouth daily.    Blood Glucose Monitoring Suppl (ONETOUCH VERIO FLEX SYSTEM) w/Device Does not apply Kit Use machine to test blood sugar twice a day as directed.    ferrous sulfate 325 (65 FE) MG Oral Tab EC Take 1 tablet (325 mg total) by mouth daily with breakfast.     Cholecalciferol (VITAMIN D) 1000 UNITS Oral Cap Take 4,000 mg by mouth daily.     No current facility-administered medications on file prior to visit.       BP Readings from Last 3 Encounters:   06/25/24 122/78   03/12/24 116/66   03/06/24 118/60     Wt Readings from Last 3 Encounters:   06/25/24 143 lb 6.4 oz (65 kg)   06/12/24 146 lb (66.2 kg)   03/12/24 146 lb (66.2 kg)       CMP  (most recent labs)   Lab Results   Component Value Date     (H) 03/12/2024    BUN 19 03/12/2024    BUNCREA 32.4 (H) 12/27/2022    CREATSERUM 0.81 03/12/2024    ANIONGAP 5 03/12/2024    EGFRCR 78 03/12/2024    GFR 92 10/25/2017    GFRNAA 83 05/06/2022    GFRAA 96 05/06/2022    CA 9.9 03/12/2024    OSMOCALC 290 03/12/2024    ALKPHO 74 06/08/2023    AST 20 03/12/2024    ALT 37 03/12/2024    BILT 0.3 06/08/2023    TP 7.4 06/08/2023    ALB 3.9 06/08/2023    GLOBULIN 3.5 06/08/2023     03/12/2024    K 4.1 03/12/2024     03/12/2024    CO2 29.0 03/12/2024         Lipids  (most recent labs)   Lab Results   Component Value Date/Time    CHOLEST 152 03/12/2024 08:47 AM    TRIG 206 (H) 03/12/2024 08:47 AM    HDL 42 03/12/2024 08:47 AM    LDL 76 03/12/2024 08:47 AM    NONHDLC 110 03/12/2024 08:47 AM         Thyroid  (most recent labs)   Lab Results   Component Value Date/Time    TSH 1.460 05/06/2022 09:27 AM    T4F 1.0 05/06/2022 09:27 AM        Micro Albumen/Creatinine:    Lab Results   Component Value Date    MICROALBCREA 216.2 (H) 03/12/2024    MICROALBCREA 56.3 (H) 09/12/2023    MICROALBCREA 73.5 (H) 03/08/2023          Lab results reviewed with patient.    REVIEW OF SYSTEMS:   Review of Systems  GENERAL HEALTH: feels well otherwise  SKIN: denies any unusual skin lesions or rashes  EYES: reports occasional vision changes  RESPIRATORY: denies shortness of breath with exertion  CARDIOVASCULAR: denies chest pain on exertion  GI: denies abdominal pain, N/V/D  NEURO: denies headaches and denies numbness and tingling to  extremities  ENDO: denies polyuria, polyphagia, polydipsia     EXAM:   /78   Pulse 82   Resp 18   Wt 143 lb 6.4 oz (65 kg)   LMP  (LMP Unknown)   SpO2 98%   BMI 28.01 kg/m²  Estimated body mass index is 28.01 kg/m² as calculated from the following:    Height as of 6/12/24: 5' (1.524 m).    Weight as of this encounter: 143 lb 6.4 oz (65 kg).   Physical Exam  Vitals reviewed  Constitutional: Normal appearance, no acute distress  Pulmonary: Pulmonary effort is normal  Neurologic: Alert and oriented  Psychiatric: Normal mood and affect  Musculoskeletal:  Diabetes foot exam:   Good foot hygiene.   Bilateral barefoot skin diabetic exam: normal.  Visualized feet and the appearance : normal.  Bilateral monofilament/sensation of both feet is normal. Vibration to dorsum to the first toe perceived.   Bilateral 2+ pedal pulse on exam     ASSESSMENT AND PLAN:   As for her Diabetes, it is reasonably well controlled for age. Discussed increasing metformin if able to tolerate. Prior to next visit will have richard pro placed for 2 weeks to review.   Medications:  Continue:   Glipizide ER 10 mg daily   Jardiance 25 mg daily  Ozempic 2 mg weekly injection     Change:  Metformin  mg at dinner --> Increase to 2x daily if able to tolerate      Recommendations:   BGM 1x daily per MCR allowable, targets reviewed and provided in AVS  lose weight by increased dietary compliance and exercise   check feet daily  Labs UTD      Cardiovascular:  The 10-year ASCVD risk score (Ashanti JOHNSON, et al., 2019) is: 17.3%    Values used to calculate the score:      Age: 73 years      Sex: Female      Is Non- : No      Diabetic: Yes      Tobacco smoker: No      Systolic Blood Pressure: 122 mmHg      Is BP treated: No      HDL Cholesterol: 42 mg/dL      Total Cholesterol: 152 mg/dL     As for her hypertension, Blood Pressure is well controlled. Pt is on ace/arb. Started due to continued proteinuria.   PLAN: will continue  present medications, reviewed diet, exercise and weight control     As for her cholesterol, Lipids are well controlled other than elevated triglycerides. Pt is on high intensity statin.   PLAN: will continue present medications, reviewed diet, exercise and weight control    Patient is not on aspirin therapy. The USPSTF recommends against initiating low-dose aspirin use for the primary prevention of CVD in adults 60 years or older.    DM Health Maintenance  Nephropathy screening: continue arb and SGLT2  Last dilated eye exam: Last Dilated Eye Exam: 24   Exam shows retinopathy? Eye Exam shows Diabetic Retinopathy?: No    Last diabetic foot exam: Last Foot Exam: 23    Date of last PHQ-2 depression screen: PHQ-2 - Date of last depression screenin2023    Patient  reports that she has never smoked. She has never used smokeless tobacco.  When is flu vaccine due? No recommendations at this time  When is pneumonia vaccine due? No recommendations at this time  Dentist : recommend every 6m     The patient indicates understanding of these issues and agrees to the plan.  Refills sent at time of office visit.    Diagnoses and all orders for this visit:    Type 2 diabetes mellitus with microalbuminuria, without long-term current use of insulin (HCC)  -     HEMOGLOBIN A1C             Do the chronicity and potential for complications of disease pt will need ongoing monitoring.   Return in about 3 months (around 2024).    Spent 30 min obtaining patient history, evaluating patient, reviewing blood glucose trends, discussing treatment options, lifestyle modifications and completing documentation -this time does not including sensor interpretation time if applicable.   The risks and benefits of my recommendations, as well as other treatment options were discussed with the patient today. Questions were also answered to the best of my knowledge.    Ayala CHURCH

## 2024-07-06 DIAGNOSIS — E11.65 TYPE 2 DIABETES MELLITUS WITH HYPERGLYCEMIA, WITHOUT LONG-TERM CURRENT USE OF INSULIN (HCC): ICD-10-CM

## 2024-07-08 RX ORDER — METFORMIN HYDROCHLORIDE 500 MG/1
500 TABLET, EXTENDED RELEASE ORAL
Qty: 90 TABLET | Refills: 1 | Status: SHIPPED | OUTPATIENT
Start: 2024-07-08

## 2024-07-08 NOTE — TELEPHONE ENCOUNTER
Requested Prescriptions     Pending Prescriptions Disp Refills    METFORMIN  MG Oral Tablet 24 Hr [Pharmacy Med Name: METFORMIN ER 500MG 24HR TABS] 90 tablet 1     Sig: TAKE 1 TABLET(500 MG) BY MOUTH DAILY WITH DINNER     Future Appointments   Date Time Provider Department Center   9/23/2024  2:30 PM Anne Galvan MD [facility] [place]   9/27/2024 12:15 PM Lucita Cope DO [facility]   10/30/2024  9:45 AM [facility] NURSE [facility]   11/12/2024  9:45 AM Yu Rubio APRN [facility] [place]     Last A1c value was 7.4% done 6/25/2024.  Refill 1/16/24  LOV 6/25/24

## 2024-07-22 RX ORDER — BLOOD SUGAR DIAGNOSTIC
STRIP MISCELLANEOUS
Qty: 200 STRIP | Refills: 5 | Status: SHIPPED | OUTPATIENT
Start: 2024-07-22

## 2024-07-22 NOTE — TELEPHONE ENCOUNTER
Requested Prescriptions     Pending Prescriptions Disp Refills    ONETOUCH VERIO In Vitro Strip [Pharmacy Med Name: ONE TOUCH VERIO TEST STR (NEW) 50] 200 strip 5     Sig: USE TO TEST BLOOD SUGAR TWICE DAILY     Future Appointments   Date Time Provider Department Center   9/23/2024  2:30 PM Anne Galvan MD [facility] [place]   9/27/2024 12:15 PM Lucita Cope DO [facility]   10/30/2024  9:45 AM EM[facility] DI[facility] NURSE [facility]   11/12/2024  9:45 AM Yu Rubio APRN [facility]     Last A1c value was 7.4% done 6/25/2024.

## 2024-09-23 ENCOUNTER — OFFICE VISIT (OUTPATIENT)
Dept: INTERNAL MEDICINE CLINIC | Facility: CLINIC | Age: 74
End: 2024-09-23
Payer: MEDICARE

## 2024-09-23 VITALS
RESPIRATION RATE: 16 BRPM | WEIGHT: 143.63 LBS | HEIGHT: 60 IN | OXYGEN SATURATION: 98 % | DIASTOLIC BLOOD PRESSURE: 50 MMHG | SYSTOLIC BLOOD PRESSURE: 112 MMHG | TEMPERATURE: 98 F | HEART RATE: 58 BPM | BODY MASS INDEX: 28.2 KG/M2

## 2024-09-23 DIAGNOSIS — E11.65 TYPE 2 DIABETES MELLITUS WITH HYPERGLYCEMIA, WITHOUT LONG-TERM CURRENT USE OF INSULIN (HCC): ICD-10-CM

## 2024-09-23 DIAGNOSIS — E11.69 HYPERLIPIDEMIA ASSOCIATED WITH TYPE 2 DIABETES MELLITUS (HCC): ICD-10-CM

## 2024-09-23 DIAGNOSIS — E66.3 OVERWEIGHT (BMI 25.0-29.9): ICD-10-CM

## 2024-09-23 DIAGNOSIS — E78.5 HYPERLIPIDEMIA ASSOCIATED WITH TYPE 2 DIABETES MELLITUS (HCC): ICD-10-CM

## 2024-09-23 DIAGNOSIS — K21.9 GASTROESOPHAGEAL REFLUX DISEASE WITHOUT ESOPHAGITIS: ICD-10-CM

## 2024-09-23 DIAGNOSIS — R80.9 TYPE 2 DIABETES MELLITUS WITH ALBUMINURIA (HCC): ICD-10-CM

## 2024-09-23 DIAGNOSIS — Z12.31 ENCOUNTER FOR SCREENING MAMMOGRAM FOR BREAST CANCER: ICD-10-CM

## 2024-09-23 DIAGNOSIS — M85.88 OSTEOPENIA OF LUMBAR SPINE: ICD-10-CM

## 2024-09-23 DIAGNOSIS — E11.29 TYPE 2 DIABETES MELLITUS WITH ALBUMINURIA (HCC): ICD-10-CM

## 2024-09-23 DIAGNOSIS — Z00.00 ROUTINE GENERAL MEDICAL EXAMINATION AT A HEALTH CARE FACILITY: Primary | ICD-10-CM

## 2024-09-23 PROBLEM — K64.8 INTERNAL HEMORRHOIDS WITH COMPLICATION: Status: RESOLVED | Noted: 2023-10-24 | Resolved: 2024-09-23

## 2024-09-23 NOTE — PROGRESS NOTES
Lexi Ahumada is a 74 year old female.     HPI:   Patient presents for medicare wellness exam and additional issues noted below.  DM - improved in 6/2024. She is no longer wearing cgm b/c of insurance. She is checking FBS every day and they are usually less than 160s. Denies hypoglycemia.   HLP - LDL was not at goal in 3/2024 but she never responded to message regarding increase in statin dose.   Right knee pain - worsened and was more frequent in June, 2024. Pain resolved in a week. She is already doing HEP for her knees. She uses tylenol prn. Avoids nsaids. She did have bilateral cortisone injections in 6/2024 and ortho says she can repeat it every 3 months but she avoids that b/c they raise her blood sugars. She cont to try avoiding knee replacement.   Albuminuria - tolerating ARB    REVIEW OF SYSTEMS:   GENERAL HEALTH: feels well otherwise. No f/c  NEURO: denies any headaches, LH, dizzyness, LOC, falls  VISION: denies any blurred or double vision  RESPIRATORY: denies shortness of breath, cough, or congestion  CARDIOVASCULAR: denies chest pain, pressure or palpitations  GI: denies abdominal pain, constipation, diarrhea, n/v, BRBPR, melena  : no dysuria or hematuria or vaginal bleeding  SKIN: denies any unusual skin lesions or rashes  PSYCH: mood is stable. Denies depression or anxiety.   EXT: denies edema     Wt Readings from Last 6 Encounters:   09/23/24 143 lb 9.6 oz (65.1 kg)   09/17/24 144 lb (65.3 kg)   06/25/24 143 lb 6.4 oz (65 kg)   06/12/24 146 lb (66.2 kg)   03/12/24 146 lb (66.2 kg)   03/06/24 147 lb 6.4 oz (66.9 kg)       Allergies   Allergen Reactions    Boniva [Ibandronate Sodium] OTHER (SEE COMMENTS)     Bleeding ulcers    Lisinopril Coughing     TABS       Family History   Problem Relation Age of Onset    Other (Lung ca) Mother     Other (Dementia) Father     Other (Thrombocytopenia) Other         Sibling    Heart Disorder Maternal Grandmother         CVA    Heart Disorder Maternal Grandfather          MI    Cancer Brother         esophageal cancer      Past Medical History:    Abdominal hernia    On bellybutton    Abdominal pain    Occasional    Anemia    Arthritis    Bloating    Blood disorder    Constipation    Occasionally    Diabetes mellitus (HCC)    Diarrhea, unspecified    Occasionally    Diverticulitis    Dyslipidemia    Flatulence/gas pain/belching    Frequent urination    Gastrointestinal bleeding    PUDz in setting of ASA/boniva use    Hemorrhoids    High blood pressure    High cholesterol    History of cardiac murmur    Irregular bowel habits    Leaking of urine    Osteoarthritis    Osteopenia    Other and unspecified hyperlipidemia    Pain in joints    Type 2 diabetes mellitus with proteinuria or albuminuria    Type II or unspecified type diabetes mellitus without mention of complication, not stated as uncontrolled    Visual impairment    glasses    Vitamin D deficiency      Past Surgical History:   Procedure Laterality Date    Colonoscopy  01/01/2008    Hemorrhoid, Recheck 7-10 years    Colonoscopy  04/2013    adenomatous polyp    Colonoscopy      Hysterectomy  2004    total hystero.    Oophorectomy Bilateral 2004    total hystero.    Other surgical history      varicose vein procedures    Tonsillectomy      Total abdom hysterectomy      Upper gi endoscopy,exam  01/01/2008      Social History:    Social History     Socioeconomic History    Marital status:     Number of children: 3   Occupational History    Occupation:      Employer: [employer]   Tobacco Use    Smoking status: Never    Smokeless tobacco: Never   Vaping Use    Vaping status: Never Used   Substance and Sexual Activity    Alcohol use: No    Drug use: No   Other Topics Concern    Caffeine Concern No    Exercise Yes     Comment: walking    Seat Belt Yes    Special Diet Yes     Comment: diabetic    Stress Concern No    Weight Concern Yes           EXAM:   /50 (BP Location: Left arm, Patient Position:  Sitting, Cuff Size: adult)   Pulse 58   Temp 98 °F (36.7 °C) (Temporal)   Resp 16   Ht 5' (1.524 m)   Wt 143 lb 9.6 oz (65.1 kg)   LMP  (LMP Unknown)   SpO2 98%   BMI 28.04 kg/m²   GENERAL: A&O well developed, well nourished,in no apparent distress  SKIN: no rashes,no suspicious lesions  HEENT: atraumatic, MMM, throat is clear  NECK: supple, no jvd, no thyromegaly  LUNGS: clear to auscultation bilateraly, no c/w/r  CARDIO: RRR without g/m/r  GI: soft non tender nondistended no hsm bs throughout  NEURO: CN 2-12 grossly intact  PSYCH: pleasant  EXTREMITIES: no cyanosis, clubbing or edema  Bilateral barefoot skin diabetic exam is normal, visualized feet and the appearance is normal.  Bilateral monofilament/sensation of both feet is normal.  Pulsation pedal pulse exam of both lower legs/feet is normal as well.    ASSESSMENT AND PLAN:   # Bilateral Knee Pain 2/2 OA: she wants to avoid surgery. She has attended PT, practicing HEP, and getting injectiosn with ortho.   # Urge Urinary Incontincence: chronic, stable. discussed options for PT, HEP, and timed voiding. Trospium did not help.   # Systolic heart murmur: TTE in Jan, 2020 w/o valve abnormalities.   # History of Iron Deficiency Anemia: normal CBC with once daily ferrous sulfate.  - Likely a small bowel source but no further evaluation is needed per heme. Cont monitoring of CBC and iron stores regularly.   # Tubular Adenoma of Colon: colonoscopy in 2023 by Vlad Marquez with tubular adenoma. Needs repeat in 3 years (2026)  # Type 2 DM: at goal in 6/2024. Cont jardiance, glyburide, metformin, and ozempic.   - DM eye exam on 2/7/2024 by Carlie Pete - no diabetic retinopathy in either eye  - Foot exam: done 2024   - BP at goal  - microalbumin done 2024   - Depression Screen: done 2024   - not on ASA  # Albuminuria in Type 2 DM: cont glycemic control and losartan  # HLP with DM: she never increased statin dose in 3/2024. Repeat lipids now and determine dosing.   #  Osteopenia of lumbar spine: DEXA 12/2022 with low FRAX score (13/1%) so cont to hold off on medications for now. Cont dietary calcium/vit D3 and weight bearing exercises. Will need prolia injections if she develops osteoporosis.   - We stopped evista in 4/2015 (had been on from 2009 to 3/2015). She had questionable GIB on boniva so is not a candidate for bisphosphonates. Cont calcium/vit D supplementation as well. Encourage weight bearing excercises as this is not an active part of her lifestyle.  # Constipation, h/o Diverticulitis (x2 episodes), colonic polyps, antral gastritis. Chronic, controlled with high fiber diet.   # GERD: well controlled on omeprazole. Unable to take it PRN. Has been treated for H pylori in 2013.   # Stomach Polyp - due for repeat this year. She has it scheduled.   Health Maintenance: wellness exam in 9/2024  Colon Cancer Screening: see above  Breast Cancer Screening: cont yearly mammogram  Bone Health: see above.  Hep C screening: AB negative in 2017  Vaccines: Up to date with shingrix. prevnar 13 in 2017. pneumovac 23 in 2020.  TDAP 2022. Advised on covid, flu and rsv.   Medical POA: Angelita Ahumada (daughter) is primary      Care Team:  GI- Dr. Sampson  Gyn- Dr. Cruz  Optho- Dr. Pete        The patient indicates understanding of these issues and agrees to the plan.  The patient is asked to return in 1 year for medicare supervisit  Anne Galvan MD

## 2024-09-23 NOTE — PATIENT INSTRUCTIONS
I recommend the following vaccines -   RSV vaccine for everyone over age 75 and those ages 60-74 with chronic heart, lung, kidney and liver conditions.     Annual FLU every September, October.    Stay up to date with COVID vaccines.     Please complete your labs as soon as possible.

## 2024-09-27 ENCOUNTER — OFFICE VISIT (OUTPATIENT)
Dept: RHEUMATOLOGY | Facility: CLINIC | Age: 74
End: 2024-09-27
Payer: MEDICARE

## 2024-09-27 VITALS
WEIGHT: 146 LBS | OXYGEN SATURATION: 95 % | HEIGHT: 60.5 IN | RESPIRATION RATE: 16 BRPM | HEART RATE: 94 BPM | SYSTOLIC BLOOD PRESSURE: 122 MMHG | TEMPERATURE: 98 F | BODY MASS INDEX: 27.92 KG/M2 | DIASTOLIC BLOOD PRESSURE: 54 MMHG

## 2024-09-27 DIAGNOSIS — Z78.0 ASYMPTOMATIC MENOPAUSE: ICD-10-CM

## 2024-09-27 DIAGNOSIS — E55.9 VITAMIN D DEFICIENCY: ICD-10-CM

## 2024-09-27 DIAGNOSIS — M15.4 EROSIVE OSTEOARTHRITIS OF BOTH HANDS: ICD-10-CM

## 2024-09-27 DIAGNOSIS — M15.0 PRIMARY OSTEOARTHRITIS INVOLVING MULTIPLE JOINTS: Primary | ICD-10-CM

## 2024-09-27 DIAGNOSIS — M85.89 OSTEOPENIA OF MULTIPLE SITES: ICD-10-CM

## 2024-09-27 DIAGNOSIS — Z13.820 SCREENING FOR OSTEOPOROSIS: ICD-10-CM

## 2024-09-27 DIAGNOSIS — R21 SKIN RASH: ICD-10-CM

## 2024-09-27 PROCEDURE — 3074F SYST BP LT 130 MM HG: CPT | Performed by: INTERNAL MEDICINE

## 2024-09-27 PROCEDURE — 3078F DIAST BP <80 MM HG: CPT | Performed by: INTERNAL MEDICINE

## 2024-09-27 PROCEDURE — 1160F RVW MEDS BY RX/DR IN RCRD: CPT | Performed by: INTERNAL MEDICINE

## 2024-09-27 PROCEDURE — 3008F BODY MASS INDEX DOCD: CPT | Performed by: INTERNAL MEDICINE

## 2024-09-27 PROCEDURE — 1159F MED LIST DOCD IN RCRD: CPT | Performed by: INTERNAL MEDICINE

## 2024-09-27 PROCEDURE — 99214 OFFICE O/P EST MOD 30 MIN: CPT | Performed by: INTERNAL MEDICINE

## 2024-09-27 RX ORDER — CLOTRIMAZOLE AND BETAMETHASONE DIPROPIONATE 10; .64 MG/G; MG/G
CREAM TOPICAL
Qty: 45 G | Refills: 2 | Status: SHIPPED | OUTPATIENT
Start: 2024-09-27

## 2024-09-27 NOTE — PROGRESS NOTES
?  RHEUMATOLOGY FOLLOW UP   Date of visit: 09/27/2024  ?  Chief Complaint   Patient presents with    Osteoarthritis     6 month f/u. Feeling pretty good. Having joint pain in hands, shoulders, and knees. Some swelling in fingers.      ?  ASSESSMENT, DISCUSSION & PLAN   Assessment:  1. Primary osteoarthritis involving multiple joints    2. Screening for osteoporosis    3. Osteopenia of multiple sites    4. Asymptomatic menopause    5. Vitamin D deficiency    6. Erosive osteoarthritis of both hands    7. Skin rash          Discussion:  Ms. Lexi Ahumada is a 73 yo woman who presented for worsened joint pain over the years. Pain located mostly in the joints of her hands, knees as well as shoulders. At this time, she has clear signs of deformities of the hands which suggest more of an erosive osteoarthritis rather than rheumatoid arthritis. Imaging work up is more consistent with osteoarthritis rather than systemic inflammatory disease.   She did have prior RNP antibody positivity, however patient lacks any overt signs consistent with MCTD (lacks signs consistent with RA, lupus, inflammatory myopathy, scleroderma or Raynaud's).     Previously discussed at length treatment options for osteoarthritis which is likely cause of symptoms. Can try the following  -- tylenol extra strength for arthritis -- pt already doing - recommended she increase this  -- topical NSAIDs like voltaren gel up to 4x daily- recommend she use this with her topical aspercream/lidocaine   -- oral NSAIDs, OTC vs prescription--- relatively contraindicated due to other comorbidities and hx of stomach ulcers   -- low dose antidepressant like duloxetine (previously not interested due to knowing someone who had committed suicide while on medication)-- recommended she consider this medication again.   -- pain management with opioids like Norco or other meds like tramadol- encouraged her to reach out if she'd like to try     Can also consider trial of  plaquenil- pt still not interested.   She will call/message me if she changes her mind on medication intervention.   Prior injections through me did not help. She is getting long acting steroid through ortho which has been helping. Has not done gel injections and not interested in surgery if can be avoided.   Recommended she continue home exercises previously taught during PT and monitor symptoms.   Okay to follow up in a year.   She is due for updated bone density so this was ordered as well as labs.     Patient verbalized understanding of above instructions. No further questions at this time.    Code selection for this visit was based on time spent (30min) on date of service in preparing to see the patient, obtaining and/or reviewing separately obtained history, performing a medically appropriate examination, counseling and educating the patient/family/caregiver, ordering medications or testing, referring and communicating with other healthcare providers, documenting clinical information in the E HR, independently interpreting results and communicating results to the patient/family/caregiver and care coordination with the patient's other providers.    ?  Plan:  Diagnoses and all orders for this visit:    Primary osteoarthritis involving multiple joints  -     Sed Gustabo Purcell (Automated) [E]; Future  -     C-Reactive Protein [E]; Future  -     Uric Acid; Future    Screening for osteoporosis  -     XR DEXA BONE DENSITOMETRY (CPT=77080); Future  -     Vitamin D; Future  -     Magnesium; Future  -     Phosphorus; Future  -     TSH and Free T4 [E]; Future    Osteopenia of multiple sites  -     XR DEXA BONE DENSITOMETRY (CPT=77080); Future  -     Vitamin D; Future  -     Magnesium; Future  -     Phosphorus; Future  -     TSH and Free T4 [E]; Future    Asymptomatic menopause  -     XR DEXA BONE DENSITOMETRY (CPT=77080); Future    Vitamin D deficiency  -     Vitamin D; Future  -     Magnesium; Future  -      Phosphorus; Future  -     TSH and Free T4 [E]; Future    Erosive osteoarthritis of both hands  -     Sed Rate, Kellieren (Automated) [E]; Future  -     C-Reactive Protein [E]; Future  -     Uric Acid; Future    Skin rash  -     clotrimazole-betamethasone 1-0.05 % External Cream; Apply twice daily for a week then take week off.            Return in about 1 year (around 9/27/2025).  ?  HPI   Lexi Ahumada is a 74 year old female with the following active problems who was initially seen for evaluation of joint pain and dx with osteoarthritis. She presents for follow up.    Since her last visit, she feels okay, about the same  Feels pain in the hands, shoulders and knees  States hip pain is okay.  Can get some sciatica down the right side- typically with certain movements. Aggravated recently with bend/lifting with prepping for a garage sale. Has improved with rest and stretching.     Has followed with orthopedics and had injection in June with long acting steroids. Feels like helping. Prior gel injection did not help. States when getting the zilretta, still getting elevations in the blood sugar. Still doing okay even though injections were 3 months ago.   Never got injection in the shoulders.     Continues with hand pain, some days better than others. Still hard to open jars. Okay with doors since doesn't have door knobs but handles instead- stable overall.   Denies pain waking up from sleep but can have some trouble finding a comfortable position to fall asleep.  Does have hx of stomach ulcers so avoids NSAIDs.   Applies aspercream and takes tylenol for arthritis as needed when pain more severe.     Denies morning stiffness  Denies swelling of the joints.   Denies new nodules but stable deformities but can be more painful with weather changes.     + dry eyes, uses Refresh drops which helps, applies twice daily. Started on drops for early glaucoma (follows with Dr. Pete). Did not require plugs at this visit but  did have them placed 3m ago.   + dry mouth, states better since stopping medication for overactive bladder.   Still working on getting her blood sugars better controlled. Diarrhea better since decreasing the metformin     Denies skin rashes  Denies raynaud's  Denies hair loss thinning  Denies oral or nasal ulcers   Denies skin tightening  Denies finger tip ulcerations.   Denies falls/fractures   Had cscope last year, found to have hemorrhoids s/p seeing gen sx and banding. Also found to have some stomach polyps and continued PPI. Plans to repeat EGD in one year (which will be due next month) and cscope in 3 years (2026).       HPI from initial consultation  referred for rheumatologic evaluation due to worsened joint pain.     Has been having worsened joint pain over the past year but has been present for several years. Has noticed worsened nodularities over the years with some pain associated. Also pain at the base of the thumbs b/l.   Main areas are the arms/shoulders right worse than left, knees as well as the hands.  Can get swelling of the knees.     Does take tylenol for arthritis without much relief.   Does feel like overall strength in her hands is decrease.     + hair thinning when she was having irregular menstrual cycles, s/p hysterectomy in 2004, hair thinning never got better but did not get worse  + hx of iron deficiency anemia, still takes oral iron; never required blood transfusion   + reflux on omeprazole, well controlled   + dry eyes, uses OTC drops- Refresh, has not had prescription, hx of punctal plugs gets every 6 months (follows with Dr. Pete)   + dry mouth, has not tried any products, more intermittent; thought could be related to diabetes medication   + hx of bleeding stomach ulcer, no recent  + hx of diverticulitis, has had few episodes, no recurrence in over 5 years. Watches diet closely. Never required surgical intervention. Last colonoscopy around 2018.    Hx of osteopenia and was on  boniva for some time. Did have a bleeding ulcer following tx, unclear if related or if due to aspirin. Was on Evista (raloxifene) for 5 years as well has been several years, not currently taking.     No history of significant morning stiffness.  The patient denies oral or nasal ulcers, photosensitive rash, elevated or scarring rashes, Raynaud's phenomenon, prior renal or liver disease, or history of seizures.  Denies hx of pericarditis or pleuritis.   No history of prior blood clot in the legs or lungs, strokes or ischemic phenomenon, prior miscarriages.  Denies nonhealing ulcers on the fingertips or trouble swallowing.  The patient denies any history of uveitis, nodular painful shin bruises, Achilles heel pain or symptoms of enthesitis, psoriatic lesions, spooning or pitting of the nails, history of dactylitis, or pain awakening the patient from sleep.  Denies recurrent cavities or swelling of the cheeks or under the jawbone.   No fevers, chills, lymphadenopathy, night sweats, unexpected weight loss, easy bruising or bleeding.  Denies chronic sinus infections/disease or epistaxis.  Denies chronic cough or hemoptysis.   Denies obvious blood in the urine.     Family hx:   Mother  of lung cancer in her 50's  Father had leg cramping, unclear details  No formally known autoimmune disease except oldest daughter with multiple sclerosis (dx at age 40)      Past Medical History:  Past Medical History:    Abdominal hernia    On bellybutton    Abdominal pain    Occasional    Anemia    Arthritis    Bloating    Blood disorder    Constipation    Occasionally    Diabetes mellitus (HCC)    Diarrhea, unspecified    Occasionally    Diverticulitis    Dyslipidemia    Flatulence/gas pain/belching    Frequent urination    Gastrointestinal bleeding    PUDz in setting of ASA/boniva use    Hemorrhoids    High blood pressure    High cholesterol    History of cardiac murmur    Irregular bowel habits    Leaking of urine    Osteoarthritis     Osteopenia    Other and unspecified hyperlipidemia    Pain in joints    Type 2 diabetes mellitus with proteinuria or albuminuria    Type II or unspecified type diabetes mellitus without mention of complication, not stated as uncontrolled    Visual impairment    glasses    Vitamin D deficiency     Past Surgical History:  Past Surgical History:   Procedure Laterality Date    Colonoscopy  01/01/2008    Hemorrhoid, Recheck 7-10 years    Colonoscopy  04/2013    adenomatous polyp    Colonoscopy      Hysterectomy  2004    total hystero.    Oophorectomy Bilateral 2004    total hystero.    Other surgical history      varicose vein procedures    Tonsillectomy      Total abdom hysterectomy      Upper gi endoscopy,exam  01/01/2008     Family History:  Family History   Problem Relation Age of Onset    Other (Lung ca) Mother     Other (Dementia) Father     Other (Thrombocytopenia) Other         Sibling    Heart Disorder Maternal Grandmother         CVA    Heart Disorder Maternal Grandfather         MI    Cancer Brother         esophageal cancer     Social History:  Social History     Socioeconomic History    Marital status:     Number of children: 3   Occupational History    Occupation: Food service     Employer: [employer]   Tobacco Use    Smoking status: Never    Smokeless tobacco: Never   Vaping Use    Vaping status: Never Used   Substance and Sexual Activity    Alcohol use: No    Drug use: No   Other Topics Concern    Caffeine Concern No    Exercise Yes     Comment: walking    Seat Belt Yes    Special Diet Yes     Comment: diabetic    Stress Concern No    Weight Concern Yes     Medications:  Outpatient Medications Marked as Taking for the 9/27/24 encounter (Office Visit) with Lucita Cope,    Medication Sig Dispense Refill    clotrimazole-betamethasone 1-0.05 % External Cream Apply twice daily for a week then take week off. 45 g 2    semaglutide 8 MG/3ML Subcutaneous Solution Pen-injector Inject 2 mg into  the skin once a week. Takes weekly on Saturdays 12 mL 3    pantoprazole 40 MG Oral Tab EC TAKE 1 TABLET(40 MG) BY MOUTH EVERY DAY 30 MINUTES BEFORE BREAKFAST 90 tablet 0    METFORMIN  MG Oral Tablet 24 Hr TAKE 1 TABLET(500 MG) BY MOUTH DAILY WITH DINNER 90 tablet 1    GLIPIZIDE ER 10 MG Oral Tablet 24 Hr TAKE 1 TABLET(10 MG) BY MOUTH DAILY 90 tablet 1    LOSARTAN 25 MG Oral Tab TAKE 1 TABLET(25 MG) BY MOUTH DAILY 90 tablet 1    latanoprost 0.005 % Ophthalmic Solution Place 1 drop into both eyes daily.      Empagliflozin (JARDIANCE) 25 MG Oral Tab Take 1 tablet by mouth daily. 90 tablet 3    Betamethasone Dipropionate Aug 0.05 % External Cream Apply 1 Application  topically 2 (two) times daily as needed (do not use longer than 14 days in a row). 15 g 0    rosuvastatin 20 MG Oral Tab Take 1 tablet (20 mg total) by mouth daily. 90 tablet 3    Multiple Vitamin (DAILY VALUE MULTIVITAMIN) Oral Tab Take 1 tablet by mouth daily.      ferrous sulfate 325 (65 FE) MG Oral Tab EC Take 1 tablet (325 mg total) by mouth daily with breakfast.      Cholecalciferol (VITAMIN D) 1000 UNITS Oral Cap Take 4,000 mg by mouth daily.       Modified Medications    No medications on file     There are no discontinued medications.  ?  Allergies:  Allergies   Allergen Reactions    Boniva [Ibandronate Sodium] OTHER (SEE COMMENTS)     Bleeding ulcers    Lisinopril Coughing     TABS     ?  REVIEW OF SYSTEMS   ?  Review of Systems   Constitutional:  Positive for weight loss (slowly , relates to meds). Negative for chills, fever and malaise/fatigue.   Eyes:  Negative for pain and redness.   Respiratory:  Negative for cough, hemoptysis and shortness of breath.    Cardiovascular:  Negative for chest pain, palpitations and leg swelling.   Gastrointestinal:  Negative for abdominal pain, blood in stool, constipation (improved on miralax), diarrhea, heartburn (controlled on PPI) and nausea.   Genitourinary:  Positive for frequency and urgency.  Negative for dysuria and hematuria.   Musculoskeletal:  Positive for back pain and joint pain. Negative for myalgias and neck pain.   Skin:  Negative for itching and rash.   Neurological:  Positive for weakness (in the hands d/t pain). Negative for dizziness, tingling and headaches.   Endo/Heme/Allergies:  Does not bruise/bleed easily.   Psychiatric/Behavioral:  Negative for depression. The patient is not nervous/anxious and does not have insomnia.      PHYSICAL EXAM   Today's Vitals:  Temperature Blood Pressure Heart Rate Resp Rate SpO2   Temp: 97.6 °F (36.4 °C) BP: 122/54 Pulse: 94 Resp: 16 SpO2: 95 %   ?  Current Weight Height BMI BSA Pain   Wt Readings from Last 1 Encounters:   09/27/24 146 lb (66.2 kg)    Height: 5' 0.5\" (153.7 cm) Body mass index is 28.04 kg/m². Body surface area is 1.64 meters squared.         Physical Exam  Vitals and nursing note reviewed.   Constitutional:       General: She is not in acute distress.     Appearance: Normal appearance. She is well-developed. She is not diaphoretic.   HENT:      Head: Normocephalic and atraumatic.   Eyes:      General: No scleral icterus.     Extraocular Movements: Extraocular movements intact.      Conjunctiva/sclera: Conjunctivae normal.   Neck:      Vascular: No JVD.      Trachea: No tracheal deviation.   Cardiovascular:      Rate and Rhythm: Normal rate and regular rhythm.      Heart sounds: Normal heart sounds. No murmur heard.  Pulmonary:      Effort: Pulmonary effort is normal. No respiratory distress.      Breath sounds: Normal breath sounds. No wheezing.   Musculoskeletal:         General: Deformity present. No swelling or tenderness.      Cervical back: Neck supple.      Comments: Diffuse heberden/bouchards nodules, large CMC B/L without synovitis.   No swelling, tenderness, redness or restriction of motion of the MCPs, wrists, elbows, ankles, or joints of the feet.  Bilateral shoulders with full ROM, some restriction on right  Bilateral knees with  medial joint line tenderness, moderate crepitus, no effusion. Right bony enlargement compared to left. Improved overall   Lymphadenopathy:      Cervical: No cervical adenopathy.   Skin:     General: Skin is warm and dry.      Findings: No erythema or rash.      Comments: No periungal erythema  No nail pitting/onycholysis    Neurological:      Mental Status: She is alert and oriented to person, place, and time.      Cranial Nerves: No cranial nerve deficit.      Gait: Gait abnormal (improved).   Psychiatric:         Mood and Affect: Mood normal.         Behavior: Behavior normal.       ?  Radiology review:       PROCEDURE:  XR CERVICAL SPINE (4VIEWS) (CPT=72050)       TECHNIQUE:  AP, lateral, obliques, and coned down view of the spine were obtained.       COMPARISON:  None.       INDICATIONS:  neck pain with movement; no injury       PATIENT STATED HISTORY: (As transcribed by Technologist)  5 days ago pt started feeling pain and stiffness to head and neck. Pt cant touch head to wash hair. No injury.            FINDINGS:       BONES:  There is no evidence of acute osseous injuries.  Normal alignment of the cervical spine.  There are mild to moderate multilevel posterior articular facet joint and uncovertebral joint degenerative changes.   DISC SPACES:  There are moderate multilevel degenerative disc changes, with multilevel disc height loss and endplate osteophytes.   PARASPINOUS:  Negative.  No paraspinous abnormality is seen.   OTHER:  Negative.                           Impression   CONCLUSION:     1. No acute process.   2. Mild to moderate multilevel disc disease and facet arthropathy of the cervical spine.           Dictated by (CST): Aung Ann DO on 11/04/2021 at 6:06 PM       Finalized by (CST): Aung Ann DO on 11/04/2021 at 6:09 PM          PROCEDURE:  XR SHOULDER, COMPLETE (MIN 2 VIEWS), RIGHT (CPT=73030)          TECHNIQUE:  Multiple views were obtained.       COMPARISON:  SOFIYA HESTER,  SHOULDER-BURSITIS(2 VWS),RIGHT, 4/06/2010, 8:35 AM.       INDICATIONS:  M25.511 Right shoulder pain, unspecified chronicity       PATIENT STATED HISTORY: (As transcribed by Technologist)  right shoulder pain x 5 days, no injury            FINDINGS:  Negative for fracture, dislocation, deformity or other acute bony abnormality. Osteoarthritic changes are noted, mild degree, mostly involving the AC joint, and only minimally the glenohumeral joint.  No soft tissue abnormalities.                            Impression   CONCLUSION:  Mild osteoarthritic changes are present, mostly involving the AC joint. No acute fracture or other acute bony process.       Dictated by (CST): Cole Landry MD on 9/03/2021 at 3:03 PM       Finalized by (CST): Cole Landry MD on 9/03/2021 at 3:07 PM        PROCEDURE:  XR HAND (MIN 3 VIEWS), RIGHT (CPT=73130)       TECHNIQUE:  Three views were obtained.       COMPARISON:  [facility], XR, XR HAND (MIN 3 VIEWS), LEFT (CPT=73130), 4/12/2021, 9:59 AM.       INDICATIONS:  M25.50 Polyarthralgia G89.29 Chronic pain of both knees M25.562 Chronic pain of both knees M25.561 Chronic pain of both knees M79.642 Bilateral hand pain M79.6*       PATIENT STATED HISTORY: (As transcribed by Technologist)  Bilateral hand pain, right worse than left. No injury.            FINDINGS:  Osseous structures are intact.  Interphalangeal joint space narrowing, severe involving the 2nd and 3rd distal interphalangeal joints and 2nd and 5th proximal interphalangeal joints with marginal osteophytes (Heberden and Mayco nodules   respectively).  Findings most consistent with osteoarthritis.  Deformity involves the 2nd digit and to a lesser extent the 3rd digit due to degenerative change.    Degenerative change also noted involving the carpal 1st metacarpal joint.  No dislocation.                        Impression   CONCLUSION:     1. Findings most consistent with osteoarthritis as detailed above, noted to a greater  extent when compared to contralateral left hand radiographs.           Dictated by (CST): Nasra Cosby MD on 4/12/2021 at 11:16 AM       Finalized by (CST): Nasra Cosby MD on 4/12/2021 at 11:21 AM        PROCEDURE:  XR HAND (MIN 3 VIEWS), LEFT (CPT=73130)       TECHNIQUE:  Three views were obtained.       COMPARISON:  [facility]OLINGTANMAY, XR, XR HAND (MIN 3 VIEWS), RIGHT (CPT=73130), 4/12/2021, 9:57 AM.       INDICATIONS:  M25.50 Polyarthralgia G89.29 Chronic pain of both knees M25.562 Chronic pain of both knees M25.561 Chronic pain of both knees M79.642 Bilateral hand pain M79.6*       PATIENT STATED HISTORY: (As transcribed by Technologist)  Bilateral hand pain, right worse than left. No injury.            FINDINGS:  Osseous structures are intact.  Interphalangeal joint space narrowing predominantly involves the 2nd and 3rd distal interphalangeal joint with marginal osteophytes (Heberden nodes) and surrounding soft tissue swelling consistent with   osteoarthritis.  Degenerative change also involves the carpal 1st metacarpal joint.  No dislocation.  Incidentally noted is linear sclerosis involving the distal radial metaphysis, correlate clinically with prior trauma.                        Impression   CONCLUSION:     1. Findings most consistent with osteoarthritis noted to a lesser extent when compared to contralateral right hand radiographs.           Dictated by (CST): Nasra Cosby MD on 4/12/2021 at 11:21 AM       Finalized by (CST): Nasra Cosby MD on 4/12/2021 at 11:23 AM        PROCEDURE:  XR KNEE BILAT STANDING (CPT=73565)       TECHNIQUE:  Bilateral standing view of the knee was obtained.       COMPARISON:  None.       INDICATIONS:       PATIENT STATED HISTORY: (As transcribed by Technologist)  Bilateral knee pain. Right worse than left. No known injury.             FINDINGS:     BONES:  Osseous structures are intact.  Bilateral medial compartmental narrowing, right greater than left with near complete obliteration of the  right medial compartment which demonstrates marginal osteophytes consistent with degenerative change.   SOFT TISSUES:  Right medial soft tissue swelling at the level of the distal femur.                        Impression   CONCLUSION:     1. Degenerative change predominantly involves the medial compartments, right greater than left as detailed above.           Dictated by (CST): Nasra Cosby MD on 4/12/2021 at 11:04 AM       Finalized by (CST): Nasra Cosby MD on 4/12/2021 at 11:06 AM        PROCEDURE:  XR DEXA BONE DENSITOMETRY (CPT=77080)       COMPARISON:  SOFIYA MORGAN, XR DEXA BONE DENSITOMETRY (CPT=77080), 12/19/2018, 8:45 AM.       INDICATIONS:    M85.88 Osteopenia of lumbar spine       PATIENT STATED HISTORY: (As transcribed by Technologist)  Osteopenia            LUMBAR SPINE ANALYSIS RESULTS:       Bone mineral density (BMD) (g/cm2):  .855     Lumbar T-Score:  -1.7       % young normals:  82       % age matched controls:  103       Change from prior spine examination:  -2.3%                TOTAL HIP ANALYSIS RESULTS:         Bone mineral density (BMD) (g/cm2):  .821       Total Hip T-Score:  -1.1       % young normals:  85       % age matched controls:  105       Change from prior hip examination:  -0.6%                FEMORAL NECK ANALYSIS RESULTS:         Bone mineral density (BMD) (g/cm2):  .788       Femoral neck T-Score:  -0.7       % young normals:  90       % age matched controls:  116       Change from prior hip examination:  6.8*%               ADDITIONAL FINDINGS:  FRAX = 4.2%/0.3%.                          Impression   CONCLUSION:  Bone mineral density values for lumbar spine and left hip are compatible with the diagnosis of osteopenia by WHO definition (T score between -1.0 and -2.5).  If therapy is initiated, follow-up study is recommended in 2 years for further   evaluation of therapeutic efficacy.          Labs:  Lab Results   Component Value Date    WBC 7.0 09/28/2024    RBC 4.60 09/28/2024    HGB  14.8 09/28/2024    HCT 45.3 09/28/2024    .0 09/28/2024    MPV 13.5 (H) 06/22/2012    MCV 98.5 09/28/2024    MCH 32.2 09/28/2024    MCHC 32.7 09/28/2024    RDW 12.6 09/28/2024    NEPRELIM 4.55 09/28/2024    NEPERCENT 65.5 09/28/2024    LYPERCENT 21.9 09/28/2024    MOPERCENT 8.6 09/28/2024    EOPERCENT 2.9 09/28/2024    BAPERCENT 0.7 09/28/2024    NE 4.55 09/28/2024    LYMABS 1.52 09/28/2024    MOABSO 0.60 09/28/2024    EOABSO 0.20 09/28/2024    BAABSO 0.05 09/28/2024     Lab Results   Component Value Date     (H) 09/28/2024    BUN 14 09/28/2024    BUNCREA 32.4 (H) 12/27/2022    CREATSERUM 0.76 09/28/2024    ANIONGAP 9 09/28/2024    GFR 92 10/25/2017    GFRNAA 83 05/06/2022    GFRAA 96 05/06/2022    CA 10.5 (H) 09/28/2024    OSMOCALC 289 09/28/2024    ALKPHO 74 06/08/2023    AST 26 09/28/2024    ALT 33 09/28/2024    BILT 0.3 06/08/2023    TP 7.4 06/08/2023    ALB 3.9 06/08/2023    GLOBULIN 3.5 06/08/2023     09/28/2024    K 4.3 09/28/2024     09/28/2024    CO2 28.0 09/28/2024       Additional Labs:  04/2022  CRP normal   ESR 15 normal   IGA, IGG normal  IGM 35 borderline low    borderline   HEMAL negative    10/2021  HEMAL by IFA 1: 160 speckled  IgA, IgG normal  IgM borderline low 40  C3 137 normal  C4 31.4 normal  CRP normal  ESR 9 normal   equivocal    04/2021  HEMAL screen positive    Remainder of LUIS FERNANDO panel negative  Uric acid 4.0 normal  CCP negative  RF negative  CRP normal  ESR 6 normal    Lucita Cope DO  [facility]  09/27/2024

## 2024-09-28 ENCOUNTER — LAB ENCOUNTER (OUTPATIENT)
Dept: LAB | Age: 74
End: 2024-09-28
Attending: INTERNAL MEDICINE
Payer: MEDICARE

## 2024-09-28 DIAGNOSIS — E55.9 VITAMIN D DEFICIENCY: ICD-10-CM

## 2024-09-28 DIAGNOSIS — Z13.820 SCREENING FOR OSTEOPOROSIS: ICD-10-CM

## 2024-09-28 DIAGNOSIS — R80.9 TYPE 2 DIABETES MELLITUS WITH ALBUMINURIA (HCC): ICD-10-CM

## 2024-09-28 DIAGNOSIS — Z12.31 ENCOUNTER FOR SCREENING MAMMOGRAM FOR BREAST CANCER: ICD-10-CM

## 2024-09-28 DIAGNOSIS — E66.3 OVERWEIGHT (BMI 25.0-29.9): ICD-10-CM

## 2024-09-28 DIAGNOSIS — M85.88 OSTEOPENIA OF LUMBAR SPINE: ICD-10-CM

## 2024-09-28 DIAGNOSIS — E11.69 HYPERLIPIDEMIA ASSOCIATED WITH TYPE 2 DIABETES MELLITUS (HCC): ICD-10-CM

## 2024-09-28 DIAGNOSIS — M15.4 EROSIVE OSTEOARTHRITIS OF BOTH HANDS: ICD-10-CM

## 2024-09-28 DIAGNOSIS — M15.0 PRIMARY OSTEOARTHRITIS INVOLVING MULTIPLE JOINTS: ICD-10-CM

## 2024-09-28 DIAGNOSIS — K21.9 GASTROESOPHAGEAL REFLUX DISEASE WITHOUT ESOPHAGITIS: ICD-10-CM

## 2024-09-28 DIAGNOSIS — M85.89 OSTEOPENIA OF MULTIPLE SITES: ICD-10-CM

## 2024-09-28 DIAGNOSIS — E78.5 HYPERLIPIDEMIA ASSOCIATED WITH TYPE 2 DIABETES MELLITUS (HCC): ICD-10-CM

## 2024-09-28 DIAGNOSIS — E11.29 TYPE 2 DIABETES MELLITUS WITH ALBUMINURIA (HCC): ICD-10-CM

## 2024-09-28 DIAGNOSIS — Z00.00 ROUTINE GENERAL MEDICAL EXAMINATION AT A HEALTH CARE FACILITY: ICD-10-CM

## 2024-09-28 LAB
ALT SERPL-CCNC: 33 U/L
ANION GAP SERPL CALC-SCNC: 9 MMOL/L (ref 0–18)
AST SERPL-CCNC: 26 U/L (ref ?–34)
BASOPHILS # BLD AUTO: 0.05 X10(3) UL (ref 0–0.2)
BASOPHILS NFR BLD AUTO: 0.7 %
BUN BLD-MCNC: 14 MG/DL (ref 9–23)
CALCIUM BLD-MCNC: 10.5 MG/DL (ref 8.7–10.4)
CHLORIDE SERPL-SCNC: 101 MMOL/L (ref 98–112)
CHOLEST SERPL-MCNC: 148 MG/DL (ref ?–200)
CO2 SERPL-SCNC: 28 MMOL/L (ref 21–32)
CREAT BLD-MCNC: 0.76 MG/DL
CRP SERPL-MCNC: <0.4 MG/DL (ref ?–0.5)
DEPRECATED HBV CORE AB SER IA-ACNC: 41 NG/ML
EGFRCR SERPLBLD CKD-EPI 2021: 83 ML/MIN/1.73M2 (ref 60–?)
EOSINOPHIL # BLD AUTO: 0.2 X10(3) UL (ref 0–0.7)
EOSINOPHIL NFR BLD AUTO: 2.9 %
ERYTHROCYTE [DISTWIDTH] IN BLOOD BY AUTOMATED COUNT: 12.6 %
ERYTHROCYTE [SEDIMENTATION RATE] IN BLOOD: 17 MM/HR
FASTING PATIENT LIPID ANSWER: YES
FASTING STATUS PATIENT QL REPORTED: YES
GLUCOSE BLD-MCNC: 150 MG/DL (ref 70–99)
HCT VFR BLD AUTO: 45.3 %
HDLC SERPL-MCNC: 46 MG/DL (ref 40–59)
HGB BLD-MCNC: 14.8 G/DL
IMM GRANULOCYTES # BLD AUTO: 0.03 X10(3) UL (ref 0–1)
IMM GRANULOCYTES NFR BLD: 0.4 %
LDLC SERPL CALC-MCNC: 79 MG/DL (ref ?–100)
LYMPHOCYTES # BLD AUTO: 1.52 X10(3) UL (ref 1–4)
LYMPHOCYTES NFR BLD AUTO: 21.9 %
MAGNESIUM SERPL-MCNC: 2 MG/DL (ref 1.6–2.6)
MCH RBC QN AUTO: 32.2 PG (ref 26–34)
MCHC RBC AUTO-ENTMCNC: 32.7 G/DL (ref 31–37)
MCV RBC AUTO: 98.5 FL
MONOCYTES # BLD AUTO: 0.6 X10(3) UL (ref 0.1–1)
MONOCYTES NFR BLD AUTO: 8.6 %
NEUTROPHILS # BLD AUTO: 4.55 X10 (3) UL (ref 1.5–7.7)
NEUTROPHILS # BLD AUTO: 4.55 X10(3) UL (ref 1.5–7.7)
NEUTROPHILS NFR BLD AUTO: 65.5 %
NONHDLC SERPL-MCNC: 102 MG/DL (ref ?–130)
OSMOLALITY SERPL CALC.SUM OF ELEC: 289 MOSM/KG (ref 275–295)
PHOSPHATE SERPL-MCNC: 4.1 MG/DL (ref 2.4–5.1)
PLATELET # BLD AUTO: 164 10(3)UL (ref 150–450)
POTASSIUM SERPL-SCNC: 4.3 MMOL/L (ref 3.5–5.1)
RBC # BLD AUTO: 4.6 X10(6)UL
SODIUM SERPL-SCNC: 138 MMOL/L (ref 136–145)
T4 FREE SERPL-MCNC: 1.3 NG/DL (ref 0.8–1.7)
TRIGL SERPL-MCNC: 129 MG/DL (ref 30–149)
TSI SER-ACNC: 2.02 MIU/ML (ref 0.55–4.78)
URATE SERPL-MCNC: 3.4 MG/DL
VIT D+METAB SERPL-MCNC: 67.9 NG/ML (ref 30–100)
VLDLC SERPL CALC-MCNC: 20 MG/DL (ref 0–30)
WBC # BLD AUTO: 7 X10(3) UL (ref 4–11)

## 2024-09-28 PROCEDURE — 84100 ASSAY OF PHOSPHORUS: CPT

## 2024-09-28 PROCEDURE — 83735 ASSAY OF MAGNESIUM: CPT

## 2024-09-28 PROCEDURE — 84450 TRANSFERASE (AST) (SGOT): CPT

## 2024-09-28 PROCEDURE — 82728 ASSAY OF FERRITIN: CPT

## 2024-09-28 PROCEDURE — 85025 COMPLETE CBC W/AUTO DIFF WBC: CPT

## 2024-09-28 PROCEDURE — 84443 ASSAY THYROID STIM HORMONE: CPT

## 2024-09-28 PROCEDURE — 85652 RBC SED RATE AUTOMATED: CPT

## 2024-09-28 PROCEDURE — 36415 COLL VENOUS BLD VENIPUNCTURE: CPT

## 2024-09-28 PROCEDURE — 82306 VITAMIN D 25 HYDROXY: CPT

## 2024-09-28 PROCEDURE — 80048 BASIC METABOLIC PNL TOTAL CA: CPT

## 2024-09-28 PROCEDURE — 84550 ASSAY OF BLOOD/URIC ACID: CPT

## 2024-09-28 PROCEDURE — 80061 LIPID PANEL: CPT

## 2024-09-28 PROCEDURE — 86140 C-REACTIVE PROTEIN: CPT

## 2024-09-28 PROCEDURE — 84460 ALANINE AMINO (ALT) (SGPT): CPT

## 2024-09-28 PROCEDURE — 84439 ASSAY OF FREE THYROXINE: CPT

## 2024-10-06 ENCOUNTER — ANESTHESIA EVENT (OUTPATIENT)
Dept: ENDOSCOPY | Facility: HOSPITAL | Age: 74
End: 2024-10-06
Payer: MEDICARE

## 2024-10-07 ENCOUNTER — HOSPITAL ENCOUNTER (OUTPATIENT)
Facility: HOSPITAL | Age: 74
Setting detail: HOSPITAL OUTPATIENT SURGERY
Discharge: HOME | End: 2024-10-07
Attending: STUDENT IN AN ORGANIZED HEALTH CARE EDUCATION/TRAINING PROGRAM | Admitting: STUDENT IN AN ORGANIZED HEALTH CARE EDUCATION/TRAINING PROGRAM
Payer: MEDICARE

## 2024-10-07 ENCOUNTER — ANESTHESIA (OUTPATIENT)
Dept: ENDOSCOPY | Facility: HOSPITAL | Age: 74
End: 2024-10-07
Payer: MEDICARE

## 2024-10-07 VITALS
SYSTOLIC BLOOD PRESSURE: 116 MMHG | TEMPERATURE: 98 F | WEIGHT: 144 LBS | DIASTOLIC BLOOD PRESSURE: 69 MMHG | HEART RATE: 76 BPM | RESPIRATION RATE: 16 BRPM | OXYGEN SATURATION: 100 % | BODY MASS INDEX: 28.27 KG/M2 | HEIGHT: 60 IN

## 2024-10-07 DIAGNOSIS — D50.9 IRON DEFICIENCY ANEMIA, UNSPECIFIED IRON DEFICIENCY ANEMIA TYPE: ICD-10-CM

## 2024-10-07 DIAGNOSIS — Z80.0 FAMILY HISTORY OF ESOPHAGEAL CANCER: ICD-10-CM

## 2024-10-07 DIAGNOSIS — K21.9 GASTROESOPHAGEAL REFLUX DISEASE, UNSPECIFIED WHETHER ESOPHAGITIS PRESENT: ICD-10-CM

## 2024-10-07 LAB — GLUCOSE BLD-MCNC: 138 MG/DL (ref 70–99)

## 2024-10-07 PROCEDURE — 0DB78ZX EXCISION OF STOMACH, PYLORUS, VIA NATURAL OR ARTIFICIAL OPENING ENDOSCOPIC, DIAGNOSTIC: ICD-10-PCS | Performed by: STUDENT IN AN ORGANIZED HEALTH CARE EDUCATION/TRAINING PROGRAM

## 2024-10-07 PROCEDURE — 0DB98ZX EXCISION OF DUODENUM, VIA NATURAL OR ARTIFICIAL OPENING ENDOSCOPIC, DIAGNOSTIC: ICD-10-PCS | Performed by: STUDENT IN AN ORGANIZED HEALTH CARE EDUCATION/TRAINING PROGRAM

## 2024-10-07 PROCEDURE — 88305 TISSUE EXAM BY PATHOLOGIST: CPT | Performed by: STUDENT IN AN ORGANIZED HEALTH CARE EDUCATION/TRAINING PROGRAM

## 2024-10-07 PROCEDURE — 82962 GLUCOSE BLOOD TEST: CPT

## 2024-10-07 RX ORDER — NICOTINE POLACRILEX 4 MG
15 LOZENGE BUCCAL
Status: DISCONTINUED | OUTPATIENT
Start: 2024-10-07 | End: 2024-10-07

## 2024-10-07 RX ORDER — NALOXONE HYDROCHLORIDE 0.4 MG/ML
0.08 INJECTION, SOLUTION INTRAMUSCULAR; INTRAVENOUS; SUBCUTANEOUS ONCE AS NEEDED
Status: DISCONTINUED | OUTPATIENT
Start: 2024-10-07 | End: 2024-10-07

## 2024-10-07 RX ORDER — ONDANSETRON 2 MG/ML
4 INJECTION INTRAMUSCULAR; INTRAVENOUS ONCE AS NEEDED
Status: DISCONTINUED | OUTPATIENT
Start: 2024-10-07 | End: 2024-10-07

## 2024-10-07 RX ORDER — SODIUM CHLORIDE, SODIUM LACTATE, POTASSIUM CHLORIDE, CALCIUM CHLORIDE 600; 310; 30; 20 MG/100ML; MG/100ML; MG/100ML; MG/100ML
INJECTION, SOLUTION INTRAVENOUS CONTINUOUS
Status: DISCONTINUED | OUTPATIENT
Start: 2024-10-07 | End: 2024-10-07

## 2024-10-07 RX ORDER — NICOTINE POLACRILEX 4 MG
30 LOZENGE BUCCAL
Status: DISCONTINUED | OUTPATIENT
Start: 2024-10-07 | End: 2024-10-07

## 2024-10-07 RX ORDER — DEXTROSE MONOHYDRATE 25 G/50ML
50 INJECTION, SOLUTION INTRAVENOUS
Status: DISCONTINUED | OUTPATIENT
Start: 2024-10-07 | End: 2024-10-07

## 2024-10-07 RX ORDER — LIDOCAINE HYDROCHLORIDE 10 MG/ML
INJECTION, SOLUTION EPIDURAL; INFILTRATION; INTRACAUDAL; PERINEURAL AS NEEDED
Status: DISCONTINUED | OUTPATIENT
Start: 2024-10-07 | End: 2024-10-07 | Stop reason: SURG

## 2024-10-07 RX ADMIN — SODIUM CHLORIDE, SODIUM LACTATE, POTASSIUM CHLORIDE, CALCIUM CHLORIDE: 600; 310; 30; 20 INJECTION, SOLUTION INTRAVENOUS at 07:10:00

## 2024-10-07 RX ADMIN — LIDOCAINE HYDROCHLORIDE 25 MG: 10 INJECTION, SOLUTION EPIDURAL; INFILTRATION; INTRACAUDAL; PERINEURAL at 07:12:00

## 2024-10-07 NOTE — ANESTHESIA POSTPROCEDURE EVALUATION
[facility]    Lexi Brandyn Patient Status:  Hospital Outpatient Surgery   Age/Gender 74 year old female MRN [id number]   Location [facility] ENDOSCOPY [facility] Attending Vlad Marquez MD   Hosp Day # 0 PCP Anne Galvan MD       Anesthesia Post-op Note    ESOPHAGOGASTRODUODENOSCOPY (EGD) WITH BIOPSIES & COLD SNARE POLYPECTOMY    Procedure Summary       Date: 10/07/24 Room / Location:  ENDOSCOPY 02 /  ENDOSCOPY    Anesthesia Start: 0710 Anesthesia Stop: 0732    Procedure: ESOPHAGOGASTRODUODENOSCOPY (EGD) WITH BIOPSIES & COLD SNARE POLYPECTOMY Diagnosis:       Gastroesophageal reflux disease, unspecified whether esophagitis present      Family history of esophageal cancer      Iron deficiency anemia, unspecified iron deficiency anemia type      (duodenitis, gastritis)    Surgeons: Vlad Marquez MD Anesthesiologist: Yaniv Saleem MD    Anesthesia Type: MAC ASA Status: 2            Anesthesia Type: MAC    Vitals Value Taken Time   /69 10/07/24 0740   Temp 98.0 10/07/24 0742   Pulse 79 10/07/24 0740   Resp 16 10/07/24 0740   SpO2 96 % 10/07/24 0740   Vitals shown include unfiled device data.    Patient Location: Endoscopy    Anesthesia Type: MAC    Airway Patency: patent    Postop Pain Control: adequate    Mental Status: mildly sedated but able to meaningfully participate in the post-anesthesia evaluation    Nausea/Vomiting: none    Cardiopulmonary/Hydration status: stable euvolemic    Complications: no apparent anesthesia related complications    Postop vital signs: stable    Dental Exam: Unchanged from Preop    Patient to be discharged home when criteria met.

## 2024-10-07 NOTE — H&P
[facility] History and Physical Procedure Note    CC: EGD    History of Present Illness: Lexi Ahumada is a 74 year old female who presents for a  EGD.    Indication:   History of gastric polyp, last EGD 2023     No First Degree Relative with a history of Colorectal Cancer         No overt Blood in stool   No Constipation   No Diarrhea   No Abdominal pain    + Reflux symptoms - well controlled on PPI therapy    No Dysphagia       Medications reviewed as below:   No non-aspirin antithrombotic agents    Medications:  No current outpatient medications on file.    Past Medical History:  Past Medical History:    Abdominal hernia    On bellybutton    Abdominal pain    Occasional    Anemia    Arthritis    Bloating    Blood disorder    Constipation    Occasionally    Diabetes mellitus (HCC)    Diarrhea, unspecified    Occasionally    Diverticulitis    Dyslipidemia    Flatulence/gas pain/belching    Frequent urination    Gastrointestinal bleeding    PUDz in setting of ASA/boniva use    Hemorrhoids    High blood pressure    High cholesterol    History of cardiac murmur    Irregular bowel habits    Leaking of urine    Osteoarthritis    Osteopenia    Other and unspecified hyperlipidemia    Pain in joints    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    Oophorectomy Bilateral 2004    total hystero.    Other surgical history      varicose vein procedures    Tonsillectomy      Total abdom hysterectomy      Upper gi endoscopy,exam  01/01/2008       Family History:  Family History   Problem Relation  Age of Onset    Other (Lung ca) Mother     Other (Dementia) Father     Other (Thrombocytopenia) Other         Sibling    Heart Disorder Maternal Grandmother         CVA    Heart Disorder Maternal Grandfather         MI    Cancer Brother         esophageal cancer       Social History:  Social History     Socioeconomic History    Marital status:     Number of children: 3   Occupational History    Occupation:      Employer: [email or web address]   Tobacco Use    Smoking status: Never    Smokeless tobacco: Never   Vaping Use    Vaping status: Never Used   Substance and Sexual Activity    Alcohol use: No    Drug use: No   Other Topics Concern    Caffeine Concern No    Exercise Yes     Comment: walking    Seat Belt Yes    Special Diet Yes     Comment: diabetic    Stress Concern No    Weight Concern Yes       Review of Systems  Negative unless otherwise mentioned in HPI.     Allergies:  Allergies   Allergen Reactions    Boniva [Ibandronate Sodium] OTHER (SEE COMMENTS)     Bleeding ulcers    Lisinopril Coughing     TABS         Objective:    Physical Exam  /65 (BP Location: Left arm)   Pulse 73   Temp 98 °F (36.7 °C) (Temporal)   Resp 18   Ht 5' (1.524 m)   Wt 144 lb (65.3 kg)   LMP  (LMP Unknown)   SpO2 99%   BMI 28.12 kg/m²   Body mass index is 28.12 kg/m².    Gen: Awake and alert, NAD  CV: Ext warm b/l  Resp: no resp distress  Neuro: NAD, Aox3.     Pertinent labs:   Lab Results   Component Value Date     09/28/2024    K 4.3 09/28/2024     09/28/2024    CO2 28.0 09/28/2024    ANIONGAP 9 09/28/2024    BUN 14 09/28/2024     Lab Results   Component Value Date    WBC 7.0 09/28/2024    HGB 14.8 09/28/2024    HCT 45.3 09/28/2024    MCV 98.5 09/28/2024    .0 09/28/2024     Lab Results   Component Value Date    AST 26 09/28/2024    ALT 33 09/28/2024    CRP <0.40 09/28/2024    ALB 3.9 06/08/2023     No results found for: \"INR\"    Imaging:  [facility] MEAGAN 2D+3D SCREENING BILAT  (CPT=77067/89218)  Narrative: PROCEDURE:  ESTELLA MEAGAN 2D+3D SCREENING BILAT (CPT=77067/49947)     COMPARISON:  [facility]DIE, ESTELLA MEAGAN 2D+3D SCREENING BILAT (CPT=77067/91643), 12/21/2020, 11:19 AM.  [facility] EDDIE, ESTELLA MEAGAN 2D+3D SCREENING BILAT (CPT=77067/73633), 11/06/2019, 1:16 PM.  MG EDDIE, ESTELLA MEAGAN 2D+3D SCREENING BILAT (CPT=77067/34428),   12/28/2021, 9:02 AM.  MG EDDIE, ESTELLA MEAGAN 2D+3D SCREENING BILAT (CPT=77067/90018), 12/29/2022, 9:41 AM.     INDICATIONS:  Z12.31 Screening mammogram for breast cancer     VIEWS OBTAINED:  Routine views of both breasts were obtained.    Standard 2D and additional multiplane thin sections of the breasts were obtained for the purpose of Tomosynthesis evaluation.  The images were reconstructed and reviewed on the dedicated Tomosynthesis workstation.     BREAST COMPOSITION:  Heterogeneously dense, which may obscure small masses.     FINDINGS:    The parenchymal distribution is stable. There are no spiculated masses, areas of nonsurgical distortion, or suspicious grouped calcifications in either breast.                       Impression: CONCLUSION:    No suspicious change from prior mammography.  No mammographic evidence of malignancy.     BI-RADS CATEGORY:    DIAGNOSTIC CATEGORY 1--NEGATIVE ASSESSMENT.       RECOMMENDATIONS:    ROUTINE MAMMOGRAM AND CLINICAL EVALUATION IN 12 MONTHS.       Because of breast density, this patient may benefit from supplemental screening with breast MRI, Molecular Breast Imaging (MBI) or bilateral whole breast ultrasound for increased sensitivity for detection of cancer which can be obscured mammographically.    Please contact your ordering provider to discuss supplemental screening options.           A letter explaining the results in lay terms has been sent to the patient.  This exam was evaluated with a computer-aided device.  This patient's information has been entered into a reminder system with a target due date for the next mammogram.         LOCATION:  [facility]        Dictated by (CST): Ryanne Pan MD on 1/09/2024 at 1:03 PM       Finalized by (CST): Ryanne Pan MD on 1/09/2024 at 1:05 PM          Informed consent  Informed Consent:   The planned procedure(s), the explanation of the procedure, indications, its expected benefits, the potential complications and risks and possible alternatives and their benefits and risks were discussed with the patient. The discussion of risks, not limited to but including bleeding, infection, perforation / tear in the gastrointestinal tract, adverse effects from anesthesia, and possible prolonged hospitalization, emergency surgery, risk of morbidity or mortality, and risk of missed lesion(s) were discussed with patient. Patient understood the proposed procedure(s), and informed consented to the procedure and elected to proceed with the procedure(s) with possible intervention (such as polypectomy, biopsy, control of bleeding, etc.) and its risks, benefits and alternatives and wish to proceed with procedure(s). All questions answered in full to patient's  satisfaction in full.       Impression/Recommendations:  Lexi Ahumada is a 74 year old female who presents for a EGD +/- endotherapy.   Plan to proceed with EGD with anesthesia.     ASA class per anesthesia.   Informed consent obtained as detailed above.       ARMEN CAMILO M.D.  [facility]

## 2024-10-07 NOTE — OPERATIVE REPORT
EGD Operative Report  Patient Name: Lexi Ahumada  YOB: 1950  MRN: [id number]  Procedure: Esophagogastroduodenoscopy (EGD)  with biopsy, polypectomy  Pre-operative Diagnosis & Indication:   History of gastric polyp  Post-operative Diagnosis:  Gastritis, mild  Duodenitis, mild  Gastric polyp x1 s/p polypectomy   Attending Endoscopist: Vlad Marquez M.D.  Informed Consent: The planned procedure(s), the explanation of the procedure, its expected benefits, the potential complications and risks and possible alternatives and their benefits and risks were discussed with the patient or the patient's surrogate. The discussion of risks, not limited to but including bleeding, infection, perforation, adverse effects from anesthesia, need for emergency surgery/prolonged hospitalization,  cardiac arrhythmias,  and aspiration were discussed with patient.  Pt and/or surrogate understood the proposed procedure(s), its risks, benefits and alternatives and wish to proceed with procedure(s). All questions answered in full.  After all questions were answered to their satisfaction, a signed, informed, and witnessed consent was obtained.  Physical Exam: Heart: regular rate and rhythm. No rubs, murmurs, or gallops. Lungs: Clear to auscultation bilaterally. Abdomen: Soft, non-tender, non distended. No rebound tenderness, no guarding.   A TIME OUT WAS COMPLETED prior to the procedure to confirm the patient, procedure(s) and complete endoscopy safety procedure.  Sedation: Monitored Anesthesia Care; ASA class per anesthesiology team   Monitoring: Pulsoximetry, pulse, respirations, and blood pressure , vitals were monitored throughout the entire procedure under monitored anesthesia care.   Procedure: The patient was then brought to the endoscopy suite where his/her pulse, pulse oximetry and blood pressure were monitored. The patient was placed in the left lateral decubitus position and deep sedation was administered. Once  adequate sedation was achieved, a bite block was placed and a lubricated tip of an Olympus video upper endoscope was inserted through the oropharynx and gently manipulated through the esophagus into the stomach and the second portion of the duodenum. Upon withdrawal of the endoscope, careful visualization of the mucosa was performed. The endoscope was then withdrawn into the gastric antrum and placed in a retroflexed position.  The endoscope was then righted, and air was suctioned from the stomach.  The endoscope was then withdrawn from the patient, with careful visual inspection of the mucosa. The patient left the procedure room in stable condition for recovery. Findings and endotherapy as listed below  Toleration: Patient tolerated procedure well   Complications: No immediate complications   Technical Difficulty:  The procedure was not technically difficult   Estimated Blood Loss: Minimal, less than 5mL of estimated blood loss.   Findings and Therapeutics:  Esophagus:   The mucosa was normal.   There were no strictures or stenosis. GEJ junction traversed with endoscope without resistance.  The Z-line was irregular, appreciated at 35 cm from the incisors.     Stomach:    One 3mm sessile gastric polyp in the fundus of the stomach. Complete polypectomy with cold snare, retrieved. Hemostasis.   Endoscope was placed in a retroflexion view in the stomach. There was  no evidence of a hiatal hernia.  Mild gastric erythema, focal in the antrum.  Biopsies with cold forceps were obtained, rule out  H pylori.   Duodenum:   Mild focal erythema in the duodenal bulb.   Biopsies with cold forceps were obtained.   Recommendations:  Post EGD precautions, watch for bleeding, infection, perforation, adverse drug reactions   Follow-up biopsies  Continue Pantoprazole 40 mg once daily, consider dose/frequency management pending pathology results and discussion in GI OV  Avoid non-aspirin NSAIDs  Follow up in GI clinic in 4-6  jose juan Marquez MD  10/7/2024  7:22 AM

## 2024-10-07 NOTE — DISCHARGE INSTRUCTIONS

## 2024-10-07 NOTE — ANESTHESIA PREPROCEDURE EVALUATION
PRE-OP EVALUATION    Patient Name: Lexi Ahumada    Admit Diagnosis: Gastroesophageal reflux disease, unspecified whether esophagitis present [K21.9]  Family history of esophageal cancer [Z80.0]  Iron deficiency anemia, unspecified iron deficiency anemia type [D50.9]    Pre-op Diagnosis: Gastroesophageal reflux disease, unspecified whether esophagitis present [K21.9]  Family history of esophageal cancer [Z80.0]  Iron deficiency anemia, unspecified iron deficiency anemia type [D50.9]    ESOPHAGOGASTRODUODENOSCOPY (EGD)    Anesthesia Procedure: ESOPHAGOGASTRODUODENOSCOPY (EGD)    Surgeons and Role:     * Vlad Marquez MD - Primary    Pre-op vitals reviewed.  Temp: 98 °F (36.7 °C)  Pulse: 73  Resp: 18  BP: 118/65  SpO2: 99 %  Body mass index is 28.12 kg/m².    Current medications reviewed.  Hospital Medications:   glucose (Dex4) 15 GM/59ML oral liquid 15 g  15 g Oral Q15 Min PRN    Or    glucose (Glutose) 40% oral gel 15 g  15 g Oral Q15 Min PRN    Or    glucose-vitamin C (Dex-4) chewable tab 4 tablet  4 tablet Oral Q15 Min PRN    Or    dextrose 50% injection 50 mL  50 mL Intravenous Q15 Min PRN    Or    glucose (Dex4) 15 GM/59ML oral liquid 30 g  30 g Oral Q15 Min PRN    Or    glucose (Glutose) 40% oral gel 30 g  30 g Oral Q15 Min PRN    Or    glucose-vitamin C (Dex-4) chewable tab 8 tablet  8 tablet Oral Q15 Min PRN    lactated ringers infusion   Intravenous Continuous       Outpatient Medications:     Medications Prior to Admission   Medication Sig Dispense Refill Last Dose    pantoprazole 40 MG Oral Tab EC TAKE 1 TABLET(40 MG) BY MOUTH EVERY DAY 30 MINUTES BEFORE BREAKFAST 90 tablet 0 10/6/2024    METFORMIN  MG Oral Tablet 24 Hr TAKE 1 TABLET(500 MG) BY MOUTH DAILY WITH DINNER 90 tablet 1 10/6/2024    GLIPIZIDE ER 10 MG Oral Tablet 24 Hr TAKE 1 TABLET(10 MG) BY MOUTH DAILY 90 tablet 1 10/6/2024    LOSARTAN 25 MG Oral Tab TAKE 1 TABLET(25 MG) BY MOUTH DAILY 90 tablet 1 10/6/2024    latanoprost 0.005 %  Ophthalmic Solution Place 1 drop into both eyes daily.   10/6/2024    Empagliflozin (JARDIANCE) 25 MG Oral Tab Take 1 tablet by mouth daily. 90 tablet 3 10/3/2024    Betamethasone Dipropionate Aug 0.05 % External Cream Apply 1 Application  topically 2 (two) times daily as needed (do not use longer than 14 days in a row). 15 g 0     rosuvastatin 20 MG Oral Tab Take 1 tablet (20 mg total) by mouth daily. 90 tablet 3 10/6/2024    OneTouch Delica Lancets 33G Does not apply Misc Use with test strips to check blood sugar twice daily 200 each 5     Multiple Vitamin (DAILY VALUE MULTIVITAMIN) Oral Tab Take 1 tablet by mouth daily.   10/6/2024    Blood Glucose Monitoring Suppl (ONETOUCH VERIO FLEX SYSTEM) w/Device Does not apply Kit Use machine to test blood sugar twice a day as directed. 1 kit 0     ferrous sulfate 325 (65 FE) MG Oral Tab EC Take 1 tablet (325 mg total) by mouth daily with breakfast.       Cholecalciferol (VITAMIN D) 1000 UNITS Oral Cap Take 4,000 mg by mouth daily.   10/6/2024    clotrimazole-betamethasone 1-0.05 % External Cream Apply twice daily for a week then take week off. 45 g 2     semaglutide 8 MG/3ML Subcutaneous Solution Pen-injector Inject 2 mg into the skin once a week. Takes weekly on Saturdays 12 mL 3 9/28/2024    ONETOUCH VERIO In Vitro Strip USE TO TEST BLOOD SUGAR TWICE DAILY 200 strip 5        Allergies: Boniva [ibandronate sodium] and Lisinopril      Anesthesia Evaluation    Patient summary reviewed.    Anesthetic Complications  (-) history of anesthetic complications         GI/Hepatic/Renal      (+) GERD                           Cardiovascular        Exercise tolerance: good     MET: >4      (+) hypertension                                     Endo/Other      (+) diabetes  type 2,                          Pulmonary    Negative pulmonary ROS.                       Neuro/Psych    Negative neuro/psych ROS.                                  Past Surgical History:   Procedure Laterality  Date    Colonoscopy  01/01/2008    Hemorrhoid, Recheck 7-10 years    Colonoscopy  04/2013    adenomatous polyp    Colonoscopy      Hysterectomy  2004    total hystero.    Oophorectomy Bilateral 2004    total hystero.    Other surgical history      varicose vein procedures    Tonsillectomy      Total abdom hysterectomy      Upper gi endoscopy,exam  01/01/2008     Social History     Socioeconomic History    Marital status:     Number of children: 3   Occupational History    Occupation:      Employer: [employer]   Tobacco Use    Smoking status: Never    Smokeless tobacco: Never   Vaping Use    Vaping status: Never Used   Substance and Sexual Activity    Alcohol use: No    Drug use: No   Other Topics Concern    Caffeine Concern No    Exercise Yes     Comment: walking    Seat Belt Yes    Special Diet Yes     Comment: diabetic    Stress Concern No    Weight Concern Yes     History   Drug Use No     Available pre-op labs reviewed.  Lab Results   Component Value Date    WBC 7.0 09/28/2024    RBC 4.60 09/28/2024    HGB 14.8 09/28/2024    HCT 45.3 09/28/2024    MCV 98.5 09/28/2024    MCH 32.2 09/28/2024    MCHC 32.7 09/28/2024    RDW 12.6 09/28/2024    .0 09/28/2024     Lab Results   Component Value Date     09/28/2024    K 4.3 09/28/2024     09/28/2024    CO2 28.0 09/28/2024    BUN 14 09/28/2024    CREATSERUM 0.76 09/28/2024     (H) 09/28/2024    CA 10.5 (H) 09/28/2024            Airway      Mallampati: II  Mouth opening: >3 FB  TM distance: > 6 cm  Neck ROM: full Cardiovascular    Cardiovascular exam normal.  Rhythm: regular  Rate: normal     Dental    Dentition appears grossly intact         Pulmonary    Pulmonary exam normal.  Breath sounds clear to auscultation bilaterally.               Other findings              ASA: 2   Plan: MAC  NPO status verified and patient meets guidelines.  Patient has not taken beta blockers in last 24 hours.  Post-procedure pain management  plan discussed with surgeon and patient.      Plan/risks discussed with: patient and child/children                Present on Admission:  **None**

## 2024-10-25 ENCOUNTER — IMMUNIZATION (OUTPATIENT)
Dept: INTERNAL MEDICINE CLINIC | Facility: CLINIC | Age: 74
End: 2024-10-25
Payer: MEDICARE

## 2024-10-25 DIAGNOSIS — Z23 NEED FOR VACCINATION: Primary | ICD-10-CM

## 2024-10-25 PROCEDURE — G0008 ADMIN INFLUENZA VIRUS VAC: HCPCS | Performed by: INTERNAL MEDICINE

## 2024-10-25 PROCEDURE — 90662 IIV NO PRSV INCREASED AG IM: CPT | Performed by: INTERNAL MEDICINE

## 2024-10-30 ENCOUNTER — NURSE ONLY (OUTPATIENT)
Age: 74
End: 2024-10-30
Payer: MEDICARE

## 2024-10-30 NOTE — PROGRESS NOTES
A continuous glucose sensor for 24 hour blood sugar monitoring was placed on patient today per APN order.   Serial NUMBER:[id number]     Exp date: 10/30/2024  After cleansing skin with alcohol prep, Sensor (F98)was inserted on    L Posterior upper arm area without difficulty. Small amount of skin prep was added around sensor tape after placement to help with sensor adhesive.    Area remains free of any bleeding or irritation or pain at site when patient left DM center.  Patient instructions:   Record daily food/drink intake and activity in log book  Call Diabetes center with any questions or concerns.   instructed to record food, exercise, insulin doses (if taking) on log provided

## 2024-11-12 ENCOUNTER — NURSE ONLY (OUTPATIENT)
Age: 74
End: 2024-11-12
Payer: MEDICARE

## 2024-11-12 ENCOUNTER — TELEPHONE (OUTPATIENT)
Age: 74
End: 2024-11-12

## 2024-11-12 PROCEDURE — 99211 OFF/OP EST MAY X REQ PHY/QHP: CPT | Performed by: NURSE PRACTITIONER

## 2024-11-12 NOTE — TELEPHONE ENCOUNTER
Forgot to confirm medications with patient while in office- left message to call back office- sending My Chart Message as well

## 2024-11-12 NOTE — PROGRESS NOTES
Call placed to patient home phone- to advise on no adjustments needed at this time- advised no further adjustments needed follow up with Yu as scheduled   Your appointments       Date & Time Appointment Department ([place])    Nov 14, 2024 8:20 AM CST Follow-Up OV with Jose F Ortega APRN [facility] ([facility])    Please arrive 15 minutes prior to your scheduled appointment time.         Dec 10, 2024 1:30 PM CST APN New Patient Visit with Yu Rubio APRN [facility] ([facility])        Sep 19, 2025 10:15 AM CDT Exam - Established with Lucita Cope DO [facility] ([facility])              [facility]  [address]  [facility] [id number] [phone] [facility]  [address]  [facility] [id number] [phone] [facility]  [facility]  [address] [facility] [address] [phone]

## 2024-11-12 NOTE — PROGRESS NOTES
Per last visit meds as follows:    Glipizide ER 10 mg daily   Jardiance 25 mg daily  Ozempic 2 mg weekly injection   Metformin  mg at dinner --> Increase to 2x daily if able to tolerate     Call to patient home phone- rang and then sounded as disconnected tone- called cell phone number and left message to call back when she gets a chance.

## 2024-11-12 NOTE — PROGRESS NOTES
[employer] CGM  Sensor placement date: 10/30/2024  Sensor removal date: 11/12/2024  Reason for CGM placement: evaluation of glucose patterns and trends  Analysis of data:  Sensor download: full report  in media  Average glucose : 151 mg/dl   GMI: 6.9%    CV (coefficient of variation) : 25.6%     21% time above 180mg /dl   1% time above 250 mg/dl  78% time in target range:  mg/dl   0% time below target range: 70mg/dl     Evaluation   1. Overnight glucose stable and in target range  2. Pattern of morning and evening postprandial elevation  3. Low likelihood of hypoglycemia  4. Normal glucose variability    PLAN: No change in current medication regimen, patient to follow up with Yu CHURCH as scheduled on 12/10/2024.    Rhona CHURCH, BC-ADM, [facility]

## 2024-11-12 NOTE — TELEPHONE ENCOUNTER
Spoke with patient- see nurse visit from 11/12/24- no adjustments needed at this time- follow up with .LYNNETTE Frankel as scheduled   Your appointments       Date & Time Appointment Department ([place])    Nov 14, 2024 8:20 AM CST Follow-Up OV with Jose F Ortega APRN [facility] ([facility])    Please arrive 15 minutes prior to your scheduled appointment time.         Dec 10, 2024 1:30 PM CST APN New Patient Visit with Yu Rubio APRN [facility] ([facility])        Sep 19, 2025 10:15 AM CDT Exam - Established with Lucita Cope DO [facility] ([facility])              [facility]  [address]  [facility] [id number] [phone] [facility]  [address]  [facility] [id number] [phone] [facility]  [facility]  [address] [facility] [address] [phone]

## 2024-11-20 ENCOUNTER — TELEPHONE (OUTPATIENT)
Dept: ORTHOPEDICS CLINIC | Facility: CLINIC | Age: 74
End: 2024-11-20

## 2024-11-20 DIAGNOSIS — M17.0 PRIMARY OSTEOARTHRITIS OF BOTH KNEES: Primary | ICD-10-CM

## 2024-11-20 NOTE — TELEPHONE ENCOUNTER
Clarified with patient she is requesting bilateral knee Zilretta injections.  Order pending.  Thank you!

## 2024-11-20 NOTE — TELEPHONE ENCOUNTER
Patient would like to have a gel injection in both knees instead of 1 at her next appt. Please enter a new order for both knees. She made an appt on 12/11 for the current auth which states 1 injection.

## 2024-11-21 ENCOUNTER — PATIENT MESSAGE (OUTPATIENT)
Dept: ORTHOPEDICS CLINIC | Facility: CLINIC | Age: 74
End: 2024-11-21

## 2024-11-21 DIAGNOSIS — M17.0 PRIMARY OSTEOARTHRITIS OF BOTH KNEES: Primary | ICD-10-CM

## 2024-12-02 ENCOUNTER — TELEPHONE (OUTPATIENT)
Dept: ORTHOPEDICS CLINIC | Facility: CLINIC | Age: 74
End: 2024-12-02

## 2024-12-05 ENCOUNTER — HOSPITAL ENCOUNTER (OUTPATIENT)
Dept: CT IMAGING | Facility: HOSPITAL | Age: 74
Discharge: HOME | End: 2024-12-05
Payer: MEDICARE

## 2024-12-05 DIAGNOSIS — D50.8 OTHER IRON DEFICIENCY ANEMIA: ICD-10-CM

## 2024-12-05 LAB
CREAT BLD-MCNC: 0.8 MG/DL
EGFRCR SERPLBLD CKD-EPI 2021: 78 ML/MIN/1.73M2 (ref 60–?)

## 2024-12-05 PROCEDURE — 82565 ASSAY OF CREATININE: CPT

## 2024-12-05 PROCEDURE — 74177 CT ABD & PELVIS W/CONTRAST: CPT

## 2024-12-10 ENCOUNTER — OFFICE VISIT (OUTPATIENT)
Age: 74
End: 2024-12-10
Payer: MEDICARE

## 2024-12-10 ENCOUNTER — TELEPHONE (OUTPATIENT)
Age: 74
End: 2024-12-10

## 2024-12-10 VITALS
HEART RATE: 80 BPM | HEIGHT: 60.5 IN | SYSTOLIC BLOOD PRESSURE: 110 MMHG | OXYGEN SATURATION: 95 % | RESPIRATION RATE: 16 BRPM | BODY MASS INDEX: 28.38 KG/M2 | WEIGHT: 148.38 LBS | DIASTOLIC BLOOD PRESSURE: 56 MMHG

## 2024-12-10 DIAGNOSIS — E11.29 TYPE 2 DIABETES MELLITUS WITH MICROALBUMINURIA, WITHOUT LONG-TERM CURRENT USE OF INSULIN (HCC): ICD-10-CM

## 2024-12-10 DIAGNOSIS — R80.9 TYPE 2 DIABETES MELLITUS WITH ALBUMINURIA (HCC): Primary | ICD-10-CM

## 2024-12-10 DIAGNOSIS — R80.9 TYPE 2 DIABETES MELLITUS WITH MICROALBUMINURIA, WITHOUT LONG-TERM CURRENT USE OF INSULIN (HCC): ICD-10-CM

## 2024-12-10 DIAGNOSIS — E11.29 TYPE 2 DIABETES MELLITUS WITH ALBUMINURIA (HCC): Primary | ICD-10-CM

## 2024-12-10 LAB
CARTRIDGE EXPIRATION DATE: ABNORMAL DATE
GLUCOSE BLOOD: 163
HEMOGLOBIN A1C: 7.1 % (ref 4.3–5.6)
TEST STRIP EXPIRATION DATE: NORMAL DATE
TEST STRIP LOT #: NORMAL NUMERIC

## 2024-12-10 PROCEDURE — 82947 ASSAY GLUCOSE BLOOD QUANT: CPT

## 2024-12-10 PROCEDURE — 99214 OFFICE O/P EST MOD 30 MIN: CPT

## 2024-12-10 PROCEDURE — 83036 HEMOGLOBIN GLYCOSYLATED A1C: CPT

## 2024-12-10 RX ORDER — LOSARTAN POTASSIUM 25 MG/1
25 TABLET ORAL DAILY
Qty: 90 TABLET | Refills: 1 | Status: SHIPPED | OUTPATIENT
Start: 2024-12-10

## 2024-12-10 RX ORDER — TIRZEPATIDE 7.5 MG/.5ML
7.5 INJECTION, SOLUTION SUBCUTANEOUS WEEKLY
Qty: 2 ML | Refills: 1 | Status: SHIPPED | OUTPATIENT
Start: 2024-12-10

## 2024-12-10 RX ORDER — GLIPIZIDE 10 MG/1
10 TABLET, FILM COATED, EXTENDED RELEASE ORAL DAILY
Qty: 90 TABLET | Refills: 1 | Status: SHIPPED | OUTPATIENT
Start: 2024-12-10

## 2024-12-10 NOTE — PROGRESS NOTES
Lexi Ahumada is a 74 year old presenting today to establish with me for diabetes management.   Primary care physician: Anne Galvan MD  Followed up by Ayala Torres. APRN. Last visist 2024    Today's A1C 7.1 ( last A1C 7.4% )   Lexi is a pleasant lady here in the office for routine DM follow-up. Since last visit with us, no reports of hospitalization, steroid injection or change in medical history. Denies any glucose events or issues in the past several months. No nausea, abdominal discomfort, genitourinary symptoms or any adverse reactions fr DM meds. Pt reports that she is gaining weight and with good appetite.    She has been seeing Ortho for the OA of both her knees. She will see [facility] tomorrow for bilateral knee gel injection.       Monitoring blood glucose: prebreakfast daily, sometimes 2 x/ day occasionally.     Highest glucose readin  mg/dl  Lowest glucose readin  mg/dl  Hypoglycemia frequency None      Diabetes History:  Type 2  Onset:  (prediabetic for 8 years prior dx)  Patient has not had hospitalizations for blood sugar issues  denies any history of pancreatitis      Previous DM therapies:  Metformin Hcl - diarrhea  Invokana - stopped started on Jardiance  Actos - weight gain  Glyburide - stopped, Glipizide started d/t age (old record)  Trulicity - ineffective (no improvement on A1c and appetite)    Current DM Regimen:  Glipizide ER 10 mg once a day  Jardiance 25 mg daily  Metformin  mg at dinner (highest dose tolerated)  Ozempic 2 mg weekly      HGBA1C:    Lab Results   Component Value Date    A1C 7.1 (A) 12/10/2024    A1C 7.4 (A) 2024    A1C 7.9 (H) 2024     (H) 2024       Lab Results   Component Value Date    CHOLEST 148 2024    CHOLEST 152 2024    TRIG 129 2024    TRIG 206 (H) 2024    HDL 46 2024    HDL 42 2024    LDL 79 2024    LDL 76 2024     Lab Results   Component Value Date     MICROALBCREA 216.2 (H) 03/12/2024    MICROALBCREA 56.3 (H) 09/12/2023      Lab Results   Component Value Date    CREATSERUM 0.76 09/28/2024    CREATSERUM 0.81 03/12/2024    EGFRCR 78 12/05/2024    EGFRCR 83 09/28/2024     Lab Results   Component Value Date    AST 26 09/28/2024    AST 20 03/12/2024    ALT 33 09/28/2024    ALT 37 03/12/2024       Lab Results   Component Value Date    TSH 2.018 09/28/2024    TSH 1.460 05/06/2022    T4F 1.3 09/28/2024             DM Complications:  Microvascular:   Neuropathy: no  Retinopathy: no  Nephropathy: yes (+) uACR 3/2024; 12/2024 egfr 78    Macrovascular:  PVD: no  CAD: no  Stroke/CVA: no        Modifying factors:  Medication adherence: Yes  Barriers: None  Recent steroids, illness or infections ( past 3m): No    Allergies: Boniva [ibandronate sodium] and Lisinopril    Past Medical History:    Abdominal hernia    On bellybutton    Abdominal pain    Occasional    Anemia    Arthritis    Bloating    Blood disorder    Constipation    Occasionally    Diabetes mellitus (HCC)    Diarrhea, unspecified    Occasionally    Diverticulitis    Dyslipidemia    Flatulence/gas pain/belching    Frequent urination    Gastrointestinal bleeding    PUDz in setting of ASA/boniva use    Hemorrhoids    High blood pressure    High cholesterol    History of cardiac murmur    Irregular bowel habits    Leaking of urine    Osteoarthritis    Osteopenia    Other and unspecified hyperlipidemia    Pain in joints    Type 2 diabetes mellitus with proteinuria or albuminuria    Type II or unspecified type diabetes mellitus without mention of complication, not stated as uncontrolled    Visual impairment    glasses    Vitamin D deficiency     Past Surgical History:   Procedure Laterality Date    Colonoscopy  01/01/2008    Hemorrhoid, Recheck 7-10 years    Colonoscopy  04/2013    adenomatous polyp    Colonoscopy      Hysterectomy  2004    total hystero.    Oophorectomy Bilateral 2004    total hystero.    Other  surgical history      varicose vein procedures    Tonsillectomy      Total abdom hysterectomy      Upper gi endoscopy,exam  01/01/2008     Social History     Socioeconomic History    Marital status:     Number of children: 3   Occupational History    Occupation: Food service     Employer: [employer]   Tobacco Use    Smoking status: Never    Smokeless tobacco: Never   Vaping Use    Vaping status: Never Used   Substance and Sexual Activity    Alcohol use: No    Drug use: No   Other Topics Concern    Caffeine Concern No    Exercise Yes     Comment: walking    Seat Belt Yes    Special Diet Yes     Comment: diabetic    Stress Concern No    Weight Concern Yes     Family History   Problem Relation Age of Onset    Other (Lung ca) Mother     Other (Dementia) Father     Other (Thrombocytopenia) Other         Sibling    Heart Disorder Maternal Grandmother         CVA    Heart Disorder Maternal Grandfather         MI    Cancer Brother         esophageal cancer     Current Medication List:   Current Outpatient Medications   Medication Sig Dispense Refill    glipiZIDE ER 10 MG Oral Tablet 24 Hr Take 1 tablet (10 mg total) by mouth daily. 90 tablet 1    losartan 25 MG Oral Tab Take 1 tablet (25 mg total) by mouth daily. 90 tablet 1    Tirzepatide (MOUNJARO) 7.5 MG/0.5ML Subcutaneous Solution Auto-injector Inject 7.5 mg into the skin once a week. 2 mL 1    clotrimazole-betamethasone 1-0.05 % External Cream Apply twice daily for a week then take week off. 45 g 2    pantoprazole 40 MG Oral Tab EC TAKE 1 TABLET(40 MG) BY MOUTH EVERY DAY 30 MINUTES BEFORE BREAKFAST 90 tablet 0    ONETOUCH VERIO In Vitro Strip USE TO TEST BLOOD SUGAR TWICE DAILY 200 strip 5    METFORMIN  MG Oral Tablet 24 Hr TAKE 1 TABLET(500 MG) BY MOUTH DAILY WITH DINNER 90 tablet 1    latanoprost 0.005 % Ophthalmic Solution Place 1 drop into both eyes daily.      Empagliflozin (JARDIANCE) 25 MG Oral Tab Take 1 tablet by mouth daily. 90 tablet 3     Betamethasone Dipropionate Aug 0.05 % External Cream Apply 1 Application  topically 2 (two) times daily as needed (do not use longer than 14 days in a row). 15 g 0    rosuvastatin 20 MG Oral Tab Take 1 tablet (20 mg total) by mouth daily. 90 tablet 3    OneTouch Delica Lancets 33G Does not apply Misc Use with test strips to check blood sugar twice daily 200 each 5    Multiple Vitamin (DAILY VALUE MULTIVITAMIN) Oral Tab Take 1 tablet by mouth daily.      Blood Glucose Monitoring Suppl (ONETOUCH VERIO FLEX SYSTEM) w/Device Does not apply Kit Use machine to test blood sugar twice a day as directed. 1 kit 0    ferrous sulfate 325 (65 FE) MG Oral Tab EC Take 1 tablet (325 mg total) by mouth daily with breakfast.      Cholecalciferol (VITAMIN D) 1000 UNITS Oral Cap Take 4,000 mg by mouth daily.         DM associated review of  symptoms:   Endocrine: Polyuria, polyphagia, polydipsia: no  Neurological: Paresthesias: no  HEENT: Blurred vision: no  Skin: no rash or wounds  Hematological: Hypoglycemia: no      Review of Systems     LUNGS: denies shortness of breath   CARDIOVASCULAR: denies chest pain  GI: denies abdominal pain, nausea or diarrhea, constipation  : denies dysuria  MUSCULOSKELETAL: (+) bilateral knee pain R > L,     Physical exam:  /56   Pulse 80   Resp 16   Ht 5' 0.5\" (1.537 m)   Wt 148 lb 6.4 oz (67.3 kg)   LMP  (LMP Unknown)   SpO2 95%   BMI 28.51 kg/m²   Body mass index is 28.51 kg/m².    Physical Exam   Vitals reviewed.  Constitutional: Normal appearance   Cardiovascular: Normal rate , rhythm   Pulmonary/Chest: Effort normal  Neurological: Alert and oriented .   Psychiatric: Normal mood and affect.     Assessment/Plan:    Hypertension  Well-controlled  Rx for Losartan sent    Dyslipidemia:   Last  labs done  9/2024 LDL 79  Continue Rosuvastatin    Type 2 Diabetes Mellitus  A1C:   Lab Results   Component Value Date     (H) 03/12/2024    A1C 7.1 (A) 12/10/2024     Weight:   Wt Readings  from Last 3 Encounters:   12/10/24 148 lb 6.4 oz (67.3 kg)   11/14/24 145 lb (65.8 kg)   10/07/24 144 lb (65.3 kg)          Diabetes control is improving but needs ongoing management and evaluation  given the chronicity of Diabetes, ongoing medication monitoring and risk for complications  .    Recommendations:   Continue Glipizide ER 10 mg once a day  Jardiance 25 mg daily  Metformin  mg at dinner (highest dose tolerated)     May go up to 500 mg twice a day if unable to switch to Mounjaro    Stop Ozempic    Start Mounjaro 7.5 mg weekly for 1 month  Discussed side effects and benefits, same with Ozempic but some has higher weight loss benefits.  Advised to monitor BG levels more frequently. Might consider stopping or decreasing Glipizide if with lower trends in BG.    Consider CGM in the new year - will check coverage.     Reviewed w patient pathophysiology of diabetes, clinical significance of A1c, adverse effects of suboptimal glucose control, and goals of therapy   Reviewed the A1C test, what the value reflects and the goal for the patient.   Reminded pt on A1C and blood sugar targets (Fasting < 130 and post prandial <180 ) and complications associated with hyperglycemia and uncontrolled DM (on AVS)   Recommended SMBG 2- x daily if not using CGM   Reviewed s/s and treatment of hypoglycemia (on AVS)   Reminded to continue with lifestyle modifications since they have positive impact on diabetes/blood sugars/health (portion control, physical activity, weight loss)   Reinforced timing and adherence with medication, self-monitoring of blood glucose and routine follow up    Recommended for  patient to follow up in Diabetes center to review carb goals, food label reading, and offer further support/guidance with exercise planning and weight loss.     The patient is asked to return in 3-4 month but recommended to contact DM clinic sooner if questions or concerns and if Mounjaro is covered by insurance, might need  medication adjustment.    The patient indicates understanding of these issues and agrees to the plan.      Orders Placed This Encounter    POC Hemoglobin A1C     Order Specific Question:   Release to patient     Answer:   Immediate    ASSAY QUANTITATIVE, GLUCOSE     Order Specific Question:   Release to patient     Answer:   Immediate    glipiZIDE ER 10 MG Oral Tablet 24 Hr     Sig: Take 1 tablet (10 mg total) by mouth daily.     Dispense:  90 tablet     Refill:  1    losartan 25 MG Oral Tab     Sig: Take 1 tablet (25 mg total) by mouth daily.     Dispense:  90 tablet     Refill:  1    Tirzepatide (MOUNJARO) 7.5 MG/0.5ML Subcutaneous Solution Auto-injector     Sig: Inject 7.5 mg into the skin once a week.     Dispense:  2 mL     Refill:  1         Diabetes complications & risks surveillance:   A1C/Blood pressure: as reported above   Nephropathy screening:  continue arb rx.   LIPID screenin2024 .Rosuvastatin rx.   Last dilated eye exam: Last Dilated Eye Exam: 24   Exam shows retinopathy? Eye Exam shows Diabetic Retinopathy?: No    Date of last PHQ-2 depression screen: PHQ-2 - Date of last depression screenin2024    Last diabetic foot exam: Last Foot Exam: 24          Note to patient: The  Cures Act makes medical notes like these available to patients in the interest of transparency. However, be advised this is a medical document. It is intended as peer to peer communication. It is written in medical language and may contain abbreviations or verbiage that are unfamiliar. It may appear blunt or direct. Medical documents are intended to carry relevant information, facts as evident, and the clinical opinion of the practitioner.

## 2024-12-11 ENCOUNTER — OFFICE VISIT (OUTPATIENT)
Dept: ORTHOPEDICS CLINIC | Facility: CLINIC | Age: 74
End: 2024-12-11
Payer: MEDICARE

## 2024-12-11 ENCOUNTER — TELEPHONE (OUTPATIENT)
Facility: CLINIC | Age: 74
End: 2024-12-11

## 2024-12-11 DIAGNOSIS — E11.29 TYPE 2 DIABETES MELLITUS WITH MICROALBUMINURIA, WITHOUT LONG-TERM CURRENT USE OF INSULIN (HCC): ICD-10-CM

## 2024-12-11 DIAGNOSIS — R80.9 TYPE 2 DIABETES MELLITUS WITH MICROALBUMINURIA, WITHOUT LONG-TERM CURRENT USE OF INSULIN (HCC): ICD-10-CM

## 2024-12-11 DIAGNOSIS — M17.0 PRIMARY OSTEOARTHRITIS OF BOTH KNEES: Primary | ICD-10-CM

## 2024-12-11 PROCEDURE — 1159F MED LIST DOCD IN RCRD: CPT | Performed by: PHYSICIAN ASSISTANT

## 2024-12-11 PROCEDURE — 20610 DRAIN/INJ JOINT/BURSA W/O US: CPT | Performed by: PHYSICIAN ASSISTANT

## 2024-12-11 PROCEDURE — 1160F RVW MEDS BY RX/DR IN RCRD: CPT | Performed by: PHYSICIAN ASSISTANT

## 2024-12-11 RX ORDER — TIRZEPATIDE 7.5 MG/.5ML
7.5 INJECTION, SOLUTION SUBCUTANEOUS WEEKLY
Qty: 6 ML | Refills: 1 | OUTPATIENT
Start: 2024-12-11

## 2024-12-11 NOTE — TELEPHONE ENCOUNTER
Patient advised she had just recently started an Ozempic pen- has 3 doses left at this time - wanted to confirm with provider that she can complete current Ozempic supply before switching over to Mounjaro prescription?    Also was wondering if prescription can be adjusted to 3 month supply - better coverage with insurance - has to pay first $1500 of prescription before they will cover the rest - adjusted prescription as requested if agreeable?

## 2024-12-11 NOTE — PROCEDURES
Risks and benefits of knee injection discussed with the patient, with risks including but not limited to pain and swelling at the injection site and/or within the knee joint, infection, elevation in blood pressure and/or glucose levels, facial flushing. After informed consent, the patient's right and left knees were marked, locally anesthetized with skin refrigerant, prepped with topical antiseptic, and injected with 6mL of 48mg/6mL Synvisc One and 3cc 1% lidocaine through the inferolateral portal.  A band-aid was applied.  The patient tolerated the procedure well.    Shaheed Knight PA-C  [facility]

## 2024-12-12 NOTE — TELEPHONE ENCOUNTER
Future Appointments   Date Time Provider Department Center   1/13/2025  9:00 AM HOB DEXA RM1 HOB DEXA [place]   1/13/2025  9:20 AM HOB ESTELLA RM1 HOB MAMMO [place]   4/8/2025  9:45 AM Pauline Pereyra APRN [facility] DI[facility]   9/19/2025 10:15 AM Lucita Cope DO [facility]HEUM[facility]BSN EMG [place]   11/19/2025  9:40 AM Jose F Ortega APRN SGINP [facility] SUB GI

## 2025-01-07 DIAGNOSIS — E11.65 TYPE 2 DIABETES MELLITUS WITH HYPERGLYCEMIA, WITHOUT LONG-TERM CURRENT USE OF INSULIN (HCC): ICD-10-CM

## 2025-01-07 RX ORDER — METFORMIN HYDROCHLORIDE 500 MG/1
500 TABLET, EXTENDED RELEASE ORAL
Qty: 90 TABLET | Refills: 1 | Status: SHIPPED | OUTPATIENT
Start: 2025-01-07

## 2025-01-07 NOTE — TELEPHONE ENCOUNTER
Received fax from [employer] requesting refill of metformin, pended. Pt now seen with Pauline  Requested Prescriptions     Pending Prescriptions Disp Refills    metFORMIN  MG Oral Tablet 24 Hr 90 tablet 1     Sig: Take 1 tablet (500 mg total) by mouth daily with dinner.     Your appointments       Date & Time Appointment Department (Center)    Jan 13, 2025 9:00 AM CST X-ray Bone Densitometry (Dexa) with [facility] DEXA [address] DEXA - [place] [address] ([facility])    It is recommended that you wear a 2 piece outfit that does not contain any metal such as snaps and zippers, etc.  Attention women: Underwire bras will need to be removed prior to the scan.    If you have the report from a prior DEXA scan performed elsewhere, please bring the report with you to your appointment.       Jan 13, 2025 9:00 AM CST X-ray Bone Densitometry (Dexa) with HOB XR [address] X-ray - [place] [address] ([facility])    It is recommended that you wear a 2 piece outfit that does not contain any metal such as snaps and zippers, etc.  Attention women: Underwire bras will need to be removed prior to the scan.    If you have the report from a prior DEXA scan performed elsewhere, please bring the report with you to your appointment.       Jan 13, 2025 9:20 AM [facility] Screening Mammogram 3D [place] with HOB ESTELLA [address] Mammography - [address] [address] ([facility])    You may be subject to a fee if you do not show up for your appointment or you cancel within 24 hours of your appointment.    Please arrive 15 minutes prior to your appointment.    If breastfeeding, pump or breastfeed one hour prior to your appointment time.    Continuous glucose monitors must be removed so the appointment should be scheduled near the normal replacement date.    It is important to bring your previous mammography films to your appointment so that the radiologist has previous images to compare this exam to. Having your previous mammograms on file helps the radiologist  notice subtle changes in your breast tissue that can alert us to a need for further studies. Without these images, the radiologist will not be able to detect the subtle changes and your final reading will be delayed until we receive them.    It is important that the annual screening mammogram be scheduled at least a year and a day after your last screening mammogram to ensure your insurance plan will cover the cost, unless you are experiencing breast related symptoms.    Do NOT use perfumes, deodorant, talcum powder, lotions, or creams on your breasts or underarms. They leave a coating that may be picked up by the x-rays, thereby distorting the mammogram.    Wear a two piece outfit the day of the exam. This allows you to be more comfortable during the exam.      Apr 08, 2025 9:45 AM CDT Diabetes Pump follow up with Pauline Pereyra APRN [facility] ([facility] [place])        Sep 19, 2025 10:15 AM CDT Exam - Established with Lucita Cope DO [facility] ([facility])        Nov 19, 2025 9:40 AM CST Follow-Up OV with Jose F Ortega APRN [facility] ([facility])    Please arrive 15 minutes prior to your scheduled appointment time.               [facility] DEXA - [address]  [place]  [address]  [facility] [address]  [phone] [facility] Mammography - [address]  [place]  [address]  [facility] [address]  [phone] [facility] X-ray - [address]  [place]  [address] [place] [address]  [facility] [address]  [phone]    [facility]  [address]  [facility] [address]  [phone] [facility]  [facility] [place]  [address]  [facility] [address]  [phone] [facility]  [place] [address] [facility] [address] [phone]          Last A1c value was 7.1% done 12/10/2024.    Refill 7/8/24  LOV  12/10/24

## 2025-01-10 ENCOUNTER — TELEPHONE (OUTPATIENT)
Age: 75
End: 2025-01-10

## 2025-01-10 RX ORDER — ROSUVASTATIN CALCIUM 20 MG/1
20 TABLET, COATED ORAL DAILY
Qty: 90 TABLET | Refills: 2 | Status: SHIPPED | OUTPATIENT
Start: 2025-01-10

## 2025-01-10 NOTE — TELEPHONE ENCOUNTER
Refill needed     Rosuvastatin 20mg   [facility] #[id number] - [facility] [address] [place] [address] AT [facility] [address], [phone], [phone]

## 2025-01-10 NOTE — TELEPHONE ENCOUNTER
Pt requesting 3-mo supply of Mounjaro and inquiring about Ozempic pens still available at home.    Continue your current Ozempic until you finish up the entire supply as discussed with you by our nurse Nena.     As with your Mounjaro rx script, I am sending the rx today for 3 months. Let us know if you have any other concerns or questions.

## 2025-01-10 NOTE — TELEPHONE ENCOUNTER
rosuvastatin 20 MG Oral Tab         Sig: Take 1 tablet (20 mg total) by mouth daily.    Disp: 90 tablet    Refills: 0    Start: 1/10/2025    Class: Normal    Non-formulary    Last ordered: 1 year ago (12/28/2023) by Anne Galvan MD    Cholesterol Medication Protocol Zhsfmi52/10/2025 01:31 PM   Protocol Details ALT < 80    ALT resulted within past year    Lipid panel within past 12 months    In person appointment or virtual visit in the past 12 mos or appointment in next 3 mos    Medication is active on med list      To be filled at: [facility] #[id number] [address] AT [facility] OF [address], [phone], [phone]     LOV:09/23/2024  RTC:1 year   Labs:09/28/2024  Last filled:10/11/2024  Future Appointments   Date Time Provider Department Center   1/13/2025  9:00 AM HOB DEXA RM1 HOB DEXA [place]   1/13/2025  9:20 AM HOB ESTELLA RM1 HOB MAMMO [place]   4/8/2025  9:45 AM Pauline Pereyra APRN [facility] DI[facility]   9/19/2025 10:15 AM Lucita Cope DO [facility] EM[facility] [place]   11/19/2025  9:40 AM Jose F Ortega APRN SGINP [facility] SUB GI

## 2025-01-13 ENCOUNTER — HOSPITAL ENCOUNTER (OUTPATIENT)
Dept: MAMMOGRAPHY | Age: 75
Discharge: HOME | End: 2025-01-13
Attending: INTERNAL MEDICINE
Payer: MEDICARE

## 2025-01-13 ENCOUNTER — HOSPITAL ENCOUNTER (OUTPATIENT)
Dept: BONE DENSITY | Age: 75
Discharge: HOME | End: 2025-01-13
Attending: INTERNAL MEDICINE
Payer: MEDICARE

## 2025-01-13 DIAGNOSIS — Z13.820 SCREENING FOR OSTEOPOROSIS: ICD-10-CM

## 2025-01-13 DIAGNOSIS — M85.89 OSTEOPENIA OF MULTIPLE SITES: ICD-10-CM

## 2025-01-13 DIAGNOSIS — R92.333 HETEROGENEOUSLY DENSE TISSUE OF BOTH BREASTS ON MAMMOGRAPHY: ICD-10-CM

## 2025-01-13 DIAGNOSIS — Z12.31 ENCOUNTER FOR SCREENING MAMMOGRAM FOR BREAST CANCER: ICD-10-CM

## 2025-01-13 DIAGNOSIS — Z12.39 SCREENING FOR BREAST CANCER USING NON-MAMMOGRAM MODALITY: Primary | ICD-10-CM

## 2025-01-13 DIAGNOSIS — Z78.0 ASYMPTOMATIC MENOPAUSE: ICD-10-CM

## 2025-01-13 PROCEDURE — 77063 BREAST TOMOSYNTHESIS BI: CPT | Performed by: INTERNAL MEDICINE

## 2025-01-13 PROCEDURE — 77067 SCR MAMMO BI INCL CAD: CPT | Performed by: INTERNAL MEDICINE

## 2025-01-13 PROCEDURE — 77080 DXA BONE DENSITY AXIAL: CPT | Performed by: INTERNAL MEDICINE

## 2025-01-14 ENCOUNTER — TELEPHONE (OUTPATIENT)
Dept: ENDOCRINOLOGY CLINIC | Facility: CLINIC | Age: 75
End: 2025-01-14

## 2025-01-14 RX ORDER — BLOOD SUGAR DIAGNOSTIC
STRIP MISCELLANEOUS
Qty: 200 STRIP | Refills: 5 | Status: SHIPPED | OUTPATIENT
Start: 2025-01-14

## 2025-01-14 NOTE — TELEPHONE ENCOUNTER
[facility] one touch test strips to be filled   Requested Prescriptions     Pending Prescriptions Disp Refills    Glucose Blood (ONETOUCH VERIO) In Vitro Strip 200 strip 5     Sig: Use to test blood sugar twice daily     Future Appointments   Date Time Provider Department Center   4/8/2025  9:45 AM Pauline Pereyra APRN [facility]   9/19/2025 10:15 AM Lucita Cope DO [facility] EM[facility] [place]   11/19/2025  9:40 AM Jose F Ortega APRN [facility] SUB GI     Last A1c value was 7.1% done 12/10/2024.  Refill 7/22/24 Terri CHERRY 12/10/24 [place]

## 2025-01-15 ENCOUNTER — TELEPHONE (OUTPATIENT)
Dept: ORTHOPEDICS CLINIC | Facility: CLINIC | Age: 75
End: 2025-01-15

## 2025-01-15 RX ORDER — EMPAGLIFLOZIN 25 MG/1
TABLET, FILM COATED ORAL DAILY
Qty: 90 TABLET | Refills: 3 | Status: SHIPPED | OUTPATIENT
Start: 2025-01-15

## 2025-01-15 NOTE — TELEPHONE ENCOUNTER
Name from pharmacy: JARDIANCE 25MG TABLETS          Will file in chart as: JARDIANCE 25 MG Oral Tab    Sig: Take 1 tablet by mouth daily.    Original sig: TAKE 1 TABLET BY MOUTH DAILY    Disp: 90 tablet    Refills: 3 (Pharmacy requested: Not specified)    Start: 1/13/2025    Class: Normal    Non-formulary    Last ordered: 12 months ago (1/17/2024) by Anne Galvan MD    Last refill: 10/14/2024    Rx #: [id number]    Diabetes Medication Protocol [id number]/13/2025 12:00 PM   Protocol Details Last A1C < 7.5 and within past 6 months    In person appointment or virtual visit in the past 6 mos or appointment in next 3 mos    Microalbumin procedure in past 12 months or taking ACE/ARB    EGFRCR or GFRNAA > 50    GFR in the past 12 months    Medication is active on med list      To be filled at: [facility] #[id number] - [address] AT [facility] [place] [address], [phone], [phone]     LOV:09/23/2024  RTC:1 year   Labs:03/12/2024  Last filled:10/14/2024  Future Appointments   Date Time Provider Department Center   4/8/2025  9:45 AM Pauline Pereyra APRN [facility] DI[facility]   9/19/2025 10:15 AM Lucita Cope DO [facility] EMG [place]   11/19/2025  9:40 AM Jose F Ortega APRN SGINP [facility] SUB GI

## 2025-01-15 NOTE — TELEPHONE ENCOUNTER
Concern: Knee pain  LOV: 12/11/24  Synvisc was not effective.  Patient asking what the next step is.  She was set up with a surgical discussion with Dr. Yepez.  Please advise if this is the appropriate next step.    09/*28/24  BUN  9 - 23 mg/dL 14   Creatinine  0.55 - 1.02 mg/dL 0.76     eGFR-Cr  >=60 mL/min/1.73m2 83     Hx.  Gastric Ulcers

## 2025-01-15 NOTE — TELEPHONE ENCOUNTER
Patient called to let Shaheed and Dr Yepez know she's in a lot of pain and would like to know if she should make an appointment to discuss surgery or what the next step is for her knees.    Offered to make an appointment for her but she states she was previously advised by Shaheed to call the office to discuss her options.

## 2025-02-17 ENCOUNTER — HOSPITAL ENCOUNTER (OUTPATIENT)
Dept: GENERAL RADIOLOGY | Age: 75
Discharge: HOME | End: 2025-02-17
Attending: ORTHOPAEDIC SURGERY
Payer: MEDICARE

## 2025-02-17 ENCOUNTER — TELEPHONE (OUTPATIENT)
Dept: ORTHOPEDICS CLINIC | Facility: CLINIC | Age: 75
End: 2025-02-17

## 2025-02-17 ENCOUNTER — OFFICE VISIT (OUTPATIENT)
Facility: CLINIC | Age: 75
End: 2025-02-17
Payer: MEDICARE

## 2025-02-17 VITALS — WEIGHT: 150 LBS | BODY MASS INDEX: 29.45 KG/M2 | HEIGHT: 60 IN

## 2025-02-17 DIAGNOSIS — M17.0 PRIMARY OSTEOARTHRITIS OF BOTH KNEES: ICD-10-CM

## 2025-02-17 DIAGNOSIS — M17.0 PRIMARY OSTEOARTHRITIS OF BOTH KNEES: Primary | ICD-10-CM

## 2025-02-17 DIAGNOSIS — M17.11 PRIMARY OSTEOARTHRITIS OF RIGHT KNEE: Primary | ICD-10-CM

## 2025-02-17 PROCEDURE — 3008F BODY MASS INDEX DOCD: CPT | Performed by: ORTHOPAEDIC SURGERY

## 2025-02-17 PROCEDURE — 1159F MED LIST DOCD IN RCRD: CPT | Performed by: ORTHOPAEDIC SURGERY

## 2025-02-17 PROCEDURE — 99214 OFFICE O/P EST MOD 30 MIN: CPT | Performed by: ORTHOPAEDIC SURGERY

## 2025-02-17 PROCEDURE — 73562 X-RAY EXAM OF KNEE 3: CPT | Performed by: ORTHOPAEDIC SURGERY

## 2025-02-17 PROCEDURE — 77073 BONE LENGTH STUDIES: CPT | Performed by: ORTHOPAEDIC SURGERY

## 2025-02-17 PROCEDURE — 1160F RVW MEDS BY RX/DR IN RCRD: CPT | Performed by: ORTHOPAEDIC SURGERY

## 2025-02-17 NOTE — TELEPHONE ENCOUNTER
Date of Surgery:    5/6/2025    Post Op Appt:  5/21/2025     Case ID: [id number]     Notes:             SURGERY SCHEDULING SHEET     Lexi Ahumada  7/11/1950  [id number]     Procedure: Right total knee arthroplasty     CPT: 05129     Diagnosis: Right knee osteoarthritis     Anesthesia: Choice     Length of Surgery: 2 hours     Disposition: Inpatient     Instruments: Medacta TKA     Assist: If case scheduled on Thurs/Fri, then Shaheed Knight first assist. If case scheduled on Tues, then Williams Garcia ([phone]) first assist. If Williams unavailable, then please communicate to Shaheed Knight/Orquidea/Alison to cancel Shaheed's clinic for that day and have Shaheed first assist. If Shaheed unavailable, contact Watson Lazaro for first assist. If Williams and Watson unavailable, then contact [facility] for first assist.     Pre-op Testing: CBC, CMP, PT/INR, PTT, TYPE AND SCREEN, and MRSA/MSSA THROUGH EDW PAT     Clearance: MEDICAL/PCP     Post op: 2 weeks postop appointment with Shaheed Knight     Surgery date specifics: Next available     Jose Luis Yepez MD, FAAOS  [facility] Orthopedic Surgery  Phone: [phone]  Fax: [phone]

## 2025-02-17 NOTE — PROGRESS NOTES
[facility] Progress Note    Subjective: Very pleasant 74-year-old female returns to clinic today for evaluation of her knees, particularly of the right knee.  She notes that Zilretta injection was helping, however it is no longer approved by insurance.  She did attempt viscosupplementation and this did not provide relief.  She has attempted medications, activity modification, knee pain remains activity and lifestyle limiting.  She does note swelling about the knee as well, worse in the right knee than the left.    Objective: Patient appears comfortable, very pleasant, accompanied by her daughter.  Right knee with effusion, moderate.  She demonstrates extension within a few degrees of full, flexion 100 limited by her seeding.  Left knee with full extension.      Labs: Hemoglobin A1c from 2 months ago was 7.1      Assessment/Plan: 74-year-old female with symptomatic radiographically severe bilateral knee osteoarthritis, symptomatically worse on the right.  The patient has reasonably attempted nonsurgical treatments as mentioned above and remains limited in activities of daily living secondary to pain from knee arthritis.  I discussed risks and benefits of surgery, with risks including but not limited to infection, blood clots, fracture, bleeding/need for blood transfusion, injury to blood vessels and nerves, loosening or failure of components, stiffness, continued pain, need for further intervention or revision surgery.  Additionally I discussed expectations with regards to the postoperative recovery timeframe, the possibility of mechanical clicking with the knee, numbness on the lateral side of the knee/leg from sacrifice of the infrapatellar branch of the saphenous nerve, and potential for difficulty/pain with kneeling and performing deep flexion activities.  The patient understands this discussion and elects to proceed with knee replacement surgery.  She would like to proceed with right knee replacement as  this is the more symptomatic of the 2.  Binder was provided today.  We did order x-rays to be updated.  She will need primary care evaluation and clearance within 30 days of the surgery date.    Jose Luis Yepez MD, FAAOS  [facility] Orthopaedic Surgery  Phone [phone]  Fax [phone]

## 2025-02-17 NOTE — PROGRESS NOTES
SURGERY SCHEDULING SHEET    Lexi Ahumada  7/11/1950  [id number]    Procedure: Right total knee arthroplasty    CPT: 25947    Diagnosis: Right knee osteoarthritis    Anesthesia: Choice    Length of Surgery: 2 hours    Disposition: Inpatient    Instruments: Medacta TKA    Assist: If case scheduled on Thurs/Fri, then Shaheed Knight first assist. If case scheduled on Tues, then Williams Dianekiki ([phone]) first assist. If Williams unavailable, then please communicate to Shaheed Knight/Orquidea/Alison to cancel Shaheed's clinic for that day and have Shaheed first assist. If Shaheed unavailable, contact Watson Lazaro for first assist. If Williams and Watson unavailable, then contact [facility] for first assist.    Pre-op Testing: CBC, CMP, PT/INR, PTT, TYPE AND SCREEN, and MRSA/MSSA THROUGH EDW PAT    Clearance: MEDICAL/PCP    Post op: 2 weeks postop appointment with Shaheed Knight    Surgery date specifics: Next available    Jose Luis Yepez MD, HUNGOS  [facility] Orthopedic Surgery  Phone: [phone]  Fax: [phone]

## 2025-02-25 ENCOUNTER — TELEPHONE (OUTPATIENT)
Dept: INTERNAL MEDICINE CLINIC | Facility: CLINIC | Age: 75
End: 2025-02-25

## 2025-02-25 DIAGNOSIS — E78.5 HYPERLIPIDEMIA ASSOCIATED WITH TYPE 2 DIABETES MELLITUS (HCC): ICD-10-CM

## 2025-02-25 DIAGNOSIS — D50.9 IRON DEFICIENCY ANEMIA, UNSPECIFIED IRON DEFICIENCY ANEMIA TYPE: ICD-10-CM

## 2025-02-25 DIAGNOSIS — R80.9 TYPE 2 DIABETES MELLITUS WITH ALBUMINURIA (HCC): ICD-10-CM

## 2025-02-25 DIAGNOSIS — E11.69 HYPERLIPIDEMIA ASSOCIATED WITH TYPE 2 DIABETES MELLITUS (HCC): ICD-10-CM

## 2025-02-25 DIAGNOSIS — E11.29 TYPE 2 DIABETES MELLITUS WITH ALBUMINURIA (HCC): ICD-10-CM

## 2025-02-25 DIAGNOSIS — M17.0 PRIMARY OSTEOARTHRITIS OF BOTH KNEES: Primary | ICD-10-CM

## 2025-02-25 NOTE — TELEPHONE ENCOUNTER
Incoming fax from orthopedic surgery  Requesting medical clearance H&P,CBC w/DIFF,CMP, PT/INR,PTT,Type and screen for surgery on 05/06/25 with     Pre op form placed in PRE-OP folder

## 2025-03-10 DIAGNOSIS — E11.29 TYPE 2 DIABETES MELLITUS WITH MICROALBUMINURIA, WITHOUT LONG-TERM CURRENT USE OF INSULIN (HCC): Primary | ICD-10-CM

## 2025-03-10 DIAGNOSIS — R80.9 TYPE 2 DIABETES MELLITUS WITH MICROALBUMINURIA, WITHOUT LONG-TERM CURRENT USE OF INSULIN (HCC): Primary | ICD-10-CM

## 2025-03-10 RX ORDER — TIRZEPATIDE 10 MG/.5ML
10 INJECTION, SOLUTION SUBCUTANEOUS
Qty: 6 ML | Refills: 0 | Status: SHIPPED | OUTPATIENT
Start: 2025-03-10

## 2025-03-10 NOTE — TELEPHONE ENCOUNTER
Patient called in, started Mounjaro at last visit, states she is gaining weight back and not seeing much improvement in her glucose trends, would like to increase to 10 mg if APRN agrees.    Last office visit 12/2024  Future Appointments   Date Time Provider Department Center   3/14/2025  8:00 AM CAPSULE [facility] SUB GI   4/8/2025  9:45 AM Pauline Pereyra APRN [facility]RNA [facility] DIAB [facility]   4/14/2025 11:30 AM Anne Galvan MD [facility] 8 [facility] [place]   4/21/2025 12:40 PM E[facility] BREAST  EH [facility]   5/21/2025 11:20 AM Shaheed Knight PA-C [facility] ORTHO 75 [facility]   6/18/2025 11:20 AM Jose Luis Yepez MD [facility] ORTHO 75 [facility]   9/19/2025 10:15 AM Lucita Cope DO EM[facility]UMHBSN [facility] [place]   11/19/2025  9:40 AM Jose F Ortega APRN SGINP [facility] SUB GI     Last A1c value was 7.1% done 12/10/2024.

## 2025-03-20 ENCOUNTER — TELEPHONE (OUTPATIENT)
Facility: CLINIC | Age: 75
End: 2025-03-20

## 2025-03-20 NOTE — TELEPHONE ENCOUNTER
Pt wants to speak with surgery scheduler about her surgery with Lucho. Please advise [phone]  Future Appointments   Date Time Provider Department Center   4/8/2025  9:45 AM Pauline Pereyra APRN [facility] DI[facility]   4/14/2025 11:30 AM Anne Galvan MD EMG 8 [facility] [place]   4/21/2025 12:40 PM EH [facility] BREAST US EH M[facility]   4/26/2025 10:00 AM Anne Galvan MD EMG 8 [facility] [place]   5/21/2025 11:20 AM Shaheed Knight PA-C [facility] ORTHO 75 [facility]   6/18/2025 11:20 AM Jose Luis Yepez MD [facility] ORTHO 75 [facility]   9/19/2025 10:15 AM Lucita Cope DO [facility]UM[facility]BSN [facility] [place]   11/19/2025  9:40 AM Jose F Ortega APRN SGINP E[facility] SUB GI

## 2025-03-28 ENCOUNTER — OFFICE VISIT (OUTPATIENT)
Dept: INTERNAL MEDICINE CLINIC | Facility: CLINIC | Age: 75
End: 2025-03-28
Payer: MEDICARE

## 2025-03-28 VITALS
DIASTOLIC BLOOD PRESSURE: 52 MMHG | TEMPERATURE: 98 F | SYSTOLIC BLOOD PRESSURE: 106 MMHG | BODY MASS INDEX: 29.64 KG/M2 | HEART RATE: 86 BPM | HEIGHT: 60 IN | RESPIRATION RATE: 16 BRPM | WEIGHT: 151 LBS | OXYGEN SATURATION: 95 %

## 2025-03-28 DIAGNOSIS — E78.5 HYPERLIPIDEMIA ASSOCIATED WITH TYPE 2 DIABETES MELLITUS (HCC): ICD-10-CM

## 2025-03-28 DIAGNOSIS — D50.9 IRON DEFICIENCY ANEMIA, UNSPECIFIED IRON DEFICIENCY ANEMIA TYPE: ICD-10-CM

## 2025-03-28 DIAGNOSIS — E11.69 HYPERLIPIDEMIA ASSOCIATED WITH TYPE 2 DIABETES MELLITUS (HCC): ICD-10-CM

## 2025-03-28 DIAGNOSIS — E66.3 OVERWEIGHT (BMI 25.0-29.9): ICD-10-CM

## 2025-03-28 DIAGNOSIS — E11.29 TYPE 2 DIABETES MELLITUS WITH ALBUMINURIA (HCC): ICD-10-CM

## 2025-03-28 DIAGNOSIS — Z00.00 ROUTINE GENERAL MEDICAL EXAMINATION AT A HEALTH CARE FACILITY: Primary | ICD-10-CM

## 2025-03-28 DIAGNOSIS — K21.9 GASTROESOPHAGEAL REFLUX DISEASE WITHOUT ESOPHAGITIS: ICD-10-CM

## 2025-03-28 DIAGNOSIS — M85.88 OSTEOPENIA OF LUMBAR SPINE: ICD-10-CM

## 2025-03-28 DIAGNOSIS — R80.9 TYPE 2 DIABETES MELLITUS WITH ALBUMINURIA (HCC): ICD-10-CM

## 2025-03-28 RX ORDER — CICLOPIROX 80 MG/ML
1 SOLUTION TOPICAL NIGHTLY
Qty: 6 ML | Refills: 3 | Status: SHIPPED | OUTPATIENT
Start: 2025-03-28

## 2025-03-28 NOTE — PATIENT INSTRUCTIONS
Please complete your labs and urine testing after April 6th. You do not need to fast.      I recommend the following vaccines -  RSV vaccine for everyone over age 75 and those ages 60-74 with chronic heart, lung, kidney and liver conditions.     Annual FLU every September, October.    Stay up to date with COVID vaccines.     Please take over the counter calcium 600 mg twice daily.

## 2025-03-28 NOTE — PROGRESS NOTES
Lexi Ahumada is a 74 year old female.     HPI:   Patient presents for medicare supervisit and additional issues noted below.   DM - following with diabetes team. They just increased her mounjaro to 10 mg. However, her FBS are still 150-160s and she is actually gaining weight on mounjaro. She has not been checking post-prandial. She is not experiencing hypoglycemia.   HLP - LDL not at goal in 9/2024. She never responded to my message about increasing crestor to 40 mg.   DM eye exam  - followign with Carlie Chou q 3 months.   Osteopenia - DEXA done Jan, 2025. She has not started calcium.   Goals of care - she is living at home with her . She is completely independent of her ADLs. She is driving.   Vaccines - RSV, flu, covid.  R knee OA and pain - has TKA scheduled with Jose Luis Yepez on May 6th. Denies any chest pain or heart racing or shortness of breath wile doing housework, carrying groceries, climbing stairs, walking. No formal exercise due to her knee pain. She is not able to go on her walks as she used to.   Right foot big toenail - thick and discolored toenail for a few months. She has been using vitamin E oil which seemed to be helping. But then it started getting yellow again.     REVIEW OF SYSTEMS:   GENERAL HEALTH: feels well otherwise. No f/c  NEURO: denies any headaches, LH, dizzyness, LOC, falls  VISION: denies any blurred or double vision  RESPIRATORY: denies shortness of breath, cough, or congestion  CARDIOVASCULAR: denies chest pain, pressure or palpitations  GI: denies abdominal pain, constipation, diarrhea, n/v, BRBPR, melena  : no dysuria or hematuria or vaginal bleeding.   SKIN: denies any unusual skin lesions or rashes  PSYCH: mood is stable. Denies depression or anxiety.   EXT: denies edema     Wt Readings from Last 6 Encounters:   03/28/25 151 lb (68.5 kg)   02/17/25 150 lb (68 kg)   12/10/24 148 lb 6.4 oz (67.3 kg)   11/14/24 145 lb (65.8 kg)   10/07/24 144 lb (65.3 kg)   09/27/24  146 lb (66.2 kg)       Allergies   Allergen Reactions    Boniva [Ibandronate Sodium] OTHER (SEE COMMENTS)     Bleeding ulcers    Lisinopril Coughing     TABS       Family History   Problem Relation Age of Onset    Other (Lung ca) Mother     Other (Dementia) Father     Other (Thrombocytopenia) Other         Sibling    Heart Disorder Maternal Grandmother         CVA    Heart Disorder Maternal Grandfather         MI    Cancer Brother         esophageal cancer      Past Medical History:    Abdominal hernia    On bellybutton    Abdominal pain    Occasional    Anemia    Arthritis    Bloating    Blood disorder    Constipation    Occasionally    Diabetes mellitus (HCC)    Diarrhea, unspecified    Occasionally    Diverticulitis    Dyslipidemia    Flatulence/gas pain/belching    Frequent urination    Gastrointestinal bleeding    PUDz in setting of ASA/boniva use    Hemorrhoids    High blood pressure    High cholesterol    History of cardiac murmur    Irregular bowel habits    Leaking of urine    Osteoarthritis    Osteopenia    Other and unspecified hyperlipidemia    Pain in joints    Type 2 diabetes mellitus with proteinuria or albuminuria    Type II or unspecified type diabetes mellitus without mention of complication, not stated as uncontrolled    Visual impairment    glasses    Vitamin D deficiency      Past Surgical History:   Procedure Laterality Date    Colonoscopy  01/01/2008    Hemorrhoid, Recheck 7-10 years    Colonoscopy  04/2013    adenomatous polyp    Colonoscopy      Hysterectomy  2004    total hystero.    Oophorectomy Bilateral 2004    total hystero.    Other surgical history      varicose vein procedures    Tonsillectomy      Total abdom hysterectomy      Upper gi endoscopy,exam  01/01/2008      Social History:    Social History     Socioeconomic History    Marital status:     Number of children: 3   Occupational History    Occupation:      Employer: [employer]   Tobacco Use    Smoking  status: Never    Smokeless tobacco: Never    Tobacco comments:     Updated 2/17/25   Vaping Use    Vaping status: Never Used   Substance and Sexual Activity    Alcohol use: No    Drug use: No   Other Topics Concern    Caffeine Concern No    Exercise Yes     Comment: walking    Seat Belt Yes    Special Diet Yes     Comment: diabetic    Stress Concern No    Weight Concern Yes     Social Drivers of Health     Food Insecurity: No Food Insecurity (3/28/2025)    NCSS - Food Insecurity     Worried About Running Out of Food in the Last Year: No     Ran Out of Food in the Last Year: No   Transportation Needs: No Transportation Needs (3/28/2025)    NCSS - Transportation     Lack of Transportation: No   Housing Stability: Not At Risk (3/28/2025)    NCSS - Housing/Utilities     Has Housing: Yes     Worried About Losing Housing: No     Unable to Get Utilities: No           EXAM:   /52 (BP Location: Left arm, Patient Position: Sitting, Cuff Size: adult)   Pulse 86   Temp 97.7 °F (36.5 °C) (Temporal)   Resp 16   Ht 5' (1.524 m)   Wt 151 lb (68.5 kg)   LMP  (LMP Unknown)   SpO2 95%   BMI 29.49 kg/m²   GENERAL: A&O well developed, well nourished,in no apparent distress  SKIN: no rashes,no suspicious lesions  HEENT: atraumatic, MMM, throat is clear  NECK: supple, no jvd, no thyromegaly  LUNGS: clear to auscultation bilateraly, no c/w/r  CARDIO: RRR without g/m/r  GI: soft non tender nondistended no hsm bs throughout  NEURO: CN 2-12 grossly intact  PSYCH: pleasant  EXTREMITIES: no cyanosis, clubbing or edema. Right foot big toenail is slightly thick and yellow. Surrounding skin is normal.   Bilateral barefoot skin diabetic exam is normal, visualized feet and the appearance is normal.  Bilateral monofilament/sensation of both feet is normal.  Pulsation pedal pulse exam of both lower legs/feet is normal as well.      ASSESSMENT AND PLAN:   # R knee pain 2/2 OA: will undergo R TKA on 5/6/2025. Pre-op labs ordered.  Rate of  cardiac death, nonfatal myocardial infarction, and nonfatal cardiac arrest according to the number of predictors is 0.4% (95% CI 0.1-0.8)  Rate of myocardial infarction, pulmonary edema, ventricular fibrillation, primary cardiac arrest, and complete heart block is 0.5% (95% CI 0.2-1.1)  She is at acceptable risk to proceed with surgery.   # Onychomycosis - right toenail. Start with ciclopirox                                  # Urge Urinary Incontincence: chronic, stable. discussed options for PT, HEP, and timed voiding. Trospium did not help.   # History of Iron Deficiency Anemia: normal CBC with once daily ferrous sulfate. Likely a small bowel source but no further evaluation is needed per heme.   # Tubular Adenoma of Colon: colonoscopy in 2023 by Vlad Marquez with tubular adenoma. Needs repeat in 3 years (2026)  # Type 2 DM: at goal in 12/2024. Cont jardiance, glyburide, metformin, and ozempic.   - DM eye exam on 2/7/2024 by Carlie Pete - no diabetic retinopathy in either eye. She is following regularly with optho, we have requested exam  - Foot exam: done 2025  - BP at goal  - microalbumin ordered   - Depression Screen: done 2025  # Albuminuria in Type 2 DM: cont glycemic control and losartan  # HLP with DM: repeat lipids now but LDL was not at goal in 2024.   # Osteopenia of lumbar spine: DEXA Jan, 2025 with low FRAX score so cont to hold off on medications for now. Cont dietary calcium/vit D3 and weight bearing exercises. Will need prolia injections if she develops osteoporosis.   - We stopped evista in 4/2015 (had been on from 2009 to 3/2015). She had questionable GIB on boniva so is not a candidate for bisphosphonates. Cont calcium/vit D supplementation as well. Encourage weight bearing excercises as this is not an active part of her lifestyle.  # Constipation, h/o Diverticulitis (x2 episodes), colonic polyps, antral gastritis. Chronic, controlled with high fiber diet.   # GERD: well controlled on  omeprazole. Unable to take it PRN. Has been treated for H pylori in 2013.   # Stomach Polyp - EGD with hyperplastic polyp in 2025.   Health Maintenance: wellness exam on March, 2025.   Colon Cancer Screening: see above  Breast Cancer Screening: cont yearly mammogram  Bone Health: see above.  Hep C screening: AB negative in 2017  Vaccines: discussed all indicated vaccines  Medical POA: Angelita Ahumada (daughter) is primary      Care Team:  GI- Dr. Sampson  Gyn- Dr. Cruz  Optho- Dr. Pete        The patient indicates understanding of these issues and agrees to the plan.  The patient is asked to return in 1 year for medicare supervisit  Anne Galvan MD

## 2025-04-07 NOTE — H&P (VIEW-ONLY)
Lexi Ahumada is a 74 year old presenting today to establish with me for diabetes management.   Primary care physician: Anne Galvan MD  Last DM visit w/ me 12/10/24  Today's A1C 6.9% ( last A1C 7.1% 12/10/24)     Last OV, Ozempic stopped and change to Mounjaro due to A1C trends. Been on >7% of A1C with Ozempic plus other DM meds. Started on 7.5 mg then transitioned to 10 mg right after the 4th dose. Currently tolerating Mounjaro well. Denies any side effects, Denies any hypoglycemia but states continues to have higher numbers before breakfast.    She has been seeing Ortho for the OA of both her knees. Scheduled for rt knee surgery in May w/ Dr. Knight.    Monitoring blood glucose:  once daily (fasting or 2 hrs post meal(3/25 - )  Average 160 mg/dl  Highest glucose readin  mg/dl (last oV 182 mg/dl)  Lowest glucose readin  mg/dl  Hypoglycemia frequency None    Diabetes History:  Type 2  Onset:  (prediabetic for 8 years prior dx)  Patient has not had hospitalizations for blood sugar issues  denies any history of pancreatitis    Previous DM therapies:  Metformin Hcl - diarrhea  Invokana - stopped started on Jardiance  Actos - weight gain  Glyburide - stopped, Glipizide started d/t age (old record)  Trulicity - ineffective (no improvement on A1c and appetite)    Current DM Regimen:  Glipizide ER 10 mg once a day  Jardiance 25 mg daily  Metformin  mg at dinner (highest dose tolerated)  Ozempic 2 mg weekly      HGBA1C:    Lab Results   Component Value Date    A1C 6.9 (A) 2025    A1C 7.1 (A) 12/10/2024    A1C 7.4 (A) 2024     (H) 2024       Lab Results   Component Value Date    CHOLEST 148 2024    CHOLEST 152 2024    TRIG 129 2024    TRIG 206 (H) 2024    HDL 46 2024    HDL 42 2024    LDL 79 2024    LDL 76 2024     Lab Results   Component Value Date    MICROALBCREA 216.2 (H) 2024    MICROALBCREA 56.3 (H) 2023       Lab Results   Component Value Date    CREATSERUM 0.76 09/28/2024    CREATSERUM 0.81 03/12/2024    EGFRCR 78 12/05/2024    EGFRCR 83 09/28/2024     Lab Results   Component Value Date    AST 26 09/28/2024    AST 20 03/12/2024    ALT 33 09/28/2024    ALT 37 03/12/2024       Lab Results   Component Value Date    TSH 2.018 09/28/2024    TSH 1.460 05/06/2022    T4F 1.3 09/28/2024       DM Complications:  Microvascular:   Neuropathy: no  Retinopathy: no  Nephropathy: yes (+) uACR 3/2024; 12/2024 egfr 78    Macrovascular:  PVD: no  CAD: no  Stroke/CVA: no        Modifying factors:  Medication adherence: Yes  Barriers: None  Recent steroids, illness or infections ( past 3m): No    Allergies: Boniva [ibandronate sodium] and Lisinopril    Past Medical History:    Abdominal hernia    On bellybutton    Abdominal pain    Occasional    Anemia    Arthritis    Bloating    Blood disorder    Constipation    Occasionally    Diabetes mellitus (HCC)    Diarrhea, unspecified    Occasionally    Diverticulitis    Dyslipidemia    Flatulence/gas pain/belching    Frequent urination    Gastrointestinal bleeding    PUDz in setting of ASA/boniva use    Hemorrhoids    High blood pressure    High cholesterol    History of cardiac murmur    Irregular bowel habits    Leaking of urine    Osteoarthritis    Osteopenia    Other and unspecified hyperlipidemia    Pain in joints    Type 2 diabetes mellitus with proteinuria or albuminuria    Type II or unspecified type diabetes mellitus without mention of complication, not stated as uncontrolled    Visual impairment    glasses    Vitamin D deficiency     Past Surgical History:   Procedure Laterality Date    Colonoscopy  01/01/2008    Hemorrhoid, Recheck 7-10 years    Colonoscopy  04/2013    adenomatous polyp    Colonoscopy      Hysterectomy  2004    total hystero.    Oophorectomy Bilateral 2004    total hystero.    Other surgical history      varicose vein procedures    Tonsillectomy      Total abdom  hysterectomy      Upper gi endoscopy,exam  01/01/2008     Social History     Socioeconomic History    Marital status:     Number of children: 3   Occupational History    Occupation:      Employer: [employer]   Tobacco Use    Smoking status: Never    Smokeless tobacco: Never    Tobacco comments:     Updated 2/17/25   Vaping Use    Vaping status: Never Used   Substance and Sexual Activity    Alcohol use: No    Drug use: No   Other Topics Concern    Caffeine Concern No    Exercise Yes     Comment: walking    Seat Belt Yes    Special Diet Yes     Comment: diabetic    Stress Concern No    Weight Concern Yes     Social Drivers of Health     Food Insecurity: No Food Insecurity (3/28/2025)    NCSS - Food Insecurity     Worried About Running Out of Food in the Last Year: No     Ran Out of Food in the Last Year: No   Transportation Needs: No Transportation Needs (3/28/2025)    NCSS - Transportation     Lack of Transportation: No   Housing Stability: Not At Risk (3/28/2025)    NCSS - Housing/Utilities     Has Housing: Yes     Worried About Losing Housing: No     Unable to Get Utilities: No     Family History   Problem Relation Age of Onset    Other (Lung ca) Mother     Other (Dementia) Father     Other (Thrombocytopenia) Other         Sibling    Heart Disorder Maternal Grandmother         CVA    Heart Disorder Maternal Grandfather         MI    Cancer Brother         esophageal cancer     Current Medication List:   Current Outpatient Medications   Medication Sig Dispense Refill    Ciclopirox 8 % External Solution Apply 1 Application topically nightly. 6 mL 3    Tirzepatide (MOUNJARO) 10 MG/0.5ML Subcutaneous Solution Auto-injector Inject 10 mg into the skin every 7 days. 6 mL 0    JARDIANCE 25 MG Oral Tab TAKE 1 TABLET BY MOUTH DAILY 90 tablet 3    Glucose Blood (ONETOUCH VERIO) In Vitro Strip Use to test blood sugar twice daily 200 strip 5    rosuvastatin 20 MG Oral Tab Take 1 tablet (20 mg total) by  mouth daily. 90 tablet 2    metFORMIN  MG Oral Tablet 24 Hr Take 1 tablet (500 mg total) by mouth daily with dinner. 90 tablet 1    PANTOPRAZOLE 40 MG Oral Tab EC TAKE 1 TABLET(40 MG) BY MOUTH EVERY DAY 30 MINUTES BEFORE BREAKFAST 90 tablet 3    glipiZIDE ER 10 MG Oral Tablet 24 Hr Take 1 tablet (10 mg total) by mouth daily. 90 tablet 1    losartan 25 MG Oral Tab Take 1 tablet (25 mg total) by mouth daily. 90 tablet 1    clotrimazole-betamethasone 1-0.05 % External Cream Apply twice daily for a week then take week off. 45 g 2    latanoprost 0.005 % Ophthalmic Solution Place 1 drop into both eyes daily.      OneTouch Delica Lancets 33G Does not apply Misc Use with test strips to check blood sugar twice daily 200 each 5    Multiple Vitamin (DAILY VALUE MULTIVITAMIN) Oral Tab Take 1 tablet by mouth daily.      Blood Glucose Monitoring Suppl (ONETOUCH VERIO FLEX SYSTEM) w/Device Does not apply Kit Use machine to test blood sugar twice a day as directed. 1 kit 0    ferrous sulfate 325 (65 FE) MG Oral Tab EC Take 1 tablet (325 mg total) by mouth daily with breakfast.      Cholecalciferol (VITAMIN D) 1000 UNITS Oral Cap Take 4,000 mg by mouth daily.         DM associated review of  symptoms:   Endocrine: Polyuria, polyphagia, polydipsia: no  Neurological: Paresthesias: no  HEENT: Blurred vision: no  Skin: no rash or wounds  Hematological: Hypoglycemia: no      Review of Systems     LUNGS: denies shortness of breath   CARDIOVASCULAR: denies chest pain  GI: denies abdominal pain, nausea or diarrhea, constipation  : denies dysuria  MUSCULOSKELETAL: (+) bilateral knee pain R > L,     Physical exam:  /60   Pulse 85   Resp 16   Ht 5' (1.524 m)   Wt 150 lb 9.6 oz (68.3 kg)   LMP  (LMP Unknown)   SpO2 95%   BMI 29.41 kg/m²   Body mass index is 29.41 kg/m².    Physical Exam   Vitals reviewed.  Constitutional: Normal appearance   Cardiovascular: Normal rate , rhythm   Pulmonary/Chest: Effort  normal  Neurological: Alert and oriented .   Psychiatric: Normal mood and affect.     Assessment/Plan:    Hypertension  Well-controlled  Pt states BP seems low - denies any lightheadedness, dizziness  Advised to monitor BP at home and if seeing SBP < 100 or w/ symptoms -> consider decreasing dose or stopping Losartan    Dyslipidemia:   Last  labs done  9/2024  Continue Rosuvastatin    Cholesterol: 148, done on 9/28/2024.  HDL Cholesterol: 46, done on 9/28/2024.  TriGlycerides 129, done on 9/28/2024.  LDL Cholesterol: 79, done on 9/28/2024.           Type 2 Diabetes Mellitus  A1C:   Lab Results   Component Value Date     (H) 03/12/2024    A1C 6.9 (A) 04/08/2025     Weight:   Wt Readings from Last 3 Encounters:   04/08/25 150 lb 9.6 oz (68.3 kg)   03/28/25 151 lb (68.5 kg)   02/17/25 150 lb (68 kg)          Diabetes control is optimal but needs ongoing management and evaluation  given the chronicity of Diabetes, ongoing medication monitoring and risk for complications.    Discussed Carlie phenomenon and other reasons why glucose is higher prior breakfast but A1C is showing improvement and glucose trends at this time is within goal most of the time.      Recommendations:   Continue Glipizide ER 10 mg once a day  Jardiance 25 mg daily  ~Reminded about hydration , sick day management and watching for UTI or mycotic rash    ~ Hold 3-4 days prior knee surgery    Metformin  mg at dinner (highest dose tolerated)    ~May go up to 1000 mg nightly to help with higher trends in the morning but pt do not want to try higher dose again due to diarrhea and stomach upset.    Mounjaro 10 mg weekly  ~Reviewed side effects vs benefits. Not considering to increase dose. Pt also does not want to lose weight.  ~ Discussed guidelines when having procedure - hold off 1 week prior knee surgery    Reviewed w patient pathophysiology of diabetes, clinical significance of A1c, adverse effects of suboptimal glucose control, and goals of  therapy   Reviewed the A1C test, what the value reflects and the goal for the patient.   Reminded pt on A1C and blood sugar targets (Fasting < 130 and post prandial <180 ) and complications associated with hyperglycemia and uncontrolled DM (on AVS)   Recommended SMBG 2- x daily if not using CGM   Reviewed s/s and treatment of hypoglycemia (on AVS)   Reminded to continue with lifestyle modifications since they have positive impact on diabetes/blood sugars/health (portion control, physical activity, weight loss)   Reinforced timing and adherence with medication, self-monitoring of blood glucose and routine follow up    The patient is asked to return in 3 months but recommended to contact DM clinic sooner if questions or concerns     The patient indicates understanding of these issues and agrees to the plan.      Orders Placed This Encounter    POC Hemoglobin A1C     Order Specific Question:   Release to patient     Answer:   Immediate    Microalb/Creat Ratio, Random Urine     Standing Status:   Future     Number of Occurrences:   1     Standing Expiration Date:   2026     Order Specific Question:   Release to patient     Answer:   Immediate         Diabetes complications & risks surveillance:   A1C/Blood pressure: as reported above   Nephropathy screening:  continue arb rx.   LIPID screenin2024 .Rosuvastatin rx.   Last dilated eye exam: No data recorded   Exam shows retinopathy? No data recorded Dr Beto Pete - has a diabetic eye appt next wk ([facility]es [facility])  Date of last PHQ-2 depression screen: PHQ-2 - Date of last depression screening: 3/28/2025  Last diabetic foot exam: Last Foot Exam: 25          Note to patient: The  Cures Act makes medical notes like these available to patients in the interest of transparency. However, be advised this is a medical document. It is intended as peer to peer communication. It is written in medical language and may contain abbreviations or  verbiage that are unfamiliar. It may appear blunt or direct. Medical documents are intended to carry relevant information, facts as evident, and the clinical opinion of the practitioner.

## 2025-04-07 NOTE — PROGRESS NOTES
Lexi Ahumada is a 74 year old presenting today to establish with me for diabetes management.   Primary care physician: Anne Galvan MD  Last DM visit w/ me 12/10/24  Today's A1C 6.9% ( last A1C 7.1% 12/10/24)     Last OV, Ozempic stopped and change to Mounjaro due to A1C trends. Been on >7% of A1C with Ozempic plus other DM meds. Started on 7.5 mg then transitioned to 10 mg right after the 4th dose. Currently tolerating Mounjaro well. Denies any side effects, Denies any hypoglycemia but states continues to have higher numbers before breakfast.    She has been seeing Ortho for the OA of both her knees. Scheduled for rt knee surgery in May w/ Dr. Knight.    Monitoring blood glucose:  once daily (fasting or 2 hrs post meal(3/25 - )  Average 160 mg/dl  Highest glucose readin  mg/dl (last oV 182 mg/dl)  Lowest glucose readin  mg/dl  Hypoglycemia frequency None    Diabetes History:  Type 2  Onset:  (prediabetic for 8 years prior dx)  Patient has not had hospitalizations for blood sugar issues  denies any history of pancreatitis    Previous DM therapies:  Metformin Hcl - diarrhea  Invokana - stopped started on Jardiance  Actos - weight gain  Glyburide - stopped, Glipizide started d/t age (old record)  Trulicity - ineffective (no improvement on A1c and appetite)    Current DM Regimen:  Glipizide ER 10 mg once a day  Jardiance 25 mg daily  Metformin  mg at dinner (highest dose tolerated)  Ozempic 2 mg weekly      HGBA1C:    Lab Results   Component Value Date    A1C 6.9 (A) 2025    A1C 7.1 (A) 12/10/2024    A1C 7.4 (A) 2024     (H) 2024       Lab Results   Component Value Date    CHOLEST 148 2024    CHOLEST 152 2024    TRIG 129 2024    TRIG 206 (H) 2024    HDL 46 2024    HDL 42 2024    LDL 79 2024    LDL 76 2024     Lab Results   Component Value Date    MICROALBCREA 216.2 (H) 2024    MICROALBCREA 56.3 (H) 2023       Lab Results   Component Value Date    CREATSERUM 0.76 09/28/2024    CREATSERUM 0.81 03/12/2024    EGFRCR 78 12/05/2024    EGFRCR 83 09/28/2024     Lab Results   Component Value Date    AST 26 09/28/2024    AST 20 03/12/2024    ALT 33 09/28/2024    ALT 37 03/12/2024       Lab Results   Component Value Date    TSH 2.018 09/28/2024    TSH 1.460 05/06/2022    T4F 1.3 09/28/2024       DM Complications:  Microvascular:   Neuropathy: no  Retinopathy: no  Nephropathy: yes (+) uACR 3/2024; 12/2024 egfr 78    Macrovascular:  PVD: no  CAD: no  Stroke/CVA: no        Modifying factors:  Medication adherence: Yes  Barriers: None  Recent steroids, illness or infections ( past 3m): No    Allergies: Boniva [ibandronate sodium] and Lisinopril    Past Medical History:    Abdominal hernia    On bellybutton    Abdominal pain    Occasional    Anemia    Arthritis    Bloating    Blood disorder    Constipation    Occasionally    Diabetes mellitus (HCC)    Diarrhea, unspecified    Occasionally    Diverticulitis    Dyslipidemia    Flatulence/gas pain/belching    Frequent urination    Gastrointestinal bleeding    PUDz in setting of ASA/boniva use    Hemorrhoids    High blood pressure    High cholesterol    History of cardiac murmur    Irregular bowel habits    Leaking of urine    Osteoarthritis    Osteopenia    Other and unspecified hyperlipidemia    Pain in joints    Type 2 diabetes mellitus with proteinuria or albuminuria    Type II or unspecified type diabetes mellitus without mention of complication, not stated as uncontrolled    Visual impairment    glasses    Vitamin D deficiency     Past Surgical History:   Procedure Laterality Date    Colonoscopy  01/01/2008    Hemorrhoid, Recheck 7-10 years    Colonoscopy  04/2013    adenomatous polyp    Colonoscopy      Hysterectomy  2004    total hystero.    Oophorectomy Bilateral 2004    total hystero.    Other surgical history      varicose vein procedures    Tonsillectomy      Total abdom  hysterectomy      Upper gi endoscopy,exam  01/01/2008     Social History     Socioeconomic History    Marital status:     Number of children: 3   Occupational History    Occupation:      Employer: [employer]   Tobacco Use    Smoking status: Never    Smokeless tobacco: Never    Tobacco comments:     Updated 2/17/25   Vaping Use    Vaping status: Never Used   Substance and Sexual Activity    Alcohol use: No    Drug use: No   Other Topics Concern    Caffeine Concern No    Exercise Yes     Comment: walking    Seat Belt Yes    Special Diet Yes     Comment: diabetic    Stress Concern No    Weight Concern Yes     Social Drivers of Health     Food Insecurity: No Food Insecurity (3/28/2025)    NCSS - Food Insecurity     Worried About Running Out of Food in the Last Year: No     Ran Out of Food in the Last Year: No   Transportation Needs: No Transportation Needs (3/28/2025)    NCSS - Transportation     Lack of Transportation: No   Housing Stability: Not At Risk (3/28/2025)    NCSS - Housing/Utilities     Has Housing: Yes     Worried About Losing Housing: No     Unable to Get Utilities: No     Family History   Problem Relation Age of Onset    Other (Lung ca) Mother     Other (Dementia) Father     Other (Thrombocytopenia) Other         Sibling    Heart Disorder Maternal Grandmother         CVA    Heart Disorder Maternal Grandfather         MI    Cancer Brother         esophageal cancer     Current Medication List:   Current Outpatient Medications   Medication Sig Dispense Refill    Ciclopirox 8 % External Solution Apply 1 Application topically nightly. 6 mL 3    Tirzepatide (MOUNJARO) 10 MG/0.5ML Subcutaneous Solution Auto-injector Inject 10 mg into the skin every 7 days. 6 mL 0    JARDIANCE 25 MG Oral Tab TAKE 1 TABLET BY MOUTH DAILY 90 tablet 3    Glucose Blood (ONETOUCH VERIO) In Vitro Strip Use to test blood sugar twice daily 200 strip 5    rosuvastatin 20 MG Oral Tab Take 1 tablet (20 mg total) by  mouth daily. 90 tablet 2    metFORMIN  MG Oral Tablet 24 Hr Take 1 tablet (500 mg total) by mouth daily with dinner. 90 tablet 1    PANTOPRAZOLE 40 MG Oral Tab EC TAKE 1 TABLET(40 MG) BY MOUTH EVERY DAY 30 MINUTES BEFORE BREAKFAST 90 tablet 3    glipiZIDE ER 10 MG Oral Tablet 24 Hr Take 1 tablet (10 mg total) by mouth daily. 90 tablet 1    losartan 25 MG Oral Tab Take 1 tablet (25 mg total) by mouth daily. 90 tablet 1    clotrimazole-betamethasone 1-0.05 % External Cream Apply twice daily for a week then take week off. 45 g 2    latanoprost 0.005 % Ophthalmic Solution Place 1 drop into both eyes daily.      OneTouch Delica Lancets 33G Does not apply Misc Use with test strips to check blood sugar twice daily 200 each 5    Multiple Vitamin (DAILY VALUE MULTIVITAMIN) Oral Tab Take 1 tablet by mouth daily.      Blood Glucose Monitoring Suppl (ONETOUCH VERIO FLEX SYSTEM) w/Device Does not apply Kit Use machine to test blood sugar twice a day as directed. 1 kit 0    ferrous sulfate 325 (65 FE) MG Oral Tab EC Take 1 tablet (325 mg total) by mouth daily with breakfast.      Cholecalciferol (VITAMIN D) 1000 UNITS Oral Cap Take 4,000 mg by mouth daily.         DM associated review of  symptoms:   Endocrine: Polyuria, polyphagia, polydipsia: no  Neurological: Paresthesias: no  HEENT: Blurred vision: no  Skin: no rash or wounds  Hematological: Hypoglycemia: no      Review of Systems     LUNGS: denies shortness of breath   CARDIOVASCULAR: denies chest pain  GI: denies abdominal pain, nausea or diarrhea, constipation  : denies dysuria  MUSCULOSKELETAL: (+) bilateral knee pain R > L,     Physical exam:  /60   Pulse 85   Resp 16   Ht 5' (1.524 m)   Wt 150 lb 9.6 oz (68.3 kg)   LMP  (LMP Unknown)   SpO2 95%   BMI 29.41 kg/m²   Body mass index is 29.41 kg/m².    Physical Exam   Vitals reviewed.  Constitutional: Normal appearance   Cardiovascular: Normal rate , rhythm   Pulmonary/Chest: Effort  normal  Neurological: Alert and oriented .   Psychiatric: Normal mood and affect.     Assessment/Plan:    Hypertension  Well-controlled  Pt states BP seems low - denies any lightheadedness, dizziness  Advised to monitor BP at home and if seeing SBP < 100 or w/ symptoms -> consider decreasing dose or stopping Losartan    Dyslipidemia:   Last  labs done  9/2024  Continue Rosuvastatin    Cholesterol: 148, done on 9/28/2024.  HDL Cholesterol: 46, done on 9/28/2024.  TriGlycerides 129, done on 9/28/2024.  LDL Cholesterol: 79, done on 9/28/2024.           Type 2 Diabetes Mellitus  A1C:   Lab Results   Component Value Date     (H) 03/12/2024    A1C 6.9 (A) 04/08/2025     Weight:   Wt Readings from Last 3 Encounters:   04/08/25 150 lb 9.6 oz (68.3 kg)   03/28/25 151 lb (68.5 kg)   02/17/25 150 lb (68 kg)          Diabetes control is optimal but needs ongoing management and evaluation  given the chronicity of Diabetes, ongoing medication monitoring and risk for complications.    Discussed Carlie phenomenon and other reasons why glucose is higher prior breakfast but A1C is showing improvement and glucose trends at this time is within goal most of the time.      Recommendations:   Continue Glipizide ER 10 mg once a day  Jardiance 25 mg daily  ~Reminded about hydration , sick day management and watching for UTI or mycotic rash    ~ Hold 3-4 days prior knee surgery    Metformin  mg at dinner (highest dose tolerated)    ~May go up to 1000 mg nightly to help with higher trends in the morning but pt do not want to try higher dose again due to diarrhea and stomach upset.    Mounjaro 10 mg weekly  ~Reviewed side effects vs benefits. Not considering to increase dose. Pt also does not want to lose weight.  ~ Discussed guidelines when having procedure - hold off 1 week prior knee surgery    Reviewed w patient pathophysiology of diabetes, clinical significance of A1c, adverse effects of suboptimal glucose control, and goals of  therapy   Reviewed the A1C test, what the value reflects and the goal for the patient.   Reminded pt on A1C and blood sugar targets (Fasting < 130 and post prandial <180 ) and complications associated with hyperglycemia and uncontrolled DM (on AVS)   Recommended SMBG 2- x daily if not using CGM   Reviewed s/s and treatment of hypoglycemia (on AVS)   Reminded to continue with lifestyle modifications since they have positive impact on diabetes/blood sugars/health (portion control, physical activity, weight loss)   Reinforced timing and adherence with medication, self-monitoring of blood glucose and routine follow up    The patient is asked to return in 3 months but recommended to contact DM clinic sooner if questions or concerns     The patient indicates understanding of these issues and agrees to the plan.      Orders Placed This Encounter    POC Hemoglobin A1C     Order Specific Question:   Release to patient     Answer:   Immediate    Microalb/Creat Ratio, Random Urine     Standing Status:   Future     Number of Occurrences:   1     Standing Expiration Date:   2026     Order Specific Question:   Release to patient     Answer:   Immediate         Diabetes complications & risks surveillance:   A1C/Blood pressure: as reported above   Nephropathy screening:  continue arb rx.   LIPID screenin2024 .Rosuvastatin rx.   Last dilated eye exam: No data recorded   Exam shows retinopathy? No data recorded Dr Beto Pete - has a diabetic eye appt next wk ([facility])  Date of last PHQ-2 depression screen: PHQ-2 - Date of last depression screening: 3/28/2025  Last diabetic foot exam: Last Foot Exam: 25          Note to patient: The  Cures Act makes medical notes like these available to patients in the interest of transparency. However, be advised this is a medical document. It is intended as peer to peer communication. It is written in medical language and may contain abbreviations or  verbiage that are unfamiliar. It may appear blunt or direct. Medical documents are intended to carry relevant information, facts as evident, and the clinical opinion of the practitioner.

## 2025-04-08 ENCOUNTER — TELEPHONE (OUTPATIENT)
Facility: CLINIC | Age: 75
End: 2025-04-08

## 2025-04-08 ENCOUNTER — OFFICE VISIT (OUTPATIENT)
Facility: CLINIC | Age: 75
End: 2025-04-08
Payer: MEDICARE

## 2025-04-08 VITALS
SYSTOLIC BLOOD PRESSURE: 108 MMHG | RESPIRATION RATE: 16 BRPM | HEIGHT: 60 IN | DIASTOLIC BLOOD PRESSURE: 60 MMHG | HEART RATE: 85 BPM | BODY MASS INDEX: 29.57 KG/M2 | WEIGHT: 150.63 LBS | OXYGEN SATURATION: 95 %

## 2025-04-08 DIAGNOSIS — E11.29 TYPE 2 DIABETES MELLITUS WITH MICROALBUMINURIA, WITHOUT LONG-TERM CURRENT USE OF INSULIN (HCC): Primary | ICD-10-CM

## 2025-04-08 DIAGNOSIS — E78.5 HYPERLIPIDEMIA ASSOCIATED WITH TYPE 2 DIABETES MELLITUS (HCC): ICD-10-CM

## 2025-04-08 DIAGNOSIS — R80.9 TYPE 2 DIABETES MELLITUS WITH MICROALBUMINURIA, WITHOUT LONG-TERM CURRENT USE OF INSULIN (HCC): Primary | ICD-10-CM

## 2025-04-08 DIAGNOSIS — I15.2 HYPERTENSION ASSOCIATED WITH TYPE 2 DIABETES MELLITUS (HCC): ICD-10-CM

## 2025-04-08 DIAGNOSIS — E11.59 HYPERTENSION ASSOCIATED WITH TYPE 2 DIABETES MELLITUS (HCC): ICD-10-CM

## 2025-04-08 DIAGNOSIS — E11.69 HYPERLIPIDEMIA ASSOCIATED WITH TYPE 2 DIABETES MELLITUS (HCC): ICD-10-CM

## 2025-04-08 LAB
CREAT UR-SCNC: 41.8 MG/DL
HEMOGLOBIN A1C: 6.9 % (ref 4.3–5.6)
MICROALBUMIN UR-MCNC: 0.8 MG/DL
MICROALBUMIN/CREAT 24H UR-RTO: 19.1 UG/MG (ref ?–30)

## 2025-04-08 PROCEDURE — G2211 COMPLEX E/M VISIT ADD ON: HCPCS

## 2025-04-08 PROCEDURE — 99214 OFFICE O/P EST MOD 30 MIN: CPT

## 2025-04-08 PROCEDURE — 82043 UR ALBUMIN QUANTITATIVE: CPT

## 2025-04-08 PROCEDURE — 83036 HEMOGLOBIN GLYCOSYLATED A1C: CPT

## 2025-04-08 PROCEDURE — 82570 ASSAY OF URINE CREATININE: CPT

## 2025-04-08 NOTE — PATIENT INSTRUCTIONS
Carlie phenomenon   Carlie phenomenon, also called the carlie effect, is the term used to describe an abnormal early-morning increase in blood sugar-- usually between 2 a.m. and 8 a.m. -- in people with diabetes.    The carlie phenomenon occurs early in the morning between 3 am and 8 am while you are still asleep. As morning approaches, the body naturally signals your liver to produce glucose, giving your body the energy it needs for the start of the day. Caused by changes in hormonal levels, the carlie phenomenon happens to all people, with or without diabetes. However, for those without diabetes, insulin levels increase and they do not experience hyperglycemia (high blood sugar).    Another reason you may experience higher blood sugars in the morning is because your injected insulin wears off. If your body doesn't have enough insulin during the night, your glucose (blood sugar) levels may start to rise. To combat this, you may consider trying a new basal insulin, adjusting the timing and amount of your basal dose (if you inject insulin), or changing your nighttime basal rates (if wearing an insulin pump).     The last reason you may experience higher morning glucose levels is known as the Somogyi effect. This occurs if your glucose (blood sugar) levels fall too low during the night, caused by too much insulin or medication. To respond, your liver produces more glucose (blood sugar) to try to maintain your glucose levels, which may result in hyperglycemia (high blood sugar). The Somogyi effect is not as common as the other reasons described.

## 2025-04-15 ENCOUNTER — ORDER TRANSCRIPTION (OUTPATIENT)
Dept: PHYSICAL THERAPY | Facility: HOSPITAL | Age: 75
End: 2025-04-15

## 2025-04-15 DIAGNOSIS — Z96.651 S/P TOTAL KNEE ARTHROPLASTY, RIGHT: Primary | ICD-10-CM

## 2025-04-16 ENCOUNTER — EKG ENCOUNTER (OUTPATIENT)
Dept: LAB | Age: 75
End: 2025-04-16
Attending: ORTHOPAEDIC SURGERY
Payer: MEDICARE

## 2025-04-16 ENCOUNTER — LABORATORY ENCOUNTER (OUTPATIENT)
Dept: LAB | Age: 75
End: 2025-04-16
Attending: ORTHOPAEDIC SURGERY
Payer: MEDICARE

## 2025-04-16 DIAGNOSIS — E11.29 TYPE 2 DIABETES MELLITUS WITH ALBUMINURIA (HCC): ICD-10-CM

## 2025-04-16 DIAGNOSIS — M85.88 OSTEOPENIA OF LUMBAR SPINE: ICD-10-CM

## 2025-04-16 DIAGNOSIS — E78.5 HYPERLIPIDEMIA ASSOCIATED WITH TYPE 2 DIABETES MELLITUS (HCC): ICD-10-CM

## 2025-04-16 DIAGNOSIS — Z01.818 PRE-OP TESTING: ICD-10-CM

## 2025-04-16 DIAGNOSIS — D50.9 IRON DEFICIENCY ANEMIA, UNSPECIFIED IRON DEFICIENCY ANEMIA TYPE: ICD-10-CM

## 2025-04-16 DIAGNOSIS — K21.9 GASTROESOPHAGEAL REFLUX DISEASE WITHOUT ESOPHAGITIS: ICD-10-CM

## 2025-04-16 DIAGNOSIS — Z00.00 ROUTINE GENERAL MEDICAL EXAMINATION AT A HEALTH CARE FACILITY: ICD-10-CM

## 2025-04-16 DIAGNOSIS — E11.69 HYPERLIPIDEMIA ASSOCIATED WITH TYPE 2 DIABETES MELLITUS (HCC): ICD-10-CM

## 2025-04-16 DIAGNOSIS — M17.0 PRIMARY OSTEOARTHRITIS OF BOTH KNEES: ICD-10-CM

## 2025-04-16 DIAGNOSIS — R80.9 TYPE 2 DIABETES MELLITUS WITH ALBUMINURIA (HCC): ICD-10-CM

## 2025-04-16 DIAGNOSIS — E66.3 OVERWEIGHT (BMI 25.0-29.9): ICD-10-CM

## 2025-04-16 LAB
ALBUMIN SERPL-MCNC: 5.1 G/DL (ref 3.2–4.8)
ALBUMIN/GLOB SERPL: 2.2 {RATIO} (ref 1–2)
ALP LIVER SERPL-CCNC: 74 U/L (ref 55–142)
ALT SERPL-CCNC: 30 U/L (ref 10–49)
ANION GAP SERPL CALC-SCNC: 11 MMOL/L (ref 0–18)
ANTIBODY SCREEN: NEGATIVE
APTT PPP: 26.4 SECONDS (ref 23–36)
AST SERPL-CCNC: 30 U/L (ref ?–34)
ATRIAL RATE: 79 BPM
BASOPHILS # BLD AUTO: 0.08 X10(3) UL (ref 0–0.2)
BASOPHILS NFR BLD AUTO: 1.1 %
BILIRUB SERPL-MCNC: 0.5 MG/DL (ref 0.2–1.1)
BUN BLD-MCNC: 15 MG/DL (ref 9–23)
CALCIUM BLD-MCNC: 10.4 MG/DL (ref 8.7–10.6)
CHLORIDE SERPL-SCNC: 103 MMOL/L (ref 98–112)
CHOLEST SERPL-MCNC: 136 MG/DL (ref ?–200)
CO2 SERPL-SCNC: 27 MMOL/L (ref 21–32)
CREAT BLD-MCNC: 0.86 MG/DL (ref 0.55–1.02)
CREAT UR-SCNC: 45.5 MG/DL
DEPRECATED HBV CORE AB SER IA-ACNC: 41 NG/ML (ref 50–306)
EGFRCR SERPLBLD CKD-EPI 2021: 72 ML/MIN/1.73M2 (ref 60–?)
EOSINOPHIL # BLD AUTO: 0.27 X10(3) UL (ref 0–0.7)
EOSINOPHIL NFR BLD AUTO: 3.6 %
ERYTHROCYTE [DISTWIDTH] IN BLOOD BY AUTOMATED COUNT: 12.4 %
EST. AVERAGE GLUCOSE BLD GHB EST-MCNC: 148 MG/DL (ref 68–126)
FASTING PATIENT LIPID ANSWER: YES
FASTING STATUS PATIENT QL REPORTED: YES
GLOBULIN PLAS-MCNC: 2.3 G/DL (ref 2–3.5)
GLUCOSE BLD-MCNC: 142 MG/DL (ref 70–99)
HBA1C MFR BLD: 6.8 % (ref ?–5.7)
HCT VFR BLD AUTO: 44.9 % (ref 35–48)
HDLC SERPL-MCNC: 40 MG/DL (ref 40–59)
HGB BLD-MCNC: 15 G/DL (ref 12–16)
IMM GRANULOCYTES # BLD AUTO: 0.02 X10(3) UL (ref 0–1)
IMM GRANULOCYTES NFR BLD: 0.3 %
INR BLD: 0.9 (ref 0.8–1.2)
LDLC SERPL CALC-MCNC: 58 MG/DL (ref ?–100)
LYMPHOCYTES # BLD AUTO: 1.9 X10(3) UL (ref 1–4)
LYMPHOCYTES NFR BLD AUTO: 25.6 %
MCH RBC QN AUTO: 32.2 PG (ref 26–34)
MCHC RBC AUTO-ENTMCNC: 33.4 G/DL (ref 31–37)
MCV RBC AUTO: 96.4 FL (ref 80–100)
MICROALBUMIN UR-MCNC: 1.8 MG/DL
MICROALBUMIN/CREAT 24H UR-RTO: 39.6 UG/MG (ref ?–30)
MONOCYTES # BLD AUTO: 0.64 X10(3) UL (ref 0.1–1)
MONOCYTES NFR BLD AUTO: 8.6 %
NEUTROPHILS # BLD AUTO: 4.51 X10 (3) UL (ref 1.5–7.7)
NEUTROPHILS # BLD AUTO: 4.51 X10(3) UL (ref 1.5–7.7)
NEUTROPHILS NFR BLD AUTO: 60.8 %
NONHDLC SERPL-MCNC: 96 MG/DL (ref ?–130)
OSMOLALITY SERPL CALC.SUM OF ELEC: 295 MOSM/KG (ref 275–295)
P AXIS: 52 DEGREES
P-R INTERVAL: 178 MS
PLATELET # BLD AUTO: 156 10(3)UL (ref 150–450)
POTASSIUM SERPL-SCNC: 4.3 MMOL/L (ref 3.5–5.1)
PROT SERPL-MCNC: 7.4 G/DL (ref 5.7–8.2)
PROTHROMBIN TIME: 12.2 SECONDS (ref 11.6–14.8)
Q-T INTERVAL: 388 MS
QRS DURATION: 102 MS
QTC CALCULATION (BEZET): 444 MS
R AXIS: -57 DEGREES
RBC # BLD AUTO: 4.66 X10(6)UL (ref 3.8–5.3)
RH BLOOD TYPE: POSITIVE
SODIUM SERPL-SCNC: 141 MMOL/L (ref 136–145)
T AXIS: 23 DEGREES
TRIGL SERPL-MCNC: 239 MG/DL (ref 30–149)
VENTRICULAR RATE: 79 BPM
VLDLC SERPL CALC-MCNC: 35 MG/DL (ref 0–30)
WBC # BLD AUTO: 7.4 X10(3) UL (ref 4–11)

## 2025-04-16 PROCEDURE — 83036 HEMOGLOBIN GLYCOSYLATED A1C: CPT

## 2025-04-16 PROCEDURE — 82043 UR ALBUMIN QUANTITATIVE: CPT

## 2025-04-16 PROCEDURE — 93005 ELECTROCARDIOGRAM TRACING: CPT

## 2025-04-16 PROCEDURE — 86850 RBC ANTIBODY SCREEN: CPT

## 2025-04-16 PROCEDURE — 87081 CULTURE SCREEN ONLY: CPT

## 2025-04-16 PROCEDURE — 82728 ASSAY OF FERRITIN: CPT

## 2025-04-16 PROCEDURE — 82570 ASSAY OF URINE CREATININE: CPT

## 2025-04-16 PROCEDURE — 80053 COMPREHEN METABOLIC PANEL: CPT

## 2025-04-16 PROCEDURE — 86901 BLOOD TYPING SEROLOGIC RH(D): CPT

## 2025-04-16 PROCEDURE — 93010 ELECTROCARDIOGRAM REPORT: CPT | Performed by: INTERNAL MEDICINE

## 2025-04-16 PROCEDURE — 85730 THROMBOPLASTIN TIME PARTIAL: CPT

## 2025-04-16 PROCEDURE — 80061 LIPID PANEL: CPT

## 2025-04-16 PROCEDURE — 85025 COMPLETE CBC W/AUTO DIFF WBC: CPT

## 2025-04-16 PROCEDURE — 86900 BLOOD TYPING SEROLOGIC ABO: CPT

## 2025-04-16 PROCEDURE — 85610 PROTHROMBIN TIME: CPT

## 2025-04-16 PROCEDURE — 36415 COLL VENOUS BLD VENIPUNCTURE: CPT

## 2025-04-21 ENCOUNTER — HOSPITAL ENCOUNTER (OUTPATIENT)
Dept: MAMMOGRAPHY | Facility: HOSPITAL | Age: 75
Discharge: HOME | End: 2025-04-21
Attending: INTERNAL MEDICINE
Payer: MEDICARE

## 2025-04-21 DIAGNOSIS — Z12.39 SCREENING FOR BREAST CANCER USING NON-MAMMOGRAM MODALITY: ICD-10-CM

## 2025-04-21 DIAGNOSIS — R92.333 HETEROGENEOUSLY DENSE TISSUE OF BOTH BREASTS ON MAMMOGRAPHY: ICD-10-CM

## 2025-04-21 PROCEDURE — 76641 ULTRASOUND BREAST COMPLETE: CPT | Performed by: INTERNAL MEDICINE

## 2025-04-22 ENCOUNTER — TELEPHONE (OUTPATIENT)
Dept: PHYSICAL THERAPY | Facility: HOSPITAL | Age: 75
End: 2025-04-22

## 2025-04-22 ENCOUNTER — TELEPHONE (OUTPATIENT)
Dept: INTERNAL MEDICINE CLINIC | Facility: CLINIC | Age: 75
End: 2025-04-22

## 2025-04-22 NOTE — TELEPHONE ENCOUNTER
Patient was seen 3/28/2025 and per patient PCP told her to cancel her appointment in April for medical clearance. I let the patient know that the PCP needs to addend the office visit with today's date stating that the patient is cleared for surgery.

## 2025-04-22 NOTE — TELEPHONE ENCOUNTER
Musa from Dr. Jose Luis Yepez's office reached out regarding pt's pre op as she is scheduled to have surgery on 5/6 for her right knee. I informed she has no upcoming pre op appt scheduled and that her LOV was on 3/28. The previously scheduled pre op on 4/14 shows as being canceled by pt per chart notes.

## 2025-04-22 NOTE — TELEPHONE ENCOUNTER
PT calling asking if KS can addend note to state she is okay for surgery. Was asked to call us from Dr. Yepez's office.

## 2025-04-23 RX ORDER — DICLOFENAC EPOLAMINE 0.01 G/1
1 SYSTEM TOPICAL EVERY 12 HOURS
Status: ON HOLD | COMMUNITY
End: 2025-05-07

## 2025-04-23 NOTE — TELEPHONE ENCOUNTER
[employer] DME was added and sent to [facility].  Fax [phone]

## 2025-04-28 ENCOUNTER — TELEPHONE (OUTPATIENT)
Facility: CLINIC | Age: 75
End: 2025-04-28

## 2025-04-28 ENCOUNTER — TELEPHONE (OUTPATIENT)
Dept: INTERNAL MEDICINE CLINIC | Facility: CLINIC | Age: 75
End: 2025-04-28

## 2025-04-28 NOTE — TELEPHONE ENCOUNTER
Incoming fax from [facility]    Patients Diabetic eye exam done on 4/22/2025 by      Placed in KS in basket for review

## 2025-04-28 NOTE — TELEPHONE ENCOUNTER
Please advise pt called to f/u on wheelchair request for after she has surgery. Please f/u with pt [phone]  Future Appointments   Date Time Provider Department Center   5/21/2025 11:20 AM Shaheed Knight PA-C [facility]   5/22/2025 11:45 AM Colt Hilliard, PT [facility] [place]   5/27/2025 11:45 AM Colt Hilliard, PT [facility] [place]   5/29/2025 10:45 AM Ria Gaitan [facility] [place]   6/2/2025 11:00 AM Yfbri Ria WDR [facility] [place]   6/5/2025 11:00 AM Colt Hilliard, PT [facility] [place]   6/9/2025 11:00 AM Ria Gaitan [facility] [place]   6/12/2025 11:00 AM Colt Hilliard, PT [facility] [place]   6/16/2025 10:45 AM Colt Hilliard, PT [facility] [place]   6/18/2025 11:20 AM Jose Luis Yepez MD [facility]   6/19/2025 10:45 AM Ria Gaitan [facility] [place]   6/23/2025 11:45 AM Ria Gaitan W[facility] [place]   6/26/2025 11:45 AM Colt Hilliard, PT W[facility] [place]   6/30/2025 10:45 AM Colt Hilliard, PT [facility] [place]   7/7/2025  8:30 AM LESLEE, PROCEDURE SGIEDW None   7/8/2025 10:00 AM Pauline Pereyra APRN [facility]   9/19/2025 10:15 AM Lucita Cope DO [facility] [place]   11/19/2025  9:40 AM Jose F Ortega APRN SGINP [facility]

## 2025-04-28 NOTE — TELEPHONE ENCOUNTER
Advised patient that inpatient SW will help with any DME needs upon discharge from hospital after surgery

## 2025-05-05 NOTE — DISCHARGE INSTRUCTIONS
Sometimes managing your health at home requires assistance.  [facility] team has recognized your preference to use [facility].  They can be reached by phone at [phone].  The fax number for your reference is [phone].  A representative from the home health agency will contact you or your family to schedule your first visit.       Joint Replacement Surgery: Patient Discharge Instructions   The best way to contact your surgeon or their team is by phone [phone] (preferred for urgent/acute matters) or through [employer].    Dear Patient,     [facility] cares about your progress with recovery following your joint replacement surgery.     300 days from your scheduled surgery, [facility] will send you a follow-up survey to help us understand how your surgery impacted your mobility, pain, and overall quality of life. Please make every effort to complete this survey. The information collected from this survey will be used by your physician to track your recovery.     Sincerely,     [facility] [place]    What to Expect:  A certain amount of pain, swelling, and bruising is expected for several weeks after surgery.    Pain Medications:   Below is a list of commonly prescribed pain medications. You may have received prescriptions for some, all, or even additional pain medications not listed.  If you are taking the following medications for pain, these are some general guidelines for using and discontinuing them.  You may also want to preemptively take your pain medication before physical therapy (at least 30 minutes prior to the session).  If you are concerned you are suffering side effects from any of these medications please contact your surgeon’s office:    Ultram (Tramadol): Take this as prescribed 3 times per day with meals until your first postoperative appointment. If your pain levels are low, you can stop taking this on a scheduled basis  and start taking it as needed.  Tylenol (Acetaminophen): Take this as prescribed 3 times per day until your first postoperative appointment. Do NOT exceed 4g (4000mg) of acetaminophen daily.  Celebrex (Celecoxib): this medication will treat swelling and pain and should be taken as directed until your first postoperative appointment.  Oxycodone IR (immediate release): This is your “rescue” drug. Take this only as needed for pain that is not controlled (rated more than 4 out of 10) by other medications.  You may wean yourself off prescription pain medication to a non-prescription pain reliever by substituting two 500mg extra-strength Tylenol (Acetaminophen) tablets in place of your prescription pain medications up to four times per day. Do NOT exceed 4g (4000mg) of acetaminophen daily.  *We will discuss pain management and weaning off pain medicine at your 1st postoperative appointment. To avoid delays in getting your narcotic pain medicine refilled, please contact the office prior to running out of medication either by phone [phone] or through [employer]. Please allow 24-72 hours for prescriptions to be filled. Your doctor may need to physically sign a hand-written prescription -- some medicines cannot be re-ordered electronically/or via phone. If you need to  a hand-written prescription, you can do so at the office located at [address] [place] [address], [address].  Pain Medications can be constipating. Below is a guideline for preventing or managing postoperative constipation:  Drink plenty of fluids. Aim to drink at least of 8oz of water 8 times per day (total of 64oz).  Eat a high-fiber diet (whatever helps you stay regular).  Make sure you are taking Senekot S (Docusate Sodium + Sennosides) or Colace (Docusate Sodium), 1-2 tablets twice daily, while on narcotics. You may decrease to once daily if you develop diarrhea. You will be sent home with a prescription.  If you have not had normal bowel  movements the following over the counter medications can be helpful:  Add daily Miralax™ or Metamucil™ as directed on bottle.  If still no results, add a dose of Milk of Magnesia™ as directed on bottle.  If still no results, take one Dulcolax™ (Bisacodyl) suppository as directed on package.  If you still have no results and it has been more than 3 days since you had a bowel movement, be sure to contact your surgeon's office.    Swelling:  You may apply a cloth covered ice pack for up to 20 minutes each hour, or as needed, to your incision to help control pain and swelling. Do not apply directly to your skin.  For the first 7 days after you leave the hospital, it is critical that you elevate your leg above the level of your heart 4 times per day for 1 hour each time. After a week, elevate your leg as needed if swelling is noted.   Call your Surgeons office if you have:  A temperature greater than 100.4 F.  Increasing pain or numbness at the incision or on your operated leg.  Increasing redness, drainage, or odor from the wound.  You WILL be swollen the first week or two after surgery; however, swelling that continues to get worse to your surgical leg or the opposite leg and is accompanied by discomfort or redness should be reported.  **Go to the Emergency Room if you have chest pain or shortness of breath**  Activities:  Your activity order is:   Weight bearing as tolerated  If you have home health care, a physical therapist (PT) will come to your home 2-3 times per week. The number of PT visits will depend on your needs and your insurance coverage.   At your first postoperative appointment with your surgeon, you will be given a prescription for outpatient physical therapy if you have not already started outpatient therapy.  Rules of the road: No driving until it is approved by your care team, and you are no longer taking narcotic pain medicine.    Wound Care/ Bathing:   Aquacel (waterproof brown rubberized dressing)  should remain in place for 7 days and may then be removed. While this waterproof dressing is in place, you may shower and let water run over the dressing (ensure first that the dressing seal is intact and none of the edges have rolled up exposing the wound - if the dressing has become open to the environment, do not allow water to enter into this area)  Once the original postoperative dressing is removed, you do not need to keep your incision covered. If you would like to keep it covered for comfort you may - use a clean new dressing each day, or more frequently if needed (if the dressing is soiled or it is not staying fixed to your skin). Use the dressings suggested by the nurse at discharge.  After the original postoperative dressing is removed, you should continue to keep the incision covered when showering to prevent it from getting wet prior to your first postoperative appointment. Before showering, be sure to cover the incision with saran wrap or a plastic bag to keep dry. After showering, pat the incision with a clean towel to ensure it is dry. Do this until you are cleared to get the incision wet (usually after the first postoperative appointment).  Once cleared to get the incision wet, you may gently allow soap and water to rinse over the area. Be sure to rinse the area well and gently pat dry.  Do not apply soap, lotion, cream, ointments, or powders to your incision. Keep the incision clean and dry.  Do not soak your incision in water. No tub baths, pools or hot tubs for at least 6 weeks after surgery and not until the wound is completely healed.  If you have stitches or staples, they will be removed at your first postoperative appointment or the week after. Do not remove steri-strips, if you have any. These will come off on their own and do not need to be replaced.  If you have a home care nurse they should:  Examine the incision during visits and call your surgeon if there are any signs of infection or  other cause for concern.  Change your dressing and may remove staples (when indicated).  Take your vitals and draw your labs.    Remember, good hand washing with soap at all times helps prevent infections. Wash your hands with soap and water prior to performing any dressing changes or wound evaluation.    Medications/Special Instructions:  Do not restart your blood pressure medication until cleared by your physician, or until your systolic blood pressure (top number) is above 130 on 2 consecutive checks and then continue your medication as prescribed.    Antibiotics:  An oral/by-mouth antibiotic tablet has been ordered for you to take for one week postoperatively. Be sure to take this medication three times daily for one week as prescribed.    Blood Thinners (you will be on blood thinners for a total of six weeks):  Aspirin: some patients will be prescribed Aspirin to prevent blood clots after surgery. If you are prescribed aspirin, take one 81mg tablet by mouth twice per day for six weeks.  DO NOT restart [id number]/fish oils, glucosamine, nonsteroidal antiinflammatories [NSAIDs, such as such as Ibuprofen (Advil, Motrin), Naproxen (Naprosyn, Aleve), Diclofenac (Voltaren), Meloxicam (Mobic)], tumeric, cinnamon, progesterone, estrogen, or Aspirin (unless told differently) until you have completed your blood thinner. These will increase your risk of bleeding.   Protonix (Omeprazole/Nexium, etc.) may be ordered if Aspirin is to be continued while on your blood thinner and should be taken daily during that time. This will help to protect your stomach.    X-Ray  X-rays needed: none    If you do not have a follow-up appointment with your surgeon, or you need to change your follow-up appointment date/time, please call today to schedule or change this appointment: [phone]. Our phones are open to schedule appointments from 8:30am to 4:30pm, Monday through Friday.  If you have any questions or concerns during the  postoperative period (for example: wound issues, pain, swelling, redness or drainage) call our office FIRST at [phone] so that we may appropriately direct you or set up a clinic appointment.    Spanda- knee replacement (single layer compression sleeve):  All patients should wear the compression sleeves (SPANDA-) until first follow up visit to surgeon’s office ( about 2-3 weeks) and then check with surgeon if need to continue use.  Take off to shower. Best to keep on as much as possible, even at night.  Wash with mild soap and water; DRIP dry overnight.    Directions to put on and off:  IT TAKES 2 PIECES OF SPANDA- (compression sleeves), (USUALLY DIFFERENT SIZES because a calf is usually smaller than a knee.)   Use the longer tube (spanda-/compression sleeve) pulled up over the calf.   This 1st piece (spanda-/compression sleeve) should be on the calf part of the leg, from just below the knee to the ball of the foot to expose the toes.   The 2nd piece (shorter compression sleeve) is pulled over and past the first sleeve onto the knee. The lower edge of the knee portion should overlap the top of the calf spanda- by a few inches. This is the only place where the 2 different pieces overlap.  The top edge of the second spanda- should be about a few inches above the knee but it should NOT be rolling down. The spanda- should be flat so that it does not roll and become too tight.  Makes sure it is NOT bunched up anywhere.   IF the spanda- feels TOO tight, then REMOVE it and call your surgeon to let them know.    Check blood pressure log at home and hold home losartan if systolic blood pressure less than 100.  Check blood sugar log at home and communicate with outpatient primary care physician ASAP if blood sugar less than 80 or more than 250.  Follow-up with regular outpatient primary care physician with blood pressure and blood sugar log at home

## 2025-05-06 ENCOUNTER — ANESTHESIA EVENT (OUTPATIENT)
Dept: SURGERY | Facility: HOSPITAL | Age: 75
End: 2025-05-06
Payer: MEDICARE

## 2025-05-06 ENCOUNTER — ANESTHESIA (OUTPATIENT)
Dept: SURGERY | Facility: HOSPITAL | Age: 75
End: 2025-05-06
Payer: MEDICARE

## 2025-05-06 ENCOUNTER — HOSPITAL ENCOUNTER (INPATIENT)
Facility: HOSPITAL | Age: 75
LOS: 1 days | Discharge: HOME HEALTH | End: 2025-05-07
Attending: ORTHOPAEDIC SURGERY | Admitting: ORTHOPAEDIC SURGERY
Payer: MEDICARE

## 2025-05-06 ENCOUNTER — APPOINTMENT (OUTPATIENT)
Dept: GENERAL RADIOLOGY | Facility: HOSPITAL | Age: 75
End: 2025-05-06
Attending: ORTHOPAEDIC SURGERY
Payer: MEDICARE

## 2025-05-06 DIAGNOSIS — M17.11 PRIMARY OSTEOARTHRITIS OF RIGHT KNEE: ICD-10-CM

## 2025-05-06 DIAGNOSIS — Z01.818 PRE-OP TESTING: Primary | ICD-10-CM

## 2025-05-06 PROBLEM — E78.5 DYSLIPIDEMIA: Status: ACTIVE | Noted: 2025-05-06

## 2025-05-06 PROBLEM — E11.9 TYPE 2 DIABETES MELLITUS WITHOUT COMPLICATION, WITHOUT LONG-TERM CURRENT USE OF INSULIN (HCC): Status: ACTIVE | Noted: 2025-05-06

## 2025-05-06 PROBLEM — I10 PRIMARY HYPERTENSION: Status: ACTIVE | Noted: 2025-05-06

## 2025-05-06 LAB
GLUCOSE BLD-MCNC: 111 MG/DL (ref 70–99)
GLUCOSE BLD-MCNC: 195 MG/DL (ref 70–99)

## 2025-05-06 PROCEDURE — 3E0T3BZ INTRODUCTION OF ANESTHETIC AGENT INTO PERIPHERAL NERVES AND PLEXI, PERCUTANEOUS APPROACH: ICD-10-PCS | Performed by: ORTHOPAEDIC SURGERY

## 2025-05-06 PROCEDURE — 0SRC0J9 REPLACEMENT OF RIGHT KNEE JOINT WITH SYNTHETIC SUBSTITUTE, CEMENTED, OPEN APPROACH: ICD-10-PCS | Performed by: ORTHOPAEDIC SURGERY

## 2025-05-06 PROCEDURE — 73560 X-RAY EXAM OF KNEE 1 OR 2: CPT | Performed by: ORTHOPAEDIC SURGERY

## 2025-05-06 PROCEDURE — 27447 TOTAL KNEE ARTHROPLASTY: CPT | Performed by: ORTHOPAEDIC SURGERY

## 2025-05-06 PROCEDURE — 76942 ECHO GUIDE FOR BIOPSY: CPT | Performed by: ANESTHESIOLOGY

## 2025-05-06 PROCEDURE — 99233 SBSQ HOSP IP/OBS HIGH 50: CPT | Performed by: INTERNAL MEDICINE

## 2025-05-06 DEVICE — GMK SPHERE KNEE: Type: IMPLANTABLE DEVICE

## 2025-05-06 DEVICE — IMPLANTABLE DEVICE
Type: IMPLANTABLE DEVICE | Site: KNEE | Status: FUNCTIONAL
Brand: REFOBACIN® BONE CEMENT R

## 2025-05-06 DEVICE — FEMUR SPHERE CEMENTED RIGHT, SIZE 4
Type: IMPLANTABLE DEVICE | Site: KNEE | Status: FUNCTIONAL
Brand: GMK SPHERE TOTAL KNEE SYSTEM

## 2025-05-06 DEVICE — TIBIAL INSERT FIXED SPHERE FLEX   #4/10 MM R E-CROSS
Type: IMPLANTABLE DEVICE | Site: KNEE | Status: FUNCTIONAL
Brand: GMK SPHERE TOTAL KNEE SYSTEM

## 2025-05-06 DEVICE — TIBIAL TRAY FIXED CEMENTED SIZE T3-I4 RIGHT
Type: IMPLANTABLE DEVICE | Site: KNEE | Status: FUNCTIONAL
Brand: GMK SPHERE TOTAL KNEE SYSTEM

## 2025-05-06 RX ORDER — MIDAZOLAM HYDROCHLORIDE 1 MG/ML
INJECTION INTRAMUSCULAR; INTRAVENOUS AS NEEDED
Status: DISCONTINUED | OUTPATIENT
Start: 2025-05-06 | End: 2025-05-06 | Stop reason: SURG

## 2025-05-06 RX ORDER — DEXTROSE MONOHYDRATE 25 G/50ML
50 INJECTION, SOLUTION INTRAVENOUS
Status: DISCONTINUED | OUTPATIENT
Start: 2025-05-06 | End: 2025-05-07

## 2025-05-06 RX ORDER — ACETAMINOPHEN 500 MG
1000 TABLET ORAL ONCE
Status: DISCONTINUED | OUTPATIENT
Start: 2025-05-06 | End: 2025-05-06 | Stop reason: HOSPADM

## 2025-05-06 RX ORDER — DEXAMETHASONE SODIUM PHOSPHATE 10 MG/ML
8 INJECTION, SOLUTION INTRAMUSCULAR; INTRAVENOUS ONCE
Status: COMPLETED | OUTPATIENT
Start: 2025-05-07 | End: 2025-05-07

## 2025-05-06 RX ORDER — ONDANSETRON 2 MG/ML
4 INJECTION INTRAMUSCULAR; INTRAVENOUS EVERY 6 HOURS PRN
Status: DISCONTINUED | OUTPATIENT
Start: 2025-05-06 | End: 2025-05-06 | Stop reason: HOSPADM

## 2025-05-06 RX ORDER — SENNOSIDES 8.6 MG
17.2 TABLET ORAL NIGHTLY
Status: DISCONTINUED | OUTPATIENT
Start: 2025-05-06 | End: 2025-05-07

## 2025-05-06 RX ORDER — HYDROCODONE BITARTRATE AND ACETAMINOPHEN 5; 325 MG/1; MG/1
1 TABLET ORAL ONCE AS NEEDED
Status: DISCONTINUED | OUTPATIENT
Start: 2025-05-06 | End: 2025-05-06 | Stop reason: HOSPADM

## 2025-05-06 RX ORDER — INSULIN ASPART 100 [IU]/ML
INJECTION, SOLUTION INTRAVENOUS; SUBCUTANEOUS ONCE
Status: COMPLETED | OUTPATIENT
Start: 2025-05-06 | End: 2025-05-06

## 2025-05-06 RX ORDER — ACETAMINOPHEN 325 MG/1
650 TABLET ORAL ONCE
Status: DISCONTINUED | OUTPATIENT
Start: 2025-05-06 | End: 2025-05-06 | Stop reason: HOSPADM

## 2025-05-06 RX ORDER — PANTOPRAZOLE SODIUM 40 MG/1
40 TABLET, DELAYED RELEASE ORAL
Status: DISCONTINUED | OUTPATIENT
Start: 2025-05-07 | End: 2025-05-07

## 2025-05-06 RX ORDER — CICLOPIROX 80 MG/ML
1 SOLUTION TOPICAL NIGHTLY
Status: DISCONTINUED | OUTPATIENT
Start: 2025-05-06 | End: 2025-05-06

## 2025-05-06 RX ORDER — INSULIN ASPART 100 [IU]/ML
INJECTION, SOLUTION INTRAVENOUS; SUBCUTANEOUS
Status: COMPLETED
Start: 2025-05-06 | End: 2025-05-06

## 2025-05-06 RX ORDER — DIPHENHYDRAMINE HYDROCHLORIDE 50 MG/ML
12.5 INJECTION, SOLUTION INTRAMUSCULAR; INTRAVENOUS EVERY 4 HOURS PRN
Status: DISCONTINUED | OUTPATIENT
Start: 2025-05-06 | End: 2025-05-07

## 2025-05-06 RX ORDER — DEXAMETHASONE SODIUM PHOSPHATE 10 MG/ML
INJECTION, SOLUTION INTRAMUSCULAR; INTRAVENOUS AS NEEDED
Status: DISCONTINUED | OUTPATIENT
Start: 2025-05-06 | End: 2025-05-06 | Stop reason: SURG

## 2025-05-06 RX ORDER — TRANEXAMIC ACID 10 MG/ML
INJECTION, SOLUTION INTRAVENOUS AS NEEDED
Status: DISCONTINUED | OUTPATIENT
Start: 2025-05-06 | End: 2025-05-06 | Stop reason: SURG

## 2025-05-06 RX ORDER — MIDAZOLAM HYDROCHLORIDE 1 MG/ML
1 INJECTION INTRAMUSCULAR; INTRAVENOUS EVERY 5 MIN PRN
Status: DISCONTINUED | OUTPATIENT
Start: 2025-05-06 | End: 2025-05-06 | Stop reason: HOSPADM

## 2025-05-06 RX ORDER — NALOXONE HYDROCHLORIDE 0.4 MG/ML
0.08 INJECTION, SOLUTION INTRAMUSCULAR; INTRAVENOUS; SUBCUTANEOUS AS NEEDED
Status: DISCONTINUED | OUTPATIENT
Start: 2025-05-06 | End: 2025-05-06 | Stop reason: HOSPADM

## 2025-05-06 RX ORDER — HYDROCODONE BITARTRATE AND ACETAMINOPHEN 5; 325 MG/1; MG/1
2 TABLET ORAL ONCE AS NEEDED
Status: DISCONTINUED | OUTPATIENT
Start: 2025-05-06 | End: 2025-05-06 | Stop reason: HOSPADM

## 2025-05-06 RX ORDER — HYDROMORPHONE HYDROCHLORIDE 1 MG/ML
0.4 INJECTION, SOLUTION INTRAMUSCULAR; INTRAVENOUS; SUBCUTANEOUS EVERY 5 MIN PRN
Status: DISCONTINUED | OUTPATIENT
Start: 2025-05-06 | End: 2025-05-06 | Stop reason: HOSPADM

## 2025-05-06 RX ORDER — OXYCODONE HYDROCHLORIDE 5 MG/1
5 TABLET ORAL EVERY 4 HOURS PRN
Status: DISCONTINUED | OUTPATIENT
Start: 2025-05-06 | End: 2025-05-07

## 2025-05-06 RX ORDER — ONDANSETRON 2 MG/ML
4 INJECTION INTRAMUSCULAR; INTRAVENOUS EVERY 6 HOURS PRN
Status: DISCONTINUED | OUTPATIENT
Start: 2025-05-06 | End: 2025-05-07

## 2025-05-06 RX ORDER — TRANEXAMIC ACID 10 MG/ML
1000 INJECTION, SOLUTION INTRAVENOUS ONCE
Status: DISCONTINUED | OUTPATIENT
Start: 2025-05-06 | End: 2025-05-06 | Stop reason: HOSPADM

## 2025-05-06 RX ORDER — ONDANSETRON 2 MG/ML
INJECTION INTRAMUSCULAR; INTRAVENOUS AS NEEDED
Status: DISCONTINUED | OUTPATIENT
Start: 2025-05-06 | End: 2025-05-06 | Stop reason: SURG

## 2025-05-06 RX ORDER — TRAMADOL HYDROCHLORIDE 50 MG/1
50 TABLET ORAL EVERY 6 HOURS SCHEDULED
Status: DISCONTINUED | OUTPATIENT
Start: 2025-05-06 | End: 2025-05-07

## 2025-05-06 RX ORDER — SODIUM CHLORIDE, SODIUM LACTATE, POTASSIUM CHLORIDE, CALCIUM CHLORIDE 600; 310; 30; 20 MG/100ML; MG/100ML; MG/100ML; MG/100ML
INJECTION, SOLUTION INTRAVENOUS CONTINUOUS
Status: DISCONTINUED | OUTPATIENT
Start: 2025-05-06 | End: 2025-05-07

## 2025-05-06 RX ORDER — NICOTINE POLACRILEX 4 MG
15 LOZENGE BUCCAL
Status: DISCONTINUED | OUTPATIENT
Start: 2025-05-06 | End: 2025-05-06 | Stop reason: HOSPADM

## 2025-05-06 RX ORDER — LATANOPROST 50 UG/ML
1 SOLUTION/ DROPS OPHTHALMIC NIGHTLY
Status: DISCONTINUED | OUTPATIENT
Start: 2025-05-06 | End: 2025-05-07

## 2025-05-06 RX ORDER — DIPHENHYDRAMINE HCL 25 MG
25 CAPSULE ORAL EVERY 4 HOURS PRN
Status: DISCONTINUED | OUTPATIENT
Start: 2025-05-06 | End: 2025-05-07

## 2025-05-06 RX ORDER — ROCURONIUM BROMIDE 10 MG/ML
INJECTION, SOLUTION INTRAVENOUS AS NEEDED
Status: DISCONTINUED | OUTPATIENT
Start: 2025-05-06 | End: 2025-05-06 | Stop reason: SURG

## 2025-05-06 RX ORDER — ROSUVASTATIN CALCIUM 20 MG/1
20 TABLET, COATED ORAL NIGHTLY
Status: DISCONTINUED | OUTPATIENT
Start: 2025-05-07 | End: 2025-05-07

## 2025-05-06 RX ORDER — ASPIRIN 81 MG/1
81 TABLET ORAL 2 TIMES DAILY
Status: DISCONTINUED | OUTPATIENT
Start: 2025-05-06 | End: 2025-05-07

## 2025-05-06 RX ORDER — ROPIVACAINE HYDROCHLORIDE 5 MG/ML
INJECTION, SOLUTION EPIDURAL; INFILTRATION; PERINEURAL AS NEEDED
Status: DISCONTINUED | OUTPATIENT
Start: 2025-05-06 | End: 2025-05-06 | Stop reason: SURG

## 2025-05-06 RX ORDER — METOCLOPRAMIDE HYDROCHLORIDE 5 MG/ML
10 INJECTION INTRAMUSCULAR; INTRAVENOUS EVERY 8 HOURS PRN
Status: DISCONTINUED | OUTPATIENT
Start: 2025-05-06 | End: 2025-05-06 | Stop reason: HOSPADM

## 2025-05-06 RX ORDER — HYDROMORPHONE HYDROCHLORIDE 1 MG/ML
0.6 INJECTION, SOLUTION INTRAMUSCULAR; INTRAVENOUS; SUBCUTANEOUS EVERY 5 MIN PRN
Status: DISCONTINUED | OUTPATIENT
Start: 2025-05-06 | End: 2025-05-06 | Stop reason: HOSPADM

## 2025-05-06 RX ORDER — SODIUM PHOSPHATE, DIBASIC AND SODIUM PHOSPHATE, MONOBASIC 7; 19 G/230ML; G/230ML
1 ENEMA RECTAL ONCE AS NEEDED
Status: DISCONTINUED | OUTPATIENT
Start: 2025-05-06 | End: 2025-05-07

## 2025-05-06 RX ORDER — HYDROMORPHONE HYDROCHLORIDE 1 MG/ML
INJECTION, SOLUTION INTRAMUSCULAR; INTRAVENOUS; SUBCUTANEOUS
Status: COMPLETED
Start: 2025-05-06 | End: 2025-05-06

## 2025-05-06 RX ORDER — CEPHALEXIN 500 MG/1
500 CAPSULE ORAL EVERY 8 HOURS SCHEDULED
Status: DISCONTINUED | OUTPATIENT
Start: 2025-05-07 | End: 2025-05-06

## 2025-05-06 RX ORDER — ACETAMINOPHEN 500 MG
1000 TABLET ORAL ONCE AS NEEDED
Status: DISCONTINUED | OUTPATIENT
Start: 2025-05-06 | End: 2025-05-06 | Stop reason: HOSPADM

## 2025-05-06 RX ORDER — IBUPROFEN 600 MG/1
600 TABLET, FILM COATED ORAL EVERY 6 HOURS
Status: DISCONTINUED | OUTPATIENT
Start: 2025-05-07 | End: 2025-05-07

## 2025-05-06 RX ORDER — POLYETHYLENE GLYCOL 3350 17 G/17G
17 POWDER, FOR SOLUTION ORAL DAILY PRN
Status: DISCONTINUED | OUTPATIENT
Start: 2025-05-06 | End: 2025-05-07

## 2025-05-06 RX ORDER — DEXTROSE MONOHYDRATE 25 G/50ML
50 INJECTION, SOLUTION INTRAVENOUS
Status: DISCONTINUED | OUTPATIENT
Start: 2025-05-06 | End: 2025-05-06 | Stop reason: HOSPADM

## 2025-05-06 RX ORDER — NICOTINE POLACRILEX 4 MG
15 LOZENGE BUCCAL
Status: DISCONTINUED | OUTPATIENT
Start: 2025-05-06 | End: 2025-05-07

## 2025-05-06 RX ORDER — HYDROMORPHONE HYDROCHLORIDE 1 MG/ML
0.2 INJECTION, SOLUTION INTRAMUSCULAR; INTRAVENOUS; SUBCUTANEOUS EVERY 2 HOUR PRN
Status: DISCONTINUED | OUTPATIENT
Start: 2025-05-06 | End: 2025-05-07

## 2025-05-06 RX ORDER — SODIUM CHLORIDE, SODIUM LACTATE, POTASSIUM CHLORIDE, CALCIUM CHLORIDE 600; 310; 30; 20 MG/100ML; MG/100ML; MG/100ML; MG/100ML
INJECTION, SOLUTION INTRAVENOUS CONTINUOUS
Status: DISCONTINUED | OUTPATIENT
Start: 2025-05-06 | End: 2025-05-06 | Stop reason: HOSPADM

## 2025-05-06 RX ORDER — KETOROLAC TROMETHAMINE 15 MG/ML
15 INJECTION, SOLUTION INTRAMUSCULAR; INTRAVENOUS EVERY 6 HOURS
Status: COMPLETED | OUTPATIENT
Start: 2025-05-06 | End: 2025-05-07

## 2025-05-06 RX ORDER — HYDROMORPHONE HYDROCHLORIDE 1 MG/ML
0.4 INJECTION, SOLUTION INTRAMUSCULAR; INTRAVENOUS; SUBCUTANEOUS EVERY 2 HOUR PRN
Status: DISCONTINUED | OUTPATIENT
Start: 2025-05-06 | End: 2025-05-07

## 2025-05-06 RX ORDER — NICOTINE POLACRILEX 4 MG
30 LOZENGE BUCCAL
Status: DISCONTINUED | OUTPATIENT
Start: 2025-05-06 | End: 2025-05-07

## 2025-05-06 RX ORDER — CEPHALEXIN 500 MG/1
500 CAPSULE ORAL EVERY 8 HOURS SCHEDULED
Status: DISCONTINUED | OUTPATIENT
Start: 2025-05-06 | End: 2025-05-07

## 2025-05-06 RX ORDER — DOCUSATE SODIUM 100 MG/1
100 CAPSULE, LIQUID FILLED ORAL 2 TIMES DAILY
Status: DISCONTINUED | OUTPATIENT
Start: 2025-05-06 | End: 2025-05-07

## 2025-05-06 RX ORDER — DIPHENHYDRAMINE HYDROCHLORIDE 50 MG/ML
12.5 INJECTION, SOLUTION INTRAMUSCULAR; INTRAVENOUS AS NEEDED
Status: DISCONTINUED | OUTPATIENT
Start: 2025-05-06 | End: 2025-05-06 | Stop reason: HOSPADM

## 2025-05-06 RX ORDER — FERROUS SULFATE 325(65) MG
325 TABLET, DELAYED RELEASE (ENTERIC COATED) ORAL
Status: DISCONTINUED | OUTPATIENT
Start: 2025-05-07 | End: 2025-05-07

## 2025-05-06 RX ORDER — BISACODYL 10 MG
10 SUPPOSITORY, RECTAL RECTAL
Status: DISCONTINUED | OUTPATIENT
Start: 2025-05-06 | End: 2025-05-07

## 2025-05-06 RX ORDER — METOCLOPRAMIDE HYDROCHLORIDE 5 MG/ML
10 INJECTION INTRAMUSCULAR; INTRAVENOUS EVERY 8 HOURS PRN
Status: DISCONTINUED | OUTPATIENT
Start: 2025-05-06 | End: 2025-05-07

## 2025-05-06 RX ORDER — CARBOXYMETHYLCELLULOSE SODIUM, GLYCERIN 5; 9 MG/ML; MG/ML
1 SOLUTION/ DROPS OPHTHALMIC 2 TIMES DAILY
COMMUNITY

## 2025-05-06 RX ORDER — HYDROMORPHONE HYDROCHLORIDE 1 MG/ML
0.2 INJECTION, SOLUTION INTRAMUSCULAR; INTRAVENOUS; SUBCUTANEOUS EVERY 5 MIN PRN
Status: DISCONTINUED | OUTPATIENT
Start: 2025-05-06 | End: 2025-05-06 | Stop reason: HOSPADM

## 2025-05-06 RX ORDER — NICOTINE POLACRILEX 4 MG
30 LOZENGE BUCCAL
Status: DISCONTINUED | OUTPATIENT
Start: 2025-05-06 | End: 2025-05-06 | Stop reason: HOSPADM

## 2025-05-06 RX ORDER — DIPHENHYDRAMINE HYDROCHLORIDE 50 MG/ML
25 INJECTION, SOLUTION INTRAMUSCULAR; INTRAVENOUS ONCE AS NEEDED
Status: ACTIVE | OUTPATIENT
Start: 2025-05-06 | End: 2025-05-06

## 2025-05-06 RX ADMIN — ROPIVACAINE HYDROCHLORIDE 20 ML: 5 INJECTION, SOLUTION EPIDURAL; INFILTRATION; PERINEURAL at 18:50:00

## 2025-05-06 RX ADMIN — TRANEXAMIC ACID 1000 MG: 10 INJECTION, SOLUTION INTRAVENOUS at 16:59:00

## 2025-05-06 RX ADMIN — ROCURONIUM BROMIDE 50 MG: 10 INJECTION, SOLUTION INTRAVENOUS at 17:17:00

## 2025-05-06 RX ADMIN — MIDAZOLAM HYDROCHLORIDE 2 MG: 1 INJECTION INTRAMUSCULAR; INTRAVENOUS at 16:51:00

## 2025-05-06 RX ADMIN — ROPIVACAINE HYDROCHLORIDE 2 ML: 5 INJECTION, SOLUTION EPIDURAL; INFILTRATION; PERINEURAL at 16:55:00

## 2025-05-06 RX ADMIN — ONDANSETRON 4 MG: 2 INJECTION INTRAMUSCULAR; INTRAVENOUS at 18:29:00

## 2025-05-06 RX ADMIN — DEXAMETHASONE SODIUM PHOSPHATE 10 MG: 10 INJECTION, SOLUTION INTRAMUSCULAR; INTRAVENOUS at 17:00:00

## 2025-05-06 NOTE — ANESTHESIA PROCEDURE NOTES
Regional Block    Date/Time: 5/6/2025 6:50 PM    Performed by: Mau Soto MD  Authorized by: Mau Soto MD      General Information and Staff    Start Time:  5/6/2025 6:50 PM  End Time:  5/6/2025 6:50 PM  Anesthesiologist:  Mau Soto MD  Performed by:  Anesthesiologist  Patient Location:  OR    Block Placement: Post Induction  Site Identification: real time ultrasound guided and image stored and retrievable    Block site/laterality marked before start: site marked  Reason for Block: at surgeon's request and post-op pain management    Preanesthetic Checklist: 2 patient identifers, IV checked, risks and benefits discussed, monitors and equipment checked, pre-op evaluation, timeout performed, anesthesia consent, sterile technique used, no prohibitive neurological deficits and no local skin infection at insertion site      Procedure Details    Patient Position:  Supine  Prep: ChloraPrep    Monitoring:  Cardiac monitor, continuous pulse ox, blood pressure cuff and heart rate  Block Type:  Adductor canal  Laterality:  Right  Injection Technique:  Single-shot    Needle    Needle Type:  Short-bevel and echogenic  Needle Localization:  Ultrasound guidance  Reason for Ultrasound Use: appropriate spread of the medication was noted in real time and no ultrasound evidence of intravascular and/or intraneural injection            Assessment    Injection Assessment:  Good spread noted, negative resistance, negative aspiration for heme, incremental injection and low pressure  Heart Rate Change: No    - Patient tolerated block procedure well without evidence of immediate block related complications.     Medications  5/6/2025 6:50 PM      Additional Comments    Medication:  Ropivacaine 0.5% 20mL

## 2025-05-06 NOTE — ANESTHESIA PREPROCEDURE EVALUATION
PRE-OP EVALUATION    Patient Name: Lexi Ahumada    Admit Diagnosis: Primary osteoarthritis of right knee [M17.11]    Pre-op Diagnosis: Primary osteoarthritis of right knee [M17.11]    Right total knee arthroplasty    Anesthesia Procedure: Right total knee arthroplasty (Right)    Surgeons and Role:     * Jose Luis Yepez MD - Primary    Pre-op vitals reviewed.  Temp: 97.8 °F (36.6 °C)  Pulse: 73  Resp: 16  BP: 125/66  SpO2: 97 %  Body mass index is 29.18 kg/m².    Current medications reviewed.  Hospital Medications:  Current Medications[1]    Outpatient Medications:   Prescriptions Prior to Admission[2]    Allergies: Boniva [ibandronate sodium] and Lisinopril      Anesthesia Evaluation    Patient summary reviewed.    Anesthetic Complications  (-) history of anesthetic complications         GI/Hepatic/Renal      (+) GERD                           Cardiovascular                  (+) hypertension                                     Endo/Other      (+) diabetes                            Pulmonary                           Neuro/Psych                                      Past Surgical History[3]  Social Hx on file[4]  History   Drug Use No     Available pre-op labs reviewed.  Lab Results   Component Value Date    WBC 7.4 04/16/2025    RBC 4.66 04/16/2025    HGB 15.0 04/16/2025    HCT 44.9 04/16/2025    MCV 96.4 04/16/2025    MCH 32.2 04/16/2025    MCHC 33.4 04/16/2025    RDW 12.4 04/16/2025    .0 04/16/2025     Lab Results   Component Value Date     04/16/2025    K 4.3 04/16/2025     04/16/2025    CO2 27.0 04/16/2025    BUN 15 04/16/2025    CREATSERUM 0.86 04/16/2025     (H) 04/16/2025    CA 10.4 04/16/2025     Lab Results   Component Value Date    INR 0.90 04/16/2025         Airway      Mallampati: II  Mouth opening: >3 FB  TM distance: > 6 cm  Neck ROM: full Cardiovascular    Cardiovascular exam normal.         Dental             Pulmonary    Pulmonary exam normal.                 Other  findings              ASA: 2   Plan: spinal  NPO status verified and       Surgeon requests: regional block    Plan/risks discussed with: patient                Present on Admission:  **None**             [1]    [Transfer Hold] acetaminophen (Tylenol Extra Strength) tab 1,000 mg  1,000 mg Oral Once    [Transfer Hold] glucose (Dex4) 15 GM/59ML oral liquid 15 g  15 g Oral Q15 Min PRN    Or    [Transfer Hold] glucose (Glutose) 40% oral gel 15 g  15 g Oral Q15 Min PRN    Or    [Transfer Hold] glucose-vitamin C (Dex-4) chewable tab 4 tablet  4 tablet Oral Q15 Min PRN    Or    [Transfer Hold] dextrose 50% injection 50 mL  50 mL Intravenous Q15 Min PRN    Or    [Transfer Hold] glucose (Dex4) 15 GM/59ML oral liquid 30 g  30 g Oral Q15 Min PRN    Or    [Transfer Hold] glucose (Glutose) 40% oral gel 30 g  30 g Oral Q15 Min PRN    Or    [Transfer Hold] glucose-vitamin C (Dex-4) chewable tab 8 tablet  8 tablet Oral Q15 Min PRN    lactated ringers infusion   Intravenous Continuous    [Transfer Hold] tranexamic acid in sodium chloride 0.7% (Cyklokapron) 1000 mg/100mL infusion premix 1,000 mg  1,000 mg Intravenous Once    povidone-iodine (Betadine) 10 % 17.5 mL in sodium chloride 0.9 % 500 mL irrigation   Irrigation Once (Intra-Op)    clonidine/epinephrine/ropivacaine/ketorolac in 0.9% NaCl 60 mL pain cocktail syringe for knee arthroplasty   Infiltration Once (Intra-Op)   [2]   Medications Prior to Admission   Medication Sig Dispense Refill Last Dose/Taking    Carboxymethylcell-Glycerin PF (REFRESH TEARS PF) 0.5-0.9 % Ophthalmic Solution Apply 1 drop to eye in the morning and 1 drop before bedtime.   Taking    Polyethylene Glycol 3350 (MIRALAX OR) Take by mouth daily.   5/5/2025 at  9:30 PM    diclofenac 1.3 % External Patch Apply 1 patch topically Q12H. On knees - pt aware should stop 10 days prior to surgery (ASPERCREME)   Taking    pantoprazole 40 MG Oral Tab EC Take 1 tablet (40 mg total) by mouth 2 (two) times daily before  meals. For 8 weeks, then once daily. 90 tablet 0 5/5/2025 Morning    Ciclopirox 8 % External Solution Apply 1 Application topically nightly. 6 mL 3 Taking    Tirzepatide (MOUNJARO) 10 MG/0.5ML Subcutaneous Solution Auto-injector Inject 10 mg into the skin every 7 days. 6 mL 0 4/21/2025    JARDIANCE 25 MG Oral Tab TAKE 1 TABLET BY MOUTH DAILY 90 tablet 3 4/30/2025    rosuvastatin 20 MG Oral Tab Take 1 tablet (20 mg total) by mouth daily. 90 tablet 2 5/5/2025 at  6:30 PM    metFORMIN  MG Oral Tablet 24 Hr Take 1 tablet (500 mg total) by mouth daily with dinner. 90 tablet 1 5/5/2025 at  6:30 PM    glipiZIDE ER 10 MG Oral Tablet 24 Hr Take 1 tablet (10 mg total) by mouth daily. 90 tablet 1 5/5/2025 at 10:00 AM    losartan 25 MG Oral Tab Take 1 tablet (25 mg total) by mouth daily. 90 tablet 1 5/5/2025 at  6:30 PM    clotrimazole-betamethasone 1-0.05 % External Cream Apply twice daily for a week then take week off. (Patient taking differently: as needed. Apply twice daily for a week then take week off.) 45 g 2 Taking Differently    latanoprost 0.005 % Ophthalmic Solution Place 1 drop into both eyes in the morning.   5/5/2025 Bedtime    Multiple Vitamin (DAILY VALUE MULTIVITAMIN) Oral Tab Take 1 tablet by mouth in the morning.   4/22/2025    ferrous sulfate 325 (65 FE) MG Oral Tab EC Take 1 tablet (325 mg total) by mouth daily with breakfast.   5/5/2025 at 10:00 AM    Cholecalciferol (VITAMIN D) 1000 UNITS Oral Cap Take 4,000 mg by mouth in the morning.   4/22/2025    Glucose Blood (ONETOUCH VERIO) In Vitro Strip Use to test blood sugar twice daily 200 strip 5     OneTouch Delica Lancets 33G Does not apply Misc Use with test strips to check blood sugar twice daily 200 each 5     Blood Glucose Monitoring Suppl (ONETOUCH VERIO FLEX SYSTEM) w/Device Does not apply Kit Use machine to test blood sugar twice a day as directed. 1 kit 0    [3]   Past Surgical History:  Procedure Laterality Date    Colonoscopy  01/01/2008     Hemorrhoid, Recheck 7-10 years    Colonoscopy  04/2013    adenomatous polyp    Colonoscopy      Hysterectomy  2004    total hystero.    Oophorectomy Bilateral 2004    total hystero.    Other surgical history      varicose vein procedures    Tonsillectomy      Total abdom hysterectomy      Upper gi endoscopy,exam  01/01/2008   [4]   Social History  Socioeconomic History    Marital status:     Number of children: 3   Occupational History    Occupation:      Employer: [employer]   Tobacco Use    Smoking status: Never    Smokeless tobacco: Never    Tobacco comments:     Updated 2/17/25   Vaping Use    Vaping status: Never Used   Substance and Sexual Activity    Alcohol use: No    Drug use: No   Other Topics Concern    Caffeine Concern No    Exercise Yes     Comment: walking    Seat Belt Yes    Special Diet Yes     Comment: diabetic    Stress Concern No    Weight Concern Yes

## 2025-05-06 NOTE — ANESTHESIA PROCEDURE NOTES
Airway  Date/Time: 5/6/2025 5:20 PM  Reason: elective      General Information and Staff   Patient location during procedure: OR  Anesthesiologist: Mau Soto MD  Performed: anesthesiologist   Performed by: Mau Soto MD  Authorized by: Mau Soto MD        Indications and Patient Condition  Indications for airway management: anesthesia  Sedation level: deep      Preoxygenated: yesPatient position: sniffing    Mask difficulty assessment: 2 - vent by mask + OA or adjuvant +/- NMBA    Final Airway Details    Final airway type: endotracheal airway    Successful airway: ETT  Cuffed: yes   Successful intubation technique: direct laryngoscopy  Endotracheal tube insertion site: oral  Blade: Jose  Blade size: #3  ETT size (mm): 7.0    Cormack-Lehane Classification: grade IIA - partial view of glottis  Placement verified by: capnometry   Measured from: lips  ETT to lips (cm): 21  Number of attempts at approach: 1

## 2025-05-06 NOTE — INTERVAL H&P NOTE
Pre-op Diagnosis: Primary osteoarthritis of right knee [M17.11]    The above referenced H&P was reviewed by Jose Luis Yepez MD on 5/6/2025, the patient was examined and no significant changes have occurred in the patient's condition since the H&P was performed.  I discussed with the patient and/or legal representative the potential benefits, risks and side effects of this procedure; the likelihood of the patient achieving goals; and potential problems that might occur during recuperation.  I discussed reasonable alternatives to the procedure, including risks, benefits and side effects related to the alternatives and risks related to not receiving this procedure.  We will proceed with procedure as planned.

## 2025-05-06 NOTE — ANESTHESIA PROCEDURE NOTES
Spinal Block    Date/Time: 5/6/2025 4:55 PM    Performed by: Mau Soto MD  Authorized by: Mau Soto MD      General Information and Staff    Start Time:  5/6/2025 4:55 PM  End Time:  5/6/2025 4:55 PM  Anesthesiologist:  Mau Soto MD  Performed by:  Anesthesiologist  Site identification: surface landmarks    Preanesthetic Checklist: patient identified, IV checked, risks and benefits discussed, monitors and equipment checked, pre-op evaluation, timeout performed, anesthesia consent and sterile technique used      Procedure Details    Patient Position:  Sitting  Prep: ChloraPrep    Monitoring:  Cardiac monitor, heart rate and continuous pulse ox  Approach:  Midline  Location:  L3-4  Injection Technique:  Single-shot    Needle    Needle Type:  Sprotte  Needle Gauge:  24 G  Needle Length:  3.5 in    Assessment    Sensory Level:   Events: clear CSF, CSF aspirated, well tolerated and blood negative      Additional Comments

## 2025-05-07 VITALS
DIASTOLIC BLOOD PRESSURE: 40 MMHG | TEMPERATURE: 99 F | OXYGEN SATURATION: 94 % | RESPIRATION RATE: 18 BRPM | SYSTOLIC BLOOD PRESSURE: 92 MMHG | HEIGHT: 60 IN | HEART RATE: 72 BPM | BODY MASS INDEX: 29.33 KG/M2 | WEIGHT: 149.38 LBS

## 2025-05-07 LAB
ANION GAP SERPL CALC-SCNC: 12 MMOL/L (ref 0–18)
BASOPHILS # BLD AUTO: 0.01 X10(3) UL (ref 0–0.2)
BASOPHILS NFR BLD AUTO: 0.1 %
BUN BLD-MCNC: 23 MG/DL (ref 9–23)
CALCIUM BLD-MCNC: 9 MG/DL (ref 8.7–10.6)
CHLORIDE SERPL-SCNC: 96 MMOL/L (ref 98–112)
CO2 SERPL-SCNC: 26 MMOL/L (ref 21–32)
CREAT BLD-MCNC: 1.02 MG/DL (ref 0.55–1.02)
EGFRCR SERPLBLD CKD-EPI 2021: 58 ML/MIN/1.73M2 (ref 60–?)
EOSINOPHIL # BLD AUTO: 0 X10(3) UL (ref 0–0.7)
EOSINOPHIL NFR BLD AUTO: 0 %
ERYTHROCYTE [DISTWIDTH] IN BLOOD BY AUTOMATED COUNT: 12.1 %
GLUCOSE BLD-MCNC: 195 MG/DL (ref 70–99)
GLUCOSE BLD-MCNC: 287 MG/DL (ref 70–99)
GLUCOSE BLD-MCNC: 315 MG/DL (ref 70–99)
HCT VFR BLD AUTO: 34.3 % (ref 35–48)
HGB BLD-MCNC: 11.6 G/DL (ref 12–16)
IMM GRANULOCYTES # BLD AUTO: 0.06 X10(3) UL (ref 0–1)
IMM GRANULOCYTES NFR BLD: 0.5 %
LYMPHOCYTES # BLD AUTO: 0.93 X10(3) UL (ref 1–4)
LYMPHOCYTES NFR BLD AUTO: 8.5 %
MCH RBC QN AUTO: 32.2 PG (ref 26–34)
MCHC RBC AUTO-ENTMCNC: 33.8 G/DL (ref 31–37)
MCV RBC AUTO: 95.3 FL (ref 80–100)
MONOCYTES # BLD AUTO: 0.29 X10(3) UL (ref 0.1–1)
MONOCYTES NFR BLD AUTO: 2.6 %
NEUTROPHILS # BLD AUTO: 9.66 X10 (3) UL (ref 1.5–7.7)
NEUTROPHILS # BLD AUTO: 9.66 X10(3) UL (ref 1.5–7.7)
NEUTROPHILS NFR BLD AUTO: 88.3 %
OSMOLALITY SERPL CALC.SUM OF ELEC: 294 MOSM/KG (ref 275–295)
PLATELET # BLD AUTO: 146 10(3)UL (ref 150–450)
POTASSIUM SERPL-SCNC: 4.9 MMOL/L (ref 3.5–5.1)
RBC # BLD AUTO: 3.6 X10(6)UL (ref 3.8–5.3)
SODIUM SERPL-SCNC: 134 MMOL/L (ref 136–145)
WBC # BLD AUTO: 11 X10(3) UL (ref 4–11)

## 2025-05-07 PROCEDURE — 99024 POSTOP FOLLOW-UP VISIT: CPT | Performed by: PHYSICIAN ASSISTANT

## 2025-05-07 PROCEDURE — 99232 SBSQ HOSP IP/OBS MODERATE 35: CPT | Performed by: INTERNAL MEDICINE

## 2025-05-07 RX ORDER — DICLOFENAC EPOLAMINE 0.01 G/1
1 SYSTEM TOPICAL EVERY 12 HOURS
Status: SHIPPED | COMMUNITY
Start: 2025-06-17

## 2025-05-07 RX ORDER — ACETAMINOPHEN 500 MG
1000 TABLET ORAL EVERY 8 HOURS
Qty: 90 TABLET | Refills: 0 | Status: SHIPPED | OUTPATIENT
Start: 2025-05-07 | End: 2025-05-22

## 2025-05-07 RX ORDER — SENNA AND DOCUSATE SODIUM 50; 8.6 MG/1; MG/1
1 TABLET, FILM COATED ORAL DAILY PRN
Qty: 30 TABLET | Refills: 0 | Status: SHIPPED | OUTPATIENT
Start: 2025-05-07

## 2025-05-07 RX ORDER — OXYCODONE HYDROCHLORIDE 5 MG/1
TABLET ORAL EVERY 4 HOURS PRN
Qty: 30 TABLET | Refills: 0 | Status: SHIPPED | OUTPATIENT
Start: 2025-05-07

## 2025-05-07 RX ORDER — ASPIRIN 81 MG/1
81 TABLET ORAL 2 TIMES DAILY
Qty: 80 TABLET | Refills: 0 | Status: SHIPPED | OUTPATIENT
Start: 2025-05-07 | End: 2025-06-16

## 2025-05-07 RX ORDER — TRAMADOL HYDROCHLORIDE 50 MG/1
50 TABLET ORAL EVERY 8 HOURS
Qty: 45 TABLET | Refills: 0 | Status: SHIPPED | OUTPATIENT
Start: 2025-05-07 | End: 2025-05-14

## 2025-05-07 RX ORDER — INSULIN DEGLUDEC 100 U/ML
10 INJECTION, SOLUTION SUBCUTANEOUS ONCE
Status: DISCONTINUED | OUTPATIENT
Start: 2025-05-07 | End: 2025-05-07

## 2025-05-07 RX ORDER — METOCLOPRAMIDE HYDROCHLORIDE 5 MG/ML
5 INJECTION INTRAMUSCULAR; INTRAVENOUS EVERY 8 HOURS PRN
Status: DISCONTINUED | OUTPATIENT
Start: 2025-05-07 | End: 2025-05-07

## 2025-05-07 RX ORDER — CEPHALEXIN 500 MG/1
500 CAPSULE ORAL EVERY 8 HOURS SCHEDULED
Qty: 18 CAPSULE | Refills: 0 | Status: SHIPPED | OUTPATIENT
Start: 2025-05-07 | End: 2025-05-13

## 2025-05-07 RX ORDER — CELECOXIB 200 MG/1
200 CAPSULE ORAL DAILY
Qty: 30 CAPSULE | Refills: 0 | Status: SHIPPED | OUTPATIENT
Start: 2025-05-07 | End: 2025-06-06

## 2025-05-07 NOTE — CM/SW NOTE
05/07/25 0900   CM/SW Referral Data   Referral Source Social Work (self-referral)   Reason for Referral Discharge planning   Informant EMR;Clinical Staff Member   Discharge Needs   Anticipated D/C needs Home health care       Patient is a 75 y/o woman admitted s/p TKR.  Pt with pre-operative plan for [facility] at [place].  PT recommending [facility] services at discharge.  Lyndsey from [facility] confirmed pt accepted for [facility] services at [place].  No other [place] needs/concerns identified at this time.  / to remain available for support and/or discharge planning.     Madiha Penaloza, [facility]  Discharge Planner  [phone]

## 2025-05-07 NOTE — PLAN OF CARE
A&Ox4. VSS on RA. /IS. SCDs, ankle pumps encouraged. Tolerating diet. Voiding freely. Pain mananged with current medications. Dressing to R knee C/D/I. Up min w/ walker. PT/OT to see. Patient updated on POC, verbalized agreement. Safety precautions in place, pt instructed to call for assistance, call light within reach.

## 2025-05-07 NOTE — PROGRESS NOTES
AVS reviewed, IV removed , will dc home w/ [facility], discharge video viewed, verbalized understanding of dc instructions.

## 2025-05-07 NOTE — OPERATIVE REPORT
Operative Note    Patient Name/: Lexi Ahumada 1950  Date: 2025  Location: [facility]  Preoperative Diagnosis: Right knee osteoarthritis  Postoperative Diagnosis: Same  Operation: Right total knee arthroplasty mechanical alignment  Primary Surgeon: Jose Luis Yepez  Assistant:  Williams Booth surgical assist. Please note a skilled surgical assistant was necessary for this case in order to assist with patient positioning, prepping, draping, incision, exposure, implant placement, wound closure.  Indications: 74-year-old female with end-stage right knee varus osteoarthritis  Surgical Findings: Varus OA  Operative Report:  After informed consent, the right lower extremity was marked.  Patient was brought to the operating room where general anesthesia was induced.  Preoperative exam demonstrated range of motion from few degrees short of full extension, gravity flexion 110-120. The right lower extremity was prepped and draped in sterile fashion.  Timeout was performed to verify correct surgical site and procedure.  Right 2 g of Ancef was given within 1 hour of incision.  Additionally, 1 g tranexamic acid was given intravenously.  Next the limb was gravity exsanguinated and tourniquet inflated.  Incision was made anteriorly from 2 fingerbreadths proximal to the patella down along the medial aspect of the tibial tubercle.  This was carried down to the extensor mechanism.  Medial and lateral flaps were developed.  The VMO muscle belly was identified.  Full-thickness medial parapatellar arthrotomy was made from 2 fingerbreadths proximal to the patella along the VMO muscle belly, around the medial patella and patellar tendon.  The fat pad was dissected free from the patellar tendon and reflected medially.  Subperiosteal dissection was carried out posteriorly about the proximal medial aspect of the tibia.  Osteophytes were removed from the proximal medial tibia.  The lateral patellofemoral plica was  incised, and the patella was everted and denervated.  The synovium over the anterior distal femur was teed open.  Next the knee was flexed up, and remnant of the ACL and lateral meniscus were excised.  I used an intramedullary reamer to open up the femur for our intramedullary alignment guide.  The distal femoral cutting block was placed on the distal femur to take off 9.5 mm of bone.  It sat down on the distal medial femur, measuring to take off more medial bone than lateral.  This was pinned in place.  A lesley's wing was used to check the cut, and the cut was performed.  The distal medial femur fragment measured 8 mm.  Next the knee was flexed and the tibial crest was marked.  An extra medullary tibial cutting guide was placed above the ankle, measuring to take off a few millimeters of the medial side, pinned in line with the tibial crest in the coronal plane and with a few degrees of posterior slope in the sagittal plane.  The proximal tibia was cut, using a Z retractor to protect MCL, patellar tendon and lateral soft tissues.  The cut measured 8 lateral.  After this portion of bone was removed, the knee was brought into extension and a lamina  was used to open up the extension space.  What remained of the medial and lateral menisci were removed.  Spacer block was placed with a T-handle drop-down xavier to verify appropriate cut angle.  Next the knee was flexed up, and the femoral sizing block with stylus was used.  This sized for a size 4 femur.  Pins were placed, and the sizing block removed.  Pins were noted to be parallel to the epicondylar axis.  The 4-in-1 cutting block was placed and screwed down.  Anterior distal femur was cut, demonstrating a positive grand piano sign.  Posterior condyles were cut, measuring 7 on the posterior medial cut.  Chamfers were then cut.  The cutting block was removed, and the knee was flexed with a lamina  to open up the posterior knee joint.  Notch contents and  PCL were removed.  Posterior condylar recesses were opened, and posterior osteophytes chiseled out.  The knee was tight on the medial side in flexion and extension therefore medial release was performed with semimembranosus insertion as well as osteophytes.  The knee was brought into extension, and sized for a tibial baseplate.  Trials were then placed.  With the 4 femur, the T3-I4 tibia, and a 10 polyethylene liner, the knee came to full extension with excellent varus valgus stability.  In mid flexion, there was 3 varus, 1-2 valgus, Lachman stable.  At 90 degrees anterior drawer was less than 5 mm partially rotating/partially translating.  Gravity flexion 120+ with the patella tracking centrally.  The tibial component rotation was marked, and the tibial trial removed.  The femoral lugs were drilled, and the anterior chamfer finishing guide was placed to cut the finishing anterior chamfer cut.  The knee was then flexed, and the tibia brought forward to place the tibial baseplate trial.  This was pinned in place, and the drill and keel punch were used to prepare the proximal tibia.  The knee was then brought into extension, the patella everted, and it was circumferentially denervated and osteophytes were excised.  Next final components were opened, bony surfaces were washed and dried, and periarticular pain cocktail was injected with 30 mL in the posterior capsule.  Cement was then mixed.  The tibial component was cemented along with the proximal tibial bone surface, the component was impacted into place followed by removal of excess cement.  The femur was reduced onto the tibia, and cement was placed on the cut surface of the femur.  The cemented femoral component was then impacted down, with removal of excess cement.  The knee was brought into full extension and an axial load was held across the joint.  Once cement had hardened, the knee was trialed again.  Optimal stability was achieved with a 10 mm polyethylene  liner, with exam demonstrating same as with trials.  The trial poly was removed, the knee was washed out, any loose bodies removed, and final poly was placed.  Betadine lavage was performed.  Remaining pain cocktail was injected with 10 mL into remaining fat pad, 10 mL into medial femoral and tibial periosteum, and 10 mL into extensor mechanism.  Closure was performed with 5 Ethibond for the arthrotomy, 2-0 Vicryl for skin, followed by staples.  A sterile dressing was applied, and the patient was brought to PACU in good condition.  Anesthesia: General Plus adductor canal block  Complications: None  Specimens: Bone  Blood loss: 25 mL  Fluids: See anesthesia report  Implants: Medacta GM K sphere size 4 femur, T3-I4 tibia, 10 poly-  Drains: None  Condition: Good  Disposition: Floor    - Weightbearing as tolerated, PT OT  - DVT prophylaxis mechanical pes aspirin twice daily  - Pain control with oral meds  - IV Ancef, 1 week Keflex postop due to diabetes    Jose Luis Yepez MD, FAAOS  [facility]opaedic Surgery  Phone [phone]  Fax [phone]

## 2025-05-07 NOTE — OCCUPATIONAL THERAPY NOTE
OCCUPATIONAL THERAPY EVALUATION - INPATIENT    Room Number: 380/380-A  Evaluation Date: 5/7/2025     Type of Evaluation: Initial  Presenting Problem: s/p Right total knee arthroplasty 5/6 Dr. Yepez    Physician Order: IP Consult to Occupational Therapy  Reason for Therapy:  ADL/IADL Dysfunction and Discharge Planning    OCCUPATIONAL THERAPY ASSESSMENT   Patient is a 74 year old female admitted on 5/6/2025 with Presenting Problem: s/p Right total knee arthroplasty 5/6 Dr. Yepez. Co-Morbidities : DM2, HTN, hyperlipidemia, GERD  Patient is currently functioning near baseline with toileting, upper body dressing, lower body dressing, grooming, transfers, static sitting balance, dynamic sitting balance, static standing balance, dynamic standing balance, maintaining seated position, functional standing tolerance, energy conservation strategies, and aerobic capacity.  Prior to admission, patient's baseline is IND with ADLs/IADLs.  Patient met all OT goals at supervision level.  Patient reports no further questions/concerns at this time.     Patient will be discharged from acute inpatient OT services at this time.    Recommendations for nursing staff:   Transfers: up x 1 assist, supervision, RW  Toileting location: Toilet    EVALUATION SESSION:  Patient at start of session: seated upright in chair, dtr and spouse present at bedside    FUNCTIONAL TRANSFER ASSESSMENT  Sit to Stand: Chair  Chair: Supervision  Toilet Transfer: Supervision    BED MOBILITY     BALANCE ASSESSMENT  Static Sitting: Supervision  Static Standing: Supervision    FUNCTIONAL ADL ASSESSMENT  Grooming Standing: Modified Independent  UB Dressing Seated: Modified Independent (donned bra and top)  LB Dressing Seated: Supervision (donned brief and pants)  LB Dressing Standing: Supervision  Toileting Seated: Modified Independent    ACTIVITY TOLERANCE: WFL                         O2 SATURATIONS       COGNITION  Overall Cognitive Status:  WFL - within  functional limits    COGNITION ASSESSMENTS     Upper Extremity:   ROM: within functional limits  Strength: is within functional limits    EDUCATION PROVIDED  Patient Education : Role of Occupational Therapy; Plan of Care; Functional Transfer Techniques; DME Recommendations; Discharge Recommendations; Fall Prevention; Weight Bear Status; Surgical Precautions; Posture/Positioning; Energy Conservation; Proper Body Mechanics  Patient's Response to Education: Verbalized Understanding; Returned Demonstration    Equipment used: RW  Demonstrates functional use    Therapist comments: RN cleared pt for session, received sitting upright in chair, dtr and spouse present at bedside. Pt educated on proper positioning of knee during recovery, specifically no pivoting/twisting knee, no pillows directly under knee, pt verbalized understanding and in agreement. Pt reports her spouse and daughter will be able to assist her as needed with recovery.    Patient End of Session: Up in chair, Needs met, Call light within reach, RN aware of session/findings, All patient questions and concerns addressed, Hospital anti-slip socks, Family present    OCCUPATIONAL PROFILE    HOME SITUATION  Type of Home: House  Home Layout: One level (3 steps up to bedroom, everything else is on main level, laundry in basement)  Lives With: Spouse (dtr staying with pt for 2 weeks)    Toilet and Equipment: Comfort height toilet, Standard height toilet  Shower/Tub and Equipment: Walk-in shower, Shower chair  Other Equipment: Long-handled shoehorn (sketchers slip ons)    Occupation/Status: retired  Hand Dominance: Right  Drives: Yes  Patient Regularly Uses: Glasses    Prior Level of Function: IND with ADLs/IADLs, lives at home with supportive spouse. Pt states her daughter will be staying with her for the first 2 weeks of recovery.    SUBJECTIVE  \"I have ottomans I can use\"    PAIN ASSESSMENT  Ratin  Location: R knee discomfort  Management Techniques: Ice,  Repositioning    OBJECTIVE     Fall Risk: High fall risk    WEIGHT BEARING RESTRICTION  R Lower Extremity: Weight Bearing as Tolerated      AM-PAC ‘6-Clicks’ Inpatient Daily Activity Short Form  -   Putting on and taking off regular lower body clothing?: None  -   Bathing (including washing, rinsing, drying)?: None  -   Toileting, which includes using toilet, bedpan or urinal? : None  -   Putting on and taking off regular upper body clothing?: None  -   Taking care of personal grooming such as brushing teeth?: None  -   Eating meals?: None    AM-PAC Score:  Score: 24  Approx Degree of Impairment: 0%  Standardized Score (AM-PAC Scale): 57.54    ADDITIONAL TESTS     NEUROLOGICAL FINDINGS      PLAN   Patient has been evaluated and presents with no skilled Occupational Therapy needs at this time.  Patient discharged from Occupational Therapy services.  Please re-order if a new functional limitation presents during this admission.    OT Device Recommendations: Long-handled shoehorn, Reacher    Patient Evaluation Complexity Level:   Occupational Profile/Medical History LOW - Brief history including review of medical or therapy records    Specific performance deficits impacting engagement in ADL/IADL LOW  1 - 3 performance deficits    Client Assessment/Performance Deficits LOW - No comorbidities nor modifications of tasks    Clinical Decision Making LOW - Analysis of occupational profile, problem-focused assessments, limited treatment options    Overall Complexity LOW     OT Session Time: 25 minutes  Self-Care Home Management: 15 minutes

## 2025-05-07 NOTE — PHYSICAL THERAPY NOTE
PHYSICAL THERAPY EVALUATION - INPATIENT     Room Number: 380/380-A  Evaluation Date: 2025  Type of Evaluation: Initial  Physician Order: PT Eval and Treat    Presenting Problem: s/p R TKA 25  Co-Morbidities : DM2, HTN, hyperlipidemia, GERD  Reason for Therapy: Mobility Dysfunction and Discharge Planning    PHYSICAL THERAPY ASSESSMENT   Patient is a 74 year old female admitted 2025 for R TKA.   Patient is currently functioning near baseline with bed mobility, transfers, gait, and stair negotiation. Prior to admission, patient's baseline is MOD I.     Patient will benefit from continued skilled PT Services at discharge to promote prior level of function.  Anticipate patient will return home with home health PT.    PLAN  Patient has been evaluated and presents with no skilled Physical Therapy needs at this time.  Patient discharged from Physical Therapy services.  Please re-order if a new functional limitation presents during this admission.    PT Device Recommendation: Rolling walker    GOALS  Patient was able to achieve the following goals ...    Patient was able to transfer Safely and independently   Patient able to ambulate on level surfaces Safely and independently     HOME SITUATION  Type of Home: House  Home Layout: One level (3 steps up to bedroom, everything else is on main level, laundry in basement)  Stairs to Enter : 3   Railing: Yes              Lives With: Spouse (dtr staying with pt for 2 weeks)    Drives: Yes   Patient Regularly Uses: Glasses     Prior Level of [place]: Pt lives with spouse in single story home. Pt independent with ADL and mobility. Pt does not use RW or cane at baseline.     SUBJECTIVE  \"I really don't have much pain.\"     OBJECTIVE  Precautions: None  Fall Risk: High fall risk    WEIGHT BEARING RESTRICTION  R Lower Extremity: Weight Bearing as Tolerated    PAIN ASSESSMENT  Ratin  Location: R knee  Management Techniques: Activity promotion,  Repositioning    COGNITION  Overall Cognitive Status:  WFL - within functional limits    RANGE OF MOTION AND STRENGTH ASSESSMENT  Upper extremity ROM and strength are within functional limits     Lower extremity ROM is within functional limits   R knee flex 75 degrees  R knee ext lacking 10 degrees    Lower extremity strength is within functional limits     BALANCE  Static Sitting: Good  Dynamic Sitting: Good  Static Standing: Fair -  Dynamic Standing: Fair -    ADDITIONAL TESTS                                    ACTIVITY TOLERANCE                         O2 WALK       NEUROLOGICAL FINDINGS                        AM-PAC '6-Clicks' INPATIENT SHORT FORM - BASIC MOBILITY  How much difficulty does the patient currently have...  Patient Difficulty: Turning over in bed (including adjusting bedclothes, sheets and blankets)?: A Little   Patient Difficulty: Sitting down on and standing up from a chair with arms (e.g., wheelchair, bedside commode, etc.): A Little   Patient Difficulty: Moving from lying on back to sitting on the side of the bed?: A Little   How much help from another person does the patient currently need...   Help from Another: Moving to and from a bed to a chair (including a wheelchair)?: A Little   Help from Another: Need to walk in hospital room?: A Little   Help from Another: Climbing 3-5 steps with a railing?: A Little       AM-PAC Score:  Raw Score: 18   Approx Degree of Impairment: 46.58%   Standardized Score (AM-PAC Scale): 43.63   CMS Modifier (G-Code): CK    FUNCTIONAL ABILITY STATUS  Gait Assessment   Functional Mobility/Gait Assessment  Gait Assistance: Supervision  Distance (ft): 250  Assistive Device: Rolling walker  Stairs: Stairs  How Many Stairs: 4  Assist: Supervision    Skilled Therapy Provided   Supervision for bed mobility.   VC for transfer set up.   Sit-stand to RW with supervision.   Pt ambulatory with RW and supervision.   Ambulates with step to gait pattern progressing to step  through gait pattern.   VC for sequencing with RW.   Pt ascended/descended 4 stairs safely with supervision.   VC for sequencing.   Returned to room sitting up in bedside chair.     Bed Mobility:  Rolling: supervision  Supine to sit: supervision   Sit to supine: supervision     Transfer Mobility:  Sit to stand: supervision   Stand to sit: supervision  Gait = supervision    Therapist's comments: Reviewed activity recommendations and there-ex.     Exercise/Education Provided:  Bed mobility  Energy conservation  Functional activity tolerated  Gait training  Posture  Strengthening  Transfer training    Patient End of Session: Up in chair, Needs met, Call light within reach, RN aware of session/findings, All patient questions and concerns addressed, Hospital anti-slip socks    Patient Evaluation Complexity Level:  History Moderate - 1 or 2 personal factors and/or co-morbidities   Examination of body systems Low -  addressing 1-2 elements   Clinical Presentation Low- Stable   Clinical Decision Making Low Complexity       PT Session Time: 30 minutes  Gait Training: 10 minutes  Therapeutic Activity: 8 minutes  Neuromuscular Re-education:  minutes  Therapeutic Exercise:  minutes

## 2025-05-07 NOTE — SPIRITUAL CARE NOTE
Spiritual Care Visit No  Patient Name: Lexi Ahumada Date of Spiritual Care Visit: 25   : 1950 Primary Dx: <principal problem not specified>      - Spiritual Care: Provided support for patient's spiritual/Restoration requests. Coordinated Communion and verified NPO status.  remains available for follow up.  Spiritual Care support can be requested via an Epic consult. For urgent/immediate needs, please contact the On Call  at: Edward: ext [phone]     . Pricilla singer   CALLED RADIOLOGY, U/S TECH ETA IS APPROX 20-30MIN

## 2025-05-07 NOTE — ANESTHESIA POSTPROCEDURE EVALUATION
[facility]    Lexi Ahumada Patient Status:  Inpatient   Age/Gender 74 year old female MRN [id number]   Location [facility] SURGERY Attending Jose Luis Yepez MD   Hosp Day # 0 PCP Anne Galvan MD       Anesthesia Post-op Note    Right total knee arthroplasty    Procedure Summary       Date: 05/06/25 Room / Location:  MAIN OR 19 / [facility] MAIN OR    Anesthesia Start: 1650 Anesthesia Stop: 1909    Procedure: Right total knee arthroplasty (Right: Knee) Diagnosis:       Primary osteoarthritis of right knee      (Primary osteoarthritis of right knee [M17.11])    Surgeons: Jose Luis Yepez MD Anesthesiologist: Mau Soto MD    Anesthesia Type: spinal, general ASA Status: 2            Anesthesia Type: spinal, general    Vitals Value Taken Time   /67 05/06/25 19:09   Temp 98.3 05/06/25 19:09   Pulse 78 05/06/25 19:09   Resp 13 05/06/25 19:09   SpO2 93% 05/06/25 19:09           Patient Location: PACU    Anesthesia Type: general    Airway Patency: patent    Postop Pain Control: adequate    Mental Status: preanesthetic baseline    Nausea/Vomiting: none    Cardiopulmonary/Hydration status: stable euvolemic    Complications: no apparent anesthesia related complications    Postop vital signs: stable    Dental Exam: Unchanged from Preop    Patient to be discharged from PACU when criteria met.

## 2025-05-07 NOTE — CONSULTS
[facility]  Report of Consultation    Lexi Ahumada Patient Status:  Inpatient    1950 MRN [id number]   Location [facility] 3SW-A Attending Jose Luis Yepez MD   Hosp Day # 0 PCP Anne Galvan MD     Reason for Consultation:  Medical management    REFERRING PHYSICIAN: Jose Luis Forrest MD      Chief Complaint: Status post right total knee arthroplasty by Ortho Dr. Yepez    History of Present Illness:  Lexi Ahumada is a  74 year old female admitted status post right total knee arthroplasty by Ortho Dr. Yepez.  Complains of expected incisional pain 8 out of 10.  Denies any nausea vomiting.  Patient seen with patient family at bedside including patient's daughter Rebecca.  Patient denies any chest pain or shortness of breath.  No other complaints.  Has history of diabetes and takes pills at home to control blood sugar.      History:  Past Medical History:    Abdominal hernia    On bellybutton    Abdominal pain    Occasional    Anemia    Arthritis    Bloating    Blood disorder    anemia    Cataract    Constipation    Occasionally    Diabetes mellitus (HCC)    Diarrhea, unspecified    Occasionally    Diverticulitis    Dyslipidemia    Flatulence/gas pain/belching    Frequent urination    Gastrointestinal bleeding    PUDz in setting of ASA/boniva use    Heart murmur    Hemorrhoids    High blood pressure    High cholesterol    History of cardiac murmur    History of stomach ulcers    Irregular bowel habits    Leaking of urine    Osteoarthritis    Osteopenia    Other and unspecified hyperlipidemia    Pain in joints    Type 2 diabetes mellitus with proteinuria or albuminuria    Type II or unspecified type diabetes mellitus without mention of complication, not stated as uncontrolled    Visual impairment    glasses    Vitamin D deficiency     Past Surgical History:   Procedure Laterality Date    Colonoscopy  2008    Hemorrhoid, Recheck 7-10 years    Colonoscopy  2013    adenomatous polyp     Colonoscopy      Hysterectomy  2004    total hystero.    Oophorectomy Bilateral 2004    total hystero.    Other surgical history      varicose vein procedures    Tonsillectomy      Total abdom hysterectomy      Upper gi endoscopy,exam  01/01/2008     Family History   Problem Relation Age of Onset    Other (Lung ca) Mother     Other (Dementia) Father     Other (Thrombocytopenia) Other         Sibling    Heart Disorder Maternal Grandmother         CVA    Heart Disorder Maternal Grandfather         MI    Cancer Brother         esophageal cancer     Social History:   reports that Lexi has never smoked. Lexi has never used smokeless tobacco. Lexi reports that Lexi does not drink alcohol and does not use drugs.    Allergies:  Allergies   Allergen Reactions    Boniva [Ibandronate Sodium] OTHER (SEE COMMENTS)     Bleeding ulcers    Lisinopril Coughing     TABS       Medications:    Current Facility-Administered Medications:     lactated ringers infusion, , Intravenous, Continuous    tiZANidine (Zanaflex) partial tab 1 mg, 1 mg, Oral, Q6H PRN    [START ON 5/7/2025] dexAMETHasone PF (Decadron) 10 MG/ML injection 8 mg, 8 mg, Intravenous, Once    oxyCODONE immediate release partial tab 2.5 mg, 2.5 mg, Oral, Q4H PRN **OR** oxyCODONE immediate release tab 5 mg, 5 mg, Oral, Q4H PRN    HYDROmorphone (Dilaudid) 1 MG/ML injection 0.2 mg, 0.2 mg, Intravenous, Q2H PRN **OR** HYDROmorphone (Dilaudid) 1 MG/ML injection 0.4 mg, 0.4 mg, Intravenous, Q2H PRN    traMADol (Ultram) tab 50 mg, 50 mg, Oral, 4 times per day    ketorolac (Toradol) 15 MG/ML injection 15 mg, 15 mg, Intravenous, Q6H **FOLLOWED BY** [START ON 5/7/2025] ibuprofen (Motrin) tab 600 mg, 600 mg, Oral, q6h    glycerin-hypromellose- (Artificial Tears) 0.2-0.2-1 % ophthalmic solution 1 drop, 1 drop, Ophthalmic, BID PRN    latanoprost (Xalatan) 0.005 % ophthalmic solution 1 drop, 1 drop, Both Eyes, Nightly    [START ON 5/7/2025] pantoprazole (Protonix)   tab 40 mg, 40 mg, Oral, BID AC    [START ON 5/7/2025] rosuvastatin (Crestor) tab 20 mg, 20 mg, Oral, Nightly    sodium chloride 0.9 % IV bolus 500 mL, 500 mL, Intravenous, Once PRN    sennosides (Senokot) tab 17.2 mg, 17.2 mg, Oral, Nightly    docusate sodium (Colace) cap 100 mg, 100 mg, Oral, BID    polyethylene glycol (PEG 3350) (Miralax) 17 g oral packet 17 g, 17 g, Oral, Daily PRN    magnesium hydroxide (Milk of Magnesia) 400 MG/5ML oral suspension 30 mL, 30 mL, Oral, Daily PRN    bisacodyl (Dulcolax) 10 MG rectal suppository 10 mg, 10 mg, Rectal, Daily PRN    fleet enema (Fleet) rectal enema 133 mL, 1 enema, Rectal, Once PRN    ondansetron (Zofran) 4 MG/2ML injection 4 mg, 4 mg, Intravenous, Q6H PRN    metoclopramide (Reglan) 5 mg/mL injection 10 mg, 10 mg, Intravenous, Q8H PRN    diphenhydrAMINE (Benadryl) 50 mg/mL  injection 25 mg, 25 mg, Intravenous, Once PRN    diphenhydrAMINE (Benadryl) cap/tab 25 mg, 25 mg, Oral, Q4H PRN **OR** diphenhydrAMINE (Benadryl) 50 mg/mL  injection 12.5 mg, 12.5 mg, Intravenous, Q4H PRN    [START ON 5/7/2025] ferrous sulfate DR tab 325 mg, 325 mg, Oral, Daily with breakfast    aspirin DR tab 81 mg, 81 mg, Oral, BID    [START ON 5/7/2025] ceFAZolin (Ancef) 2g in 10mL IV syringe premix, 2 g, Intravenous, Q8H    cephALEXin (Keflex) cap 500 mg, 500 mg, Oral, Q8H DORIS    Review of Systems:  A comprehensive 14 point review of systems was completed.  Pertinent positives and negatives noted in the the HPI.    Physical Exam:  Vital signs: Blood pressure 116/63, pulse 76, temperature 98.4 °F (36.9 °C), temperature source Temporal, resp. rate 12, height 5' (1.524 m), weight 149 lb 6.4 oz (67.8 kg), SpO2 97%, not currently breastfeeding.  General: No acute distress.   HEENT: Moist mucous membranes.   Respiratory: Clear to auscultation bilaterally.  No wheezes. No rhonchi.  Cardiovascular: S1, S2.  Regular rate and rhythm.  Abdomen: Soft, nontender, nondistended.  Positive bowel  sounds.  Neurologic: Awake alert, no focal neurological deficits.  Musculoskeletal: Right knee incision in dressing.  No pedal edema or calf tenderness  Psychiatric: Appropriate mood and affect.    Laboratory Data:  Lab Results   Component Value Date    PGLU 195 05/06/2025           ASSESSMENT / PLAN:   Status post right total knee arthroplasty by Ortho Jose Luis Forrest MD-managed by Ortho    Diabetes mellitus type 2  Hyperglycemia protocol  Follow Accu-Cheks  Hold home oral antihyperglycemic's while in hospital, will plan to resume this at the time of discharge  Will do NovoLog correction factor coverage in hospital, blood sugar controlled at this time  Hypertension  Hold home losartan at this time  Follow blood pressure, will plan to resume this tomorrow if blood pressures start to trend higher.  Blood pressure controlled at this time  Hyperlipidemia/dyslipidemia  Continue home statin  GERD, history of peptic ulcer disease  PPI for PUD prophylaxis      Quality:  DVT Prophylaxis: SCD.  Baby aspirin 81 mg twice daily for DVT prophylaxis per Ortho  CODE status: Full  Lopez: No    Plan of care discussed with patient, patient's daughter at bedside        Thank you for allowing me to participate in the care of your patient. Will continue to follow while in hospital.       Stacy Moe MD  5/6/2025  8:40 PM

## 2025-05-07 NOTE — PROGRESS NOTES
[facility] Progress Note    Subjective: Patient doing well. Pain well controlled on oral medication. Has worked with and cleared PT/OT. Tolerating diet, voiding independently.    Objective: Laying comfortably in hospital chair. Alert and oriented. Firing BL TA/EHL. Dressing C/D/I.    Assessment/Plan: 74 year old female postop day #1 status post right total knee arthroplasty.    - Weightbearing as tolerated, PT OT  - DVT prophylaxis mechanical plus aspirin twice daily  - Pain control with oral meds  - IV Ancef, 1 week Keflex postop due to diabetes  Disposition: Home today    Shaheed Knight PA-C  [facility]opedic [facility]  Phone [phone]  Fax [phone]

## 2025-05-07 NOTE — PROGRESS NOTES
[facility]   part of [facility]     Hospitalist Progress Note     Lexi Ahumada Patient Status:  Inpatient    1950 MRN [id number]   Location [facility] 3SW-A Attending Jose Luis Yepez MD   Hosp Day # 1 PCP Anne Galvan MD     Chief Complaint: Medical management    Subjective:     Patient feeling better today.  Sitting up in the chair.  Expected incisional pain 4 out of 10.  Denies any nausea vomiting.  Tolerated diet.  Did well with physical therapy.  Being discharged by orthopedic team today.    Objective:    Review of Systems:   A comprehensive review of systems was completed; pertinent positive and negatives stated in subjective.    Vital signs:  Temp:  [97.7 °F (36.5 °C)-98.7 °F (37.1 °C)] 98.7 °F (37.1 °C)  Pulse:  [68-84] 72  Resp:  [8-22] 18  BP: ()/(40-71) 92/40  SpO2:  [90 %-97 %] 94 %    Physical Exam:    BP 92/40 (BP Location: Left arm)   Pulse 72   Temp 98.7 °F (37.1 °C) (Oral)   Resp 18   Ht 5' (1.524 m)   Wt 149 lb 6.4 oz (67.8 kg)   LMP  (LMP Unknown)   SpO2 94%   BMI 29.18 kg/m²     General: No acute distress.   HEENT: Moist mucous membranes.   Respiratory: Clear to auscultation bilaterally.  No wheezes. No rhonchi.  Cardiovascular: S1, S2.  Regular rate and rhythm.  Abdomen: Soft, nontender, nondistended.  Positive bowel sounds.  Neurologic: Awake alert, no focal neurological deficits.  Musculoskeletal: Right knee incision in dressing.  No pedal edema or calf tenderness  Psychiatric: Appropriate mood and affect.    Medications:    traMADol  50 mg Oral 4 times per day    ketorolac  15 mg Intravenous Q6H    Followed by    ibuprofen  600 mg Oral q6h    latanoprost  1 drop Both Eyes Nightly    pantoprazole  40 mg Oral BID AC    rosuvastatin  20 mg Oral Nightly    sennosides  17.2 mg Oral Nightly    docusate sodium  100 mg Oral BID    ferrous sulfate  325 mg Oral Daily with breakfast    aspirin  81 mg Oral BID    cephalexin  500 mg Oral Q8H DORIS    insulin aspart   1-10 Units Subcutaneous TID AC and HS       Assessment & Plan:      Status post right total knee arthroplasty by Ortho Jose Luis Forrest MD-managed by Ortho     Diabetes mellitus type 2 with steroid induced hyperglycemia  Hyperglycemia protocol  Followed Accu-Cheks  Held home oral antihyperglycemic's while in hospital, will plan to resume this at the time of discharge  Continue NovoLog correction factor coverage in hospital, blood sugar controlled at this time  Blood sugar elevated today after 1 dose of IV steroids given by Ortho today morning.  Will give 1 dose of Tresiba.  Hypertension  Continue home losartan at the time of discharge with holding parameters to hold if systolic blood pressure less than 120  Blood pressure controlled at this time  Hyperlipidemia/dyslipidemia  Continue home statin  GERD, history of peptic ulcer disease  PPI for PUD prophylaxis    Discussed with patient, patient's daughter at bedside.  Discussed with RN.    Pain controlled and patient did very well with physical therapy.  Being discharged by Ortho today, final discharge order from Ortho who is attending/admitting this admission.  Wound care and activity per Ortho.  Follow-up with Ortho as outpatient as directed.  Follow-up with regular outpatient primary care Anne Galvan MD within 1 week in office with blood pressure log at home.      Stacy Moe MD    Supplementary Documentation:     Quality:  DVT Mechanical Prophylaxis:   SCDs, Early ambuation  DVT Pharmacologic Prophylaxis   Medication   None         DVT Pharmacologic prophylaxis: Aspirin 81 mg      Code Status: Not on file  Lopez: External urinary catheter in place  Lopez Duration (in days):   Central line:    ASCENCION: 5/7/2025    Discharge is dependent on: Per Ortho   At this point   Brandyn is expected to be discharge to: Home    The 21st Century Cures Act makes medical notes like these available to patients in the interest of transparency. Please be advised this is a medical  document. Medical documents are intended to carry relevant information, facts as evident, and the clinical opinion of the practitioner. The medical note is intended as peer to peer communication and may appear blunt or direct. It is written in medical language and may contain abbreviations or verbiage that are unfamiliar.

## 2025-05-07 NOTE — SPIRITUAL CARE NOTE
Spiritual Care Visit Note    Patient Name: Lexi Ahumada Date of Spiritual Care Visit: 25   : 1950 Primary Dx: <principal problem not specified>       Referred By: Referral From: Nurse    Spiritual Care Taxonomy:    Intended Effects: Aligning care plan with patient's values              Visit Type/Summary:     - Spiritual Care: Patient was being discharged - unable to complete visit.     Spiritual Care support can be requested via an Epic consult. For urgent/immediate needs, please contact the On Call  at: Edward: ext [phone]    Daina Zendejas MDiv, BCC, DMin

## 2025-05-07 NOTE — PLAN OF CARE
Patient is alert and oriented x4. VSS on RA. Pain controlled. No complaint of numbness or tingling. Pulses intact. Plantar and dorsi flexions intact bilaterally. Motor strength intact.  Aquacel dressing CDI. IS and ankle pumps encouraged. Belongings within reach. Encouraged to call for any needs.

## 2025-05-07 NOTE — PAYOR COMM NOTE
--------------  ADMISSION REVIEW     Payor: [employer] MEDICARE ADV PPO  Subscriber #:  [id number]  Authorization Number: [id number]    Admit date: 25  Admit time:  1:21 PM       REVIEW DOCUMENTATION:  2025  5:15 PM Right total knee arthroplasty    Jose Luis Yepez MD               Signed         Operative Note     Patient Name/: Lexi Ahumada 1950  Date: 2025  Location: [facility]  Preoperative Diagnosis: Right knee osteoarthritis  Postoperative Diagnosis: Same  Operation: Right total knee arthroplasty mechanical alignment  Primary Surgeon: Jose Luis Yepez  Assistant:  Williams Booth surgical assist. Please note a skilled surgical assistant was necessary for this case in order to assist with patient positioning, prepping, draping, incision, exposure, implant placement, wound closure.  Indications: 74-year-old female with end-stage right knee varus osteoarthritis  Surgical Findings: Varus OA  Operative Report:  After informed consent, the right lower extremity was marked.  Patient was brought to the operating room where general anesthesia was induced.  Preoperative exam demonstrated range of motion from few degrees short of full extension, gravity flexion 110-120. The right lower extremity was prepped and draped in sterile fashion.  Timeout was performed to verify correct surgical site and procedure.  Right 2 g of Ancef was given within 1 hour of incision.  Additionally, 1 g tranexamic acid was given intravenously.  Next the limb was gravity exsanguinated and tourniquet inflated.  Incision was made anteriorly from 2 fingerbreadths proximal to the patella down along the medial aspect of the tibial tubercle.  This was carried down to the extensor mechanism.  Medial and lateral flaps were developed.  The VMO muscle belly was identified.  Full-thickness medial parapatellar arthrotomy was made from 2 fingerbreadths proximal to the patella along the VMO muscle belly, around the medial patella  and patellar tendon.  The fat pad was dissected free from the patellar tendon and reflected medially.  Subperiosteal dissection was carried out posteriorly about the proximal medial aspect of the tibia.  Osteophytes were removed from the proximal medial tibia.  The lateral patellofemoral plica was incised, and the patella was everted and denervated.  The synovium over the anterior distal femur was teed open.  Next the knee was flexed up, and remnant of the ACL and lateral meniscus were excised.  I used an intramedullary reamer to open up the femur for our intramedullary alignment guide.  The distal femoral cutting block was placed on the distal femur to take off 9.5 mm of bone.  It sat down on the distal medial femur, measuring to take off more medial bone than lateral.  This was pinned in place.  A lesley's wing was used to check the cut, and the cut was performed.  The distal medial femur fragment measured 8 mm.  Next the knee was flexed and the tibial crest was marked.  An extra medullary tibial cutting guide was placed above the ankle, measuring to take off a few millimeters of the medial side, pinned in line with the tibial crest in the coronal plane and with a few degrees of posterior slope in the sagittal plane.  The proximal tibia was cut, using a Z retractor to protect MCL, patellar tendon and lateral soft tissues.  The cut measured 8 lateral.  After this portion of bone was removed, the knee was brought into extension and a lamina  was used to open up the extension space.  What remained of the medial and lateral menisci were removed.  Spacer block was placed with a T-handle drop-down xavier to verify appropriate cut angle.  Next the knee was flexed up, and the femoral sizing block with stylus was used.  This sized for a size 4 femur.  Pins were placed, and the sizing block removed.  Pins were noted to be parallel to the epicondylar axis.  The 4-in-1 cutting block was placed and screwed down.  Anterior  distal femur was cut, demonstrating a positive grand piano sign.  Posterior condyles were cut, measuring 7 on the posterior medial cut.  Chamfers were then cut.  The cutting block was removed, and the knee was flexed with a lamina  to open up the posterior knee joint.  Notch contents and PCL were removed.  Posterior condylar recesses were opened, and posterior osteophytes chiseled out.  The knee was tight on the medial side in flexion and extension therefore medial release was performed with semimembranosus insertion as well as osteophytes.  The knee was brought into extension, and sized for a tibial baseplate.  Trials were then placed.  With the 4 femur, the T3-I4 tibia, and a 10 polyethylene liner, the knee came to full extension with excellent varus valgus stability.  In mid flexion, there was 3 varus, 1-2 valgus, Lachman stable.  At 90 degrees anterior drawer was less than 5 mm partially rotating/partially translating.  Gravity flexion 120+ with the patella tracking centrally.  The tibial component rotation was marked, and the tibial trial removed.  The femoral lugs were drilled, and the anterior chamfer finishing guide was placed to cut the finishing anterior chamfer cut.  The knee was then flexed, and the tibia brought forward to place the tibial baseplate trial.  This was pinned in place, and the drill and keel punch were used to prepare the proximal tibia.  The knee was then brought into extension, the patella everted, and it was circumferentially denervated and osteophytes were excised.  Next final components were opened, bony surfaces were washed and dried, and periarticular pain cocktail was injected with 30 mL in the posterior capsule.  Cement was then mixed.  The tibial component was cemented along with the proximal tibial bone surface, the component was impacted into place followed by removal of excess cement.  The femur was reduced onto the tibia, and cement was placed on the cut surface of  the femur.  The cemented femoral component was then impacted down, with removal of excess cement.  The knee was brought into full extension and an axial load was held across the joint.  Once cement had hardened, the knee was trialed again.  Optimal stability was achieved with a 10 mm polyethylene liner, with exam demonstrating same as with trials.  The trial poly was removed, the knee was washed out, any loose bodies removed, and final poly was placed.  Betadine lavage was performed.  Remaining pain cocktail was injected with 10 mL into remaining fat pad, 10 mL into medial femoral and tibial periosteum, and 10 mL into extensor mechanism.  Closure was performed with 5 Ethibond for the arthrotomy, 2-0 Vicryl for skin, followed by staples.  A sterile dressing was applied, and the patient was brought to PACU in good condition.  Anesthesia: General Plus adductor canal block  Complications: None  Specimens: Bone  Blood loss: 25 mL  Fluids: See anesthesia report  Implants: Medacta GM K sphere size 4 femur, T3-I4 tibia, 10 poly-  Drains: None  Condition: Good  Disposition: Floor     - Weightbearing as tolerated, PT OT  - DVT prophylaxis mechanical pes aspirin twice daily  - Pain control with oral meds  - IV Ancef, 1 week Keflex postop due to diabetes                MEDICATIONS ADMINISTERED IN LAST 1 DAY:  aspirin DR tab 81 mg       Date Action Dose Route User    5/6/2025 2047 Given 81 mg Oral Josias Stone RN          ceFAZolin (Ancef) 2g in 10mL IV syringe premix       Date Action Dose Route User    5/6/2025 1700 Given 2 g Intravenous Mau Soto MD          ceFAZolin (Ancef) 2g in 10mL IV syringe premix       Date Action Dose Route User    5/7/2025 0146 New Bag 2 g Intravenous Josias Stone RN          cephALEXin (Keflex) cap 500 mg       Date Action Dose Route User    5/7/2025 0619 Given 500 mg Oral Josias Stone RN    5/6/2025 2048 Given 500 mg Oral Josias Stone RN          dexAMETHasone PF (Decadron) 10 MG/ML  injection       Date Action Dose Route User    5/6/2025 1700 Given 10 mg Injection Mau Soto MD          docusate sodium (Colace) cap 100 mg       Date Action Dose Route User    5/6/2025 2048 Given 100 mg Oral Josias Stone RN          fentaNYL (Sublimaze) 50 mcg/mL injection 25 mcg       Date Action Dose Route User    5/6/2025 1938 Given 25 mcg Intravenous Mika Mujica RN    5/6/2025 1920 Given 25 mcg Intravenous Mika Mujica RN    5/6/2025 1915 Given 25 mcg Intravenous Mika Mujica RN          fentaNYL (Sublimaze) 50 mcg/mL injection       Date Action Dose Route User    5/6/2025 1755 Given 50 mcg Intravenous Mau Soto MD    5/6/2025 1717 Given 50 mcg Intravenous Mau Soto MD          fentaNYL (Sublimaze) 50 mcg/mL injection       Date Action Dose Route User    5/6/2025 1920 Given 25 mcg Intravenous Mika Mujica RN          fentaNYL (Sublimaze) 50 mcg/mL injection       Date Action Dose Route User    5/6/2025 1938 Given 25 mcg Intravenous Mika Mujica RN          HYDROmorphone (Dilaudid) 1 MG/ML injection 0.4 mg       Date Action Dose Route User    5/6/2025 1933 Given 0.4 mg Intravenous Mika Mujica RN    5/6/2025 1926 Given 0.4 mg Intravenous Mika Mujica RN          HYDROmorphone (Dilaudid) 1 MG/ML injection       Date Action Dose Route User    5/6/2025 1926 Given 0.4 mg Intravenous Mika Mujica RN          insulin aspart (NovoLOG) 100 Units/mL vial 1-5 Units       Date Action Dose Route User    5/6/2025 1930 Given 2 Units Subcutaneous (Right Upper Arm) Mika Mujica RN          insulin aspart (NovoLOG) 100 Units/mL FlexPen 1-10 Units       Date Action Dose Route User    5/7/2025 0619 Given 1 Units Subcutaneous (Left Upper Arm) Josias Stone RN          ketorolac (Toradol) 15 MG/ML injection 15 mg       Date Action Dose Route User    5/7/2025 0146 Given 15 mg Intravenous Josias Stone RN    5/6/2025 2047 Given 15 mg Intravenous Speno,  KIRBY Ferrara          lactated ringers infusion       Date Action Dose Route User    5/6/2025 1650 Continued by Anesthesia (none) Intravenous Mau Soto MD    5/6/2025 1435 New Bag (none) Intravenous Andreia Otto RN          lactated ringers infusion       Date Action Dose Route User    5/6/2025 1906 Rate/Dose Change (none) Intravenous Mika Mujica, KIRBY          midazolam (Versed) 2 MG/2ML injection       Date Action Dose Route User    5/6/2025 1651 Given 2 mg Intravenous Mau Soto MD          ondansetron (Zofran) 4 MG/2ML injection       Date Action Dose Route User    5/6/2025 1829 Given 4 mg Intravenous Mau Soto MD          clonidine/epinephrine/ropivacaine/ketorolac in 0.9% NaCl 60 mL pain cocktail syringe for knee arthroplasty       Date Action Dose Route User    5/6/2025 1830 Given (none) Infiltration (Right Knee) Jose Luis Yepez MD          oxyCODONE immediate release tab 5 mg       Date Action Dose Route User    5/6/2025 2048 Given 5 mg Oral Josias Stone RN          pantoprazole (Protonix) DR tab 40 mg       Date Action Dose Route User    5/7/2025 0618 Given 40 mg Oral Josias Stone RN          povidone-iodine (Betadine) 10 % 17.5 mL in sodium chloride 0.9 % 500 mL irrigation       Date Action Dose Route User    5/6/2025 1830 Given 500 mL Irrigation (Right Knee) Jose Luis Yepez MD          propofol (Diprivan) 10 MG/ML injection       Date Action Dose Route User    5/6/2025 1717 Given 150 mg Intravenous Mau Soto MD    5/6/2025 1659 Given 20 mg Intravenous Mau Soto MD          propofol (Diprivan) 10 mg/mL infusion premix       Date Action Dose Route User    5/6/2025 1717 Rate/Dose Change 50 mcg/kg/min × 67.8 kg Intravenous Mau Soto MD    5/6/2025 1659 New Bag 100 mcg/kg/min × 67.8 kg Intravenous Mau Soto MD          rocuronium (Zemuron) 50 mg/5mL injection       Date Action Dose Route User    5/6/2025 1717 Given 50 mg Intravenous Mau Soto MD          ropivacaine  (Naropin) 0.5% injection       Date Action Dose Route User    5/6/2025 1655 Given 2 mL Intrathecal Mau Soto MD          ropivacaine (Naropin) 0.5% injection       Date Action Dose Route User    5/6/2025 1850 Given 20 mL Infiltration Mau Soto MD          sennosides (Senokot) tab 17.2 mg       Date Action Dose Route User    5/6/2025 2047 Given 17.2 mg Oral Josias Stone RN          sugammadex (Bridion) 200 MG/2ML injection       Date Action Dose Route User    5/6/2025 1839 Given 200 mg Intravenous Mau Soto MD          traMADol (Ultram) tab 50 mg       Date Action Dose Route User    5/7/2025 0619 Given 50 mg Oral Josias Stone RN    5/7/2025 0146 Given 50 mg Oral Josias Stone RN          tranexamic acid in sodium chloride 0.7% (Cyklokapron) 1000 mg/100mL infusion premix       Date Action Dose Route User    5/6/2025 1659 Given 1,000 mg Intravenous Mau Soto MD            Vitals (last day)       Date/Time Temp Pulse Resp BP SpO2 Weight O2 Device O2 Flow Rate (L/min) [facility]    05/07/25 0300 98.1 °F (36.7 °C) 75 18 119/61 91 % -- Nasal cannula 2 L/min     05/06/25 2330 98.4 °F (36.9 °C) 74 18 102/54 93 % -- Nasal cannula 2 L/min     05/06/25 2030 -- 68 18 122/58 92 % -- Nasal cannula 2 L/min     05/06/25 1951 -- 76 12 -- 97 % -- -- --     05/06/25 1949 -- 78 8 -- 93 % -- -- --     05/06/25 1945 98.4 °F (36.9 °C) -- -- -- -- -- Nasal cannula 2 L/min     05/06/25 1942 -- 70 10 116/63 92 % -- -- --     05/06/25 1927 -- 75 16 126/60 94 % -- -- --     05/06/25 1922 -- 72 13 131/68 94 % -- -- --     05/06/25 1917 -- 76 15 134/71 95 % -- -- --     05/06/25 1912 -- 79 16 134/68 96 % -- -- --     05/06/25 1907 -- 84 22 138/67 90 % -- -- --     05/06/25 1906 98.3 °F (36.8 °C) -- -- -- -- -- Nasal cannula 2 L/min     05/06/25 1357 97.8 °F (36.6 °C) 73 16 125/66 97 % 149 lb 6.4 oz (67.8 kg) None (Room air) -- NR          CIWA Scores (since admission)       None

## 2025-05-08 ENCOUNTER — PATIENT OUTREACH (OUTPATIENT)
Dept: CASE MANAGEMENT | Age: 75
End: 2025-05-08

## 2025-05-08 NOTE — PROGRESS NOTES
Transitional Care Management   Discharge Date: 25  Contact Date: 2025    Assessment:  TCM Initial Assessment    General:  Assessment completed with: Patient, Children  Patient Subjective: Pt doing ok in some pain from her surgery however pain medication is helping.  Chief Complaint: Right total knee arthroplasty  Verify patient name and  with patient/ caregiver: Yes    Hospital Stay/Discharge:  Prior to leaving the hospital were your Discharge Instructions reviewed with you?: Yes  Did you receive a copy of your written Discharge Instructions?: Yes  Do you feel better or worse since you left the hospital or emergency department?: Better    Follow - Up Appointment:  Do you have a follow-up appointment?: No    Home Health/DME:  Prior to leaving the hospital was Home Health (HH) arranged for you?: Yes  Has HH been out or set up a visit to see you?: Been out     Prior to leaving the hospital or emergency department was Durable Medical Equipment (DME), medical supplies, or infusions arranged for you?: No  Are DME/medical supply/infusions needs identified by staff during this assessment?: No     Medications/Diet:       Were you given a different diet per your Discharge Instructions?: No     Questions/Concerns:  Do you have any questions or concerns that have not been discussed?: No         Follow-up Appointments:  Your appointments       Date & Time Appointment Department (Center)    May 21, 2025 11:20 AM CDT Post Op Visit with Shaheed Knight PA-C [facility], [address] [place] [address] ([facility])        May 22, 2025 11:45 AM CDT PT Ortho Post Op Evaluation with Colt Hilliard PT [facility]  [place] ([facility] [place])        May 27, 2025 11:45 AM CDT Physical Therapy Ortho Treatment with Colt Hilliard PT [facility] [place] ([facility] [place])        May 29, 2025 10:45 AM CDT Physical Therapy Ortho Treatment with Ria Gaitan  [facility] [place] ([facility] [place])        Jun 02, 2025 11:00 AM CDT Physical Therapy Ortho Treatment with JosefinaantisRia Edmary anne [facility] [place] ([facility] [place])        Jun 05, 2025 11:00 AM CDT Physical Therapy Ortho Treatment with Colt Hilliard, PT [facility] [place] ([facility] [place])        Jun 09, 2025 11:00 AM CDT Physical Therapy Ortho Treatment with JosefinaantisRia Edmary anne [facility] [place] ([facility] [place])        Jun 12, 2025 11:00 AM CDT Physical Therapy Ortho Treatment with Colt Hilliard, PT [facility] [place] ([facility] [place])        Jun 16, 2025 10:45 AM CDT Physical Therapy Ortho Treatment with Colt Hilliard, PT [facility] [place] ([facility] [place])        Jun 18, 2025 11:20 AM CDT Post Op Visit with Jose Luis Yepez MD [address] ([facility] [employer])        Jun 19, 2025 10:45 AM CDT Physical Therapy Ortho Treatment with JosefinaantisRia [facility] [place] ([facility] [place])        Jun 23, 2025 11:45 AM CDT Physical Therapy Ortho Treatment with JosefinaantisRia Edmary anne [facility] [place] ([facility] [place])        Jun 26, 2025 11:45 AM CDT Physical Therapy Ortho Treatment with Colt Hilliard, PT [facility] [place] ([facility] [place])        Jun 30, 2025 10:45 AM CDT Physical Therapy Ortho Treatment with Colt Hilliard, PT [facility] [place] ([facility] [place])        Jul 07, 2025 8:30 AM CDT EGD with JASWANT CAMILO (--)    Please arrive 60 minutes prior to your scheduled procedure time.         Jul 08, 2025 10:00 AM CDT Diabetes Pump follow up with Pauline Pereyra APRN [facility] ([facility] [place])        Sep 19, 2025 10:15 AM CDT Exam - Established with Lucita Cope DO [facility], [place] ([facility]         Nov 19, 2025 9:40 AM CST Follow-Up OV with Jose F Ortega APRN [facility] ([facility])    Please arrive 15 minutes prior to your scheduled appointment time.               [facility] [place]  [facility]  [address] [facility] [address]  [phone] [facility], [address] [place] [address], [facility]  [address]  [facility] [address]  [phone] [facility], [facility]  [address] [facility] [address]  [facility] [address] [phone]    [facility]  [facility] [place] [address] [place] [address]  [facility] [address] [phone] HERBER [place]  No address on file  [phone] [facility]  [facility]  [address] [facility] [address] [phone]            Transitional Care Clinic  Was TCC Ordered: No  Was TCC Scheduled: No, Explain deferred following  Ortho and has follow up with Endo.     Primary Care Provider (If no TCC appointment)  Does patient already have a PCP appointment scheduled? No  Care Manager Attempted to schedule PCP office TCM appointment with patient   -If no appointment scheduled: Explain see above.     Specialist  Does the patient have any other follow-up appointment(s) that need to be scheduled? Yes   -If yes: Care Manager reviewed upcoming specialist appointments with patient: Yes   -Does the patient need assistance scheduling appointment(s): No      Book By Date: 5/21/25

## 2025-05-14 ENCOUNTER — TELEPHONE (OUTPATIENT)
Dept: ORTHOPEDICS CLINIC | Facility: CLINIC | Age: 75
End: 2025-05-14

## 2025-05-14 DIAGNOSIS — M17.11 PRIMARY OSTEOARTHRITIS OF RIGHT KNEE: ICD-10-CM

## 2025-05-14 RX ORDER — TRAMADOL HYDROCHLORIDE 50 MG/1
50 TABLET ORAL EVERY 8 HOURS
Qty: 21 TABLET | Refills: 0 | Status: SHIPPED | OUTPATIENT
Start: 2025-05-14 | End: 2025-05-21

## 2025-05-14 NOTE — TELEPHONE ENCOUNTER
Tramadol    DOS: 5/6/25  Right total knee arthroplasty   Last OV: 2/17/25  Last refill date: 5/7/25 #/refills: 21/0  Upcoming appt:   Future Appointments   Date Time Provider Department Center   5/21/2025 11:20 AM Shaheed Knight PA-C [facility]         Patient called stating the insurance would only allow a 7 day supply of tramadol. Please advise for refill. Confirmed pharmacy with patient     [facility][id number] [place], [place] - [facility] [place] [address] AT [facility] [address], [phone], [phone]   [address] [place] [address]   Phone: [phone] Fax: [phone]   Hours: Not open 24 hours

## 2025-05-14 NOTE — TELEPHONE ENCOUNTER
Patient called stating the insurance would only allow a 7 day supply of tramadol. Please advise for refill. Confirmed pharmacy with patient     [id number] - [place], [place] - [address] [place] [address] AT [facility] [address], [phone], [phone]   [facility] [address] [place] [address]   Phone: [phone] Fax: [phone]   Hours: Not open 24 hours

## 2025-05-21 ENCOUNTER — OFFICE VISIT (OUTPATIENT)
Dept: ORTHOPEDICS CLINIC | Facility: CLINIC | Age: 75
End: 2025-05-21
Payer: MEDICARE

## 2025-05-21 ENCOUNTER — TELEPHONE (OUTPATIENT)
Dept: PHYSICAL THERAPY | Facility: HOSPITAL | Age: 75
End: 2025-05-21

## 2025-05-21 VITALS — BODY MASS INDEX: 29.25 KG/M2 | HEIGHT: 60 IN | WEIGHT: 149 LBS

## 2025-05-21 DIAGNOSIS — Z96.651 STATUS POST RIGHT KNEE REPLACEMENT: Primary | ICD-10-CM

## 2025-05-21 PROCEDURE — 3008F BODY MASS INDEX DOCD: CPT | Performed by: PHYSICIAN ASSISTANT

## 2025-05-21 PROCEDURE — 1125F AMNT PAIN NOTED PAIN PRSNT: CPT | Performed by: PHYSICIAN ASSISTANT

## 2025-05-21 PROCEDURE — 99024 POSTOP FOLLOW-UP VISIT: CPT | Performed by: PHYSICIAN ASSISTANT

## 2025-05-21 PROCEDURE — 1111F DSCHRG MED/CURRENT MED MERGE: CPT | Performed by: PHYSICIAN ASSISTANT

## 2025-05-21 PROCEDURE — 1160F RVW MEDS BY RX/DR IN RCRD: CPT | Performed by: PHYSICIAN ASSISTANT

## 2025-05-21 PROCEDURE — 1159F MED LIST DOCD IN RCRD: CPT | Performed by: PHYSICIAN ASSISTANT

## 2025-05-21 NOTE — PROGRESS NOTES
[facility] Progress Note    Subjective: Patient returns to clinic 2 weeks status post right total knee arthroplasty performed on 5/6/25. They have been taking Tylenol, Tramadol, Celebrex, and oxycodone for pain control.  They continue to take aspirin for DVT prophylaxis. They are overall satisfied with their progress.  Denies any fevers, chills, night sweats.  No redness or drainage from the incision.concerning for infection. She has complains of medial right knee pain, pain at night, swelling naround the knee.    Objective: Patient is seated comfortably in the exam chair. Alert and oriented. Nonlabored breathing. Grossly neurologically intact. Incision is clean, dry, and intact with staples in place without any redness or drainage concerning for infection. Demonstrates active knee extension to 0 and knee flexion to 90. Calves are soft and nontender.     Assessment/Plan: Staples were removed in clinic today.  The incision was swabbed with Betadine, Mastisol was applied to either side of the incision, and Steri-Strips were applied over the incision.  I instructed the patient to avoid getting the incision wet in the shower for the next 2 days to allow the staple sites to reepithelialize.  I also recommended avoiding soaking the incision in a pool or tub until the patient is 6 weeks postop.  Continue with aspirin for DVT prophylaxis.  Outpatient physical therapy referral written. Follow-up in 4 weeks with Dr. Yepez for routine 6-week postoperative visit or sooner if any concerns.  The patient's questions were sought and answered satisfactorily.  They are happy with the plan and will follow as advised.    X-rays needed at next visit: Postoperative radiographs right knee and full leg length films        Shaheed Knight PA-C  [facility] Orthopedic Surgery    This note was dictated using Dragon software.  While it was briefly proofread prior to completion, some grammatical, spelling, and word choice errors  due to dictation may still occur.

## 2025-05-22 ENCOUNTER — OFFICE VISIT (OUTPATIENT)
Dept: PHYSICAL THERAPY | Age: 75
End: 2025-05-22
Attending: ORTHOPAEDIC SURGERY
Payer: MEDICARE

## 2025-05-22 DIAGNOSIS — Z96.651 S/P TOTAL KNEE ARTHROPLASTY, RIGHT: Primary | ICD-10-CM

## 2025-05-22 PROCEDURE — 97161 PT EVAL LOW COMPLEX 20 MIN: CPT

## 2025-05-22 PROCEDURE — 97112 NEUROMUSCULAR REEDUCATION: CPT

## 2025-05-22 NOTE — PROGRESS NOTES
LOWER EXTREMITY EVALUATION:     Diagnosis:   Status post right knee replacement (Z96.651) Patient:  Lexi Ahumada (74 year old, female)        Referring Provider: Jose Luis Yepez  Today's Date   5/22/2025    Precautions:  None   Date of Evaluation: 05/22/25  Next MD visit: No data recorded  Date of Surgery: 5/6/25     PATIENT SUMMARY   Summary of chief complaints: Right knee stiffness.  History of current condition: Pt states she had her right knee replaced on 5/6/25. She states she stayed one night in the hospital and went home. She states she had a nurse in the home the day after and a PT. She states the PT was there three times last week. Knee replacement was because of OA. Still swollen and brused but improving. On tramadol and oxycodone which she took today at about 930.   Pain level: current 5 /10, at best 4 /10, at worst 8 /10    Current limitations: walking distance, walking without AD, stair negotiation, squatting and ADL completion.  Pt goals: Walking without pain and stairs.  Past medical history was reviewed with Lexi.  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  Lexi  has a past surgical history that includes colonoscopy (01/01/2008); upper gi  endoscopy,exam (01/01/2008); total abdom hysterectomy; colonoscopy (04/2013); other surgical history; hysterectomy (2004); oophorectomy (Bilateral, 2004); colonoscopy; and tonsillectomy.    ASSESSMENT  Lexi presents to physical therapy evaluation with primary c/o Right knee stiffness.. The results of the objective tests and measures show reduced ROM, reduced strength, hypomobility and reported pain.. Functional deficits include but are not limited to walking distance, walking without AD, stair negotiation, squatting and ADL completion.. Signs and symptoms are consistent with diagnosis of Status post right knee replacement (Z96.651). Pt and PT discussed evaluation findings, pathology, POC and HEP.  Pt voiced understanding and agreement. Pt is educated and demonstrates safe ambulation with straight cane. She is advised to use walker outside of house during long trips and straight cane in house or short trips. Skilled Physical Therapy is medically necessary to address the above impairments and reach functional goals.    OBJECTIVE:    Gait: slight antalgic gait with two wheeled walker.   Observation/Skin: significant bruising to calf, moderate to quad and HS. Noted swelling to entire lower leg. No signs of infection or DVT.   Joint mobility: TF ant and post glide hypomobile. PF hypomobile in all directions likely from reduced ROM        AROM:    Knee    Flexion: R 87; L 120   Extension: R -10; L 0             Strength/MMT:  Knee   Flexion: R 3+/5; L 4+/5  Extension: R 4-/5; L 4+/5            Today's Treatment and Response:   Pt education was provided on exam findings, treatment diagnosis, treatment plan, expectations, and prognosis.  Today's Treatment       5/22/2025   LE Treatment   Therapeutic Exercise Heel slides 3x10  Quad set 5sx25  SAQ 20x 3s  SLR x10  Bridge 2x10  Mini squat 2x10   Neuro Re-Education Pt education   Modalities Vaso low pressure x10 min   Neuro Re-Educ Minutes 10   Evaluation Minutes 35    Hot/Cold Pack Minutes 10   Total Time Of Timed Procedures 10   Total Time Of Service-Based Procedures 45   Total Treatment Time 55   HEP Access Code: [email or web address]  URL: [email or web address]  Date: 05/22/2025  Prepared by: Colt Bousson    Exercises  - Supine Heel Slide with Strap  - 1 x daily - 7 x weekly - 3 sets - 10 reps  - Supine Quad Set  - 1 x daily - 7 x weekly - 1 sets - 25 reps - 5s hold  - Supine Knee Extension Strengthening  - 1 x daily - 7 x weekly - 1 sets - 20 reps - 3s hold  - Active Straight Leg Raise with Quad Set  - 1 x daily - 7 x weekly - 1 sets - 10 reps  - Supine Bridge  - 1 x daily - 7 x weekly - 2 sets - 10 reps  - Mini Squat with Counter Support  - 1 x daily - 7 x weekly - 2 sets - 10 reps        Patient was instructed in and issued a HEP for: Access Code: [email or web address]  URL: [email or web address]  Date: 05/22/2025  Prepared by: Colt Bousson    Exercises  - Supine Heel Slide with Strap  - 1 x daily - 7 x weekly - 3 sets - 10 reps  - Supine Quad Set  - 1 x daily - 7 x weekly - 1 sets - 25 reps - 5s hold  - Supine Knee Extension Strengthening  - 1 x daily - 7 x weekly - 1 sets - 20 reps - 3s hold  - Active Straight Leg Raise with Quad Set  - 1 x daily - 7 x weekly - 1 sets - 10 reps  - Supine Bridge  - 1 x daily - 7 x weekly - 2 sets - 10 reps  - Mini Squat with Counter Support  - 1 x daily - 7 x weekly - 2 sets - 10 reps    Charges:  PT EVAL: Low Complexity, vaso x1, neuro x1  In agreement with evaluation findings and clinical rationale, this evaluation involved LOW COMPLEXITY decision making due to no personal factors/comorbidities, 1-2 body structures involved/activity limitations, and stable symptoms as documented in the evaluation.                                                                                                                PLAN OF CARE:    Goals: (to be met in 12 visits)    Not Met Progress Toward Partially Met Met   Pt will improve  knee extension ROM to 0 deg to allow proper heel strike during gait and terminal knee extension in stance. [] [] [] []   Pt will improve knee AROM flexion to >120 degrees to improve ability to perform squat. [] [] [] []   Pt will improve quad strength to 5/5 to ascend 1 flight of stairs reciprocally without UE assist. [] [] [] []   Pt will be independent and compliant with comprehensive HEP to maintain progress achieved in PT. [] [] [] []          Frequency / Duration: Patient will be seen 2x/weekx/week or a total of 12  visits over a 90 day period. Treatment will include: Gait training; Manual Therapy; Neuromuscular Re-education; Therapeutic Activities; Therapeutic Exercise; Home Exercise Program instruction; Patient/Family Education    Education or treatment limitation: None   Rehab Potential: good       Patient/Family/Caregiver was advised of these findings, precautions, and treatment options and has agreed to actively participate in planning and for this course of care.    Thank you for your referral. Please co-sign or sign and return this letter via fax as soon as possible to [phone]. If you have any questions, please contact me at Dept: [phone]    Sincerely,  Electronically signed by therapist: Colt Hilliard, PT  Physician's certification required: Yes  I certify the need for these services furnished under this plan of treatment and while under my care.    X___________________________________________________ Date____________________    Certification From: 5/22/2025  To:8/20/2025

## 2025-05-27 ENCOUNTER — OFFICE VISIT (OUTPATIENT)
Dept: PHYSICAL THERAPY | Age: 75
End: 2025-05-27
Attending: ORTHOPAEDIC SURGERY
Payer: MEDICARE

## 2025-05-27 DIAGNOSIS — M17.11 PRIMARY OSTEOARTHRITIS OF RIGHT KNEE: ICD-10-CM

## 2025-05-27 PROCEDURE — 97110 THERAPEUTIC EXERCISES: CPT

## 2025-05-27 PROCEDURE — 97140 MANUAL THERAPY 1/> REGIONS: CPT

## 2025-05-27 PROCEDURE — 97016 VASOPNEUMATIC DEVICE THERAPY: CPT

## 2025-05-27 RX ORDER — OXYCODONE HYDROCHLORIDE 5 MG/1
TABLET ORAL EVERY 4 HOURS PRN
Qty: 30 TABLET | Refills: 0 | Status: SHIPPED | OUTPATIENT
Start: 2025-05-27 | End: 2025-05-28

## 2025-05-27 NOTE — TELEPHONE ENCOUNTER
Oxycodone 5 mg  DOS: 05/06/25  Last OV: 05/21/25  Last refill date: 05/07/25     #/refills: 30/0  Upcoming appt:   Future Appointments   Date Time Provider Department Center   5/29/2025 10:45 AM Ria Gaitan [facility] [place]   6/2/2025 11:00 AM Ria Gaitan [facility] [place]   6/5/2025 11:00 AM Colt Hilliard, PT [facility]   6/9/2025 11:00 AM Ria Gaitan [facility] [place]   6/12/2025 11:00 AM Colt Hilliard, PT [facility] [place]   6/16/2025 10:45 AM Colt Hilliard PT [facility] [place]   6/18/2025 11:20 AM Jose Luis Yepez MD [facility]

## 2025-05-27 NOTE — PROGRESS NOTES
Patient: Lexi Ahumada (74 year old, female) Referring Provider:  Insurance:   Diagnosis: Status post right knee replacement (Z96.651) Jose Luis Yepez  [facility]   Date of Surgery: 5/6/25 Next MD visit:  N/A   Precautions:  None No data recorded Referral Information:    Date of Evaluation: Req: 0, Auth: 0, Exp:     05/22/25 POC Auth Visits:          Today's Date   5/27/2025    Subjective  Pt states this week she is feeling a lot better. Still a lot of swelling. Using the cane now without issue but might be a little high. She states she took her oxy 1 hour ago and iced before coming in.       Pain: 4/10     Objective  From IE AROM:    Knee   Flexion: R 87; L 120   Extension: R -10; L 0              Assessment  ALthough pt reports taking pain medication and using ice prior to session she remains with high pain during all manual therapy. She needs intermittent cues on exercises and following notes her knee has an increase in extension. Pt is most limited by swelling and pain therefore continued with vaso. Held bridges for HEP due to pain.    Goals (to be met in 12 visits)      Not Met Progress Toward Partially Met Met   Pt will improve knee extension ROM to 0 deg to allow proper heel strike during gait and terminal knee extension in stance. [] [] [] []   Pt will improve knee AROM flexion to >120 degrees to improve ability to perform squat. [] [] [] []   Pt will improve quad strength to 5/5 to ascend 1 flight of stairs reciprocally without UE assist. [] [] [] []   Pt will be independent and compliant with comprehensive HEP to maintain progress achieved in PT. [] [] [] []              Plan  WIll continue to progress ROM and strength for ambulation and ADL completion.    Treatment Last 4 Visits  Treatment Day: 2       5/22/2025 5/27/2025   LE Treatment   Therapeutic Exercise Heel slides 3x10  Quad set 5sx25  SAQ 20x 3s  SLR x10  Bridge 2x10  Mini squat 2x10 Heel slides 3x10  Quad set 5sx25  SAQ 20x 3s  SLR  x10  Bridge 2x10 (hold)  Mini squat 2x10  PROM flex/ext  HS stretch 3x30s   Neuro Re-Education Pt education    Manual Therapy  TF ant and post glide grade 1-3  Light quad and HS STM   Modalities Vaso low pressure x10 min    Therapeutic Exercise Minutes  30   Neuro Re-Educ Minutes 10    Manual Therapy Minutes  10   Evaluation Minutes 35    Hot/Cold Pack Minutes 10 10   Total Time Of Timed Procedures 10 40   Total Time Of Service-Based Procedures 45 10   Total Treatment Time 55 50   HEP Access Code: [email or web address]  URL: [email or web address]  Date: 05/22/2025  Prepared by: Colt Pimentelusson    Exercises  - Supine Heel Slide with Strap  - 1 x daily - 7 x weekly - 3 sets - 10 reps  - Supine Quad Set  - 1 x daily - 7 x weekly - 1 sets - 25 reps - 5s hold  - Supine Knee Extension Strengthening  - 1 x daily - 7 x weekly - 1 sets - 20 reps - 3s hold  - Active Straight Leg Raise with Quad Set  - 1 x daily - 7 x weekly - 1 sets - 10 reps  - Supine Bridge  - 1 x daily - 7 x weekly - 2 sets - 10 reps  - Mini Squat with Counter Support  - 1 x daily - 7 x weekly - 2 sets - 10 reps         HEP  Access Code: [email or web address]  URL: [email or web address]  Date: 05/22/2025  Prepared by: Colt Bousson    Exercises  - Supine Heel Slide with Strap  - 1 x daily - 7 x weekly - 3 sets - 10 reps  - Supine Quad Set  - 1 x daily - 7 x weekly - 1 sets - 25 reps - 5s hold  - Supine Knee Extension Strengthening  - 1 x daily - 7 x weekly - 1 sets - 20 reps - 3s hold  - Active Straight Leg Raise with Quad Set  - 1 x daily - 7 x weekly - 1 sets - 10 reps  - Supine Bridge  - 1 x daily - 7 x weekly - 2 sets - 10 reps  - Mini Squat with Counter Support  - 1 x daily - 7 x weekly - 2 sets - 10 reps    Charges     Therex x2, manual x1, vaso x1

## 2025-05-28 ENCOUNTER — PATIENT MESSAGE (OUTPATIENT)
Dept: ORTHOPEDICS CLINIC | Facility: CLINIC | Age: 75
End: 2025-05-28

## 2025-05-28 DIAGNOSIS — M17.11 PRIMARY OSTEOARTHRITIS OF RIGHT KNEE: ICD-10-CM

## 2025-05-28 RX ORDER — OXYCODONE HYDROCHLORIDE 5 MG/1
TABLET ORAL EVERY 4 HOURS PRN
Qty: 30 TABLET | Refills: 0 | OUTPATIENT
Start: 2025-05-28

## 2025-05-28 RX ORDER — OXYCODONE HYDROCHLORIDE 5 MG/1
TABLET ORAL EVERY 4 HOURS PRN
Qty: 30 TABLET | Refills: 0 | Status: SHIPPED | OUTPATIENT
Start: 2025-05-28

## 2025-05-28 NOTE — TELEPHONE ENCOUNTER
Oxycodone ordered yesterday by Shaheed. Pt requesting pharmacy change.    LM on provider line at [facility] to cancel oxycodone Rx e-scribed to them on 5/27.    Pt requests it be sent to [facility] on file.     Pharmacies updated. Educated pt on how to use [employer] for selecting preferred pharmacy.

## 2025-05-29 ENCOUNTER — OFFICE VISIT (OUTPATIENT)
Dept: PHYSICAL THERAPY | Age: 75
End: 2025-05-29
Attending: ORTHOPAEDIC SURGERY
Payer: MEDICARE

## 2025-05-29 PROCEDURE — 97110 THERAPEUTIC EXERCISES: CPT

## 2025-05-29 PROCEDURE — 97140 MANUAL THERAPY 1/> REGIONS: CPT

## 2025-05-29 NOTE — PROGRESS NOTES
Patient: Lexi Ahumada (74 year old, female) Referring Provider:  Insurance:   Diagnosis: Status post right knee replacement (Z96.651) Jose Luis Hidalgolivan  [facility]   Date of Surgery: 5/6/25 Next MD visit:  N/A   Precautions:  None No data recorded Referral Information:    Date of Evaluation: Req: 0, Auth: 0, Exp:     05/22/25 POC Auth Visits:          Today's Date   5/29/2025    Subjective  Patient reports that she took meds prior to PT this morning. Has been using her cane and walking more around her house going up and down her stairs.       Pain: pain not reported     Objective  From IE AROM:    Knee   Flexion: R 87; L 120   Extension: R -10; L 0         Assessment  Patiet tolerated session well without increase in symptoms. Demonstrates continued swelling in knee which improves minimally with STM for edema control. Patient able to progress at today's sessions. Cues required to increase ROM with repeatition to improve knee ROM. Patient would benefit from continued PT to address strength and ROM to allow patient to meet set goals.    Goals (to be met in 12 visits)     Not Met Progress Toward Partially Met Met    Pt will improve knee extension ROM to 0 deg to allow proper heel strike during gait and terminal knee extension in stance. []  []  []  []    Pt will improve knee AROM flexion to >120 degrees to improve ability to perform squat. []  []  []  []    Pt will improve quad strength to 5/5 to ascend 1 flight of stairs reciprocally without UE assist. []  []  []  []    Pt will be independent and compliant with comprehensive HEP to maintain progress achieved in PT. []  []  []  []         Plan  Continue per plan of care.    Treatment Last 4 Visits  Treatment Day: 3       5/22/2025 5/27/2025 5/29/2025   LE Treatment   Therapeutic Exercise Heel slides 3x10  Quad set 5sx25  SAQ 20x 3s  SLR x10  Bridge 2x10  Mini squat 2x10 Heel slides 3x10  Quad set 5sx25  SAQ 20x 3s  SLR x10  Bridge 2x10 (hold)  Mini squat 2x10  PROM  flex/ext  HS stretch 3x30s Nustep x 5 minutes   Heel slides 2x10   Quad set 5sx25   SAQ 20x 3s   LAQ 2 x 10 3s  Mini squat 2x10   Heel raises 2 x 10   Gastroc stretch 2 x 30''   PROM flex/ext   HS stretch 3x30s      Neuro Re-Education Pt education     Manual Therapy  TF ant and post glide grade 1-3  Light quad and HS STM TF ant and post glide grade 1-3   Quad and HS STM for edema control      Modalities Vaso low pressure x10 min  Game ready x 10 minutes low pressure   Therapeutic Exercise Minutes  30 30   Neuro Re-Educ Minutes 10     Manual Therapy Minutes  10 15   Evaluation Minutes 35     Hot/Cold Pack Minutes 10 10    Other Therapy Minutes   10   Total Time Of Timed Procedures 10 40 45   Total Time Of Service-Based Procedures 45 10 10   Total Treatment Time 55 50 55   HEP Access Code: [email or web address]  URL: [email or web address]  Date: 05/22/2025  Prepared by: Colt Pimentelusson    Exercises  - Supine Heel Slide with Strap  - 1 x daily - 7 x weekly - 3 sets - 10 reps  - Supine Quad Set  - 1 x daily - 7 x weekly - 1 sets - 25 reps - 5s hold  - Supine Knee Extension Strengthening  - 1 x daily - 7 x weekly - 1 sets - 20 reps - 3s hold  - Active Straight Leg Raise with Quad Set  - 1 x daily - 7 x weekly - 1 sets - 10 reps  - Supine Bridge  - 1 x daily - 7 x weekly - 2 sets - 10 reps  - Mini Squat with Counter Support  - 1 x daily - 7 x weekly - 2 sets - 10 reps          HEP  Access Code: [email or web address]  URL: [email or web address]  Date: 05/22/2025  Prepared by: Colt Bousson    Exercises  - Supine Heel Slide with Strap  - 1 x daily - 7 x weekly - 3 sets - 10 reps  - Supine Quad Set  - 1 x daily - 7 x weekly - 1 sets - 25 reps - 5s hold  - Supine Knee Extension Strengthening  - 1 x daily - 7 x weekly - 1 sets - 20 reps - 3s hold  - Active Straight Leg Raise with Quad Set  - 1 x daily - 7 x weekly - 1 sets - 10 reps  - Supine Bridge  - 1 x daily - 7 x weekly - 2 sets - 10 reps  - Mini Squat with  Counter Support  - 1 x daily - 7 x weekly - 2 sets - 10 reps    Charges     2 TE, 1 manual

## 2025-05-30 ENCOUNTER — MED REC SCAN ONLY (OUTPATIENT)
Dept: ORTHOPEDICS CLINIC | Facility: CLINIC | Age: 75
End: 2025-05-30

## 2025-06-02 ENCOUNTER — OFFICE VISIT (OUTPATIENT)
Dept: PHYSICAL THERAPY | Age: 75
End: 2025-06-02
Attending: ORTHOPAEDIC SURGERY
Payer: MEDICARE

## 2025-06-02 PROCEDURE — 97110 THERAPEUTIC EXERCISES: CPT

## 2025-06-02 PROCEDURE — 97140 MANUAL THERAPY 1/> REGIONS: CPT

## 2025-06-02 NOTE — PROGRESS NOTES
Patient: Lexi Ahumada (74 year old, female) Referring Provider:  Insurance:   Diagnosis: Status post right knee replacement (Z96.651) Jose Luis Hidalgolivan  [facility]   Date of Surgery: 5/6/25 Next MD visit:  N/A   Precautions:  None No data recorded Referral Information:    Date of Evaluation: Req: 0, Auth: 0, Exp:     05/22/25 POC Auth Visits:          Today's Date   6/2/2025    Subjective  Patient reports compliance with HEP at home. States that she was sore after last PT session.       Pain: pain not reported     Objective  Post manual: Knee flexion 96 degrees, -5 degrees extension       Assessment  Patient encouged continued HEP, educated to push ROM with each rep when stretching into flexion and extension to improve gains made in PT sessions. Patient making gradual progress with ROM. Patient would benefit from continued PT to address strength and ROM to allow patient to meet set goals.    Goals (to be met in 12 visits)       Not Met Progress Toward Partially Met Met    Pt will improve knee extension ROM to 0 deg to allow proper heel strike during gait and terminal knee extension in stance. []  []  []  []    Pt will improve knee AROM flexion to >120 degrees to improve ability to perform squat. []  []  []  []    Pt will improve quad strength to 5/5 to ascend 1 flight of stairs reciprocally without UE assist. []  []  []  []    Pt will be independent and compliant with comprehensive HEP to maintain progress achieved in PT. []  []  []  []             Plan       Treatment Last 4 Visits  Treatment Day: 4       5/22/2025 5/27/2025 5/29/2025 6/2/2025   LE Treatment   Therapeutic Exercise Heel slides 3x10  Quad set 5sx25  SAQ 20x 3s  SLR x10  Bridge 2x10  Mini squat 2x10 Heel slides 3x10  Quad set 5sx25  SAQ 20x 3s  SLR x10  Bridge 2x10 (hold)  Mini squat 2x10  PROM flex/ext  HS stretch 3x30s Nustep x 5 minutes   Heel slides 2x10   Quad set 5sx25   SAQ 20x 3s   LAQ 2 x 10 3s  Mini squat 2x10   Heel raises 2 x 10    Gastroc stretch 2 x 30''   PROM flex/ext   HS stretch 3x30s    Nustep x 5 minutes   Heel slides 2x10   Quad set 5sx25   Prone hang x 2 minutes  PROM flex/ext   HS stretch 3x30s   Standing Hamstring curl 2 x 10   Mini squat 2x10   Heel raises 2 x 10   Hip extension 2 x 10   Gastroc stretch 2 x 30''      Neuro Re-Education Pt education      Manual Therapy  TF ant and post glide grade 1-3  Light quad and HS STM TF ant and post glide grade 1-3   Quad and HS STM for edema control    TF ant and post glide grade 1-3   Quad and HS STM for edema control      Modalities Vaso low pressure x10 min  Game ready x 10 minutes low pressure Game ready x 10 minutes low pressure   Therapeutic Exercise Minutes  30 30 30   Neuro Re-Educ Minutes 10      Manual Therapy Minutes  10 15 15   Evaluation Minutes 35      Hot/Cold Pack Minutes 10 10     Other Therapy Minutes   10 10   Total Time Of Timed Procedures 10 40 45 45   Total Time Of Service-Based Procedures 45 10 10 10   Total Treatment Time 55 50 55 55   HEP Access Code: [email or web address]  URL: [email or web address]  Date: 05/22/2025  Prepared by: Colt Hilliard    Exercises  - Supine Heel Slide with Strap  - 1 x daily - 7 x weekly - 3 sets - 10 reps  - Supine Quad Set  - 1 x daily - 7 x weekly - 1 sets - 25 reps - 5s hold  - Supine Knee Extension Strengthening  - 1 x daily - 7 x weekly - 1 sets - 20 reps - 3s hold  - Active Straight Leg Raise with Quad Set  - 1 x daily - 7 x weekly - 1 sets - 10 reps  - Supine Bridge  - 1 x daily - 7 x weekly - 2 sets - 10 reps  - Mini Squat with Counter Support  - 1 x daily - 7 x weekly - 2 sets - 10 reps           HEP  Access Code: [email or web address]  URL: [email or web address]  Date: 05/22/2025  Prepared by: Colt Pimentelusson    Exercises  - Supine Heel Slide with Strap  - 1 x daily - 7 x weekly - 3 sets - 10 reps  - Supine Quad Set  - 1 x daily - 7 x weekly - 1 sets - 25 reps - 5s hold  - Supine Knee Extension Strengthening  -  1 x daily - 7 x weekly - 1 sets - 20 reps - 3s hold  - Active Straight Leg Raise with Quad Set  - 1 x daily - 7 x weekly - 1 sets - 10 reps  - Supine Bridge  - 1 x daily - 7 x weekly - 2 sets - 10 reps  - Mini Squat with Counter Support  - 1 x daily - 7 x weekly - 2 sets - 10 reps    Charges     2 TE,1 manual

## 2025-06-05 ENCOUNTER — OFFICE VISIT (OUTPATIENT)
Dept: PHYSICAL THERAPY | Age: 75
End: 2025-06-05
Attending: ORTHOPAEDIC SURGERY
Payer: MEDICARE

## 2025-06-05 ENCOUNTER — TELEPHONE (OUTPATIENT)
Dept: INTERNAL MEDICINE CLINIC | Facility: CLINIC | Age: 75
End: 2025-06-05

## 2025-06-05 PROCEDURE — 97140 MANUAL THERAPY 1/> REGIONS: CPT

## 2025-06-05 PROCEDURE — 97110 THERAPEUTIC EXERCISES: CPT

## 2025-06-05 PROCEDURE — 97016 VASOPNEUMATIC DEVICE THERAPY: CPT

## 2025-06-05 NOTE — TELEPHONE ENCOUNTER
TJ: Dr. Galvan patient, unable to schedule until later in June. Feeling lightheaded and dizzy since surgery on 5/6/2025. Scheduled to see you 6/6/2025 as no available appointments with [facility].     Spoke with patient. Patient reports she had knee surgery 5/6,  and has been feeling lightheaded and dizzy since. Reports she felt some improvement but then this morning, feels like she's back to feeling very dizzy, foggy. Patient was seen in PT this morning and reported feeling dizzy and lightheaded. BP was taking by PT, patient reports /58 (krupa)?. States O2 sats were fine. States she suffers from anemia. Took losartan this morning and all prescribed meds. Losartan is prescribed for protein in urine not BP control. Denies shortness of breath, denies chest pain. Patient instructed to proceed to ER if her symptoms worsen. Patient requested to be seen by PCP. No appointments available soon enough, requested to be seen by any available provider. Scheduled with Dr. Garcia as noted below.    This RN had patient repeat blood pressure while on phone.   BP: 109/58, HR: 84.     Time spent on call: 15 minutes    Future Appointments   Date Time Provider Department Center   6/9/2025 11:00 AM Ria Gaitan [facility]   6/10/2025  1:20 PM Pablo Garcia MD [facility] [place]

## 2025-06-05 NOTE — PROGRESS NOTES
Patient: Lexi Ahumada (74 year old, female) Referring Provider:  Insurance:   Diagnosis: Status post right knee replacement (Z96.651) Jose Luis Yepez  [facility]   Date of Surgery: 5/6/25 Next MD visit:  N/A   Precautions:  None No data recorded Referral Information:    Date of Evaluation: Req: 0, Auth: 0, Exp:     05/22/25 POC Auth Visits:          Today's Date   6/5/2025    Subjective  Pt states today she has been light headed and is not sure why. She states her knee is getting better but still really swollen.       Pain: pain not reported     Objective  /64, HR 78, SPO2 97              Assessment  Pt presents to clinic with reports of feeling light headed but wanting to continue with PT visit. Vital are within treatable range therefore continued with session without incident. She remains with high pain at end ranges but tolerates some OP. FOllowing all exercises she notes a feeling of looseness. Performed long duration TKE stretch without an increase in discomfort.    Goals (to be met in 12 visits)      Not Met Progress Toward Partially Met Met   Pt will improve knee extension ROM to 0 deg to allow proper heel strike during gait and terminal knee extension in stance. [] [] [] []   Pt will improve knee AROM flexion to >120 degrees to improve ability to perform squat. [] [] [] []   Pt will improve quad strength to 5/5 to ascend 1 flight of stairs reciprocally without UE assist. [] [] [] []   Pt will be independent and compliant with comprehensive HEP to maintain progress achieved in PT. [] [] [] []                  Plan  Next session reinitiate strengthening as tolerated focused on TKE such as SAQ and LAQ.    Treatment Last 4 Visits  Treatment Day: 5 5/27/2025 5/29/2025 6/2/2025 6/5/2025   LE Treatment   Therapeutic Exercise Heel slides 3x10  Quad set 5sx25  SAQ 20x 3s  SLR x10  Bridge 2x10 (hold)  Mini squat 2x10  PROM flex/ext  HS stretch 3x30s Nustep x 5 minutes   Heel slides 2x10   Quad set  5sx25   SAQ 20x 3s   LAQ 2 x 10 3s  Mini squat 2x10   Heel raises 2 x 10   Gastroc stretch 2 x 30''   PROM flex/ext   HS stretch 3x30s    Nustep x 5 minutes   Heel slides 2x10   Quad set 5sx25   Prone hang x 2 minutes  PROM flex/ext   HS stretch 3x30s   Standing Hamstring curl 2 x 10   Mini squat 2x10   Heel raises 2 x 10   Hip extension 2 x 10   Gastroc stretch 2 x 30''    Nustep x 5 minutes   Heel slides 2x10   Quad set 5sx25   Prone hang x 2 minutes (hold)  PROM flex/ext   HS stretch 3x30s   Standing Hamstring curl 2 x 10 (hold)  Mini squat 2x10 (hold)  Heel raises 2 x 10   Hip extension 2 x 10 (hold)  Gastroc stretch 2 x 30''   Supine TKE 3# 3 min  TF distraction 10# 4min sitting   Manual Therapy TF ant and post glide grade 1-3  Light quad and HS STM TF ant and post glide grade 1-3   Quad and HS STM for edema control    TF ant and post glide grade 1-3   Quad and HS STM for edema control    TF ant and post glide grade 1-3   Quad and HS STM for edema control      Modalities  Game ready x 10 minutes low pressure Game ready x 10 minutes low pressure Vaso x10 min low pressure   Therapeutic Exercise Minutes 30 30 30 30   Manual Therapy Minutes 10 15 15 15   Hot/Cold Pack Minutes 10   10   Other Therapy Minutes  10 10    Total Time Of Timed Procedures 40 45 45 45   Total Time Of Service-Based Procedures 10 10 10 10   Total Treatment Time 50 55 55 55        HEP  Access Code: [email or web address]  URL: [email or web address]  Date: 05/22/2025  Prepared by: Colt Hilliard    Exercises  - Supine Heel Slide with Strap  - 1 x daily - 7 x weekly - 3 sets - 10 reps  - Supine Quad Set  - 1 x daily - 7 x weekly - 1 sets - 25 reps - 5s hold  - Supine Knee Extension Strengthening  - 1 x daily - 7 x weekly - 1 sets - 20 reps - 3s hold  - Active Straight Leg Raise with Quad Set  - 1 x daily - 7 x weekly - 1 sets - 10 reps  - Supine Bridge  - 1 x daily - 7 x weekly - 2 sets - 10 reps  - Mini Squat with Counter Support  - 1 x  daily - 7 x weekly - 2 sets - 10 reps    Charges     Therex x2, manual x1

## 2025-06-09 ENCOUNTER — OFFICE VISIT (OUTPATIENT)
Dept: PHYSICAL THERAPY | Age: 75
End: 2025-06-09
Attending: ORTHOPAEDIC SURGERY
Payer: MEDICARE

## 2025-06-09 PROCEDURE — 97110 THERAPEUTIC EXERCISES: CPT

## 2025-06-09 PROCEDURE — 97016 VASOPNEUMATIC DEVICE THERAPY: CPT

## 2025-06-09 PROCEDURE — 97140 MANUAL THERAPY 1/> REGIONS: CPT

## 2025-06-09 NOTE — PROGRESS NOTES
Patient: Lexi Ahumada (74 year old, female) Referring Provider:  Insurance:   Diagnosis: Status post right knee replacement (Z96.651) Jose Luis Yepez  [facility]   Date of Surgery: 5/6/25 Next MD visit:  N/A   Precautions:  None No data recorded Referral Information:    Date of Evaluation: Req: 0, Auth: 0, Exp:     05/22/25 POC Auth Visits:          Today's Date   6/9/2025    Subjective  Patient reports that she feels that her knee swelling is down today. Feels like her knee stiffness is better also.       Pain: pain not reported     Objective  Knee flexion 96 AAROM, -9 degrees extension            Assessment  Patient limited with progressions secondary to pain. Patient encourged to work into good stretch at end range of knee when performing HEP. Provided updated HEP to include increased knee ROM exercises to improve knee ROM. Patient would benefit from continued PT to address ROM, strength and functional deficits.    Goals (to be met in 12 visits)       Not Met Progress Toward Partially Met Met    Pt will improve knee extension ROM to 0 deg to allow proper heel strike during gait and terminal knee extension in stance. []  []  []  []    Pt will improve knee AROM flexion to >120 degrees to improve ability to perform squat. []  []  []  []    Pt will improve quad strength to 5/5 to ascend 1 flight of stairs reciprocally without UE assist. []  []  []  []    Pt will be independent and compliant with comprehensive HEP to maintain progress achieved in PT. []  []  []  []                 Plan  Continue per plan of care.    Treatment Last 4 Visits  Treatment Day: 6 5/29/2025 6/2/2025 6/5/2025 6/9/2025   LE Treatment   Therapeutic Exercise Nustep x 5 minutes   Heel slides 2x10   Quad set 5sx25   SAQ 20x 3s   LAQ 2 x 10 3s  Mini squat 2x10   Heel raises 2 x 10   Gastroc stretch 2 x 30''   PROM flex/ext   HS stretch 3x30s    Nustep x 5 minutes   Heel slides 2x10   Quad set 5sx25   Prone hang x 2 minutes  PROM flex/ext    HS stretch 3x30s   Standing Hamstring curl 2 x 10   Mini squat 2x10   Heel raises 2 x 10   Hip extension 2 x 10   Gastroc stretch 2 x 30''    Nustep x 5 minutes   Heel slides 2x10   Quad set 5sx25   Prone hang x 2 minutes (hold)  PROM flex/ext   HS stretch 3x30s   Standing Hamstring curl 2 x 10 (hold)  Mini squat 2x10 (hold)  Heel raises 2 x 10   Hip extension 2 x 10 (hold)  Gastroc stretch 2 x 30''   Supine TKE 3# 3 min  TF distraction 10# 4min sitting Nustep x 5 minutes   Heel slides 2x20 reps   Quad set 5sx25   LAQ 2 x 10   Seated self PROM knee flexion on edge of bed  Seated self knee extension over pressure  Heel raises 2 x 10   Mini squats 2 x 10   Hip abduction 2 x 10   TF distraction 10# 4min sitting   PROM flex/ext in sitting/supine      Manual Therapy TF ant and post glide grade 1-3   Quad and HS STM for edema control    TF ant and post glide grade 1-3   Quad and HS STM for edema control    TF ant and post glide grade 1-3   Quad and HS STM for edema control    TF ant and post glide grade 1-3   Passive knee extension over pressure  Quad and HS STM for edema control      Modalities Game ready x 10 minutes low pressure Game ready x 10 minutes low pressure Vaso x10 min low pressure Vaso x10 min low pressure   Therapeutic Exercise Minutes 30 30 30 30   Manual Therapy Minutes 15 15 15 15   Hot/Cold Pack Minutes   10 10   Other Therapy Minutes 10 10     Total Time Of Timed Procedures 45 45 45 45   Total Time Of Service-Based Procedures 10 10 10 10   Total Treatment Time 55 55 55 55   HEP    Access Code: [email or web address]  URL: [email or web address]  Date: 06/09/2025  Prepared by: Ria Gaitan    Exercises  - Supine Heel Slide with Strap  - 1 x daily - 7 x weekly - 3 sets - 10 reps  - Supine Quad Set  - 1 x daily - 7 x weekly - 1 sets - 25 reps - 5s hold  - Supine Knee Extension Strengthening  - 1 x daily - 7 x weekly - 1 sets - 20 reps - 3s hold  - Active Straight Leg Raise with Quad Set  - 1 x daily  - 7 x weekly - 1 sets - 10 reps  - Supine Bridge  - 1 x daily - 7 x weekly - 2 sets - 10 reps  - Mini Squat with Counter Support  - 1 x daily - 7 x weekly - 2 sets - 10 reps  - Prone Knee Extension Hang  - 1 x daily - 7 x weekly - 2-5 minutes hold  - Seated Passive Knee Extension  - 1 x daily - 7 x weekly - 3 sets - 10 reps  - Seated Knee Flexion Stretch  - 1 x daily - 7 x weekly - 3 sets - 10 reps        HEP  Access Code: [email or web address]  URL: [email or web address]  Date: 06/09/2025  Prepared by: Ria Yfantis    Exercises  - Supine Heel Slide with Strap  - 1 x daily - 7 x weekly - 3 sets - 10 reps  - Supine Quad Set  - 1 x daily - 7 x weekly - 1 sets - 25 reps - 5s hold  - Supine Knee Extension Strengthening  - 1 x daily - 7 x weekly - 1 sets - 20 reps - 3s hold  - Active Straight Leg Raise with Quad Set  - 1 x daily - 7 x weekly - 1 sets - 10 reps  - Supine Bridge  - 1 x daily - 7 x weekly - 2 sets - 10 reps  - Mini Squat with Counter Support  - 1 x daily - 7 x weekly - 2 sets - 10 reps  - Prone Knee Extension Hang  - 1 x daily - 7 x weekly - 2-5 minutes hold  - Seated Passive Knee Extension  - 1 x daily - 7 x weekly - 3 sets - 10 reps  - Seated Knee Flexion Stretch  - 1 x daily - 7 x weekly - 3 sets - 10 reps    Charges     1 manual, 2 TE, 1 vaso

## 2025-06-10 ENCOUNTER — OFFICE VISIT (OUTPATIENT)
Dept: INTERNAL MEDICINE CLINIC | Facility: CLINIC | Age: 75
End: 2025-06-10
Payer: MEDICARE

## 2025-06-10 ENCOUNTER — LAB ENCOUNTER (OUTPATIENT)
Dept: LAB | Age: 75
End: 2025-06-10
Attending: INTERNAL MEDICINE
Payer: MEDICARE

## 2025-06-10 VITALS
BODY MASS INDEX: 28 KG/M2 | HEART RATE: 82 BPM | TEMPERATURE: 97 F | SYSTOLIC BLOOD PRESSURE: 102 MMHG | OXYGEN SATURATION: 93 % | WEIGHT: 145.81 LBS | DIASTOLIC BLOOD PRESSURE: 46 MMHG

## 2025-06-10 DIAGNOSIS — R42 DIZZINESS: ICD-10-CM

## 2025-06-10 DIAGNOSIS — R42 DIZZINESS: Primary | ICD-10-CM

## 2025-06-10 LAB
ALBUMIN SERPL-MCNC: 4.8 G/DL (ref 3.2–4.8)
ALBUMIN/GLOB SERPL: 2.3 {RATIO} (ref 1–2)
ALP LIVER SERPL-CCNC: 81 U/L (ref 55–142)
ALT SERPL-CCNC: 21 U/L (ref 10–49)
ANION GAP SERPL CALC-SCNC: 10 MMOL/L (ref 0–18)
AST SERPL-CCNC: 25 U/L (ref ?–34)
BASOPHILS # BLD AUTO: 0.07 X10(3) UL (ref 0–0.2)
BASOPHILS NFR BLD AUTO: 1 %
BILIRUB SERPL-MCNC: 0.4 MG/DL (ref 0.2–1.1)
BUN BLD-MCNC: 14 MG/DL (ref 9–23)
CALCIUM BLD-MCNC: 10 MG/DL (ref 8.7–10.6)
CHLORIDE SERPL-SCNC: 102 MMOL/L (ref 98–112)
CO2 SERPL-SCNC: 28 MMOL/L (ref 21–32)
CREAT BLD-MCNC: 0.82 MG/DL (ref 0.55–1.02)
EGFRCR SERPLBLD CKD-EPI 2021: 76 ML/MIN/1.73M2 (ref 60–?)
EOSINOPHIL # BLD AUTO: 0.37 X10(3) UL (ref 0–0.7)
EOSINOPHIL NFR BLD AUTO: 5.4 %
ERYTHROCYTE [DISTWIDTH] IN BLOOD BY AUTOMATED COUNT: 12.9 %
FASTING STATUS PATIENT QL REPORTED: NO
GLOBULIN PLAS-MCNC: 2.1 G/DL (ref 2–3.5)
GLUCOSE BLD-MCNC: 116 MG/DL (ref 70–99)
HCT VFR BLD AUTO: 40.8 % (ref 35–48)
HGB BLD-MCNC: 13.2 G/DL (ref 12–16)
IMM GRANULOCYTES # BLD AUTO: 0.01 X10(3) UL (ref 0–1)
IMM GRANULOCYTES NFR BLD: 0.1 %
LYMPHOCYTES # BLD AUTO: 1.84 X10(3) UL (ref 1–4)
LYMPHOCYTES NFR BLD AUTO: 26.9 %
MCH RBC QN AUTO: 31.8 PG (ref 26–34)
MCHC RBC AUTO-ENTMCNC: 32.4 G/DL (ref 31–37)
MCV RBC AUTO: 98.3 FL (ref 80–100)
MONOCYTES # BLD AUTO: 0.65 X10(3) UL (ref 0.1–1)
MONOCYTES NFR BLD AUTO: 9.5 %
NEUTROPHILS # BLD AUTO: 3.91 X10 (3) UL (ref 1.5–7.7)
NEUTROPHILS # BLD AUTO: 3.91 X10(3) UL (ref 1.5–7.7)
NEUTROPHILS NFR BLD AUTO: 57.1 %
OSMOLALITY SERPL CALC.SUM OF ELEC: 291 MOSM/KG (ref 275–295)
PLATELET # BLD AUTO: 182 10(3)UL (ref 150–450)
POTASSIUM SERPL-SCNC: 4.2 MMOL/L (ref 3.5–5.1)
PROT SERPL-MCNC: 6.9 G/DL (ref 5.7–8.2)
RBC # BLD AUTO: 4.15 X10(6)UL (ref 3.8–5.3)
SODIUM SERPL-SCNC: 140 MMOL/L (ref 136–145)
WBC # BLD AUTO: 6.9 X10(3) UL (ref 4–11)

## 2025-06-10 PROCEDURE — 85025 COMPLETE CBC W/AUTO DIFF WBC: CPT

## 2025-06-10 PROCEDURE — 3044F HG A1C LEVEL LT 7.0%: CPT | Performed by: INTERNAL MEDICINE

## 2025-06-10 PROCEDURE — 3061F NEG MICROALBUMINURIA REV: CPT | Performed by: INTERNAL MEDICINE

## 2025-06-10 PROCEDURE — 1160F RVW MEDS BY RX/DR IN RCRD: CPT | Performed by: INTERNAL MEDICINE

## 2025-06-10 PROCEDURE — 80053 COMPREHEN METABOLIC PANEL: CPT

## 2025-06-10 PROCEDURE — 3060F POS MICROALBUMINURIA REV: CPT | Performed by: INTERNAL MEDICINE

## 2025-06-10 PROCEDURE — 99213 OFFICE O/P EST LOW 20 MIN: CPT | Performed by: INTERNAL MEDICINE

## 2025-06-10 PROCEDURE — 3078F DIAST BP <80 MM HG: CPT | Performed by: INTERNAL MEDICINE

## 2025-06-10 PROCEDURE — 3074F SYST BP LT 130 MM HG: CPT | Performed by: INTERNAL MEDICINE

## 2025-06-10 PROCEDURE — 1159F MED LIST DOCD IN RCRD: CPT | Performed by: INTERNAL MEDICINE

## 2025-06-10 PROCEDURE — 36415 COLL VENOUS BLD VENIPUNCTURE: CPT

## 2025-06-10 NOTE — PROGRESS NOTES
Subjective:   Lexi Ahumada is a 74 year old female who presents for Follow - Up   The patient had a right knee replacement on May 6, 2025.  After the surgery, the patient felt lightheaded especially when she would look up.  However, today the patient feels fine no lightheadedness.    Is getting low dose ASA as DVT prophylaxis.    The swelling in the right knee is improving after the operations.    History/Other:    Chief Complaint Reviewed and Verified  No Further Nursing Notes to   Review  Tobacco Reviewed  Allergies Reviewed  Medications Reviewed    Medical History Reviewed  Surgical History Reviewed  OB Status Reviewed    Family History Reviewed  Social History Reviewed         Tobacco:  Lexi has never smoked tobacco.    Current Medications[1]      Review of Systems:  Pertinent items are noted in HPI.  A comprehensive review of systems was negative.      Objective:   /46 (BP Location: Left arm, Patient Position: Sitting, Cuff Size: adult)   Pulse 82   Temp 97.2 °F (36.2 °C) (Temporal)   Wt 145 lb 12.8 oz (66.1 kg)   LMP  (LMP Unknown)   SpO2 93%   BMI 28.47 kg/m²  Estimated body mass index is 28.47 kg/m² as calculated from the following:    Height as of 5/21/25: 5' (1.524 m).    Weight as of this encounter: 145 lb 12.8 oz (66.1 kg).  General condition: Not in distress.    HEENT: Atraumatic normocephalic  No cervical or submandibular lymphadenopathy  Chest: Clear to auscultate  Heart: S1-S2 ,no murmur  Abdomen: Soft non tender, no palpable organomegaly  Neurological examination: No facial asymmetry.  No lateralizing neurological signs.  Mental state examination: Good eye to eye contact.  Normal mood and behavior.  Engaged in meaningful conversations.  Extremities:  Normal upper extremities  Legs: right knee - swelling present. No calf tenderness    Assessment & Plan:     Lightheadedness in the postoperative.  Cause is multifactorial including volume depletion and opioid use.  Currently,  the patient is using opiate sparingly and symptoms are much better.  Today she was not dizzy at all.  Nevertheless, I will obtain CMP and CBC.  Diabetes mellitus: Continue with hypoglycemic agents.      No follow-ups on file.    Pablo Garcia MD, 6/10/2025, 1:32 PM       [1]   Current Outpatient Medications   Medication Sig Dispense Refill    oxyCODONE 5 MG Oral Tab Take 0.5-1 tablets (2.5-5 mg total) by mouth every 4 (four) hours as needed. 30 tablet 0    senna-docusate 8.6-50 MG Oral Tab Take 1 tablet by mouth daily as needed. 30 tablet 0    aspirin 81 MG Oral Tab EC Take 1 tablet (81 mg total) by mouth in the morning and 1 tablet (81 mg total) before bedtime. 80 tablet 0    [START ON 6/17/2025] diclofenac 1.3 % External Patch Apply 1 patch topically Q12H. On knees - pt aware should stop 10 days prior to surgery (ASPERCREME) (Patient not taking: Reported on 6/10/2025)      Cholecalciferol (VITAMIN D) 1000 units Oral Cap Take 4,000 Units by mouth in the morning.      Carboxymethylcell-Glycerin PF (REFRESH TEARS PF) 0.5-0.9 % Ophthalmic Solution Apply 1 drop to eye in the morning and 1 drop before bedtime.      Polyethylene Glycol 3350 (MIRALAX OR) Take by mouth daily.      pantoprazole 40 MG Oral Tab EC Take 1 tablet (40 mg total) by mouth 2 (two) times daily before meals. For 8 weeks, then once daily. 90 tablet 0    Ciclopirox 8 % External Solution Apply 1 Application topically nightly. 6 mL 3    Tirzepatide (MOUNJARO) 10 MG/0.5ML Subcutaneous Solution Auto-injector Inject 10 mg into the skin every 7 days. 6 mL 0    JARDIANCE 25 MG Oral Tab TAKE 1 TABLET BY MOUTH DAILY 90 tablet 3    Glucose Blood (ONETOUCH VERIO) In Vitro Strip Use to test blood sugar twice daily 200 strip 5    rosuvastatin 20 MG Oral Tab Take 1 tablet (20 mg total) by mouth daily. 90 tablet 2    metFORMIN  MG Oral Tablet 24 Hr Take 1 tablet (500 mg total) by mouth daily with dinner. 90 tablet 1    glipiZIDE ER 10 MG Oral Tablet 24 Hr Take  1 tablet (10 mg total) by mouth daily. 90 tablet 1    losartan 25 MG Oral Tab Take 1 tablet (25 mg total) by mouth daily. 90 tablet 1    clotrimazole-betamethasone 1-0.05 % External Cream Apply twice daily for a week then take week off. (Patient taking differently: as needed. Apply twice daily for a week then take week off.) 45 g 2    latanoprost 0.005 % Ophthalmic Solution Place 1 drop into both eyes in the morning.      OneTouch Delica Lancets 33G Does not apply Misc Use with test strips to check blood sugar twice daily 200 each 5    Multiple Vitamin (DAILY VALUE MULTIVITAMIN) Oral Tab Take 1 tablet by mouth in the morning.      Blood Glucose Monitoring Suppl (ONETOUCH VERIO FLEX SYSTEM) w/Device Does not apply Kit Use machine to test blood sugar twice a day as directed. 1 kit 0    ferrous sulfate 325 (65 FE) MG Oral Tab EC Take 1 tablet (325 mg total) by mouth daily with breakfast.

## 2025-06-10 NOTE — PATIENT INSTRUCTIONS
Your dizziness is multifactorial including due to medication use such as tramadol and oxycodone.  Please try to take this medication as less as possible.  Hydrate yourself well.  Currently, your symptoms are already getting better, which is a good news.    Please get your blood tests done that include complete metabolic panel and complete blood count.    Please go to the emergency room if there is shortness of breath or chest pain.  Let me know if you notice new swelling.

## 2025-06-11 DIAGNOSIS — R80.9 TYPE 2 DIABETES MELLITUS WITH MICROALBUMINURIA, WITHOUT LONG-TERM CURRENT USE OF INSULIN (HCC): ICD-10-CM

## 2025-06-11 DIAGNOSIS — E11.29 TYPE 2 DIABETES MELLITUS WITH MICROALBUMINURIA, WITHOUT LONG-TERM CURRENT USE OF INSULIN (HCC): ICD-10-CM

## 2025-06-11 RX ORDER — GLIPIZIDE 10 MG/1
10 TABLET, FILM COATED, EXTENDED RELEASE ORAL DAILY
Qty: 90 TABLET | Refills: 1 | Status: SHIPPED | OUTPATIENT
Start: 2025-06-11

## 2025-06-11 RX ORDER — LOSARTAN POTASSIUM 25 MG/1
25 TABLET ORAL DAILY
Qty: 90 TABLET | Refills: 1 | Status: SHIPPED | OUTPATIENT
Start: 2025-06-11

## 2025-06-11 NOTE — TELEPHONE ENCOUNTER
Received fax from pharmacy requesting refill     Requested Prescriptions     Pending Prescriptions Disp Refills    losartan 25 MG Oral Tab 90 tablet 1     Sig: Take 1 tablet (25 mg total) by mouth daily.     Your appointments       Date & Time Appointment Department ([place])    Jun 12, 2025 11:00 AM CDT Physical Therapy Ortho Treatment with Colt Hilliard, PT [facility] [place] ([facility] [place])        Jun 16, 2025 10:45 AM CDT Physical Therapy Ortho Treatment with Colt Hilliard PT [facility] ([facility] [place])        Jun 18, 2025 11:20 AM CDT Post Op Visit with Jose Luis Yepez MD [facility], [address] [place] [address] ([facility])        Jun 19, 2025 10:45 AM CDT Physical Therapy Ortho Treatment with Ria Gaitan [facility] [place] [facility] [place])        Jun 23, 2025 11:45 AM CDT Physical Therapy Ortho Treatment with Ria Gaitan [facility] [place] [facility] [place])        Jun 26, 2025 11:45 AM CDT Physical Therapy Ortho Treatment with Colt Hilliard PT [facility] ([facility] [place])        Jun 30, 2025 10:45 AM CDT Physical Therapy Ortho Treatment with Colt Hilliard PT [facility] [place] ([facility] [place])        Jul 07, 2025 8:30 AM CDT EGD with JASWANT CAMILO (--)    Please arrive 60 minutes prior to your scheduled procedure time.         Jul 08, 2025 10:00 AM CDT Diabetes Pump follow up with Pauline Pereyra APRN [facility], [facility] ([facility] [place])        Jul 16, 2025 10:00 AM CDT Follow Up Visit with Anne Galvan MD [facility], [facility] ([facility])    Contact your primary care provider if your insurance requires a referral.    Please arrive 15 minutes prior to your scheduled appointment. Be sure to bring your current Insurance card, Photo  ID, and medication bottles or a list of your current medications.      A 24 hour notice is required to cancel any appointment or you may be charged a $40 No Show Fee.     Important: 24 hour notice is required to cancel any appointment or you may be charged a $40 No Show Fee. Please notify your physician office.         Sep 19, 2025 10:15 AM CDT Exam - Established with Lucita Cope DO [facility] ([facility])        Nov 19, 2025 9:40 AM CST Follow-Up OV with Jose F Ortega APRN [facility] ([facility])    Please arrive 15 minutes prior to your scheduled appointment time.               [facility]  [address] [place] [address]  [phone] [address] [employer]  [address] [place] [address] [address]  [facility] [id number] [phone] [facility]  [address]  [facility] [id number] [phone]    [facility]  [facility] [place] [address] [address]ina [address], [address]  [facility] [address] [phone] [facility]  [address] [facility] [address]  [facility] [id number] [phone] [facility]  No address on file  [phone]    [facility]  [facility]  [address] [facility] [address] [phone]           Last A1c value was 6.8% done 4/16/2025.   Last office visit: 4/8/25 - AM  Last refill: 12/20/24 - AM

## 2025-06-11 NOTE — TELEPHONE ENCOUNTER
Requested Prescriptions     Pending Prescriptions Disp Refills    GLIPIZIDE ER 10 MG Oral Tablet 24 Hr [Pharmacy Med Name: GLIPIZIDE ER 10MG TABLETS] 90 tablet 1     Sig: TAKE 1 TABLET(10 MG) BY MOUTH DAILY     Your appointments       Date & Time Appointment Department ([place])    Jun 12, 2025 11:00 AM CDT Physical Therapy Ortho Treatment with Colt Hilliard, PT [facility] [place] ([facility] [place])        Jun 16, 2025 10:45 AM CDT Physical Therapy Ortho Treatment with Colt Hilliard PT [facility] [place] ([facility] [place])        Jun 18, 2025 11:20 AM CDT Post Op Visit with Jose Luis Yepez MD [facility], [address] [place] [address] ([facility])        Jun 19, 2025 10:45 AM CDT Physical Therapy Ortho Treatment with Ria Gaitan [facility] [place] ([facility] [place])        Jun 23, 2025 11:45 AM CDT Physical Therapy Ortho Treatment with Ria Gaitan [facility] [place] ([facility] [place])        Jun 26, 2025 11:45 AM CDT Physical Therapy Ortho Treatment with Colt Hilliard PT [facility] [place] ([facility] [place])        Jun 30, 2025 10:45 AM CDT Physical Therapy Ortho Treatment with Colt Hilliard PT [facility] [place] ([facility] [place])        Jul 07, 2025 8:30 AM CDT EGD with JASWANT CAMILO (--)    Please arrive 60 minutes prior to your scheduled procedure time.         Jul 08, 2025 10:00 AM CDT Diabetes Pump follow up with Pauline Pereyra APRN [facility], [facility] ([facility] [place])        Jul 16, 2025 10:00 AM CDT Follow Up Visit with Anne Galvan MD [facility], [facility] ([facility])    Contact your primary care provider if your insurance requires a referral.    Please arrive 15 minutes prior to your scheduled appointment. Be sure to bring your current Insurance card,  Photo ID, and medication bottles or a list of your current medications.      A 24 hour notice is required to cancel any appointment or you may be charged a $40 No Show Fee.     Important: 24 hour notice is required to cancel any appointment or you may be charged a $40 No Show Fee. Please notify your physician office.         Sep 19, 2025 10:15 AM CDT Exam - Established with Lucita Cope DO [facility] ([facility])        Nov 19, 2025 9:40 AM CST Follow-Up OV with Jose F Ortega APRN [facility] ([facility])    Please arrive 15 minutes prior to your scheduled appointment time.               [facility]  [address] [place] [address]  [phone] [address] [employer]  [address] [place] [address] [address]  [facility] [id number] [phone] [facility]  [address]  [facility] [id number] [phone]    [facility]  [facility] [place] [address] Paulina Mazariegos, [address]  [facility] [address] [phone] [facility][note 3]  [address] [facility] [address]  [facility] [id number] [phone] [facility]  No address on file  [phone]    [facility]  [facility]  [address] [facility] [address] [phone]           Last A1c value was 6.8% done 4/16/2025.   Last office visit: 4/08/25 - AM  Last refill: 12/10/24 - AM

## 2025-06-12 ENCOUNTER — OFFICE VISIT (OUTPATIENT)
Dept: PHYSICAL THERAPY | Age: 75
End: 2025-06-12
Attending: ORTHOPAEDIC SURGERY
Payer: MEDICARE

## 2025-06-12 PROCEDURE — 97110 THERAPEUTIC EXERCISES: CPT

## 2025-06-12 PROCEDURE — 97112 NEUROMUSCULAR REEDUCATION: CPT

## 2025-06-12 NOTE — PROGRESS NOTES
Progress Summary  Pt has attended 7 visits in Physical Therapy.     Patient: Lexi Ahumada (74 year old, female) Referring Provider:  Insurance:   Diagnosis: Status post right knee replacement (Z96.651) Jose Luis Finchvan  [facility]   Date of Surgery: 5/6/25 Next MD visit:  N/A   Precautions:  None No data recorded Referral Information:    Date of Evaluation: Req: 0, Auth: 0, Exp:     05/22/25 POC Auth Visits:          Today's Date   6/12/2025    Subjective  Pt states her knee is not painful but still stiff when she walks. She states overall she feels about 50% better.       Pain: pain not reported     Objective          Gait: slight antalgic gait with straight cane  Observation/Skin: mild bruising to calf, moderate to quad and HS. Noted swelling to knee. No signs of infection or DVT.   Joint mobility: TF ant and post glide hypomobile. PF hypomobile in all directions likely from reduced ROM           AROM: at start of visit   Knee    Flexion: R 85; L 120   Extension: R -15; L 0          AROM: FOLLOWING MANUAL THERAPY   Knee    Flexion: R 91; L 120   Extension: R -10; L 0               Strength/MMT:  Knee   Flexion: R 4-/5; L 4+/5  Extension: R 4/5; L 4+/5                         Circumference at joint line R 41 cm, L 37 cm      Assessment  Pt presents with an increase in ROM, increased strength and an overall reduction in pain leading to functional gains and progressing towards goals. Although pt has objective imporvements since IE she remains very painful and swollen to knee which is limiting functional progression. Pt responds to manual theray this visit and several treatment visit by increasing ROM following. Pt needs consistent encouragement to push ROM further into flexion and extension during exercises. It is medically necessary to continue PT to reac functional goals.    Goals (to be met in 14 visits)      Not Met Progress Toward Partially Met Met   Pt will improve knee extension ROM to 0 deg to allow  proper heel strike during gait and terminal knee extension in stance. [] [x] [] []   Pt will improve knee AROM flexion to >120 degrees to improve ability to perform squat. [] [x] [] []   Pt will improve quad strength to 5/5 to ascend 1 flight of stairs reciprocally without UE assist. Pt states she still uses cane with descending full flight [] [x] [] []   Pt will be independent and compliant with comprehensive HEP to maintain progress achieved in PT. [] [x] [] []                          Plan: Continue skilled Physical Therapy 2-3 x/week or a total of 14 visits over a 90 day period.        Patient/Family/Caregiver was advised of these findings, precautions, and treatment options and has agreed to actively participate in planning and for this course of care.    Thank you for your referral. If you have any questions, please contact me at Dept: [phone].    Sincerely,  Electronically signed by therapist: Colt Hilliard, PT     Physician's certification required:  Yes  Please co-sign or sign and return this letter via fax as soon as possible to [phone].   I certify the need for these services furnished under this plan of treatment and while under my care.    X___________________________________________________ Date____________________    Certification From: 6/12/2025  To:9/10/2025          Treatment Last 4 Visits  Treatment Day: 7 6/2/2025 6/5/2025 6/9/2025 6/12/2025   LE Treatment   Therapeutic Exercise Nustep x 5 minutes   Heel slides 2x10   Quad set 5sx25   Prone hang x 2 minutes  PROM flex/ext   HS stretch 3x30s   Standing Hamstring curl 2 x 10   Mini squat 2x10   Heel raises 2 x 10   Hip extension 2 x 10   Gastroc stretch 2 x 30''    Nustep x 5 minutes   Heel slides 2x10   Quad set 5sx25   Prone hang x 2 minutes (hold)  PROM flex/ext   HS stretch 3x30s   Standing Hamstring curl 2 x 10 (hold)  Mini squat 2x10 (hold)  Heel raises 2 x 10   Hip extension 2 x 10 (hold)  Gastroc stretch 2 x 30''   Supine  TKE 3# 3 min  TF distraction 10# 4min sitting Nustep x 5 minutes   Heel slides 2x20 reps   Quad set 5sx25   LAQ 2 x 10   Seated self PROM knee flexion on edge of bed  Seated self knee extension over pressure  Heel raises 2 x 10   Mini squats 2 x 10   Hip abduction 2 x 10   TF distraction 10# 4min sitting   PROM flex/ext in sitting/supine    Nustep x 5 minutes   Heel slides 3x10 3s hold  SAQ 30x   Quad set 5sx25   LAQ 2 x 10 (hold)  Seated self PROM knee flexion on edge of bed (hold)  Seated self knee extension over pressure (hold)  Heel raises 2 x 10 (hold)  Mini squats 2 x 10 (hold)  Hip abduction 2 x 10 (hold)  TF distraction 10# 4min sitting   PROM flex/ext in sitting/supine   Supine TKE 5lbs 3 min  Supine TKE with small CP x10 min   Neuro Re-Education    Reassessment and pt education   Manual Therapy TF ant and post glide grade 1-3   Quad and HS STM for edema control    TF ant and post glide grade 1-3   Quad and HS STM for edema control    TF ant and post glide grade 1-3   Passive knee extension over pressure  Quad and HS STM for edema control    TF ant and post glides   Modalities Game ready x 10 minutes low pressure Vaso x10 min low pressure Vaso x10 min low pressure    Therapeutic Exercise Minutes 30 30 30 45   Neuro Re-Educ Minutes    15   Manual Therapy Minutes 15 15 15 5   Hot/Cold Pack Minutes  10 10    Other Therapy Minutes 10      Total Time Of Timed Procedures 45 45 45 65   Total Time Of Service-Based Procedures 10 10 10 0   Total Treatment Time 55 55 55 65   HEP   Access Code: [email or web address]  URL: [email or web address]  Date: 06/09/2025  Prepared by: Ria Gaitan    Exercises  - Supine Heel Slide with Strap  - 1 x daily - 7 x weekly - 3 sets - 10 reps  - Supine Quad Set  - 1 x daily - 7 x weekly - 1 sets - 25 reps - 5s hold  - Supine Knee Extension Strengthening  - 1 x daily - 7 x weekly - 1 sets - 20 reps - 3s hold  - Active Straight Leg Raise with Quad Set  - 1 x daily - 7 x weekly - 1  sets - 10 reps  - Supine Bridge  - 1 x daily - 7 x weekly - 2 sets - 10 reps  - Mini Squat with Counter Support  - 1 x daily - 7 x weekly - 2 sets - 10 reps  - Prone Knee Extension Hang  - 1 x daily - 7 x weekly - 2-5 minutes hold  - Seated Passive Knee Extension  - 1 x daily - 7 x weekly - 3 sets - 10 reps  - Seated Knee Flexion Stretch  - 1 x daily - 7 x weekly - 3 sets - 10 reps Access Code: [email or web address]  URL: [email or web address]  Date: 06/12/2025  Prepared by: Colt Hilliard    Exercises  - Supine Heel Slide with Strap  - 2-3 x daily - 6 x weekly - 3 sets - 10 reps  - Supine Quad Set  - 2-3 x daily - 6 x weekly - 1 sets - 25 reps - 5s hold  - Supine Knee Extension Strengthening  - 2-3 x daily - 6 x weekly - 1 sets - 20 reps - 3s hold  - Active Straight Leg Raise with Quad Set  - 2-3 x daily - 6 x weekly - 1 sets - 10 reps  - Mini Squat with Counter Support  - 1 x daily - 6 x weekly - 2 sets - 10 reps  - Prone Knee Extension Hang  - 2-3 x daily - 6 x weekly - 2-5 minutes hold  - Seated Passive Knee Extension  - 2-3 x daily - 6 x weekly - 2 sets - 10 reps - 5s hold  - Seated Knee Flexion Stretch  - 2-3 x daily - 6 x weekly - 3 sets - 10 reps  - Heel Raises with Counter Support  - 1 x daily - 6 x weekly - 2 sets - 10 reps        HEP  Access Code: [email or web address]  URL: [email or web address]  Date: 06/12/2025  Prepared by: Colt Hilliard    Exercises  - Supine Heel Slide with Strap  - 2-3 x daily - 6 x weekly - 3 sets - 10 reps  - Supine Quad Set  - 2-3 x daily - 6 x weekly - 1 sets - 25 reps - 5s hold  - Supine Knee Extension Strengthening  - 2-3 x daily - 6 x weekly - 1 sets - 20 reps - 3s hold  - Active Straight Leg Raise with Quad Set  - 2-3 x daily - 6 x weekly - 1 sets - 10 reps  - Mini Squat with Counter Support  - 1 x daily - 6 x weekly - 2 sets - 10 reps  - Prone Knee Extension Hang  - 2-3 x daily - 6 x weekly - 2-5 minutes hold  - Seated Passive Knee Extension  - 2-3 x daily -  6 x weekly - 2 sets - 10 reps - 5s hold  - Seated Knee Flexion Stretch  - 2-3 x daily - 6 x weekly - 3 sets - 10 reps  - Heel Raises with Counter Support  - 1 x daily - 6 x weekly - 2 sets - 10 reps    Charges     Therex x3, neuro x1

## 2025-06-14 DIAGNOSIS — E11.29 TYPE 2 DIABETES MELLITUS WITH MICROALBUMINURIA, WITHOUT LONG-TERM CURRENT USE OF INSULIN (HCC): ICD-10-CM

## 2025-06-14 DIAGNOSIS — R80.9 TYPE 2 DIABETES MELLITUS WITH MICROALBUMINURIA, WITHOUT LONG-TERM CURRENT USE OF INSULIN (HCC): ICD-10-CM

## 2025-06-16 ENCOUNTER — OFFICE VISIT (OUTPATIENT)
Dept: PHYSICAL THERAPY | Age: 75
End: 2025-06-16
Attending: ORTHOPAEDIC SURGERY
Payer: MEDICARE

## 2025-06-16 PROCEDURE — 97140 MANUAL THERAPY 1/> REGIONS: CPT

## 2025-06-16 PROCEDURE — 97110 THERAPEUTIC EXERCISES: CPT

## 2025-06-16 RX ORDER — TIRZEPATIDE 10 MG/.5ML
INJECTION, SOLUTION SUBCUTANEOUS
Qty: 6 ML | Refills: 0 | Status: SHIPPED | OUTPATIENT
Start: 2025-06-16

## 2025-06-16 NOTE — TELEPHONE ENCOUNTER
Requested Prescriptions     Pending Prescriptions Disp Refills    MOUNJARO 10 MG/0.5ML Subcutaneous Solution Auto-injector [Pharmacy Med Name: MOUNJARO 10MG/0.5ML INJ (4 PENS)] 6 mL 0     Sig: ADMINISTER 10 MG UNDER THE SKIN EVERY 7 DAYS     Your appointments       Date & Time Appointment Department ([place])    Jun 16, 2025 10:45 AM CDT Physical Therapy Ortho Treatment with Colt Hilliard, PT [facility] [place] ([facility] [place])        Jun 18, 2025 11:20 AM CDT Post Op Visit with Jose Luis Yepez MD [address] ([facility])        Jun 18, 2025 2:00 PM CDT Physical Therapy Ortho Treatment with Colt Hilliard PT [facility] [place] ([facility] [place])        Jun 19, 2025 10:45 AM CDT Physical Therapy Ortho Treatment with Ria Gaitan [facility] [place] ([facility] [place])        Jun 23, 2025 11:45 AM CDT Physical Therapy Ortho Treatment with Ria Gaitan [facility] [place] [facility] [place])        Jun 24, 2025 12:30 PM CDT Physical Therapy Ortho Treatment with Ria Gaitan [facility] [place] ([facility] [place])        Jun 26, 2025 11:45 AM CDT Physical Therapy Ortho Treatment with Colt Hilliard PT [facility] [place] ([facility] [place])        Jun 30, 2025 10:45 AM CDT Physical Therapy Ortho Treatment with Colt Hilliard, PT [facility] [place] ([facility] [place])        Jul 07, 2025 8:30 AM CDT EGD with JASWANT CAMILO (--)    Please arrive 60 minutes prior to your scheduled procedure time.         Jul 08, 2025 10:00 AM CDT Diabetes Pump follow up with Pauline Pereyra APRN [facility], [facility] ([facility] [place])        Jul 16, 2025 10:00 AM CDT Follow Up Visit with Anne Galvan MD [facility] ([facility])    Contact your primary  care provider if your insurance requires a referral.    Please arrive 15 minutes prior to your scheduled appointment. Be sure to bring your current Insurance card, Photo ID, and medication bottles or a list of your current medications.      A 24 hour notice is required to cancel any appointment or you may be charged a $40 No Show Fee.     Important: 24 hour notice is required to cancel any appointment or you may be charged a $40 No Show Fee. Please notify your physician office.         Sep 19, 2025 10:15 AM CDT Exam - Established with Lucita Cope DO [facility], [facility] ([facility])        Nov 19, 2025 9:40 AM CST Follow-Up OV with Jose F Ortega APRN [facility] ([facility])    Please arrive 15 minutes prior to your scheduled appointment time.               [facility] [address] [address]  [phone] [address] [employer]  [address] [place] [address] [address]  [facility] [id number] [phone] [facility]  [address]  [facility] [id number] [phone]    [facility]  [facility] [place]  [address]  [facility] [id number] [phone] [facility]  [address]  [facility] [id number] [phone] [facility]  No address on file  [phone]    [facility]  [facility]  [address] [facility] [address] [phone]           Last A1c value was 6.8% done 4/16/2025.   Last office visit: 4/8/25 - AM  Last refill: 3/10/25 - am

## 2025-06-16 NOTE — PROGRESS NOTES
Patient: Lexi Ahumada (74 year old, female) Referring Provider:  Insurance:   Diagnosis: Status post right knee replacement (Z96.651) Jose Luis Yepez  [facility]   Date of Surgery: 5/6/25 Next MD visit:  N/A   Precautions:  None No data recorded Referral Information:    Date of Evaluation: Req: 0, Auth: 0, Exp:     05/22/25 POC Auth Visits:          Today's Date   6/16/2025    Subjective  Pt states her knee feels about the same. Feels like she is stuck. Only took tylenol today due to burning in her hands. Will ask surgeon about alternatives.       Pain: pain not reported     Objective  6/9/25 Knee flexion 96 AAROM, -9 degrees extension            Assessment  Pt has an increase in pain at end ranges and tolerates minimal end range OP likely from reduced pain medications. Performed knee flexion ROM exercises at different angles with motion continued to be limited. increased time with holds due to pain.    Goals (to be met in 14 visits)      Not Met Progress Toward Partially Met Met   Pt will improve knee extension ROM to 0 deg to allow proper heel strike during gait and terminal knee extension in stance. [] [x] [] []   Pt will improve knee AROM flexion to >120 degrees to improve ability to perform squat. [] [x] [] []   Pt will improve quad strength to 5/5 to ascend 1 flight of stairs reciprocally without UE assist. Pt states she still uses cane with descending full flight [] [x] [] []   Pt will be independent and compliant with comprehensive HEP to maintain progress achieved in PT. [] [x] [] []                          Plan  Will continue to progress ROM for ambulation with AD and ADL completion.    Treatment Last 4 Visits  Treatment Day: 8       6/5/2025 6/9/2025 6/12/2025 6/16/2025   LE Treatment   Therapeutic Exercise Nustep x 5 minutes   Heel slides 2x10   Quad set 5sx25   Prone hang x 2 minutes (hold)  PROM flex/ext   HS stretch 3x30s   Standing Hamstring curl 2 x 10 (hold)  Mini squat 2x10 (hold)  Heel  raises 2 x 10   Hip extension 2 x 10 (hold)  Gastroc stretch 2 x 30''   Supine TKE 3# 3 min  TF distraction 10# 4min sitting Nustep x 5 minutes   Heel slides 2x20 reps   Quad set 5sx25   LAQ 2 x 10   Seated self PROM knee flexion on edge of bed  Seated self knee extension over pressure  Heel raises 2 x 10   Mini squats 2 x 10   Hip abduction 2 x 10   TF distraction 10# 4min sitting   PROM flex/ext in sitting/supine    Nustep x 5 minutes   Heel slides 3x10 3s hold  SAQ 30x   Quad set 5sx25   LAQ 2 x 10 (hold)  Seated self PROM knee flexion on edge of bed (hold)  Seated self knee extension over pressure (hold)  Heel raises 2 x 10 (hold)  Mini squats 2 x 10 (hold)  Hip abduction 2 x 10 (hold)  TF distraction 10# 4min sitting   PROM flex/ext in sitting/supine   Supine TKE 5lbs 3 min  Supine TKE with small CP x10 min Nustep x 5 minutes   Heel slides 3x10 5s  SAQ 30x hold  Quad set 5sx25 hold  LAQ 2 x 10 (hold)   Seated self PROM knee flexion on edge of bed (hold)   Seated self knee extension over pressure (hold)   Heel raises 2 x 10 (hold)   Mini squats 2 x 10 (hold)   Hip abduction 2 x 10 (hold)   TF distraction 10# 4min sitting   PROM flex/ext in sitting/supine   Supine TKE 5lbs 3 min   Supine TKE with small CP x10 min   Seated knee flexion with strap 20x  Wall heel slides with OP using left LE 20x   Neuro Re-Education   Reassessment and pt education    Manual Therapy TF ant and post glide grade 1-3   Quad and HS STM for edema control    TF ant and post glide grade 1-3   Passive knee extension over pressure  Quad and HS STM for edema control    TF ant and post glides TF ant and post glide grade 1-3   Passive knee extension over pressure   Quad and HS STM for edema control      Modalities Vaso x10 min low pressure Vaso x10 min low pressure     Therapeutic Exercise Minutes 30 30 45 45   Neuro Re-Educ Minutes   15    Manual Therapy Minutes 15 15 5 10   Hot/Cold Pack Minutes 10 10     Total Time Of Timed Procedures 45 45  65 55   Total Time Of Service-Based Procedures 10 10 0 0   Total Treatment Time 55 55 65 55   HEP  Access Code: [email or web address]  URL: [email or web address]/  Date: 06/09/2025  Prepared by: Ria Gaitan    Exercises  - Supine Heel Slide with Strap  - 1 x daily - 7 x weekly - 3 sets - 10 reps  - Supine Quad Set  - 1 x daily - 7 x weekly - 1 sets - 25 reps - 5s hold  - Supine Knee Extension Strengthening  - 1 x daily - 7 x weekly - 1 sets - 20 reps - 3s hold  - Active Straight Leg Raise with Quad Set  - 1 x daily - 7 x weekly - 1 sets - 10 reps  - Supine Bridge  - 1 x daily - 7 x weekly - 2 sets - 10 reps  - Mini Squat with Counter Support  - 1 x daily - 7 x weekly - 2 sets - 10 reps  - Prone Knee Extension Hang  - 1 x daily - 7 x weekly - 2-5 minutes hold  - Seated Passive Knee Extension  - 1 x daily - 7 x weekly - 3 sets - 10 reps  - Seated Knee Flexion Stretch  - 1 x daily - 7 x weekly - 3 sets - 10 reps Access Code: [email or web address]  URL: [email or web address]/  Date: 06/12/2025  Prepared by: Colt Hilliard    Exercises  - Supine Heel Slide with Strap  - 2-3 x daily - 6 x weekly - 3 sets - 10 reps  - Supine Quad Set  - 2-3 x daily - 6 x weekly - 1 sets - 25 reps - 5s hold  - Supine Knee Extension Strengthening  - 2-3 x daily - 6 x weekly - 1 sets - 20 reps - 3s hold  - Active Straight Leg Raise with Quad Set  - 2-3 x daily - 6 x weekly - 1 sets - 10 reps  - Mini Squat with Counter Support  - 1 x daily - 6 x weekly - 2 sets - 10 reps  - Prone Knee Extension Hang  - 2-3 x daily - 6 x weekly - 2-5 minutes hold  - Seated Passive Knee Extension  - 2-3 x daily - 6 x weekly - 2 sets - 10 reps - 5s hold  - Seated Knee Flexion Stretch  - 2-3 x daily - 6 x weekly - 3 sets - 10 reps  - Heel Raises with Counter Support  - 1 x daily - 6 x weekly - 2 sets - 10 reps         HEP  Access Code: [email or web address]  URL: [email or web address]  Date: 06/12/2025  Prepared by: Colt  Bousson    Exercises  - Supine Heel Slide with Strap  - 2-3 x daily - 6 x weekly - 3 sets - 10 reps  - Supine Quad Set  - 2-3 x daily - 6 x weekly - 1 sets - 25 reps - 5s hold  - Supine Knee Extension Strengthening  - 2-3 x daily - 6 x weekly - 1 sets - 20 reps - 3s hold  - Active Straight Leg Raise with Quad Set  - 2-3 x daily - 6 x weekly - 1 sets - 10 reps  - Mini Squat with Counter Support  - 1 x daily - 6 x weekly - 2 sets - 10 reps  - Prone Knee Extension Hang  - 2-3 x daily - 6 x weekly - 2-5 minutes hold  - Seated Passive Knee Extension  - 2-3 x daily - 6 x weekly - 2 sets - 10 reps - 5s hold  - Seated Knee Flexion Stretch  - 2-3 x daily - 6 x weekly - 3 sets - 10 reps  - Heel Raises with Counter Support  - 1 x daily - 6 x weekly - 2 sets - 10 reps    Charges     Therex x3, manual x1

## 2025-06-17 ENCOUNTER — TELEPHONE (OUTPATIENT)
Facility: CLINIC | Age: 75
End: 2025-06-17

## 2025-06-17 DIAGNOSIS — Z96.651 STATUS POST RIGHT KNEE REPLACEMENT: Primary | ICD-10-CM

## 2025-06-18 ENCOUNTER — HOSPITAL ENCOUNTER (OUTPATIENT)
Dept: GENERAL RADIOLOGY | Age: 75
Discharge: HOME | End: 2025-06-18
Attending: ORTHOPAEDIC SURGERY
Payer: MEDICARE

## 2025-06-18 ENCOUNTER — OFFICE VISIT (OUTPATIENT)
Dept: ORTHOPEDICS CLINIC | Facility: CLINIC | Age: 75
End: 2025-06-18
Payer: MEDICARE

## 2025-06-18 ENCOUNTER — OFFICE VISIT (OUTPATIENT)
Dept: PHYSICAL THERAPY | Age: 75
End: 2025-06-18
Attending: ORTHOPAEDIC SURGERY
Payer: MEDICARE

## 2025-06-18 DIAGNOSIS — Z96.651 STATUS POST RIGHT KNEE REPLACEMENT: ICD-10-CM

## 2025-06-18 DIAGNOSIS — Z96.651 STATUS POST RIGHT KNEE REPLACEMENT: Primary | ICD-10-CM

## 2025-06-18 PROCEDURE — 1160F RVW MEDS BY RX/DR IN RCRD: CPT | Performed by: ORTHOPAEDIC SURGERY

## 2025-06-18 PROCEDURE — 97110 THERAPEUTIC EXERCISES: CPT

## 2025-06-18 PROCEDURE — 77073 BONE LENGTH STUDIES: CPT | Performed by: ORTHOPAEDIC SURGERY

## 2025-06-18 PROCEDURE — 73562 X-RAY EXAM OF KNEE 3: CPT | Performed by: ORTHOPAEDIC SURGERY

## 2025-06-18 PROCEDURE — 97140 MANUAL THERAPY 1/> REGIONS: CPT

## 2025-06-18 PROCEDURE — 1159F MED LIST DOCD IN RCRD: CPT | Performed by: ORTHOPAEDIC SURGERY

## 2025-06-18 PROCEDURE — 99024 POSTOP FOLLOW-UP VISIT: CPT | Performed by: ORTHOPAEDIC SURGERY

## 2025-06-18 RX ORDER — MELOXICAM 15 MG/1
15 TABLET ORAL DAILY
Qty: 30 TABLET | Refills: 0 | Status: SHIPPED | OUTPATIENT
Start: 2025-06-18 | End: 2025-07-18

## 2025-06-18 RX ORDER — TRAMADOL HYDROCHLORIDE 50 MG/1
TABLET ORAL EVERY 6 HOURS PRN
Qty: 30 TABLET | Refills: 0 | Status: SHIPPED | OUTPATIENT
Start: 2025-06-18

## 2025-06-18 RX ORDER — METHYLPREDNISOLONE 4 MG/1
TABLET ORAL
Qty: 21 TABLET | Refills: 0 | Status: SHIPPED | OUTPATIENT
Start: 2025-06-18

## 2025-06-18 NOTE — PROGRESS NOTES
Patient: Lexi Ahumada (74 year old, female) Referring Provider:  Insurance:   Diagnosis: Status post right knee replacement (Z96.651) Jose Luis Yepez  [facility]   Date of Surgery: 5/6/25 Next MD visit:  N/A   Precautions:  None No data recorded Referral Information:    Date of Evaluation: Req: 0, Auth: 0, Exp:     05/22/25 POC Auth Visits:          Today's Date   6/18/2025    Subjective  Pt states she went to the doctor who is going to give her different pain medications and swelling medications. Draining her knee is not going to help. Has not filled the new prescriptions yes but took a tramadol before coming as she had some left from before.       Pain: pain not reported     Objective  6/9/25 Knee flexion 96 AAROM, -9 degrees extension              Assessment  Pt allows further stretching in end ranges likely due to reports of increased pain medication but remains very painful. Initiated TKE with mulligan seat belt. Pt tolerates moderately well as she needs increased tension half way through stretch. She is encouraged to continue pain medications and aggressive stretching at home for best outcome and voiced understanding. States she will fill all surgeon prescriptions today.    Goals (to be met in 14 visits)      Not Met Progress Toward Partially Met Met   Pt will improve knee extension ROM to 0 deg to allow proper heel strike during gait and terminal knee extension in stance. [] [x] [] []   Pt will improve knee AROM flexion to >120 degrees to improve ability to perform squat. [] [x] [] []   Pt will improve quad strength to 5/5 to ascend 1 flight of stairs reciprocally without UE assist. Pt states she still uses cane with descending full flight [] [x] [] []   Pt will be independent and compliant with comprehensive HEP to maintain progress achieved in PT. [] [x] [] []                              Plan  Will continue to progress ROM for ambulation with AD and ADL completion.    Treatment Last 4  Visits  Treatment Day: 9       6/9/2025 6/12/2025 6/16/2025 6/18/2025   LE Treatment   Therapeutic Exercise Nustep x 5 minutes   Heel slides 2x20 reps   Quad set 5sx25   LAQ 2 x 10   Seated self PROM knee flexion on edge of bed  Seated self knee extension over pressure  Heel raises 2 x 10   Mini squats 2 x 10   Hip abduction 2 x 10   TF distraction 10# 4min sitting   PROM flex/ext in sitting/supine    Nustep x 5 minutes   Heel slides 3x10 3s hold  SAQ 30x   Quad set 5sx25   LAQ 2 x 10 (hold)  Seated self PROM knee flexion on edge of bed (hold)  Seated self knee extension over pressure (hold)  Heel raises 2 x 10 (hold)  Mini squats 2 x 10 (hold)  Hip abduction 2 x 10 (hold)  TF distraction 10# 4min sitting   PROM flex/ext in sitting/supine   Supine TKE 5lbs 3 min  Supine TKE with small CP x10 min Nustep x 5 minutes   Heel slides 3x10 5s  SAQ 30x hold  Quad set 5sx25 hold  LAQ 2 x 10 (hold)   Seated self PROM knee flexion on edge of bed (hold)   Seated self knee extension over pressure (hold)   Heel raises 2 x 10 (hold)   Mini squats 2 x 10 (hold)   Hip abduction 2 x 10 (hold)   TF distraction 10# 4min sitting   PROM flex/ext in sitting/supine   Supine TKE 5lbs 3 min   Supine TKE with small CP x10 min   Seated knee flexion with strap 20x  Wall heel slides with OP using left LE 20x Nustep x 5 minutes   Heel slides 3x10 5s   SAQ 30x   Quad set 5sx25 hold   LAQ 2 x 10 (hold)   Seated self PROM knee flexion on edge of bed (hold)   Seated self knee extension over pressure (hold)   Heel raises 2 x 10 (hold)   Mini squats 2 x 10 (hold)   Hip abduction 2 x 10 (hold)   TF distraction 10# 4min sitting   PROM flex/ext in sitting/supine   Supine TKE 5lbs 3 min (hold)  Supine TKE with large CP x10 min   Seated knee flexion with strap 20x (hold)  Wall heel slides with OP using left LE 20x (hold)  Mulligan belt TKE with white foam roll 3 min   Neuro Re-Education  Reassessment and pt education     Manual Therapy TF ant and post glide  grade 1-3   Passive knee extension over pressure  Quad and HS STM for edema control    TF ant and post glides TF ant and post glide grade 1-3   Passive knee extension over pressure   Quad and HS STM for edema control    TF ant and post glide grade 1-3   Passive knee extension over pressure   Quad and HS STM for edema control      Modalities Vaso x10 min low pressure      Therapeutic Exercise Minutes 30 45 45 30   Neuro Re-Educ Minutes  15     Manual Therapy Minutes 15 5 10 25   Hot/Cold Pack Minutes 10      Total Time Of Timed Procedures 45 65 55 55   Total Time Of Service-Based Procedures 10 0 0 0   Total Treatment Time 55 65 55 55   HEP Access Code: [email or web address]  URL: [email or web address]  Date: 06/09/2025  Prepared by: Ria Gaitan    Exercises  - Supine Heel Slide with Strap  - 1 x daily - 7 x weekly - 3 sets - 10 reps  - Supine Quad Set  - 1 x daily - 7 x weekly - 1 sets - 25 reps - 5s hold  - Supine Knee Extension Strengthening  - 1 x daily - 7 x weekly - 1 sets - 20 reps - 3s hold  - Active Straight Leg Raise with Quad Set  - 1 x daily - 7 x weekly - 1 sets - 10 reps  - Supine Bridge  - 1 x daily - 7 x weekly - 2 sets - 10 reps  - Mini Squat with Counter Support  - 1 x daily - 7 x weekly - 2 sets - 10 reps  - Prone Knee Extension Hang  - 1 x daily - 7 x weekly - 2-5 minutes hold  - Seated Passive Knee Extension  - 1 x daily - 7 x weekly - 3 sets - 10 reps  - Seated Knee Flexion Stretch  - 1 x daily - 7 x weekly - 3 sets - 10 reps Access Code: [email or web address]  URL: [email or web address]  Date: 06/12/2025  Prepared by: Colt Hilliard    Exercises  - Supine Heel Slide with Strap  - 2-3 x daily - 6 x weekly - 3 sets - 10 reps  - Supine Quad Set  - 2-3 x daily - 6 x weekly - 1 sets - 25 reps - 5s hold  - Supine Knee Extension Strengthening  - 2-3 x daily - 6 x weekly - 1 sets - 20 reps - 3s hold  - Active Straight Leg Raise with Quad Set  - 2-3 x daily - 6 x weekly - 1 sets - 10  reps  - Mini Squat with Counter Support  - 1 x daily - 6 x weekly - 2 sets - 10 reps  - Prone Knee Extension Hang  - 2-3 x daily - 6 x weekly - 2-5 minutes hold  - Seated Passive Knee Extension  - 2-3 x daily - 6 x weekly - 2 sets - 10 reps - 5s hold  - Seated Knee Flexion Stretch  - 2-3 x daily - 6 x weekly - 3 sets - 10 reps  - Heel Raises with Counter Support  - 1 x daily - 6 x weekly - 2 sets - 10 reps          HEP  Access Code: [email or web address]  URL: [email or web address]  Date: 06/12/2025  Prepared by: Colt Hilliard    Exercises  - Supine Heel Slide with Strap  - 2-3 x daily - 6 x weekly - 3 sets - 10 reps  - Supine Quad Set  - 2-3 x daily - 6 x weekly - 1 sets - 25 reps - 5s hold  - Supine Knee Extension Strengthening  - 2-3 x daily - 6 x weekly - 1 sets - 20 reps - 3s hold  - Active Straight Leg Raise with Quad Set  - 2-3 x daily - 6 x weekly - 1 sets - 10 reps  - Mini Squat with Counter Support  - 1 x daily - 6 x weekly - 2 sets - 10 reps  - Prone Knee Extension Hang  - 2-3 x daily - 6 x weekly - 2-5 minutes hold  - Seated Passive Knee Extension  - 2-3 x daily - 6 x weekly - 2 sets - 10 reps - 5s hold  - Seated Knee Flexion Stretch  - 2-3 x daily - 6 x weekly - 3 sets - 10 reps  - Heel Raises with Counter Support  - 1 x daily - 6 x weekly - 2 sets - 10 reps    Charges     Manual x2, therex x2

## 2025-06-18 NOTE — PROGRESS NOTES
[facility] Progress Note    Subjective: Patient returns to clinic 6 weeks postop from right knee replacement.  She reports some issues with stiffness, swelling, tightness and pain, as well as motion deficit.  Most recent therapy note from 2 days ago documents knee feeling stuck, taking Tylenol, range of motion -9-96.  She states she has not taken much in the way of pain medications over the past few weeks, only taking Tylenol.  Feels like oxycodone was causing some burning sensation in her hands.  She does feel like tramadol helped.  She does ambulate with a cane.  She notes she has had swelling about the knee, swelling and bruising about the leg and thigh.  Feels that the leg and thigh are getting better, but the knee still feels swollen.    Objective: Right knee incision is well-healed.  Minimal appreciable effusion to the knee.  There is some edema about the knee.  Active extension within about 5 degrees of full, flexion just past 90 degrees.  Knee is appropriate stable to varus valgus stress throughout range of motion.      Imaging: X-rays of the right knee personally viewed, independently interpreted and radiology report read.  Full-length films demonstrate improvement in hip-knee-ankle axis, from preoperatively along the medial aspect of the medial compartment, to postoperatively along the medial aspect of the trochlear groove.  Tibial component alignment within about a degree of neutral touch varus, 3 to 4 degrees posterior slope.  On resurfaced patella tracking centrally.      Assessment/Plan: 74-year-old female 6 weeks postop status post right knee replacement.  Patient with some difficulty with inflammation and range of motion.  We did discuss anti-inflammatory modalities, icing, also prescribed Medrol Dosepak to be followed by prescription of meloxicam.  Did discuss monitoring blood sugar while on steroid.  I did refill tramadol to be used prior to therapy in order to help push motion.  Will touch  base with patient's therapist as well.  She has done with aspirin DVT prophylaxis no restrictions on the incision.  Would have her follow-up in 4 weeks for reassessment, did discuss potential for manipulation if insufficient improvement in range of motion.  She expressed understanding.    Jose Luis Yepez MD, FAAOS  [facility]  Phone [phone]  Fax [phone]

## 2025-06-19 ENCOUNTER — OFFICE VISIT (OUTPATIENT)
Dept: PHYSICAL THERAPY | Age: 75
End: 2025-06-19
Attending: ORTHOPAEDIC SURGERY
Payer: MEDICARE

## 2025-06-19 PROCEDURE — 97110 THERAPEUTIC EXERCISES: CPT

## 2025-06-19 PROCEDURE — 97140 MANUAL THERAPY 1/> REGIONS: CPT

## 2025-06-19 NOTE — PROGRESS NOTES
Patient: Lexi Ahumada (74 year old, female) Referring Provider:  Insurance:   Diagnosis: Status post right knee replacement (Z96.651) Jose Luis Yepez  [facility]   Date of Surgery: 5/6/25 Next MD visit:  N/A   Precautions:  None No data recorded Referral Information:    Date of Evaluation: Req: 0, Auth: 0, Exp:     05/22/25 POC Auth Visits:          Today's Date   6/19/2025    Subjective  Patient reports that her knee feels really stiff and tight, denies pain. Did take meds before PT.       Pain: pain not reported (stiffness)     Objective  Knee flexion 98 AAROM       Assessment  Patient continues to have high pain with stretching into knee flexion, extension continues to be limited but less painful with PROM.Patient educated on coninutation and consistency with HEP for improved PT outcomes. Patient receptive. Patient would benefit from continued PT to address strength, ROM, and function to allow patient to meet set goals.    Goals (to be met in 14 visits)       Not Met Progress Toward Partially Met Met    Pt will improve knee extension ROM to 0 deg to allow proper heel strike during gait and terminal knee extension in stance. []  []  []  []    Pt will improve knee AROM flexion to >120 degrees to improve ability to perform squat. []  []  []  []    Pt will improve quad strength to 5/5 to ascend 1 flight of stairs reciprocally without UE assist. []  []  []  []    Pt will be independent and compliant with comprehensive HEP to maintain progress achieved in PT. []  []  []  []                     Plan  Continue per plan of care    Treatment Last 4 Visits  Treatment Day: 10       6/12/2025 6/16/2025 6/18/2025 6/19/2025   LE Treatment   Therapeutic Exercise Nustep x 5 minutes   Heel slides 3x10 3s hold  SAQ 30x   Quad set 5sx25   LAQ 2 x 10 (hold)  Seated self PROM knee flexion on edge of bed (hold)  Seated self knee extension over pressure (hold)  Heel raises 2 x 10 (hold)  Mini squats 2 x 10 (hold)  Hip abduction 2 x  10 (hold)  TF distraction 10# 4min sitting   PROM flex/ext in sitting/supine   Supine TKE 5lbs 3 min  Supine TKE with small CP x10 min Nustep x 5 minutes   Heel slides 3x10 5s  SAQ 30x hold  Quad set 5sx25 hold  LAQ 2 x 10 (hold)   Seated self PROM knee flexion on edge of bed (hold)   Seated self knee extension over pressure (hold)   Heel raises 2 x 10 (hold)   Mini squats 2 x 10 (hold)   Hip abduction 2 x 10 (hold)   TF distraction 10# 4min sitting   PROM flex/ext in sitting/supine   Supine TKE 5lbs 3 min   Supine TKE with small CP x10 min   Seated knee flexion with strap 20x  Wall heel slides with OP using left LE 20x Nustep x 5 minutes   Heel slides 3x10 5s   SAQ 30x   Quad set 5sx25 hold   LAQ 2 x 10 (hold)   Seated self PROM knee flexion on edge of bed (hold)   Seated self knee extension over pressure (hold)   Heel raises 2 x 10 (hold)   Mini squats 2 x 10 (hold)   Hip abduction 2 x 10 (hold)   TF distraction 10# 4min sitting   PROM flex/ext in sitting/supine   Supine TKE 5lbs 3 min (hold)  Supine TKE with large CP x10 min   Seated knee flexion with strap 20x (hold)  Wall heel slides with OP using left LE 20x (hold)  Mulligan belt TKE with white foam roll 3 min Bike (reverse pedaling) x 5 minutes   PROM flex/ext in sitting/supine   Heel slides 3 minutes   Mulligan belt TKE with white foam roll 3 min   Prone knee ext hang 2# x 3 minutes  Supine TKE with large CP x10 min    Neuro Re-Education Reassessment and pt education      Manual Therapy TF ant and post glides TF ant and post glide grade 1-3   Passive knee extension over pressure   Quad and HS STM for edema control    TF ant and post glide grade 1-3   Passive knee extension over pressure   Quad and HS STM for edema control    TF ant and post glide grade 1-3   Passive knee extension over pressure   Quad and HS STM for edema control      Therapeutic Exercise Minutes 45 45 30 30   Neuro Re-Educ Minutes 15      Manual Therapy Minutes 5 10 25 15   Total Time Of  Timed Procedures 65 55 55 45   Total Time Of Service-Based Procedures 0 0 0 0   Total Treatment Time 65 55 55 45   HEP Access Code: [email or web address]  URL: [email or web address]  Date: 06/12/2025  Prepared by: Colt Hilliard    Exercises  - Supine Heel Slide with Strap  - 2-3 x daily - 6 x weekly - 3 sets - 10 reps  - Supine Quad Set  - 2-3 x daily - 6 x weekly - 1 sets - 25 reps - 5s hold  - Supine Knee Extension Strengthening  - 2-3 x daily - 6 x weekly - 1 sets - 20 reps - 3s hold  - Active Straight Leg Raise with Quad Set  - 2-3 x daily - 6 x weekly - 1 sets - 10 reps  - Mini Squat with Counter Support  - 1 x daily - 6 x weekly - 2 sets - 10 reps  - Prone Knee Extension Hang  - 2-3 x daily - 6 x weekly - 2-5 minutes hold  - Seated Passive Knee Extension  - 2-3 x daily - 6 x weekly - 2 sets - 10 reps - 5s hold  - Seated Knee Flexion Stretch  - 2-3 x daily - 6 x weekly - 3 sets - 10 reps  - Heel Raises with Counter Support  - 1 x daily - 6 x weekly - 2 sets - 10 reps           HEP  Access Code: [email or web address]  URL: [email or web address]  Date: 06/12/2025  Prepared by: Colt Hilliard    Exercises  - Supine Heel Slide with Strap  - 2-3 x daily - 6 x weekly - 3 sets - 10 reps  - Supine Quad Set  - 2-3 x daily - 6 x weekly - 1 sets - 25 reps - 5s hold  - Supine Knee Extension Strengthening  - 2-3 x daily - 6 x weekly - 1 sets - 20 reps - 3s hold  - Active Straight Leg Raise with Quad Set  - 2-3 x daily - 6 x weekly - 1 sets - 10 reps  - Mini Squat with Counter Support  - 1 x daily - 6 x weekly - 2 sets - 10 reps  - Prone Knee Extension Hang  - 2-3 x daily - 6 x weekly - 2-5 minutes hold  - Seated Passive Knee Extension  - 2-3 x daily - 6 x weekly - 2 sets - 10 reps - 5s hold  - Seated Knee Flexion Stretch  - 2-3 x daily - 6 x weekly - 3 sets - 10 reps  - Heel Raises with Counter Support  - 1 x daily - 6 x weekly - 2 sets - 10 reps    Charges     1 man, 2 TE

## 2025-06-23 ENCOUNTER — OFFICE VISIT (OUTPATIENT)
Dept: PHYSICAL THERAPY | Age: 75
End: 2025-06-23
Attending: ORTHOPAEDIC SURGERY
Payer: MEDICARE

## 2025-06-23 PROCEDURE — 97140 MANUAL THERAPY 1/> REGIONS: CPT

## 2025-06-23 PROCEDURE — 97110 THERAPEUTIC EXERCISES: CPT

## 2025-06-23 NOTE — PROGRESS NOTES
Patient: Lexi Ahumada (74 year old, female) Referring Provider:  Insurance:   Diagnosis: Status post right knee replacement (Z96.651) Jose Luis Hidalgolivan  [facility]   Date of Surgery: 5/6/25 Next MD visit:  N/A   Precautions:  None No data recorded Referral Information:    Date of Evaluation: Req: 0, Auth: 0, Exp:     05/22/25 POC Auth Visits:          Today's Date   6/23/2025    Subjective  Patient reports that she is feeling better, states that he knee feels less stiff and has less pain. Reports that she also has less swelling.       Pain: pain not reported     Objective  Knee flexion 100 AAROM       Assessment  Patient making gradual progressions with ROM, remains limited due to pain at end of available range. Patient's swelling appears improved at today's session. ABle to incorporate CKC knee flexion and extesnion at today's session without increased pain.    Goals (to be met in 14 visits)       Not Met Progress Toward Partially Met Met    Pt will improve knee extension ROM to 0 deg to allow proper heel strike during gait and terminal knee extension in stance. []  []  []  []    Pt will improve knee AROM flexion to >120 degrees to improve ability to perform squat. []  []  []  []    Pt will improve quad strength to 5/5 to ascend 1 flight of stairs reciprocally without UE assist. []  []  []  []    Pt will be independent and compliant with comprehensive HEP to maintain progress achieved in PT. []  []  []  []                         Plan  Continue per plan of care    Treatment Last 4 Visits  Treatment Day: 11 6/16/2025 6/18/2025 6/19/2025 6/23/2025   LE Treatment   Therapeutic Exercise Nustep x 5 minutes   Heel slides 3x10 5s  SAQ 30x hold  Quad set 5sx25 hold  LAQ 2 x 10 (hold)   Seated self PROM knee flexion on edge of bed (hold)   Seated self knee extension over pressure (hold)   Heel raises 2 x 10 (hold)   Mini squats 2 x 10 (hold)   Hip abduction 2 x 10 (hold)   TF distraction 10# 4min sitting   PROM  flex/ext in sitting/supine   Supine TKE 5lbs 3 min   Supine TKE with small CP x10 min   Seated knee flexion with strap 20x  Wall heel slides with OP using left LE 20x Nustep x 5 minutes   Heel slides 3x10 5s   SAQ 30x   Quad set 5sx25 hold   LAQ 2 x 10 (hold)   Seated self PROM knee flexion on edge of bed (hold)   Seated self knee extension over pressure (hold)   Heel raises 2 x 10 (hold)   Mini squats 2 x 10 (hold)   Hip abduction 2 x 10 (hold)   TF distraction 10# 4min sitting   PROM flex/ext in sitting/supine   Supine TKE 5lbs 3 min (hold)  Supine TKE with large CP x10 min   Seated knee flexion with strap 20x (hold)  Wall heel slides with OP using left LE 20x (hold)  Mulligan belt TKE with white foam roll 3 min Bike (reverse pedaling) x 5 minutes   PROM flex/ext in sitting/supine   Heel slides 3 minutes   Mulligan belt TKE with white foam roll 3 min   Prone knee ext hang 2# x 3 minutes  Supine TKE with large CP x10 min  Bike (full cycles) x 5 minutes   PROM flex/ext in sitting/supine   Heel slides 3 minutes   Prone knee ext hang 2# x 3 minutes  Standing knee flexion on 8inch step 2 x 10   Standing TKE with GTB 2 x 10    Supine TKE with large CP x10 min      Manual Therapy TF ant and post glide grade 1-3   Passive knee extension over pressure   Quad and HS STM for edema control    TF ant and post glide grade 1-3   Passive knee extension over pressure   Quad and HS STM for edema control    TF ant and post glide grade 1-3   Passive knee extension over pressure   Quad and HS STM for edema control    TF ant and post glide grade 1-3   Passive knee extension over pressure   Quad and HS STM for edema control      Therapeutic Exercise Minutes 45 30 30 35   Manual Therapy Minutes 10 25 15 10   Total Time Of Timed Procedures 55 55 45 45   Total Time Of Service-Based Procedures 0 0 0 0   Total Treatment Time 55 55 45 45        HEP  Access Code: [email or web address]  URL: [email or web address]  Date:  06/12/2025  Prepared by: Colt Hilliard    Exercises  - Supine Heel Slide with Strap  - 2-3 x daily - 6 x weekly - 3 sets - 10 reps  - Supine Quad Set  - 2-3 x daily - 6 x weekly - 1 sets - 25 reps - 5s hold  - Supine Knee Extension Strengthening  - 2-3 x daily - 6 x weekly - 1 sets - 20 reps - 3s hold  - Active Straight Leg Raise with Quad Set  - 2-3 x daily - 6 x weekly - 1 sets - 10 reps  - Mini Squat with Counter Support  - 1 x daily - 6 x weekly - 2 sets - 10 reps  - Prone Knee Extension Hang  - 2-3 x daily - 6 x weekly - 2-5 minutes hold  - Seated Passive Knee Extension  - 2-3 x daily - 6 x weekly - 2 sets - 10 reps - 5s hold  - Seated Knee Flexion Stretch  - 2-3 x daily - 6 x weekly - 3 sets - 10 reps  - Heel Raises with Counter Support  - 1 x daily - 6 x weekly - 2 sets - 10 reps    Charges     2 TE, 1 manual

## 2025-06-24 ENCOUNTER — OFFICE VISIT (OUTPATIENT)
Dept: PHYSICAL THERAPY | Age: 75
End: 2025-06-24
Attending: ORTHOPAEDIC SURGERY
Payer: MEDICARE

## 2025-06-24 PROCEDURE — 97140 MANUAL THERAPY 1/> REGIONS: CPT

## 2025-06-24 PROCEDURE — 97110 THERAPEUTIC EXERCISES: CPT

## 2025-06-24 NOTE — PROGRESS NOTES
Patient: Lexi Ahumada (74 year old, female) Referring Provider:  Insurance:   Diagnosis: Status post right knee replacement (Z96.651) Jose Luis Yepez  [facility]   Date of Surgery: 5/6/25 Next MD visit:  N/A   Precautions:  None No data recorded Referral Information:    Date of Evaluation: Req: 0, Auth: 0, Exp:     05/22/25 POC Auth Visits:          Today's Date   6/24/2025    Subjective  Patient reports that she is more stiff today because of PT yesterday.       Pain: pain not reported     Objective  Knee flexion 104 AAROM       Assessment  Patient made a 4 degree improvement in knee flexion at today's session. Patient tolerated session well, remains limited due to pain however making steady progress with ROM. Patient would benefit from continued PT to address ROM deficits.    Goals (to be met in 14 visits)       Not Met Progress Toward Partially Met Met    Pt will improve knee extension ROM to 0 deg to allow proper heel strike during gait and terminal knee extension in stance. []  []  []  []    Pt will improve knee AROM flexion to >120 degrees to improve ability to perform squat. []  []  []  []    Pt will improve quad strength to 5/5 to ascend 1 flight of stairs reciprocally without UE assist. []  []  []  []    Pt will be independent and compliant with comprehensive HEP to maintain progress achieved in PT. []  []  []  []                             Plan  Continue per plan of care    Treatment Last 4 Visits  Treatment Day: 12       6/18/2025 6/19/2025 6/23/2025 6/24/2025   LE Treatment   Therapeutic Exercise Nustep x 5 minutes   Heel slides 3x10 5s   SAQ 30x   Quad set 5sx25 hold   LAQ 2 x 10 (hold)   Seated self PROM knee flexion on edge of bed (hold)   Seated self knee extension over pressure (hold)   Heel raises 2 x 10 (hold)   Mini squats 2 x 10 (hold)   Hip abduction 2 x 10 (hold)   TF distraction 10# 4min sitting   PROM flex/ext in sitting/supine   Supine TKE 5lbs 3 min (hold)  Supine TKE with large CP  x10 min   Seated knee flexion with strap 20x (hold)  Wall heel slides with OP using left LE 20x (hold)  Mulligan belt TKE with white foam roll 3 min Bike (reverse pedaling) x 5 minutes   PROM flex/ext in sitting/supine   Heel slides 3 minutes   Mulligan belt TKE with white foam roll 3 min   Prone knee ext hang 2# x 3 minutes  Supine TKE with large CP x10 min  Bike (full cycles) x 5 minutes   PROM flex/ext in sitting/supine   Heel slides 3 minutes   Prone knee ext hang 2# x 3 minutes  Standing knee flexion on 8inch step 2 x 10   Standing TKE with GTB 2 x 10    Supine TKE with large CP x10 min    Bike (full cycles) x 5 minutes   PROM flex/ext in sitting/supine   Heel slides 3 minutes   Prone knee ext hang 3# x 3 minutes  Prone hamstring curl 2 x 10 3#   Standing knee flexion on 8inch step 2 x 10   Retro walking x 5 laps to facilitate quad contraction and knee extension  SB hamstring curl 2 x 10   Supine TKE with large CP x10 min      Manual Therapy TF ant and post glide grade 1-3   Passive knee extension over pressure   Quad and HS STM for edema control    TF ant and post glide grade 1-3   Passive knee extension over pressure   Quad and HS STM for edema control    TF ant and post glide grade 1-3   Passive knee extension over pressure   Quad and HS STM for edema control    TF ant and post glide grade 1-3   Passive knee extension over pressure   Quad and HS STM for edema control      Therapeutic Exercise Minutes 30 30 35 35   Manual Therapy Minutes 25 15 10 10   Total Time Of Timed Procedures 55 45 45 45   Total Time Of Service-Based Procedures 0 0 0 0   Total Treatment Time 55 45 45 45        HEP  Access Code: [email or web address]  URL: [email or web address]  Date: 06/12/2025  Prepared by: Colt Hilliard    Exercises  - Supine Heel Slide with Strap  - 2-3 x daily - 6 x weekly - 3 sets - 10 reps  - Supine Quad Set  - 2-3 x daily - 6 x weekly - 1 sets - 25 reps - 5s hold  - Supine Knee Extension Strengthening  -  2-3 x daily - 6 x weekly - 1 sets - 20 reps - 3s hold  - Active Straight Leg Raise with Quad Set  - 2-3 x daily - 6 x weekly - 1 sets - 10 reps  - Mini Squat with Counter Support  - 1 x daily - 6 x weekly - 2 sets - 10 reps  - Prone Knee Extension Hang  - 2-3 x daily - 6 x weekly - 2-5 minutes hold  - Seated Passive Knee Extension  - 2-3 x daily - 6 x weekly - 2 sets - 10 reps - 5s hold  - Seated Knee Flexion Stretch  - 2-3 x daily - 6 x weekly - 3 sets - 10 reps  - Heel Raises with Counter Support  - 1 x daily - 6 x weekly - 2 sets - 10 reps    Charges     2 TE, 1 manual

## 2025-06-26 ENCOUNTER — OFFICE VISIT (OUTPATIENT)
Dept: PHYSICAL THERAPY | Age: 75
End: 2025-06-26
Attending: ORTHOPAEDIC SURGERY
Payer: MEDICARE

## 2025-06-26 PROCEDURE — 97110 THERAPEUTIC EXERCISES: CPT

## 2025-06-26 PROCEDURE — 97140 MANUAL THERAPY 1/> REGIONS: CPT

## 2025-06-26 NOTE — PROGRESS NOTES
Patient: Lexi Ahumada (74 year old, female) Referring Provider:  Insurance:   Diagnosis: Status post right knee replacement (Z96.651) Jose Luis Yepez  [facility]   Date of Surgery: 5/6/25 Next MD visit:  N/A   Precautions:  None No data recorded Referral Information:    Date of Evaluation: Req: 0, Auth: 0, Exp:     05/22/25 POC Auth Visits:          Today's Date   6/26/2025    Subjective  Pt states her knee is still stiff in the morning.       Pain: pain not reported     Objective  6/24/25- Knee flexion 104 AAROM              Assessment  Pt PROM improves with manual therapy. Initiated standing TKE with GTB. Pt has difficulty but is able to complete.    Goals (to be met in 14 visits)      Not Met Progress Toward Partially Met Met   Pt will improve knee extension ROM to 0 deg to allow proper heel strike during gait and terminal knee extension in stance. [] [x] [] []   Pt will improve knee AROM flexion to >120 degrees to improve ability to perform squat. [] [x] [] []   Pt will improve quad strength to 5/5 to ascend 1 flight of stairs reciprocally without UE assist. Pt states she still uses cane with descending full flight [] [x] [] []   Pt will be independent and compliant with comprehensive HEP to maintain progress achieved in PT. [] [x] [] []                                  Plan  Next session consider prone quad stretch.    Treatment Last 4 Visits  Treatment Day: 13 6/19/2025 6/23/2025 6/24/2025 6/26/2025   LE Treatment   Therapeutic Exercise Bike (reverse pedaling) x 5 minutes   PROM flex/ext in sitting/supine   Heel slides 3 minutes   Mulligan belt TKE with white foam roll 3 min   Prone knee ext hang 2# x 3 minutes  Supine TKE with large CP x10 min  Bike (full cycles) x 5 minutes   PROM flex/ext in sitting/supine   Heel slides 3 minutes   Prone knee ext hang 2# x 3 minutes  Standing knee flexion on 8inch step 2 x 10   Standing TKE with GTB 2 x 10    Supine TKE with large CP x10 min    Bike (full cycles)  x 5 minutes   PROM flex/ext in sitting/supine   Heel slides 3 minutes   Prone knee ext hang 3# x 3 minutes  Prone hamstring curl 2 x 10 3#   Standing knee flexion on 8inch step 2 x 10   Retro walking x 5 laps to facilitate quad contraction and knee extension  SB hamstring curl 2 x 10   Supine TKE with large CP x10 min    Bike (full cycles) x 5 minutes   PROM flex/ext in sitting/supine   Heel slides 3 minutes   Prone knee ext hang 3# x 3 minutes   Prone hamstring curl 2 x 10 3#   Standing knee flexion on 8inch step 2 x 10   Retro walking x 5 laps to facilitate quad contraction and knee extension  Supine TKE with large CP x10 min   HS stretch 3x30s     Manual Therapy TF ant and post glide grade 1-3   Passive knee extension over pressure   Quad and HS STM for edema control    TF ant and post glide grade 1-3   Passive knee extension over pressure   Quad and HS STM for edema control    TF ant and post glide grade 1-3   Passive knee extension over pressure   Quad and HS STM for edema control    TF ant and post glide grade 1-3   Passive knee extension over pressure   Quad and HS STM for edema control      Therapeutic Exercise Minutes 30 35 35 45   Manual Therapy Minutes 15 10 10 10   Total Time Of Timed Procedures 45 45 45 55   Total Time Of Service-Based Procedures 0 0 0 0   Total Treatment Time 45 45 45 55        HEP  Access Code: [email or web address]  URL: [email or web address]  Date: 06/12/2025  Prepared by: Colt Hilliard    Exercises  - Supine Heel Slide with Strap  - 2-3 x daily - 6 x weekly - 3 sets - 10 reps  - Supine Quad Set  - 2-3 x daily - 6 x weekly - 1 sets - 25 reps - 5s hold  - Supine Knee Extension Strengthening  - 2-3 x daily - 6 x weekly - 1 sets - 20 reps - 3s hold  - Active Straight Leg Raise with Quad Set  - 2-3 x daily - 6 x weekly - 1 sets - 10 reps  - Mini Squat with Counter Support  - 1 x daily - 6 x weekly - 2 sets - 10 reps  - Prone Knee Extension Hang  - 2-3 x daily - 6 x weekly - 2-5  minutes hold  - Seated Passive Knee Extension  - 2-3 x daily - 6 x weekly - 2 sets - 10 reps - 5s hold  - Seated Knee Flexion Stretch  - 2-3 x daily - 6 x weekly - 3 sets - 10 reps  - Heel Raises with Counter Support  - 1 x daily - 6 x weekly - 2 sets - 10 reps    Charges     Therex x3, manual x1

## 2025-06-30 ENCOUNTER — OFFICE VISIT (OUTPATIENT)
Dept: PHYSICAL THERAPY | Age: 75
End: 2025-06-30
Attending: ORTHOPAEDIC SURGERY
Payer: MEDICARE

## 2025-06-30 PROCEDURE — 97140 MANUAL THERAPY 1/> REGIONS: CPT

## 2025-06-30 PROCEDURE — 97110 THERAPEUTIC EXERCISES: CPT

## 2025-06-30 NOTE — PROGRESS NOTES
Patient: Lexi Ahumada (74 year old, female) Referring Provider:  Insurance:   Diagnosis: Status post right knee replacement (Z96.651) Jose Luis Yepez  [facility]   Date of Surgery: 5/6/25 Next MD visit:  N/A   Precautions:  None No data recorded Referral Information:    Date of Evaluation: Req: 0, Auth: 0, Exp:     05/22/25 POC Auth Visits:          Today's Date   6/30/2025    Subjective  Pt states her knee is feeling the same.       Pain: pain not reported     Objective  AROM knee flexion 95 deg at start of visit. 100 deg following heel slides.              Assessment  Pt AROM flexion improves with heel slides and OP by 5 deg. She initiates prone quad stretch without pain in her knee and exercise is added to HEP.    Goals (to be met in 14 visits)      Not Met Progress Toward Partially Met Met   Pt will improve knee extension ROM to 0 deg to allow proper heel strike during gait and terminal knee extension in stance. [] [x] [] []   Pt will improve knee AROM flexion to >120 degrees to improve ability to perform squat. [] [x] [] []   Pt will improve quad strength to 5/5 to ascend 1 flight of stairs reciprocally without UE assist. Pt states she still uses cane with descending full flight [] [x] [] []   Pt will be independent and compliant with comprehensive HEP to maintain progress achieved in PT. [] [x] [] []                                      Plan  Will continue to progress strength and ROM for ADL completion.    Treatment Last 4 Visits  Treatment Day: 14 6/23/2025 6/24/2025 6/26/2025 6/30/2025   LE Treatment   Therapeutic Exercise Bike (full cycles) x 5 minutes   PROM flex/ext in sitting/supine   Heel slides 3 minutes   Prone knee ext hang 2# x 3 minutes  Standing knee flexion on 8inch step 2 x 10   Standing TKE with GTB 2 x 10    Supine TKE with large CP x10 min    Bike (full cycles) x 5 minutes   PROM flex/ext in sitting/supine   Heel slides 3 minutes   Prone knee ext hang 3# x 3 minutes  Prone  hamstring curl 2 x 10 3#   Standing knee flexion on 8inch step 2 x 10   Retro walking x 5 laps to facilitate quad contraction and knee extension  SB hamstring curl 2 x 10   Supine TKE with large CP x10 min    Bike (full cycles) x 5 minutes   PROM flex/ext in sitting/supine   Heel slides 3 minutes   Prone knee ext hang 3# x 3 minutes   Prone hamstring curl 2 x 10 3#   Standing knee flexion on 8inch step 2 x 10   Retro walking x 5 laps to facilitate quad contraction and knee extension  Supine TKE with large CP x10 min   HS stretch 3x30s   Bike (full cycles) x 5 minutes   PROM flex/ext in sitting/supine   Heel slides 3 minutes   Prone knee ext hang 3# x 3 minutes   Prone hamstring curl 2 x 10 3# (hold)  Standing knee flexion on 8inch step 2 x 10   Retro walking x 5 laps to facilitate quad contraction and knee extension   Supine TKE with large CP x10 min   SAQ 20x 3s hold  Prone quad stretch 3x30s     Manual Therapy TF ant and post glide grade 1-3   Passive knee extension over pressure   Quad and HS STM for edema control    TF ant and post glide grade 1-3   Passive knee extension over pressure   Quad and HS STM for edema control    TF ant and post glide grade 1-3   Passive knee extension over pressure   Quad and HS STM for edema control    TF ant and post glide grade 1-3   Passive knee extension over pressure   Quad and HS STM for edema control      Therapeutic Exercise Minutes 35 35 45 45   Manual Therapy Minutes 10 10 10 10   Total Time Of Timed Procedures 45 45 55 55   Total Time Of Service-Based Procedures 0 0 0 0   Total Treatment Time 45 45 55 55        HEP  Access Code: [email or web address]  URL: [email or web address]  Date: 06/12/2025  Prepared by: Colt Hilliard    Exercises  - Supine Heel Slide with Strap  - 2-3 x daily - 6 x weekly - 3 sets - 10 reps  - Supine Quad Set  - 2-3 x daily - 6 x weekly - 1 sets - 25 reps - 5s hold  - Supine Knee Extension Strengthening  - 2-3 x daily - 6 x weekly - 1 sets -  20 reps - 3s hold  - Active Straight Leg Raise with Quad Set  - 2-3 x daily - 6 x weekly - 1 sets - 10 reps  - Mini Squat with Counter Support  - 1 x daily - 6 x weekly - 2 sets - 10 reps  - Prone Knee Extension Hang  - 2-3 x daily - 6 x weekly - 2-5 minutes hold  - Seated Passive Knee Extension  - 2-3 x daily - 6 x weekly - 2 sets - 10 reps - 5s hold  - Seated Knee Flexion Stretch  - 2-3 x daily - 6 x weekly - 3 sets - 10 reps  - Heel Raises with Counter Support  - 1 x daily - 6 x weekly - 2 sets - 10 reps    Charges     Therex x3, manual x1

## 2025-07-02 ENCOUNTER — OFFICE VISIT (OUTPATIENT)
Dept: PHYSICAL THERAPY | Age: 75
End: 2025-07-02
Attending: ORTHOPAEDIC SURGERY
Payer: MEDICARE

## 2025-07-02 PROCEDURE — 97110 THERAPEUTIC EXERCISES: CPT

## 2025-07-02 PROCEDURE — 97112 NEUROMUSCULAR REEDUCATION: CPT

## 2025-07-02 PROCEDURE — 97140 MANUAL THERAPY 1/> REGIONS: CPT

## 2025-07-02 NOTE — PROGRESS NOTES
Progress Summary  Pt has attended 15 visits in Physical Therapy.     Patient: Lexi Ahumada (74 year old, female) Referring Provider:  Insurance:   Diagnosis: Status post right knee replacement (Z96.651) Jose Luis Yepez  [facility]   Date of Surgery: 5/6/25 Next MD visit:  N/A   Precautions:  None No data recorded Referral Information:    Date of Evaluation: Req: 0, Auth: 0, Exp:     05/22/25 POC Auth Visits:          Today's Date   7/2/2025    Subjective  Pt states her knee is still stiff and some days are better than others. Some days feel up to 80% better. Stiffness does not go away.       Pain: pain not reported     Objective          Gait: slight antalgic gait with straight cane  Observation/Skin: mild bruising to calf, moderate to quad and HS. Noted swelling to knee. No signs of infection or DVT.   Joint mobility: TF ant and post glide hypomobile. PF hypomobile in all directions likely from reduced ROM           AROM: at start of visit   Knee    Flexion: R 96; L 120   Extension: R -9; L 0          AROM: FOLLOWING MANUAL THERAPY   Knee    Flexion: R 101; L 120   Extension: R -7; L 0               Strength/MMT:  Knee   Flexion: R 4/5; L 4+/5  Extension: R 4/5; L 4+/5                         Circumference at joint line R 41.5 cm, L 37 cm        Assessment  Pt presents with an increase in ROM, increased strength and an overall reduction in reported pain leading to functional gains and progressing towards goals. Pt remains with functional limitations including but not limited to stair negotiation, squatting, walking distance and ADL completion. Pt ROM has continued to improve with aggressive manual therapy which pt cannot perform independently. She continues to use MD prescribed pain medications to tolerate high intensity interventions which continue to slowly improve function. It is medically necessary to continue PT to reach functional goals.    Goals (to be met in 21 visits)      Not Met Progress Toward  Partially Met Met   Pt will improve knee extension ROM to 0 deg to allow proper heel strike during gait and terminal knee extension in stance. [] [x] [] []   Pt will improve knee AROM flexion to >120 degrees to improve ability to perform squat. [] [x] [] []   Pt will improve quad strength to 5/5 to ascend 1 flight of stairs reciprocally without UE assist. Non reciprocal pattern.  [] [x] [] []   Pt will be independent and compliant with comprehensive HEP to maintain progress achieved in PT. [] [x] [] []                Plan: Continue skilled Physical Therapy 2 x/week or a total of 21 visits over a 90 day period.        Patient/Family/Caregiver was advised of these findings, precautions, and treatment options and has agreed to actively participate in planning and for this course of care.    Thank you for your referral. If you have any questions, please contact me at Dept: [phone].    Sincerely,  Electronically signed by therapist: Colt Hilliard, PT     Physician's certification required:  Yes  Please co-sign or sign and return this letter via fax as soon as possible to [phone].   I certify the need for these services furnished under this plan of treatment and while under my care.    X___________________________________________________ Date____________________    Certification From: 7/2/2025  To:9/30/2025          Treatment Last 4 Visits  Treatment Day: 15       6/24/2025 6/26/2025 6/30/2025 7/2/2025   LE Treatment   Therapeutic Exercise Bike (full cycles) x 5 minutes   PROM flex/ext in sitting/supine   Heel slides 3 minutes   Prone knee ext hang 3# x 3 minutes  Prone hamstring curl 2 x 10 3#   Standing knee flexion on 8inch step 2 x 10   Retro walking x 5 laps to facilitate quad contraction and knee extension  SB hamstring curl 2 x 10   Supine TKE with large CP x10 min    Bike (full cycles) x 5 minutes   PROM flex/ext in sitting/supine   Heel slides 3 minutes   Prone knee ext hang 3# x 3 minutes   Prone  hamstring curl 2 x 10 3#   Standing knee flexion on 8inch step 2 x 10   Retro walking x 5 laps to facilitate quad contraction and knee extension  Supine TKE with large CP x10 min   HS stretch 3x30s   Bike (full cycles) x 5 minutes   PROM flex/ext in sitting/supine   Heel slides 3 minutes   Prone knee ext hang 3# x 3 minutes   Prone hamstring curl 2 x 10 3# (hold)  Standing knee flexion on 8inch step 2 x 10   Retro walking x 5 laps to facilitate quad contraction and knee extension   Supine TKE with large CP x10 min   SAQ 20x 3s hold  Prone quad stretch 3x30s   Bike (full cycles) x 5 minutes   PROM flex/ext in sitting/supine   Heel slides 3 minutes   Prone knee ext hang 5# x 3 minutes   Prone hamstring curl 2 x 10 3# (hold)   Standing knee flexion on 8inch step 2 x 10 (hold)   Retro walking x 5 laps to facilitate quad contraction and knee extension (hold)   Supine TKE with large CP x10 min   SAQ 20x 3s hold (hold)   Prone quad stretch 3x30s      Neuro Re-Education    Reassessment and pt education   Manual Therapy TF ant and post glide grade 1-3   Passive knee extension over pressure   Quad and HS STM for edema control    TF ant and post glide grade 1-3   Passive knee extension over pressure   Quad and HS STM for edema control    TF ant and post glide grade 1-3   Passive knee extension over pressure   Quad and HS STM for edema control    TF ant and post glide grade 1-3   Passive knee extension over pressure   Quad and HS STM for edema control      Therapeutic Exercise Minutes 35 45 45 25   Neuro Re-Educ Minutes    15   Manual Therapy Minutes 10 10 10 10   Total Time Of Timed Procedures 45 55 55 50   Total Time Of Service-Based Procedures 0 0 0 0   Total Treatment Time 45 55 55 50        HEP  Access Code: [email or web address]  URL: [email or web address]  Date: 06/12/2025  Prepared by: Colt Hilliard    Exercises  - Supine Heel Slide with Strap  - 2-3 x daily - 6 x weekly - 3 sets - 10 reps  - Supine Quad Set  -  2-3 x daily - 6 x weekly - 1 sets - 25 reps - 5s hold  - Supine Knee Extension Strengthening  - 2-3 x daily - 6 x weekly - 1 sets - 20 reps - 3s hold  - Active Straight Leg Raise with Quad Set  - 2-3 x daily - 6 x weekly - 1 sets - 10 reps  - Mini Squat with Counter Support  - 1 x daily - 6 x weekly - 2 sets - 10 reps  - Prone Knee Extension Hang  - 2-3 x daily - 6 x weekly - 2-5 minutes hold  - Seated Passive Knee Extension  - 2-3 x daily - 6 x weekly - 2 sets - 10 reps - 5s hold  - Seated Knee Flexion Stretch  - 2-3 x daily - 6 x weekly - 3 sets - 10 reps  - Heel Raises with Counter Support  - 1 x daily - 6 x weekly - 2 sets - 10 reps    Charges     Manual x1, therex x2, neuro x1

## 2025-07-07 ENCOUNTER — OFFICE VISIT (OUTPATIENT)
Dept: PHYSICAL THERAPY | Age: 75
End: 2025-07-07
Attending: ORTHOPAEDIC SURGERY
Payer: MEDICARE

## 2025-07-07 PROCEDURE — 97110 THERAPEUTIC EXERCISES: CPT

## 2025-07-07 PROCEDURE — 97140 MANUAL THERAPY 1/> REGIONS: CPT

## 2025-07-07 NOTE — PROGRESS NOTES
Patient: Lexi Ahumada (74 year old, female) Referring Provider:  Insurance:   Diagnosis: Status post right knee replacement (Z96.651) Jose Luis Yepez  [facility]   Date of Surgery: 5/6/25 Next MD visit:  N/A   Precautions:  None No data recorded Referral Information:    Date of Evaluation: Req: 0, Auth: 0, Exp:     05/22/25 POC Auth Visits:          Today's Date   7/7/2025    Subjective  Patient reports that she finished her steroid about 2 weeks ago and is noticing more swelling again. Is now just taking meloxicam and tramdol. Was able to do the stairs recprically since last Friday. Having difficulty going down the stairs.       Pain: 0/10 (Has no pain when not in PT)     Objective  AROM knee flexion 104 deg post manual       Assessment  Patient newly able to complete stairs with less difficulty. Patient making steady progress with knee ROM despite increase in pain and swelling. Patient tolerated session well without increased symptoms.    Goals (to be met in 21 visits)       Not Met Progress Toward Partially Met Met    Pt will improve knee extension ROM to 0 deg to allow proper heel strike during gait and terminal knee extension in stance. []  []  []  []    Pt will improve knee AROM flexion to >120 degrees to improve ability to perform squat. []  []  []  []    Pt will improve quad strength to 5/5 to ascend 1 flight of stairs reciprocally without UE assist. []  []  []  []    Pt will be independent and compliant with comprehensive HEP to maintain progress achieved in PT. []  []  []  []                                 Plan  Will continue to progress strength and ROM for ADL completion.    Treatment Last 4 Visits  Treatment Day: 16 6/26/2025 6/30/2025 7/2/2025 7/7/2025   LE Treatment   Therapeutic Exercise Bike (full cycles) x 5 minutes   PROM flex/ext in sitting/supine   Heel slides 3 minutes   Prone knee ext hang 3# x 3 minutes   Prone hamstring curl 2 x 10 3#   Standing knee flexion on 8inch step 2 x 10    Retro walking x 5 laps to facilitate quad contraction and knee extension  Supine TKE with large CP x10 min   HS stretch 3x30s   Bike (full cycles) x 5 minutes   PROM flex/ext in sitting/supine   Heel slides 3 minutes   Prone knee ext hang 3# x 3 minutes   Prone hamstring curl 2 x 10 3# (hold)  Standing knee flexion on 8inch step 2 x 10   Retro walking x 5 laps to facilitate quad contraction and knee extension   Supine TKE with large CP x10 min   SAQ 20x 3s hold  Prone quad stretch 3x30s   Bike (full cycles) x 5 minutes   PROM flex/ext in sitting/supine   Heel slides 3 minutes   Prone knee ext hang 5# x 3 minutes   Prone hamstring curl 2 x 10 3# (hold)   Standing knee flexion on 8inch step 2 x 10 (hold)   Retro walking x 5 laps to facilitate quad contraction and knee extension (hold)   Supine TKE with large CP x10 min   SAQ 20x 3s hold (hold)   Prone quad stretch 3x30s    Bike (full cycles) x 5 minutes  PROM flex/ext in sitting/supine  Heel slides 3 minutes  Prone knee ext hang 5# x 3 minutes  Prone hamstring curl 2 x 10 3#   Prone quad stretch 3x30s    Step ups 2 x 10 8inch  Step downs 2 x 10 6inch  Lateral step ups 2 x 10 6inch  Leg press 75 # 2 x 10   Supine TKE with large CP x10 min          Neuro Re-Education   Reassessment and pt education    Manual Therapy TF ant and post glide grade 1-3   Passive knee extension over pressure   Quad and HS STM for edema control    TF ant and post glide grade 1-3   Passive knee extension over pressure   Quad and HS STM for edema control    TF ant and post glide grade 1-3   Passive knee extension over pressure   Quad and HS STM for edema control    TF ant and post glide grade 1-3   Passive knee extension over pressure   Quad and HS STM for edema control      Therapeutic Exercise Minutes 45 45 25 30   Neuro Re-Educ Minutes   15    Manual Therapy Minutes 10 10 10 15   Total Time Of Timed Procedures 55 55 50 45   Total Time Of Service-Based Procedures 0 0 0 0   Total Treatment  Time 55 55 50 45        HEP  Access Code: [email or web address]  URL: [email or web address]  Date: 06/12/2025  Prepared by: Colt Hilliard    Exercises  - Supine Heel Slide with Strap  - 2-3 x daily - 6 x weekly - 3 sets - 10 reps  - Supine Quad Set  - 2-3 x daily - 6 x weekly - 1 sets - 25 reps - 5s hold  - Supine Knee Extension Strengthening  - 2-3 x daily - 6 x weekly - 1 sets - 20 reps - 3s hold  - Active Straight Leg Raise with Quad Set  - 2-3 x daily - 6 x weekly - 1 sets - 10 reps  - Mini Squat with Counter Support  - 1 x daily - 6 x weekly - 2 sets - 10 reps  - Prone Knee Extension Hang  - 2-3 x daily - 6 x weekly - 2-5 minutes hold  - Seated Passive Knee Extension  - 2-3 x daily - 6 x weekly - 2 sets - 10 reps - 5s hold  - Seated Knee Flexion Stretch  - 2-3 x daily - 6 x weekly - 3 sets - 10 reps  - Heel Raises with Counter Support  - 1 x daily - 6 x weekly - 2 sets - 10 reps    Charges     1 man, 2 TE

## 2025-07-08 DIAGNOSIS — E11.65 TYPE 2 DIABETES MELLITUS WITH HYPERGLYCEMIA, WITHOUT LONG-TERM CURRENT USE OF INSULIN (HCC): ICD-10-CM

## 2025-07-08 NOTE — TELEPHONE ENCOUNTER
Received fax from pharmacy requesting refill    Requested Prescriptions     Pending Prescriptions Disp Refills    metFORMIN  MG Oral Tablet 24 Hr 90 tablet 1     Sig: Take 1 tablet (500 mg total) by mouth daily with dinner.     Your appointments       Date & Time Appointment Department ([place])    Jul 10, 2025 11:00 AM CDT Physical Therapy Ortho Treatment with Colt Hilliard PT [facility] [place] ([facility] [place])        Jul 14, 2025 10:45 AM CDT Physical Therapy Ortho Treatment with Colt Hilliard PT [facility] [place] ([facility] [place])        Jul 16, 2025 10:00 AM CDT Follow Up Visit with Anne Galvan MD [facility], [facility] ([facility])    Contact your primary care provider if your insurance requires a referral.    Please arrive 15 minutes prior to your scheduled appointment. Be sure to bring your current Insurance card, Photo ID, and medication bottles or a list of your current medications.      A 24 hour notice is required to cancel any appointment or you may be charged a $40 No Show Fee.     Important: 24 hour notice is required to cancel any appointment or you may be charged a $40 No Show Fee. Please notify your physician office.         Jul 16, 2025 2:20 PM CDT Post Op Visit with Jose Luis Yepez MD [facility], [address] [place] [address] ([facility])        Jul 17, 2025 12:30 PM CDT Physical Therapy Ortho Treatment with [facility] [place] ([facility] [place])        Jul 21, 2025 11:00 AM CDT Physical Therapy Ortho Treatment with [facility] [place] ([facility] [place])        Jul 24, 2025 12:30 PM CDT Physical Therapy Ortho Treatment with Colt Hilliard PT [facility] [place] ([facility] [place])        Jul 28, 2025 11:00 AM CDT Physical Therapy Ortho Treatment with [facility]  [place] ([facility])        Jul 31, 2025 11:00 AM CDT Physical Therapy Ortho Treatment with Colt Hilliard PT [facility] ([facility])        Aug 19, 2025 10:45 AM CDT Diabetes Pump follow up with Pauline Pereyra APRN [facility] ([facility] [place])        Sep 19, 2025 10:15 AM CDT Exam - Established with Lucita Cope DO [facility] ([facility])        Nov 19, 2025 9:40 AM CST Follow-Up OV with Jose F Ortega APRN [facility] ([facility])    Please arrive 15 minutes prior to your scheduled appointment time.               [facility]  [address] [place] [id number]  [phone] [address] [employer]  [address] [place] [address] [address]  [facility] [id number] [phone] [facility]  [address]  [facility] [id number] [phone]    [facility]  [facility] [place]  [address]  [facility] [id number] [phone] [facility]  [address]  [facility] [id number] [phone] [facility]  [facility]  [address] [facility] [id number] [phone]         Last A1c value was 6.8% done 4/16/2025.   Last office visit: 4/8/25 - AM  Last refill: 1/7/25 - AM

## 2025-07-09 RX ORDER — METFORMIN HYDROCHLORIDE 500 MG/1
500 TABLET, EXTENDED RELEASE ORAL
Qty: 90 TABLET | Refills: 1 | Status: SHIPPED | OUTPATIENT
Start: 2025-07-09

## 2025-07-10 ENCOUNTER — OFFICE VISIT (OUTPATIENT)
Dept: PHYSICAL THERAPY | Age: 75
End: 2025-07-10
Attending: ORTHOPAEDIC SURGERY
Payer: MEDICARE

## 2025-07-10 PROCEDURE — 97140 MANUAL THERAPY 1/> REGIONS: CPT

## 2025-07-10 PROCEDURE — 97110 THERAPEUTIC EXERCISES: CPT

## 2025-07-10 NOTE — PROGRESS NOTES
Patient: Lexi Ahumada (74 year old, female) Referring Provider:  Insurance:   Diagnosis: Status post right knee replacement (Z96.651) Jose Luis Yepez  [facility]   Date of Surgery: 5/6/25 Next MD visit:  N/A   Precautions:  None No data recorded Referral Information:    Date of Evaluation: Req: 0, Auth: 0, Exp:     05/22/25 POC Auth Visits:          Today's Date   7/10/2025    Subjective  Pt states her knee is still stiff but better than last week.       Pain: 0/10     Objective  AROM knee flexion 105 deg.              Assessment  Pt ROM improves passively but only 1 deg actively in flexion. Aggressive PROM flexion performed without hard end feel. Extension PROM has hard end feel near end rage. Focused exclusely on ROM this visit.    Goals (to be met in 21 visits)      Not Met Progress Toward Partially Met Met   Pt will improve knee extension ROM to 0 deg to allow proper heel strike during gait and terminal knee extension in stance. [] [x] [] []   Pt will improve knee AROM flexion to >120 degrees to improve ability to perform squat. [] [x] [] []   Pt will improve quad strength to 5/5 to ascend 1 flight of stairs reciprocally without UE assist. Non reciprocal pattern.  [] [x] [] []   Pt will be independent and compliant with comprehensive HEP to maintain progress achieved in PT. [] [x] [] []                Plan  Will continue to progress strength and ROM for ADL completion.    Treatment Last 4 Visits  Treatment Day: 17       6/30/2025 7/2/2025 7/7/2025 7/10/2025   LE Treatment   Therapeutic Exercise Bike (full cycles) x 5 minutes   PROM flex/ext in sitting/supine   Heel slides 3 minutes   Prone knee ext hang 3# x 3 minutes   Prone hamstring curl 2 x 10 3# (hold)  Standing knee flexion on 8inch step 2 x 10   Retro walking x 5 laps to facilitate quad contraction and knee extension   Supine TKE with large CP x10 min   SAQ 20x 3s hold  Prone quad stretch 3x30s   Bike (full cycles) x 5 minutes   PROM flex/ext in  sitting/supine   Heel slides 3 minutes   Prone knee ext hang 5# x 3 minutes   Prone hamstring curl 2 x 10 3# (hold)   Standing knee flexion on 8inch step 2 x 10 (hold)   Retro walking x 5 laps to facilitate quad contraction and knee extension (hold)   Supine TKE with large CP x10 min   SAQ 20x 3s hold (hold)   Prone quad stretch 3x30s    Bike (full cycles) x 5 minutes  PROM flex/ext in sitting/supine  Heel slides 3 minutes  Prone knee ext hang 5# x 3 minutes  Prone hamstring curl 2 x 10 3#   Prone quad stretch 3x30s    Step ups 2 x 10 8inch  Step downs 2 x 10 6inch  Lateral step ups 2 x 10 6inch  Leg press 75 # 2 x 10   Supine TKE with large CP x10 min        Bike (full cycles) x 5 minutes   PROM flex/ext in sitting/supine   Heel slides 3 minutes   Prone knee ext hang 5# x 3 minutes   Prone hamstring curl 2 x 10 3# (hold)  Prone quad stretch 3x30s   Step ups 2 x 10 8inch (hold)  Step downs 2 x 10 6inch (hold)  Lateral step ups 2 x 10 6inch (hold)  Leg press 75 # 2 x 10 (hold)  Supine TKE with large CP x10 min      Neuro Re-Education  Reassessment and pt education     Manual Therapy TF ant and post glide grade 1-3   Passive knee extension over pressure   Quad and HS STM for edema control    TF ant and post glide grade 1-3   Passive knee extension over pressure   Quad and HS STM for edema control    TF ant and post glide grade 1-3   Passive knee extension over pressure   Quad and HS STM for edema control    TF ant and post glide grade 1-3   Passive knee extension over pressure   Quad and HS STM for edema control      Therapeutic Exercise Minutes 45 25 30 35   Neuro Re-Educ Minutes  15     Manual Therapy Minutes 10 10 15 15   Total Time Of Timed Procedures 55 50 45 50   Total Time Of Service-Based Procedures 0 0 0 0   Total Treatment Time 55 50 45 50        HEP  Access Code: [email or web address]  URL: [email or web address]  Date: 06/12/2025  Prepared by: Colt Hilliard    Exercises  - Supine Heel Slide with  Strap  - 2-3 x daily - 6 x weekly - 3 sets - 10 reps  - Supine Quad Set  - 2-3 x daily - 6 x weekly - 1 sets - 25 reps - 5s hold  - Supine Knee Extension Strengthening  - 2-3 x daily - 6 x weekly - 1 sets - 20 reps - 3s hold  - Active Straight Leg Raise with Quad Set  - 2-3 x daily - 6 x weekly - 1 sets - 10 reps  - Mini Squat with Counter Support  - 1 x daily - 6 x weekly - 2 sets - 10 reps  - Prone Knee Extension Hang  - 2-3 x daily - 6 x weekly - 2-5 minutes hold  - Seated Passive Knee Extension  - 2-3 x daily - 6 x weekly - 2 sets - 10 reps - 5s hold  - Seated Knee Flexion Stretch  - 2-3 x daily - 6 x weekly - 3 sets - 10 reps  - Heel Raises with Counter Support  - 1 x daily - 6 x weekly - 2 sets - 10 reps    Charges     Therex x2, manual x1

## 2025-07-14 ENCOUNTER — OFFICE VISIT (OUTPATIENT)
Dept: PHYSICAL THERAPY | Age: 75
End: 2025-07-14
Attending: ORTHOPAEDIC SURGERY
Payer: MEDICARE

## 2025-07-14 PROCEDURE — 97112 NEUROMUSCULAR REEDUCATION: CPT

## 2025-07-14 PROCEDURE — 97110 THERAPEUTIC EXERCISES: CPT

## 2025-07-14 NOTE — PROGRESS NOTES
Progress Summary  Pt has attended 18 visits in Physical Therapy.     Patient: Lexi Ahumada (75 year old, female) Referring Provider:  Insurance:   Diagnosis: Status post right knee replacement (Z96.651) Jose Luis Yepez  [facility]   Date of Surgery: 5/6/25 Next MD visit:  N/A   Precautions:  None No data recorded Referral Information:    Date of Evaluation: Req: 0, Auth: 0, Exp:     05/22/25 POC Auth Visits:          Today's Date   7/14/2025    Subjective  Pt states her knee was a little more stiff over the weekend but feeling better now. She states overall she feels about 85-90% better.       Pain: 0/10     Objective          Gait: slight antalgic gait with straight cane  Observation/Skin: mild bruising to calf, moderate to quad and HS. Noted swelling to knee. No signs of infection or DVT.   Joint mobility: TF ant and post glide hypomobile. PF hypomobile in all directions likely from reduced ROM           AROM: at start of visit   Knee    Flexion: R 100; L 120   Extension: R -6; L 0          AROM: FOLLOWING MANUAL THERAPY   Knee    Flexion: R 103; L 120   Extension: R -4; L 0      PROM: at start of visit   Knee    Flexion: R 109; L 120   Extension: R 0; L 0              Strength/MMT:  Knee   Flexion: R 4/5; L 4+/5  Extension: R 4/5; L 4+/5                         Circumference at joint line R 40 cm, L 37 cm        Assessment  Pt presents with an increase in ROM, increased strength in some planes, reduced swelling and reduced overall pain since last progress note leading to functional gains and meeting some goals. Pt continues to slowly improve ROM and function over the last several visits. She remains limited by pain during manual therapy but with use of MD prescribed pain medications she contiues to allow further overpressure. She has reduced eccentric control with step downs and verbal cues minimally improve control. It is medically necessary to continue PT to reach functional goals.    Goals (to be met in  25 visits)      Not Met Progress Toward Partially Met Met   Pt will improve knee extension ROM to 0 deg to allow proper heel strike during gait and terminal knee extension in stance And stand 30 min (new) [] [] [x] []   Pt will improve knee AROM flexion to >120 degrees to improve ability to perform squat. [] [x] [] []   Pt will improve quad strength to 5/5 to ascend 1 flight of stairs reciprocally without UE assist. Non reciprocal pattern.  [] [x] [] []   Pt will be independent and compliant with comprehensive HEP to maintain progress achieved in PT. [] [x] [] []              Plan: Continue skilled Physical Therapy 2 x/week or a total of 25 visits over a 90 day period.        Patient/Family/Caregiver was advised of these findings, precautions, and treatment options and has agreed to actively participate in planning and for this course of care.    Thank you for your referral. If you have any questions, please contact me at Dept: [phone].    Sincerely,  Electronically signed by therapist: Colt Hilliard, PT     Physician's certification required:  Yes  Please co-sign or sign and return this letter via fax as soon as possible to [phone].   I certify the need for these services furnished under this plan of treatment and while under my care.    X___________________________________________________ Date____________________    Certification From: 7/14/2025  To:10/12/2025          Treatment Last 4 Visits  Treatment Day: 18       7/2/2025 7/7/2025 7/10/2025 7/14/2025   LE Treatment   Therapeutic Exercise Bike (full cycles) x 5 minutes   PROM flex/ext in sitting/supine   Heel slides 3 minutes   Prone knee ext hang 5# x 3 minutes   Prone hamstring curl 2 x 10 3# (hold)   Standing knee flexion on 8inch step 2 x 10 (hold)   Retro walking x 5 laps to facilitate quad contraction and knee extension (hold)   Supine TKE with large CP x10 min   SAQ 20x 3s hold (hold)   Prone quad stretch 3x30s    Bike (full cycles) x 5  minutes  PROM flex/ext in sitting/supine  Heel slides 3 minutes  Prone knee ext hang 5# x 3 minutes  Prone hamstring curl 2 x 10 3#   Prone quad stretch 3x30s    Step ups 2 x 10 8inch  Step downs 2 x 10 6inch  Lateral step ups 2 x 10 6inch  Leg press 75 # 2 x 10   Supine TKE with large CP x10 min        Bike (full cycles) x 5 minutes   PROM flex/ext in sitting/supine   Heel slides 3 minutes   Prone knee ext hang 5# x 3 minutes   Prone hamstring curl 2 x 10 3# (hold)  Prone quad stretch 3x30s   Step ups 2 x 10 8inch (hold)  Step downs 2 x 10 6inch (hold)  Lateral step ups 2 x 10 6inch (hold)  Leg press 75 # 2 x 10 (hold)  Supine TKE with large CP x10 min    Bike (full cycles) x 5 minutes   PROM flex/ext in sitting/supine   Heel slides 3 minutes   Prone knee ext hang 5# x 3 minutes (hold)   Prone hamstring curl 2 x 10 3# (hold)   Prone quad stretch 3x30s (hold)   Step ups 2 x 10 8inch   Step downs 2 x 10 6inch   Lateral step ups 2 x 10 6inch   Leg press 75 # 2 x 10 (hold)   Supine TKE with large CP x10 min      Neuro Re-Education Reassessment and pt education   Reassessment and pt education   Manual Therapy TF ant and post glide grade 1-3   Passive knee extension over pressure   Quad and HS STM for edema control    TF ant and post glide grade 1-3   Passive knee extension over pressure   Quad and HS STM for edema control    TF ant and post glide grade 1-3   Passive knee extension over pressure   Quad and HS STM for edema control       Therapeutic Exercise Minutes 25 30 35 40   Neuro Re-Educ Minutes 15   15   Manual Therapy Minutes 10 15 15    Total Time Of Timed Procedures 50 45 50 55   Total Time Of Service-Based Procedures 0 0 0 0   Total Treatment Time 50 45 50 55        HEP  Access Code: [email or web address]  URL: [email or web address]  Date: 06/12/2025  Prepared by: Colt Hilliard    Exercises  - Supine Heel Slide with Strap  - 2-3 x daily - 6 x weekly - 3 sets - 10 reps  - Supine Quad Set  - 2-3 x daily -  6 x weekly - 1 sets - 25 reps - 5s hold  - Supine Knee Extension Strengthening  - 2-3 x daily - 6 x weekly - 1 sets - 20 reps - 3s hold  - Active Straight Leg Raise with Quad Set  - 2-3 x daily - 6 x weekly - 1 sets - 10 reps  - Mini Squat with Counter Support  - 1 x daily - 6 x weekly - 2 sets - 10 reps  - Prone Knee Extension Hang  - 2-3 x daily - 6 x weekly - 2-5 minutes hold  - Seated Passive Knee Extension  - 2-3 x daily - 6 x weekly - 2 sets - 10 reps - 5s hold  - Seated Knee Flexion Stretch  - 2-3 x daily - 6 x weekly - 3 sets - 10 reps  - Heel Raises with Counter Support  - 1 x daily - 6 x weekly - 2 sets - 10 reps    Charges     neuro x1, therex x3

## 2025-07-16 ENCOUNTER — OFFICE VISIT (OUTPATIENT)
Dept: INTERNAL MEDICINE CLINIC | Facility: CLINIC | Age: 75
End: 2025-07-16
Payer: MEDICARE

## 2025-07-16 ENCOUNTER — OFFICE VISIT (OUTPATIENT)
Dept: ORTHOPEDICS CLINIC | Facility: CLINIC | Age: 75
End: 2025-07-16
Payer: MEDICARE

## 2025-07-16 VITALS
DIASTOLIC BLOOD PRESSURE: 60 MMHG | BODY MASS INDEX: 28.63 KG/M2 | TEMPERATURE: 97 F | HEART RATE: 81 BPM | RESPIRATION RATE: 18 BRPM | HEIGHT: 60 IN | SYSTOLIC BLOOD PRESSURE: 108 MMHG | OXYGEN SATURATION: 95 % | WEIGHT: 145.81 LBS

## 2025-07-16 DIAGNOSIS — R80.9 TYPE 2 DIABETES MELLITUS WITH ALBUMINURIA (HCC): Primary | ICD-10-CM

## 2025-07-16 DIAGNOSIS — M85.88 OSTEOPENIA OF LUMBAR SPINE: ICD-10-CM

## 2025-07-16 DIAGNOSIS — E11.29 TYPE 2 DIABETES MELLITUS WITH ALBUMINURIA (HCC): Primary | ICD-10-CM

## 2025-07-16 DIAGNOSIS — Z96.651 STATUS POST RIGHT KNEE REPLACEMENT: Primary | ICD-10-CM

## 2025-07-16 DIAGNOSIS — E78.5 HYPERLIPIDEMIA ASSOCIATED WITH TYPE 2 DIABETES MELLITUS (HCC): ICD-10-CM

## 2025-07-16 DIAGNOSIS — E11.69 HYPERLIPIDEMIA ASSOCIATED WITH TYPE 2 DIABETES MELLITUS (HCC): ICD-10-CM

## 2025-07-16 DIAGNOSIS — Z12.31 VISIT FOR SCREENING MAMMOGRAM: ICD-10-CM

## 2025-07-16 PROBLEM — E11.9 TYPE 2 DIABETES MELLITUS WITHOUT COMPLICATION, WITHOUT LONG-TERM CURRENT USE OF INSULIN (HCC): Status: RESOLVED | Noted: 2025-05-06 | Resolved: 2025-07-16

## 2025-07-16 PROBLEM — I10 PRIMARY HYPERTENSION: Status: RESOLVED | Noted: 2025-05-06 | Resolved: 2025-07-16

## 2025-07-16 LAB — HEMOGLOBIN A1C: 6.7 % (ref 4.3–5.6)

## 2025-07-16 PROCEDURE — 99214 OFFICE O/P EST MOD 30 MIN: CPT | Performed by: INTERNAL MEDICINE

## 2025-07-16 PROCEDURE — 83036 HEMOGLOBIN GLYCOSYLATED A1C: CPT | Performed by: INTERNAL MEDICINE

## 2025-07-16 PROCEDURE — G2211 COMPLEX E/M VISIT ADD ON: HCPCS | Performed by: INTERNAL MEDICINE

## 2025-07-16 PROCEDURE — 1160F RVW MEDS BY RX/DR IN RCRD: CPT | Performed by: ORTHOPAEDIC SURGERY

## 2025-07-16 PROCEDURE — 1159F MED LIST DOCD IN RCRD: CPT | Performed by: ORTHOPAEDIC SURGERY

## 2025-07-16 PROCEDURE — 99024 POSTOP FOLLOW-UP VISIT: CPT | Performed by: ORTHOPAEDIC SURGERY

## 2025-07-16 RX ORDER — TRAMADOL HYDROCHLORIDE 50 MG/1
50 TABLET ORAL EVERY 8 HOURS PRN
Qty: 30 TABLET | Refills: 0 | Status: SHIPPED | OUTPATIENT
Start: 2025-07-16

## 2025-07-16 RX ORDER — PANTOPRAZOLE SODIUM 40 MG/1
40 TABLET, DELAYED RELEASE ORAL
COMMUNITY

## 2025-07-16 NOTE — PROGRESS NOTES
Lexi Ahumada is a 75 year old female.     HPI:   Patient presents for the following issues.   Underwent right knee replacement on 5/6/2025 - she has been having a lot of swelling post-op. A course of steroids really helped but swelling returned one week after completing course of steroids. Ortho resumed meloxicam on 6/18/2025 but she does not think it is helping. She is not performing as well as she would like during PT. She has an appt with ortho later today. She need tramadol prior to PT. In some ways she feels better than before her replacement. Does not interrupt her sleep.   GERD - gastric, duodenal erythema and possible duodenal ulcer - per capsule endoscopy in 3/2025. She is due for repeat EGD now.   DM - checking sugars every morning and occasionally other times of the day. FBS are 130-150s have been rising since her steroids. She has not checked post-prandial sugars. Denies hypoglycemia.   HTN - pressures have been SBP <115. No longer feeling LH or dizzy.   Goals of care - lives with her  who helps. She is independent of all ADLs.     REVIEW OF SYSTEMS:   GENERAL HEALTH: feels well otherwise. No f/c  NEURO: denies any headaches, LH, dizzyness, LOC, falls  VISION: denies any blurred or double vision  RESPIRATORY: denies shortness of breath, cough, or congestion  CARDIOVASCULAR: denies chest pain, pressure or palpitations  GI: denies abdominal pain, constipation, diarrhea, n/v, BRBPR, melena  : no dysuria or hematuria  PSYCH: mood is stable. Denies depression or anxiety.   EXT: swelling of RLE      Wt Readings from Last 6 Encounters:   07/16/25 145 lb 12.8 oz (66.1 kg)   06/10/25 145 lb 12.8 oz (66.1 kg)   05/21/25 149 lb (67.6 kg)   05/06/25 149 lb 6.4 oz (67.8 kg)   06/25/25 145 lb (65.8 kg)   04/08/25 150 lb 9.6 oz (68.3 kg)       Allergies   Allergen Reactions    Boniva [Ibandronate Sodium] OTHER (SEE COMMENTS)     Bleeding ulcers    Lisinopril Coughing     TABS       Family History   Problem  Relation Age of Onset    Other (Lung ca) Mother     Other (Dementia) Father     Other (Thrombocytopenia) Other         Sibling    Heart Disorder Maternal Grandmother         CVA    Heart Disorder Maternal Grandfather         MI    Cancer Brother         esophageal cancer      Past Medical History:    Abdominal hernia    On bellybutton    Abdominal pain    Occasional    Anemia    Arthritis    Bloating    Blood disorder    anemia    Cataract    Constipation    Occasionally    Diabetes mellitus (HCC)    Diarrhea, unspecified    Occasionally    Diverticulitis    Dyslipidemia    Flatulence/gas pain/belching    Frequent urination    Gastrointestinal bleeding    PUDz in setting of ASA/boniva use    Heart murmur    Hemorrhoids    High blood pressure    High cholesterol    History of cardiac murmur    History of stomach ulcers    Irregular bowel habits    Leaking of urine    Osteoarthritis    Osteopenia    Other and unspecified hyperlipidemia    Pain in joints    Type 2 diabetes mellitus with proteinuria or albuminuria    Type II or unspecified type diabetes mellitus without mention of complication, not stated as uncontrolled    Visual impairment    glasses    Vitamin D deficiency      Past Surgical History:   Procedure Laterality Date    Colonoscopy  01/01/2008    Hemorrhoid, Recheck 7-10 years    Colonoscopy  04/2013    adenomatous polyp    Colonoscopy      Hysterectomy  2004    total hystero.    Oophorectomy Bilateral 2004    total hystero.    Other surgical history      varicose vein procedures    Tonsillectomy      Total abdom hysterectomy      Upper gi endoscopy,exam  01/01/2008      Social History:    Social History     Socioeconomic History    Marital status:     Number of children: 3   Occupational History    Occupation:      Employer: [employer]   Tobacco Use    Smoking status: Never    Smokeless tobacco: Never    Tobacco comments:     Updated 2/17/25   Vaping Use    Vaping status: Never  Used   Substance and Sexual Activity    Alcohol use: No    Drug use: No   Other Topics Concern    Caffeine Concern No    Exercise Yes     Comment: walking    Seat Belt Yes    Special Diet Yes     Comment: diabetic    Stress Concern No    Weight Concern Yes     Social Drivers of Health     Food Insecurity: No Food Insecurity (5/6/2025)    NCSS - Food Insecurity     Worried About Running Out of Food in the Last Year: No     Ran Out of Food in the Last Year: No   Transportation Needs: No Transportation Needs (5/6/2025)    NCSS - Transportation     Lack of Transportation: No   Housing Stability: Not At Risk (5/6/2025)    NCSS - Housing/Utilities     Has Housing: Yes     Worried About Losing Housing: No     Unable to Get Utilities: No           EXAM:   /60   Pulse 81   Temp 97.4 °F (36.3 °C) (Temporal)   Resp 18   Ht 5' (1.524 m)   Wt 145 lb 12.8 oz (66.1 kg)   LMP  (LMP Unknown)   SpO2 95%   BMI 28.47 kg/m²   GENERAL: A&O well developed, well nourished,in no apparent distress  SKIN: no rashes,no suspicious lesions  HEENT: atraumatic, MMM, throat is clear  NECK: supple, no jvd, no thyromegaly  LUNGS: clear to auscultation bilateraly, no c/w/r  CARDIO: RRR without g/m/r  GI: soft non tender nondistended no hsm bs throughout  NEURO: CN 2-12 grossly intact  PSYCH: pleasant  EXTREMITIES: no cyanosis, clubbing. She has swelling of right knee down to right ankle. Surgical scar of right knee is healing well.       ASSESSMENT AND PLAN:   # s/p right knee replacement on 5/6/2025 - she is still having issues with flexion/extension and swelling. Managed by ortho. Meloxicam is not helping so I have encouraged her to stop it especially in light of her possible duodenal ulcer  # Duodenal ulcer, duodenal + gastric erythema - no symptoms on BID PPI. GI wants to repeat her EGD now. Stop nsaids. CBC is stable. No signs of active bleeding.   # Urge Urinary Incontincence: chronic, stable. discussed options for PT, HEP, and  timed voiding. Trospium did not help.   # Tubular Adenoma of Colon: colonoscopy in 2023 by Vlad Marquez with tubular adenoma. Needs repeat in 3 years (2026)  # Type 2 DM: at goal 7/2025. Cont metformin, jardiance, glipizide, mounjaro.   - DM eye exam on 4/22/2025 by Carlie Pete - no diabetic retinopathy in either eye.   - Foot exam: done 2025  - BP at goal  - microalbumin done in 2025  - Depression Screen: done 2025  # Albuminuria in Type 2 DM: cont glycemic control. Stop losartan as her pressures are very tightly controlled. Consider re-trying low dose ACE.   # HLP with DM: at goal on statin therapy   # Osteopenia of lumbar spine: DEXA Jan, 2025 with low FRAX score. Cont dietary calcium/vit D3 and weight bearing exercises. Will need prolia injections if she develops osteoporosis.   - We stopped evista in 4/2015 (had been on from 2009 to 3/2015). She had questionable GIB on boniva so is not a candidate for bisphosphonates. Cont calcium/vit D supplementation as well. Encourage weight bearing excercises as this is not an active part of her lifestyle.  # GERD: well controlled on omeprazole. Unable to take it PRN. Has been treated for H pylori in 2013.   # Stomach Polyp - EGD with hyperplastic polyp in 2025.   Health Maintenance: wellness exam on March, 2025.   Colon Cancer Screening: see above  Breast Cancer Screening: cont yearly mammogram  Bone Health: see above.  Hep C screening: AB negative in 2017  Vaccines: discussed all indicated vaccines  Medical POA: Angelita Ahumada (daughter) is primary       The patient indicates understanding of these issues and agrees to the plan.  The patient is asked to return in Jan, 2026 for LORNA Galvan MD

## 2025-07-16 NOTE — PROGRESS NOTES
[facility] Progress Note    Subjective: 75-year-old female returns to clinic 10 weeks postop from right knee replacement.  She has made significant improvement since the last visit, most recent therapy note from 2 days ago documents pain 0 out of 10, patient feels 85 to 90% better, range of motion -6-100 at visit start/-4 to 103 following therapy, and passive range of motion 0-1 09.  She reports that she is walking better, does not feel limited by pain, doing stairs, main concerns are stiffness and swelling with prolonged standing or sitting, as well as soreness along the inside of the knee and numbness on the outside.  She is only taking tramadol prior to therapy, feels this helps her push her range of motion.    Objective: Patient is ambulatory without assist device.  Upon stance, the knee comes a few degrees short of locking out into full extension compared to the left.  Flexion of the knee is around to 110 degrees while seated.  Active extension while seated is within a few degrees of full.  Mild edema about the knee and leg.      Assessment/Plan: 75-year-old female who is 10 weeks postop from right knee replacement.  She has made significant improvement.  Range of motion is much improved.  She is functioning well.  Most limitation at this point due to subjective and objective edema about the knee and leg, did discuss with the patient that as inflammation continues to resolve, this should continue to improve.  Did discuss potential use of compression stocking while standing.  She does not feel that meloxicam is making much of a difference therefore is going to discontinue, but did ask for additional tramadol prescription.  Will attempt to wean off by 3 months postoperatively.  She is going to continue with physical therapy for the next few weeks at minimum as she does note that she is continuing to make improvement with therapy.  We did discuss expectations for continued improvement up to a year after  surgery.  Counseled again on numbness in the infrapatellar branch of the saphenous nerve distribution, as well as soreness along the medial release.  Would have her follow-up at 1 year from date of surgery with repeat imaging or sooner if needed.  All questions were answered, she is reassured and comfortable with this plan.    Jose Luis Yepez MD, FAAOS  [facility]  Phone [phone]  Fax [phone]

## 2025-07-16 NOTE — PATIENT INSTRUCTIONS
Please stop the losartan because your blood pressures are very tightly controlled and I want to avoid light-headedness and falls. Please send me your blood pressure log 2 weeks after stopping the losartan.     I would like you to repeat a comprehensive set of labs in January, 2026 and see me one week later.     You are due for your mammogram in January, 2026.

## 2025-07-17 ENCOUNTER — OFFICE VISIT (OUTPATIENT)
Dept: PHYSICAL THERAPY | Age: 75
End: 2025-07-17
Attending: ORTHOPAEDIC SURGERY
Payer: MEDICARE

## 2025-07-17 PROCEDURE — 97140 MANUAL THERAPY 1/> REGIONS: CPT

## 2025-07-17 PROCEDURE — 97110 THERAPEUTIC EXERCISES: CPT

## 2025-07-17 NOTE — PROGRESS NOTES
Patient: Lexi Ahumada (75 year old, female) Referring Provider:  Insurance:   Diagnosis: Status post right knee replacement (Z96.651) No ref. provider found  BLUE CROSS MCR   Date of Surgery: 5/6/25 Next MD visit:  N/A   Precautions:  None No data recorded Referral Information:    Date of Evaluation: Req: 0, Auth: 0, Exp:     05/22/25 POC Auth Visits:          Today's Date   7/17/2025    Subjective  Patient reports that she saw MD and they suggested her continuing PT.       Pain: 0/10     Objective  AROM knee flexion 105 deg.         Assessment  Patient tolerated session well without increased symptoms. Patient able to increase strength training at this session and performed increased CKC. Patient would benefit from continued PT to address strength, ROM, and function to allow patient to return to PLOF.    Goals (to be met in 25 visits)       Not Met Progress Toward Partially Met Met    Pt will improve knee extension ROM to 0 deg to allow proper heel strike during gait and terminal knee extension in stance. []  []  []  []    Pt will improve knee AROM flexion to >120 degrees to improve ability to perform squat. []  []  []  []    Pt will improve quad strength to 5/5 to ascend 1 flight of stairs reciprocally without UE assist. []  []  []  []    Pt will be independent and compliant with comprehensive HEP to maintain progress achieved in PT. []  []  []  []                                     Plan  Continue per plan of care.    Treatment Last 4 Visits  Treatment Day: 19       7/7/2025 7/10/2025 7/14/2025 7/17/2025   LE Treatment   Therapeutic Exercise Bike (full cycles) x 5 minutes  PROM flex/ext in sitting/supine  Heel slides 3 minutes  Prone knee ext hang 5# x 3 minutes  Prone hamstring curl 2 x 10 3#   Prone quad stretch 3x30s    Step ups 2 x 10 8inch  Step downs 2 x 10 6inch  Lateral step ups 2 x 10 6inch  Leg press 75 # 2 x 10   Supine TKE with large CP x10 min        Bike (full cycles) x 5 minutes   PROM flex/ext  in sitting/supine   Heel slides 3 minutes   Prone knee ext hang 5# x 3 minutes   Prone hamstring curl 2 x 10 3# (hold)  Prone quad stretch 3x30s   Step ups 2 x 10 8inch (hold)  Step downs 2 x 10 6inch (hold)  Lateral step ups 2 x 10 6inch (hold)  Leg press 75 # 2 x 10 (hold)  Supine TKE with large CP x10 min    Bike (full cycles) x 5 minutes   PROM flex/ext in sitting/supine   Heel slides 3 minutes   Prone knee ext hang 5# x 3 minutes (hold)   Prone hamstring curl 2 x 10 3# (hold)   Prone quad stretch 3x30s (hold)   Step ups 2 x 10 8inch   Step downs 2 x 10 6inch   Lateral step ups 2 x 10 6inch   Leg press 75 # 2 x 10 (hold)   Supine TKE with large CP x10 min    Bike (full cycles) x 5 minutes   PROM flex/ext in sitting/supine   Heel slides 3 minutes   Prone knee ext hang 5# x 3 minutes   Prone hamstring curl 2 x 10 3#   Prone quad stretch 3x30s   Step ups 2 x 10 8inch   Step downs 2 x 10 6inch   Lateral step ups 2 x 10 8inch   Staggered stance sit to stands x 10   Mini squats 2 x10  Lateral walking RTB x 2 laps   Supine TKE with large CP x10 min      Neuro Re-Education   Reassessment and pt education    Manual Therapy TF ant and post glide grade 1-3   Passive knee extension over pressure   Quad and HS STM for edema control    TF ant and post glide grade 1-3   Passive knee extension over pressure   Quad and HS STM for edema control     TF ant and post glide grade 1-3   Passive knee extension over pressure   Quad and HS STM for edema control      Therapeutic Exercise Minutes 30 35 40 30   Neuro Re-Educ Minutes   15    Manual Therapy Minutes 15 15  15   Total Time Of Timed Procedures 45 50 55 45   Total Time Of Service-Based Procedures 0 0 0 0   Total Treatment Time 45 50 55 45        HEP  Access Code: [email or web address]  URL: [email or web address]  Date: 06/12/2025  Prepared by: Colt Hilliard    Exercises  - Supine Heel Slide with Strap  - 2-3 x daily - 6 x weekly - 3 sets - 10 reps  - Supine Quad Set  -  2-3 x daily - 6 x weekly - 1 sets - 25 reps - 5s hold  - Supine Knee Extension Strengthening  - 2-3 x daily - 6 x weekly - 1 sets - 20 reps - 3s hold  - Active Straight Leg Raise with Quad Set  - 2-3 x daily - 6 x weekly - 1 sets - 10 reps  - Mini Squat with Counter Support  - 1 x daily - 6 x weekly - 2 sets - 10 reps  - Prone Knee Extension Hang  - 2-3 x daily - 6 x weekly - 2-5 minutes hold  - Seated Passive Knee Extension  - 2-3 x daily - 6 x weekly - 2 sets - 10 reps - 5s hold  - Seated Knee Flexion Stretch  - 2-3 x daily - 6 x weekly - 3 sets - 10 reps  - Heel Raises with Counter Support  - 1 x daily - 6 x weekly - 2 sets - 10 reps    Charges     2 TE, 1 manual

## 2025-07-21 ENCOUNTER — OFFICE VISIT (OUTPATIENT)
Dept: PHYSICAL THERAPY | Age: 75
End: 2025-07-21
Attending: ORTHOPAEDIC SURGERY
Payer: MEDICARE

## 2025-07-21 PROCEDURE — 97140 MANUAL THERAPY 1/> REGIONS: CPT

## 2025-07-21 PROCEDURE — 97110 THERAPEUTIC EXERCISES: CPT

## 2025-07-21 NOTE — PROGRESS NOTES
Patient: Lexi Ahumada (75 year old, female) Referring Provider:  Insurance:   Diagnosis: Status post right knee replacement (Z96.651) No ref. provider found  BLUE CROSS MCR   Date of Surgery: 5/6/25 Next MD visit:  N/A   Precautions:  None No data recorded Referral Information:    Date of Evaluation: Req: 0, Auth: 0, Exp:     05/22/25 POC Auth Visits:          Today's Date   7/21/2025    Subjective  Patient reports that she is feeling better, knee is swollen still.       Pain: 0/10     Objective  AAROM knee flexion 110 deg        Assessment  Patient making steady progress toward ROM goals. Patient tolerated session well without increased symptoms and slowly able to progress stengthening. Patient would benefit from continue PT to allow patient to return to PLOF.    Goals (to be met in 25 visits)       Not Met Progress Toward Partially Met Met    Pt will improve knee extension ROM to 0 deg to allow proper heel strike during gait and terminal knee extension in stance. []  []  []  []    Pt will improve knee AROM flexion to >120 degrees to improve ability to perform squat. []  []  []  []    Pt will improve quad strength to 5/5 to ascend 1 flight of stairs reciprocally without UE assist. []  []  []  []    Pt will be independent and compliant with comprehensive HEP to maintain progress achieved in PT. []  []  []  []                                         Plan  Continue per plan of care.    Treatment Last 4 Visits  Treatment Day: 20       7/10/2025 7/14/2025 7/17/2025 7/21/2025   LE Treatment   Therapeutic Exercise Bike (full cycles) x 5 minutes   PROM flex/ext in sitting/supine   Heel slides 3 minutes   Prone knee ext hang 5# x 3 minutes   Prone hamstring curl 2 x 10 3# (hold)  Prone quad stretch 3x30s   Step ups 2 x 10 8inch (hold)  Step downs 2 x 10 6inch (hold)  Lateral step ups 2 x 10 6inch (hold)  Leg press 75 # 2 x 10 (hold)  Supine TKE with large CP x10 min    Bike (full cycles) x 5 minutes   PROM flex/ext in  sitting/supine   Heel slides 3 minutes   Prone knee ext hang 5# x 3 minutes (hold)   Prone hamstring curl 2 x 10 3# (hold)   Prone quad stretch 3x30s (hold)   Step ups 2 x 10 8inch   Step downs 2 x 10 6inch   Lateral step ups 2 x 10 6inch   Leg press 75 # 2 x 10 (hold)   Supine TKE with large CP x10 min    Bike (full cycles) x 5 minutes   PROM flex/ext in sitting/supine   Heel slides 3 minutes   Prone knee ext hang 5# x 3 minutes   Prone hamstring curl 2 x 10 3#   Prone quad stretch 3x30s   Step ups 2 x 10 8inch   Step downs 2 x 10 6inch   Lateral step ups 2 x 10 8inch   Staggered stance sit to stands x 10   Mini squats 2 x10  Lateral walking RTB x 2 laps   Supine TKE with large CP x10 min    Bike (full cycles) x 5 minutes   PROM flex/ext in sitting/supine   Heel slides 3 minutes   Prone knee ext hang 5# x 3 minutes   Prone hamstring curl 2 x 10 3#   Prone quad stretch 3x30s   Step ups 2 x 10 8inch   Step downs 2 x 10 6inch   Lateral step ups 2 x 10 8inch   Staggered stance sit to stands x 10   Mini squats 2 x10   Lateral walking RTB x 2 laps   Supine TKE with large CP x10 min   Leg press 100# 2 x 10   Single leg press 50# 2 x 10      Neuro Re-Education  Reassessment and pt education     Manual Therapy TF ant and post glide grade 1-3   Passive knee extension over pressure   Quad and HS STM for edema control     TF ant and post glide grade 1-3   Passive knee extension over pressure   Quad and HS STM for edema control    TF ant and post glide grade 1-3   Passive knee extension over pressure   Quad and HS STM for edema control      Therapeutic Exercise Minutes 35 40 30 35   Neuro Re-Educ Minutes  15     Manual Therapy Minutes 15  15 10   Total Time Of Timed Procedures 50 55 45 45   Total Time Of Service-Based Procedures 0 0 0 0   Total Treatment Time 50 55 45 45        HEP  Access Code: [email or web address]  URL: [email or web address]  Date: 06/12/2025  Prepared by: Colt Hilliard    Exercises  - Supine Heel  Slide with Strap  - 2-3 x daily - 6 x weekly - 3 sets - 10 reps  - Supine Quad Set  - 2-3 x daily - 6 x weekly - 1 sets - 25 reps - 5s hold  - Supine Knee Extension Strengthening  - 2-3 x daily - 6 x weekly - 1 sets - 20 reps - 3s hold  - Active Straight Leg Raise with Quad Set  - 2-3 x daily - 6 x weekly - 1 sets - 10 reps  - Mini Squat with Counter Support  - 1 x daily - 6 x weekly - 2 sets - 10 reps  - Prone Knee Extension Hang  - 2-3 x daily - 6 x weekly - 2-5 minutes hold  - Seated Passive Knee Extension  - 2-3 x daily - 6 x weekly - 2 sets - 10 reps - 5s hold  - Seated Knee Flexion Stretch  - 2-3 x daily - 6 x weekly - 3 sets - 10 reps  - Heel Raises with Counter Support  - 1 x daily - 6 x weekly - 2 sets - 10 reps    Charges     2 TE, 1 manual

## 2025-07-24 ENCOUNTER — OFFICE VISIT (OUTPATIENT)
Dept: PHYSICAL THERAPY | Age: 75
End: 2025-07-24
Attending: ORTHOPAEDIC SURGERY
Payer: MEDICARE

## 2025-07-24 PROCEDURE — 97110 THERAPEUTIC EXERCISES: CPT

## 2025-07-24 PROCEDURE — 97140 MANUAL THERAPY 1/> REGIONS: CPT

## 2025-07-24 NOTE — PROGRESS NOTES
Patient: Lexi Ahumada (75 year old, female) Referring Provider:  Insurance:   Diagnosis: Status post right knee replacement (Z96.651) No ref. provider found  BLUE CROSS FANI   Date of Surgery: 5/6/25 Next MD visit:  N/A   Precautions:  None No data recorded Referral Information:    Date of Evaluation: Req: 0, Auth: 0, Exp:     05/22/25 POC Auth Visits:          Today's Date   7/24/2025    Subjective  Pt states her knee is day by day but overall getting better.       Pain: 0/10     Objective  7/21/25-AAROM knee flexion 110 deg              Assessment  Pt extension significantly improves with aggressive mobilizations. She completes an increase in strengthening exercises and initiates tandem walking with one LOB using UE assist to avoid fall. She continues to report function improving.    Goals (to be met in 25 visits)      Not Met Progress Toward Partially Met Met   Pt will improve knee extension ROM to 0 deg to allow proper heel strike during gait and terminal knee extension in stance And stand 30 min (new) [] [] [x] []   Pt will improve knee AROM flexion to >120 degrees to improve ability to perform squat. [] [x] [] []   Pt will improve quad strength to 5/5 to ascend 1 flight of stairs reciprocally without UE assist. Non reciprocal pattern.  [] [x] [] []   Pt will be independent and compliant with comprehensive HEP to maintain progress achieved in PT. [] [x] [] []              Plan  WIll continue to progress ROM and strength for stairs and squatting.    Treatment Last 4 Visits  Treatment Day: 21 7/14/2025 7/17/2025 7/21/2025 7/24/2025   LE Treatment   Therapeutic Exercise Bike (full cycles) x 5 minutes   PROM flex/ext in sitting/supine   Heel slides 3 minutes   Prone knee ext hang 5# x 3 minutes (hold)   Prone hamstring curl 2 x 10 3# (hold)   Prone quad stretch 3x30s (hold)   Step ups 2 x 10 8inch   Step downs 2 x 10 6inch   Lateral step ups 2 x 10 6inch   Leg press 75 # 2 x 10 (hold)   Supine TKE with  large CP x10 min    Bike (full cycles) x 5 minutes   PROM flex/ext in sitting/supine   Heel slides 3 minutes   Prone knee ext hang 5# x 3 minutes   Prone hamstring curl 2 x 10 3#   Prone quad stretch 3x30s   Step ups 2 x 10 8inch   Step downs 2 x 10 6inch   Lateral step ups 2 x 10 8inch   Staggered stance sit to stands x 10   Mini squats 2 x10  Lateral walking RTB x 2 laps   Supine TKE with large CP x10 min    Bike (full cycles) x 5 minutes   PROM flex/ext in sitting/supine   Heel slides 3 minutes   Prone knee ext hang 5# x 3 minutes   Prone hamstring curl 2 x 10 3#   Prone quad stretch 3x30s   Step ups 2 x 10 8inch   Step downs 2 x 10 6inch   Lateral step ups 2 x 10 8inch   Staggered stance sit to stands x 10   Mini squats 2 x10   Lateral walking RTB x 2 laps   Supine TKE with large CP x10 min   Leg press 100# 2 x 10   Single leg press 50# 2 x 10    Bike (full cycles) x 5 minutes   PROM flex/ext in sitting/supine   Heel slides 3 minutes   Prone knee ext hang 5# x 3 minutes   Prone hamstring curl 2 x 10 3#   Prone quad stretch 3x30s   Step ups 2 x 10 8inch   Step downs 2 x 10 6inch   Lateral step ups 2 x 10 8inch   Staggered stance sit to stands x 20   Mini squats 2 x10   Lateral walking RTB x 2 laps   Supine TKE with large CP x10 min   Leg press 100# 2 x 10   Single leg press 75# 2 x 10   Tandem walking 30 steps     Neuro Re-Education Reassessment and pt education      Manual Therapy  TF ant and post glide grade 1-3   Passive knee extension over pressure   Quad and HS STM for edema control    TF ant and post glide grade 1-3   Passive knee extension over pressure   Quad and HS STM for edema control    TF ant and post glide grade 1-3   Passive knee extension over pressure   Quad and HS STM for edema control      Therapeutic Exercise Minutes 40 30 35 45   Neuro Re-Educ Minutes 15      Manual Therapy Minutes  15 10 10   Total Time Of Timed Procedures 55 45 45 55   Total Time Of Service-Based Procedures 0 0 0 0   Total  Treatment Time 55 45 45 55        HEP  Access Code: [email or web address]  URL: [email or web address]  Date: 06/12/2025  Prepared by: Colt Hilliard    Exercises  - Supine Heel Slide with Strap  - 2-3 x daily - 6 x weekly - 3 sets - 10 reps  - Supine Quad Set  - 2-3 x daily - 6 x weekly - 1 sets - 25 reps - 5s hold  - Supine Knee Extension Strengthening  - 2-3 x daily - 6 x weekly - 1 sets - 20 reps - 3s hold  - Active Straight Leg Raise with Quad Set  - 2-3 x daily - 6 x weekly - 1 sets - 10 reps  - Mini Squat with Counter Support  - 1 x daily - 6 x weekly - 2 sets - 10 reps  - Prone Knee Extension Hang  - 2-3 x daily - 6 x weekly - 2-5 minutes hold  - Seated Passive Knee Extension  - 2-3 x daily - 6 x weekly - 2 sets - 10 reps - 5s hold  - Seated Knee Flexion Stretch  - 2-3 x daily - 6 x weekly - 3 sets - 10 reps  - Heel Raises with Counter Support  - 1 x daily - 6 x weekly - 2 sets - 10 reps    Charges     Manual x1, therex x3

## 2025-07-28 ENCOUNTER — OFFICE VISIT (OUTPATIENT)
Dept: PHYSICAL THERAPY | Age: 75
End: 2025-07-28
Attending: ORTHOPAEDIC SURGERY
Payer: MEDICARE

## 2025-07-28 PROCEDURE — 97110 THERAPEUTIC EXERCISES: CPT

## 2025-07-28 PROCEDURE — 97140 MANUAL THERAPY 1/> REGIONS: CPT

## 2025-07-28 NOTE — PROGRESS NOTES
Patient: Lexi Ahumada (75 year old, female) Referring Provider:  Insurance:   Diagnosis: Status post right knee replacement (Z96.651) No ref. provider found  BLUE CROSS MCR   Date of Surgery: 5/6/25 Next MD visit:  N/A   Precautions:  None No data recorded Referral Information:    Date of Evaluation: Req: 0, Auth: 0, Exp:     05/22/25 POC Auth Visits:          Today's Date   7/28/2025    Subjective  Patient reports that she does continue to have swelling. States that pain is not too bad.       Pain: 0/10     Objective  Requires occasional UE assist and CGA with tandem balance on aeromat         Assessment  Patient appropriatly challenged with exercises, patient continues to make gradual gains in ROM with manual techniques. Patient would benefit from continued PT to address strength, ROM and stability to allow patient to return to PLOF.    Goals (to be met in 25 visits)       Not Met Progress Toward Partially Met Met    Pt will improve knee extension ROM to 0 deg to allow proper heel strike during gait and terminal knee extension in stance. []  []  []  []    Pt will improve knee AROM flexion to >120 degrees to improve ability to perform squat. []  []  []  []    Pt will improve quad strength to 5/5 to ascend 1 flight of stairs reciprocally without UE assist. []  []  []  []    Pt will be independent and compliant with comprehensive HEP to maintain progress achieved in PT. []  []  []  []                                             Plan  Continue per plan of care.    Treatment Last 4 Visits  Treatment Day: 22 7/17/2025 7/21/2025 7/24/2025 7/28/2025   LE Treatment   Therapeutic Exercise Bike (full cycles) x 5 minutes   PROM flex/ext in sitting/supine   Heel slides 3 minutes   Prone knee ext hang 5# x 3 minutes   Prone hamstring curl 2 x 10 3#   Prone quad stretch 3x30s   Step ups 2 x 10 8inch   Step downs 2 x 10 6inch   Lateral step ups 2 x 10 8inch   Staggered stance sit to stands x 10   Mini squats 2  x10  Lateral walking RTB x 2 laps   Supine TKE with large CP x10 min    Bike (full cycles) x 5 minutes   PROM flex/ext in sitting/supine   Heel slides 3 minutes   Prone knee ext hang 5# x 3 minutes   Prone hamstring curl 2 x 10 3#   Prone quad stretch 3x30s   Step ups 2 x 10 8inch   Step downs 2 x 10 6inch   Lateral step ups 2 x 10 8inch   Staggered stance sit to stands x 10   Mini squats 2 x10   Lateral walking RTB x 2 laps   Supine TKE with large CP x10 min   Leg press 100# 2 x 10   Single leg press 50# 2 x 10    Bike (full cycles) x 5 minutes   PROM flex/ext in sitting/supine   Heel slides 3 minutes   Prone knee ext hang 5# x 3 minutes   Prone hamstring curl 2 x 10 3#   Prone quad stretch 3x30s   Step ups 2 x 10 8inch   Step downs 2 x 10 6inch   Lateral step ups 2 x 10 8inch   Staggered stance sit to stands x 20   Mini squats 2 x10   Lateral walking RTB x 2 laps   Supine TKE with large CP x10 min   Leg press 100# 2 x 10   Single leg press 75# 2 x 10   Tandem walking 30 steps   Bike (full cycles) x 5 minutes   PROM flex/ext in sitting/supine   Heel slides 3 minutes   Prone knee ext hang 5# x 3 minutes   Prone hamstring curl 2 x 10 5#   Prone quad stretch 3x30s   Step ups 2 x 10 8inch   Step downs 2 x 10 6inch   Lateral step ups 2 x 10 8inch   Staggered stance sit to stands x 20   Mini squats 2 x10   Lateral walking RTB x 2 laps   Leg press 100# 2 x 10   Single leg press 75# 2 x 10   Tandem walking on airex x 5 laps   Supine TKE with large CP x10 min    Manual Therapy TF ant and post glide grade 1-3   Passive knee extension over pressure   Quad and HS STM for edema control    TF ant and post glide grade 1-3   Passive knee extension over pressure   Quad and HS STM for edema control    TF ant and post glide grade 1-3   Passive knee extension over pressure   Quad and HS STM for edema control    TF ant and post glide grade 1-3   Passive knee extension over pressure   Quad and HS STM for edema control      Therapeutic  Exercise Minutes 30 35 45 35   Manual Therapy Minutes 15 10 10 10   Total Time Of Timed Procedures 45 45 55 45   Total Time Of Service-Based Procedures 0 0 0 0   Total Treatment Time 45 45 55 45        HEP  Access Code: [email or web address]  URL: [email or web address]  Date: 06/12/2025  Prepared by: Colt Hilliard    Exercises  - Supine Heel Slide with Strap  - 2-3 x daily - 6 x weekly - 3 sets - 10 reps  - Supine Quad Set  - 2-3 x daily - 6 x weekly - 1 sets - 25 reps - 5s hold  - Supine Knee Extension Strengthening  - 2-3 x daily - 6 x weekly - 1 sets - 20 reps - 3s hold  - Active Straight Leg Raise with Quad Set  - 2-3 x daily - 6 x weekly - 1 sets - 10 reps  - Mini Squat with Counter Support  - 1 x daily - 6 x weekly - 2 sets - 10 reps  - Prone Knee Extension Hang  - 2-3 x daily - 6 x weekly - 2-5 minutes hold  - Seated Passive Knee Extension  - 2-3 x daily - 6 x weekly - 2 sets - 10 reps - 5s hold  - Seated Knee Flexion Stretch  - 2-3 x daily - 6 x weekly - 3 sets - 10 reps  - Heel Raises with Counter Support  - 1 x daily - 6 x weekly - 2 sets - 10 reps    Charges     2 TE,1 manual

## 2025-07-31 ENCOUNTER — OFFICE VISIT (OUTPATIENT)
Dept: PHYSICAL THERAPY | Age: 75
End: 2025-07-31
Attending: ORTHOPAEDIC SURGERY
Payer: MEDICARE

## 2025-07-31 PROCEDURE — 97110 THERAPEUTIC EXERCISES: CPT

## 2025-07-31 PROCEDURE — 97112 NEUROMUSCULAR REEDUCATION: CPT

## 2025-08-04 ENCOUNTER — APPOINTMENT (OUTPATIENT)
Dept: PHYSICAL THERAPY | Age: 75
End: 2025-08-04
Attending: ORTHOPAEDIC SURGERY

## 2025-08-07 ENCOUNTER — APPOINTMENT (OUTPATIENT)
Dept: PHYSICAL THERAPY | Age: 75
End: 2025-08-07
Attending: ORTHOPAEDIC SURGERY

## 2025-08-13 ENCOUNTER — TELEPHONE (OUTPATIENT)
Dept: ORTHOPEDICS CLINIC | Facility: CLINIC | Age: 75
End: 2025-08-13

## 2025-08-18 ENCOUNTER — TELEPHONE (OUTPATIENT)
Dept: INTERNAL MEDICINE CLINIC | Facility: CLINIC | Age: 75
End: 2025-08-18

## 2025-08-19 ENCOUNTER — OFFICE VISIT (OUTPATIENT)
Facility: CLINIC | Age: 75
End: 2025-08-19

## 2025-08-19 VITALS
HEIGHT: 60 IN | WEIGHT: 142 LBS | OXYGEN SATURATION: 96 % | HEART RATE: 86 BPM | RESPIRATION RATE: 16 BRPM | DIASTOLIC BLOOD PRESSURE: 60 MMHG | SYSTOLIC BLOOD PRESSURE: 102 MMHG | BODY MASS INDEX: 27.88 KG/M2

## 2025-08-19 DIAGNOSIS — R80.9 TYPE 2 DIABETES MELLITUS WITH MICROALBUMINURIA, WITHOUT LONG-TERM CURRENT USE OF INSULIN (HCC): Primary | ICD-10-CM

## 2025-08-19 DIAGNOSIS — E11.59 HYPERTENSION ASSOCIATED WITH TYPE 2 DIABETES MELLITUS (HCC): ICD-10-CM

## 2025-08-19 DIAGNOSIS — I15.2 HYPERTENSION ASSOCIATED WITH TYPE 2 DIABETES MELLITUS (HCC): ICD-10-CM

## 2025-08-19 DIAGNOSIS — E11.69 HYPERLIPIDEMIA ASSOCIATED WITH TYPE 2 DIABETES MELLITUS (HCC): ICD-10-CM

## 2025-08-19 DIAGNOSIS — E11.29 TYPE 2 DIABETES MELLITUS WITH MICROALBUMINURIA, WITHOUT LONG-TERM CURRENT USE OF INSULIN (HCC): Primary | ICD-10-CM

## 2025-08-19 DIAGNOSIS — E78.5 HYPERLIPIDEMIA ASSOCIATED WITH TYPE 2 DIABETES MELLITUS (HCC): ICD-10-CM

## 2025-08-19 PROCEDURE — 1159F MED LIST DOCD IN RCRD: CPT

## 2025-08-19 PROCEDURE — G2211 COMPLEX E/M VISIT ADD ON: HCPCS

## 2025-08-19 PROCEDURE — 3074F SYST BP LT 130 MM HG: CPT

## 2025-08-19 PROCEDURE — 1160F RVW MEDS BY RX/DR IN RCRD: CPT

## 2025-08-19 PROCEDURE — 99215 OFFICE O/P EST HI 40 MIN: CPT

## 2025-08-19 PROCEDURE — 3008F BODY MASS INDEX DOCD: CPT

## 2025-08-19 PROCEDURE — 3078F DIAST BP <80 MM HG: CPT

## 2025-08-19 RX ORDER — GLUCOSAMINE HCL/CHONDROITIN SU 500-400 MG
CAPSULE ORAL
Qty: 100 STRIP | Refills: 1 | Status: SHIPPED | OUTPATIENT
Start: 2025-08-19

## 2025-08-19 RX ORDER — AVOBENZONE, HOMOSALATE, OCTISALATE, OCTOCRYLENE 30; 40; 45; 26 MG/ML; MG/ML; MG/ML; MG/ML
CREAM TOPICAL
Qty: 100 EACH | Refills: 1 | Status: SHIPPED | OUTPATIENT
Start: 2025-08-19

## (undated) DIAGNOSIS — E11.649 HYPOGLYCEMIA ASSOCIATED WITH DIABETES (HCC): ICD-10-CM

## (undated) DEVICE — BITEBLOCK ENDOSCP 60FR MAXI STRP

## (undated) DEVICE — THREADED PINS PACK: Brand: KNEE INSTRUMENTS

## (undated) DEVICE — BANDAGE,COHESIVE,TAN,4X5YD,LF,STRL: Brand: MEDLINE

## (undated) DEVICE — KIT CUSTOM ENDOPROCEDURE STERIS

## (undated) DEVICE — SOLUTION SCRB 4OZ 4% CHG ANTISEPSIS HIBICLN

## (undated) DEVICE — TOWEL,OR,DSP,ST,BLUE,DLX,2/PK,40PK/CS: Brand: MEDLINE

## (undated) DEVICE — PREMIUM WET SKIN PREP TRAY: Brand: MEDLINE INDUSTRIES, INC.

## (undated) DEVICE — HOOD: Brand: FLYTE

## (undated) DEVICE — GLOVE SUR 7.5 SENSICARE PI PIP GRN PWD F

## (undated) DEVICE — SHORT THREADED PINS PACK: Brand: KNEE INSTRUMENTS

## (undated) DEVICE — GLOVE SUR 7.5 SENSICARE PI PIP CRM PWD F

## (undated) DEVICE — KIT VLV 5 PC AIR H2O SUCT BX ENDOGATOR CONN

## (undated) DEVICE — WRAP COMPR UNIV KNEE HOT CLD GEL MICWV AND

## (undated) DEVICE — SUT ETHBND XL 5 30IN V-37 NABSRB GRN 40MM 1/2

## (undated) DEVICE — STRYKER PERFORMANCE SERIES SAGITTAL BLADE: Brand: STRYKER PERFORMANCE SERIES

## (undated) DEVICE — PADDING CAST 4INX4YD 100% COT SFT SLF BOND

## (undated) DEVICE — SOLUTION PREP 4OZ 10% POVIDONE IOD SCR TOP

## (undated) DEVICE — 10FT COMBINED O2 DELIVERY/CO2 MONITORING. FILTER WITH MICROSTREAM TYPE LUER: Brand: DUAL ADULT NASAL CANNULA

## (undated) DEVICE — RECIPROCATING BLADE, DOUBLE SIDED, OFFSET  (70.0 X 1.0 X 12.5MM)

## (undated) DEVICE — TIP CLEANER: Brand: VALLEYLAB

## (undated) DEVICE — 3M™ RED DOT™ MONITORING ELECTRODE WITH FOAM TAPE AND STICKY GEL, 50/BAG, 20/CASE, 72/PLT 2570: Brand: RED DOT™

## (undated) DEVICE — DRAPE,U/SHT,SPLIT,FILM,60X84,STERILE: Brand: MEDLINE

## (undated) DEVICE — SCREWS PACK: Brand: KNEE INSTRUMENTS

## (undated) DEVICE — GLOVE SUR 8 SENSICARE PI PIP CRM PWD F

## (undated) DEVICE — GIJAW SINGLE-USE BIOPSY FORCEPS WITH NEEDLE: Brand: GIJAW

## (undated) DEVICE — SOLUTION IRRIG 3000ML 0.9% NACL FLX CONT

## (undated) DEVICE — 1200CC GUARDIAN II: Brand: GUARDIAN

## (undated) DEVICE — HOOD, PEEL-AWAY: Brand: FLYTE

## (undated) DEVICE — CONTAINER,SPECIMEN,PNEU TUBE,4OZ,OR STRL: Brand: MEDLINE

## (undated) DEVICE — 3 BONE CEMENT MIXER: Brand: MIXEVAC

## (undated) DEVICE — SLEEVE COMPR MD KNEE LEN SGL USE KENDALL SCD

## (undated) DEVICE — ANTISEPTIC 4OZ 70% ISO ALC

## (undated) DEVICE — TOTAL HIP CDS: Brand: MEDLINE INDUSTRIES, INC.

## (undated) DEVICE — V2 SPECIMEN COLLECTION MANIFOLD KIT: Brand: NEPTUNE

## (undated) DEVICE — DRESSING AQUACEL W/ SILVER 3.5 X12 IN

## (undated) DEVICE — LAPAROTOMY SPONGE - RF AND X-RAY DETECTABLE PRE-WASHED: Brand: SITUATE

## (undated) DEVICE — LASSO POLYPECTOMY SNARE: Brand: LASSO

## (undated) DEVICE — DRAPE,TOWEL,LARGE,INVISISHIELD: Brand: MEDLINE

## (undated) DEVICE — GLOVE SUR 8.5 SENSICARE PI PIP GRN PWD F

## (undated) DEVICE — ANTIBACTERIAL UNDYED BRAIDED (POLYGLACTIN 910), SYNTHETIC ABSORBABLE SUTURE: Brand: COATED VICRYL

## (undated) DEVICE — SYRINGE MED 30ML STD CLR PLAS LL TIP N CTRL

## (undated) DEVICE — NEPTUNE E-SEP SMOKE EVACUATION PENCIL, COATED, 70MM BLADE, PUSH BUTTON SWITCH: Brand: NEPTUNE E-SEP

## (undated) DEVICE — COVER,LIGHT,CAMERA,HARD,1/PK,STRL: Brand: MEDLINE

## (undated) DEVICE — GOWN,SIRUS,FABRNF,XL,20/CS: Brand: MEDLINE

## (undated) DEVICE — 3M™ IOBAN™ 2 ANTIMICROBIAL INCISE DRAPE 6651EZ: Brand: IOBAN™ 2

## (undated) DEVICE — SMOOTH PINS PACK: Brand: KNEE INSTRUMENTS

## (undated) DEVICE — NEEDLE SPNL 18GA L3.5IN PNK QNCKE STYL DISP

## (undated) DEVICE — DISPOSABLE TOURNIQUET CUFF SINGLE BLADDER, DUAL PORT AND QUICK CONNECT CONNECTOR: Brand: COLOR CUFF

## (undated) DEVICE — SWORD PIN PACK: Brand: KNEE INSTRUMENTS

## (undated) NOTE — LETTER
24    Patient: Lexi Ahumada  : 1950 Visit date: 2024    Dear  Anne Galvan MD    Thank you for referring Lexi Ahumada to my practice.  Please find my assessment and plan below.    Assessment   1. Internal hemorrhoids with complication    2. Rectal bleeding        Plan     The patient is recovering nicely following rubber band ligation and phenol injection of internal hemorrhoids.    The anticipated postoperative recovery was discussed with the patient in detail.    Dietary, activity, and exercise recommendations along with restrictions were discussed with the patient during today's visit.    Wound care instructions were discussed during today's visit.    The patient will return to my attention on an as needed basis.    The patient is encouraged to continue seeing the primary care physician for ongoing medical needs.    The patient was given ample opportunity to ask questions.  The patient's questions were answered in detail and to the patient's satisfaction.  The patient voiced understanding of the postoperative care plan.           Sincerely,       Logan Jett MD   CC:   No Recipients

## (undated) NOTE — LETTER
05/31/19        Heidi Duffy  [address] [facility] [address]      Dear Citlalli Stable records indicate that you have outstanding lab work and or testing that was ordered for you and has not yet been completed: Fasting lab work   To complete

## (undated) NOTE — LETTER
07/23/18        Terrence Valera  [address] [facility] [address]      Dear Airam Bloodgood records indicate that you have outstanding lab work and or testing that was ordered for you and has not yet been completed:          Ferritin [E]      To p

## (undated) NOTE — LETTER
10/24/23    Patient: Jannette Hardy  : 1950 Visit date: 10/24/2023    Dear  Valentina Cervantes MD    Thank you for referring Jannette Hardy to my practice. Please find my assessment and plan below. Assessment   Rectal bleeding  (primary encounter diagnosis)  Internal hemorrhoids with complication      Plan     The patient will be scheduled for in-office rubber band ligation phenol injection of internal hemorrhoids. The sydnee-operative care plan was discussed with the patient, who voices understanding. Activity and lifting recommendations were discussed in length. The risks, benefits, and alternatives to the procedure were explained to the patient. The risks explained include, but are not limited to, bleeding, infection, pain wound complications, recurrence, incorrect diagnosis, injury to adjacent organs and structures. We also discussed the possibile need for further therapeutic, diagnostic, or surgical intervention. The patient voiced understanding, and after all questions were answered to the patient's satisfaction, the patient provided willing and informed consent to proceed.       Sincerely,       Andrea Belle MD   CC:   No Recipients

## (undated) NOTE — LETTER
25      Orthopedic Surgery   Pre-Operative Clearance Request    Patient Name:   Lexi Ahumada             :   1950    Surgeon: Dr. Yepez             Date of Surgery: 2025    Surgical Procedure: Right total knee arthroplasty       MUST COMPLETE ALL OF THE FOLLOWING 2-3 WEEKS PRIOR TO YOUR SURGERY TO AVOID CANCELLATION, DUE TO THE RULE THEIR WILL BE NO EXCEPTIONS!      [x]  History and Physical      [x]  Medical  Clearance                     Required pre-op testing to be ordered:                      [x]  CBC W/Diff                                                                 [x]  CMP                                                                                                                                                                                                                                  [x]  PT/INR  [x]  PTT  [x]  Type and Screen                       **Please fax test results, H&P, and clearance to [phone] and to P.A[facility] at [phone]**

## (undated) NOTE — LETTER
24    Patient: Lexi Ahumada  : 1950 Visit date: 1/3/2024    Dear  Anne Galvan MD    Thank you for referring Lexi Ahumada to my practice.  Please find my assessment and plan below.    Assessment   1. Internal hemorrhoids with complication        Plan     Successful treatment of internal hemorrhoid by rubber band ligation and phenol injection.    The care plan is discussed with the patient who voiced understanding.    Dietary, activity, and exercise recommendations were discussed with the patient.    The patient is encouraged to avoid straining, continue dietary fiber supplementation, stool softeners, and adequate hydration.    Follow-up in 2 weeks for continued treatment of internal hemorrhoids.    The patient was provided ample opportunity to ask questions.  All of the patient's questions were answered in detail.  The patient voiced understanding of the care plan.         Sincerely,       Logan Jett MD   CC:   No Recipients

## (undated) NOTE — Clinical Note
Bubba Dominguez! [facility] you're doing well! Thank you for referring Ms. Early Brain for rheumatologic evaluation. Please see the discussion portion of my note and let me know if you have any questions.      Phillip Eng, DO  [facility]  4/7/2021

## (undated) NOTE — LETTER
12/06/19        Ada Sole  [address] [place] [id number]      Dear Sara Reyes records indicate that you have outstanding lab work and or testing that was ordered for you and has not yet been completed:Non Fasting Lab Work   To comple

## (undated) NOTE — LETTER
23    Patient: Mich Ramos  : 1950 Visit date: 2023    Dear  Kim Chow MD    Thank you for referring Mich Ramos to my practice. Please find my assessment and plan below. Assessment   1. Internal hemorrhoids with complication        Plan     Successful treatment of internal hemorrhoid of the right teddy-lateral position by rubber band ligation and phenol injection. The care plan is discussed with the patient who voiced understanding. Dietary, activity, and exercise recommendations were discussed with the patient. The patient is encouraged to avoid straining, continue dietary fiber supplementation, stool softeners, and adequate hydration. Follow-up in 2 weeks for continued treatment of internal hemorrhoids. The patient was provided ample opportunity to ask questions. All of the patient's questions were answered in detail. The patient voiced understanding of the care plan. Assessment   No diagnosis found.       Plan         Sincerely,       Sara Montoya MD   CC:   No Recipients

## (undated) NOTE — Clinical Note
Please work on PA for gel injections- pt would prefer hyalgan if able. Let kamran know once approved and pt can be added on schedule  Thanks.      Isis Fonseca, DO  [facility]  4/12/2022

## (undated) NOTE — LETTER
07/11/19        Hernandez Peterson  [address] [facility] [address]      Dear Anabela Garcia records indicate that you have outstanding lab work and or testing that was ordered for you and has not yet been completed: Mammogram - Please contact [facility]

## (undated) NOTE — LETTER
23    Patient: Lito Padilla  : 1950 Visit date: 2023    Dear  Robert Scott MD    Thank you for referring Lito Padilla to my practice. Please find my assessment and plan below. Assessment   1. Internal hemorrhoids with complication        Plan     Successful treatment of internal hemorrhoid of the left lateral position by rubber band ligation and phenol injection. The care plan is discussed with the patient who voiced understanding. Dietary, activity, and exercise recommendations were discussed with the patient. The patient is encouraged to avoid straining, continue dietary fiber supplementation, stool softeners, and adequate hydration. Follow-up in 2 weeks for continued treatment of internal hemorrhoids. The patient was provided ample opportunity to ask questions. All of the patient's questions were answered in detail. The patient voiced understanding of the care plan.          Sincerely,       Heath Shoemaker MD   CC:   No Recipients

## (undated) NOTE — MR AVS SNAPSHOT
[place]  [address]  [address] [facility] [address] [phone]               Thank you for choosing us for your health care visit with Nelia Montero MD.  We are glad to serve you and happy to provide you with this s Generic drug:  Insulin Pen Needle   USE 1 PEN TWICE A DAY (DUE FOR OFFICE VISIT IN APRIL)           BYETTA 10 MCG PEN 10 MCG/0.04ML Sopn   Generic drug:  Exenatide   INJECT 10 UNITS TWICE A DAY           CALTRATE 600 OR   Take  by mouth 2 (two) times daily

## (undated) NOTE — MR AVS SNAPSHOT
[place]  [address] [facility] [address]  [address] [facility] [address] [phone]               Thank you for choosing us for your health care visit with Zain Desai MD.  We are glad to serve you and happy to provide you with this summ · Chemicals in hair dyes and rinses, soaps, solvents, waxes, fingernail polish, and deodorants   · Jewelry or watchbands made of nickel  Contact dermatitis is not passed from person to person.   Talk with your healthcare provider about what may have caused antihistamine at drug and grocery stores. It can make you sleepy, so use lower doses during the daytime. Or you can use loratadine. This is an antihistamine that will not make you sleepy.  Do not use diphenhydramine if you have glaucoma or have trouble urin TAKE 1 TABLET NIGHTLY. (MUST KEEP APPOINTMENT, NO FURTHER REFILLS UNTIL LABS ARE COMPLETED)   Commonly known as:  LIPITOR           BD PEN NEEDLE KEVIN U/F 32G X 4 MM Misc   Generic drug:  Insulin Pen Needle   USE 1 PEN TWICE A DAY (DUE FOR OFFICE VISIT IN Call the [employer] for assistance with your inactive [employer] account    If you have questions, you can call [phone] to talk to our [facility] Staff. Remember, [employer] is NOT to be used for urgent needs. For medical emergencies, dial 911.     Vi

## (undated) NOTE — MR AVS SNAPSHOT
[place]  [address] [facility] [facility] [address]  [address] [facility] [id number] [phone]               Thank you for choosing us for your health care visit with Dax Guzmán MD.  We are glad to serve you and happy to provide you with this summ Lipid Panel    Complete by:   Mar 15, 2017 (Approximate)    Assoc Dx:  Mixed hyperlipidemia [E78.2], Essential hypertension [I10], Type 2 diabetes mellitus without complication, without long-term current use of insulin (HCC) [E11.9]                 Follow- Generic drug:  Insulin Pen Needle   USE 1 PEN TWICE A DAY (DUE FOR OFFICE VISIT IN APRIL)           BYETTA 10 MCG PEN 10 MCG/0.04ML Sopn   Generic drug:  Exenatide   INJECT 10 UNITS TWICE A DAY           CALTRATE 600 OR   Take  by mouth 2 (two) times daily Call the [employer] for assistance with your inactive [employer] account    If you have questions, you can call [phone] to talk to our [facility] Staff. Remember, [employer] is NOT to be used for urgent needs. For medical emergencies, dial 911.     Vi